# Patient Record
Sex: FEMALE | Race: WHITE | NOT HISPANIC OR LATINO | Employment: OTHER
[De-identification: names, ages, dates, MRNs, and addresses within clinical notes are randomized per-mention and may not be internally consistent; named-entity substitution may affect disease eponyms.]

---

## 2017-01-13 ENCOUNTER — RECORDS - HEALTHEAST (OUTPATIENT)
Dept: ADMINISTRATIVE | Facility: OTHER | Age: 72
End: 2017-01-13

## 2017-01-20 ENCOUNTER — RECORDS - HEALTHEAST (OUTPATIENT)
Dept: ADMINISTRATIVE | Facility: OTHER | Age: 72
End: 2017-01-20

## 2017-02-03 ENCOUNTER — HOSPITAL ENCOUNTER (OUTPATIENT)
Dept: CT IMAGING | Facility: CLINIC | Age: 72
Discharge: HOME | End: 2017-02-03
Attending: PHYSICIAN ASSISTANT

## 2017-02-03 DIAGNOSIS — M53.3 COCCYX PAIN: ICD-10-CM

## 2017-02-06 ENCOUNTER — RECORDS - HEALTHEAST (OUTPATIENT)
Dept: ADMINISTRATIVE | Facility: OTHER | Age: 72
End: 2017-02-06

## 2017-02-07 ENCOUNTER — RECORDS - HEALTHEAST (OUTPATIENT)
Dept: ADMINISTRATIVE | Facility: OTHER | Age: 72
End: 2017-02-07

## 2017-02-16 ENCOUNTER — OFFICE VISIT - HEALTHEAST (OUTPATIENT)
Dept: INTERNAL MEDICINE | Facility: CLINIC | Age: 72
End: 2017-02-16

## 2017-02-16 DIAGNOSIS — Z01.818 PREOP EXAMINATION: ICD-10-CM

## 2017-02-16 ASSESSMENT — MIFFLIN-ST. JEOR: SCORE: 1200.29

## 2017-02-23 ENCOUNTER — RECORDS - HEALTHEAST (OUTPATIENT)
Dept: ADMINISTRATIVE | Facility: OTHER | Age: 72
End: 2017-02-23

## 2017-03-10 ENCOUNTER — RECORDS - HEALTHEAST (OUTPATIENT)
Dept: ADMINISTRATIVE | Facility: OTHER | Age: 72
End: 2017-03-10

## 2017-03-14 ENCOUNTER — RECORDS - HEALTHEAST (OUTPATIENT)
Dept: ADMINISTRATIVE | Facility: OTHER | Age: 72
End: 2017-03-14

## 2017-03-17 ENCOUNTER — AMBULATORY - HEALTHEAST (OUTPATIENT)
Dept: INTERNAL MEDICINE | Facility: CLINIC | Age: 72
End: 2017-03-17

## 2017-03-17 ENCOUNTER — COMMUNICATION - HEALTHEAST (OUTPATIENT)
Dept: INTERNAL MEDICINE | Facility: CLINIC | Age: 72
End: 2017-03-17

## 2017-03-17 ENCOUNTER — ALLIED HEALTH/NURSE VISIT (OUTPATIENT)
Dept: NURSING | Facility: CLINIC | Age: 72
End: 2017-03-17
Payer: COMMERCIAL

## 2017-03-17 DIAGNOSIS — I10 BENIGN ESSENTIAL HYPERTENSION: Primary | ICD-10-CM

## 2017-03-17 PROCEDURE — 99207 ZZC NO CHARGE NURSE ONLY: CPT

## 2017-03-17 NOTE — MR AVS SNAPSHOT
"              After Visit Summary   3/17/2017    Jenny Iglesias    MRN: [id number]           Patient Information     Date Of Birth          1945        Visit Information        Provider Department      3/17/2017 1:00 PM HP RN/TRIAGE NURSE ONLY [facility]        Today's Diagnoses     Benign essential hypertension    -  1       Follow-ups after your visit        Who to contact     If you have questions or need follow up information about today's clinic visit or your schedule please contact [facility] directly at [phone].  Normal or non-critical lab and imaging results will be communicated to you by [employer], letter or phone within 4 business days after the clinic has received the results. If you do not hear from us within 7 days, please contact the clinic through [employer] or phone. If you have a critical or abnormal lab result, we will notify you by phone as soon as possible.  Submit refill requests through [employer] or call your pharmacy and they will forward the refill request to us. Please allow 3 business days for your refill to be completed.          Additional Information About Your Visit        MyChart Information     [employer] lets you send messages to your doctor, view your test results, renew your prescriptions, schedule appointments and more. To sign up, go to [email or web address] . Click on \"Log in\" on the left side of the screen, which will take you to the Welcome page. Then click on \"Sign up Now\" on the right side of the page.     You will be asked to enter the access code listed below, as well as some personal information. Please follow the directions to create your username and password.     Your access code is: [email or web address]  Expires: 6/15/2017  1:03 PM     Your access code will  in 90 days. If you need help or a new code, please call your [facility] or [phone].        Care EveryWhere ID     This is your Care EveryWhere ID. This could be " used by other organizations to access your [facility] medical records  [id number]         Blood Pressure from Last 3 Encounters:   11/11/13 118/68    Weight from Last 3 Encounters:   No data found for Wt              Today, you had the following     No orders found for display       Primary Care Provider Office Phone # Fax #    Ramesh Canales [phone]       [facility] [address] [address]        Thank you!     Thank you for choosing [facility]  for your care. Our goal is always to provide you with excellent care. Hearing back from our patients is one way we can continue to improve our services. Please take a few minutes to complete the written survey that you may receive in the mail after your visit with us. Thank you!             Your Updated Medication List - Protect others around you: Learn how to safely use, store and throw away your medicines at www.disposemymeds.org.          This list is accurate as of: 3/17/17  1:03 PM.  Always use your most recent med list.                   Brand Name Dispense Instructions for use    ALEVE 220 MG tablet   Generic drug:  naproxen sodium      Take 1 tablet by mouth 2 times daily (with meals)       AMBIEN PO      Take 5 mg by mouth At Bedtime       CALCIUM MAGNESIUM PO      Take 1 tablet by mouth daily       CENTRUM PO      Take 1 tablet by mouth daily       estradiol 0.1 MG/24HR WK patch    CLIMARA     Place 1 patch onto the skin once a week       HYZAAR 100-25 MG per tablet   Generic drug:  losartan-hydrochlorothiazide      Take 1 tablet by mouth daily       potassium chloride 10 MEQ tablet   Generic drug:  potassium chloride      Take 1 tablet by mouth daily       SINEMET  MG per tablet   Generic drug:  carbidopa-levodopa      Take 1 tablet by mouth At Bedtime

## 2017-03-17 NOTE — PROGRESS NOTES
S-(situation): [place] due to high BP reading of 204/92. Patient accompanied by . Patient denies weakness on either side of the body, no tongue deviation, no facial drooping or slurred speech. Patient denies visula changes, HA,  chest pain , dizziness or SOB. Patient states flushed and has ringing in ears. Patient has had increased hot flashes.   Vitals: BP recheck in clinic( 1st) 168/75, (2nd)173/80,  HR 52   T:97.7 SPO2 99% RA    B-(background): Patient states has had ringing in ears and has been flushed for the last week.    A-(assessment): Patient having some stress due to insurance not covering Estradiol patches for hot flashes and hot flashes have increased.     R-(recommendations): Patient needs to f/u with Dr. Canales, PCP at [facility] for BP/HTN check and possibly have meds adjusted.  Writer huddled with Dr. Joshi and in agreement with patient f/u w/PCP next week. Writer called Dr. Canaels office and updated MD to occurrence. Patient instructed to try destressing with activities patient finds calming such as essential oils, reading, deep breathing, movies etc.  Patient in agreement with plan and verbalizes understanding.   Thanks!   Yael Lewis RN

## 2017-04-03 ENCOUNTER — COMMUNICATION - HEALTHEAST (OUTPATIENT)
Dept: INTERNAL MEDICINE | Facility: CLINIC | Age: 72
End: 2017-04-03

## 2017-04-03 ENCOUNTER — OFFICE VISIT - HEALTHEAST (OUTPATIENT)
Dept: INTERNAL MEDICINE | Facility: CLINIC | Age: 72
End: 2017-04-03

## 2017-04-03 DIAGNOSIS — R10.9 ABDOMINAL PAIN: ICD-10-CM

## 2017-04-03 DIAGNOSIS — Z00.00 PREVENTATIVE HEALTH CARE: ICD-10-CM

## 2017-04-03 DIAGNOSIS — Z00.00 ROUTINE GENERAL MEDICAL EXAMINATION AT A HEALTH CARE FACILITY: ICD-10-CM

## 2017-04-03 DIAGNOSIS — Z78.0 POSTMENOPAUSAL: ICD-10-CM

## 2017-04-03 DIAGNOSIS — I10 ESSENTIAL HYPERTENSION: ICD-10-CM

## 2017-04-03 ASSESSMENT — MIFFLIN-ST. JEOR: SCORE: 1200.84

## 2017-04-10 ENCOUNTER — RECORDS - HEALTHEAST (OUTPATIENT)
Dept: ADMINISTRATIVE | Facility: OTHER | Age: 72
End: 2017-04-10

## 2017-04-18 ENCOUNTER — RECORDS - HEALTHEAST (OUTPATIENT)
Dept: ADMINISTRATIVE | Facility: OTHER | Age: 72
End: 2017-04-18

## 2017-04-19 ENCOUNTER — RECORDS - HEALTHEAST (OUTPATIENT)
Dept: ADMINISTRATIVE | Facility: OTHER | Age: 72
End: 2017-04-19

## 2017-04-24 ENCOUNTER — COMMUNICATION - HEALTHEAST (OUTPATIENT)
Dept: INTERNAL MEDICINE | Facility: CLINIC | Age: 72
End: 2017-04-24

## 2017-04-25 ENCOUNTER — RECORDS - HEALTHEAST (OUTPATIENT)
Dept: ADMINISTRATIVE | Facility: OTHER | Age: 72
End: 2017-04-25

## 2017-04-27 ENCOUNTER — COMMUNICATION - HEALTHEAST (OUTPATIENT)
Dept: INTERNAL MEDICINE | Facility: CLINIC | Age: 72
End: 2017-04-27

## 2017-05-02 ENCOUNTER — RECORDS - HEALTHEAST (OUTPATIENT)
Dept: ADMINISTRATIVE | Facility: OTHER | Age: 72
End: 2017-05-02

## 2017-05-05 ENCOUNTER — RECORDS - HEALTHEAST (OUTPATIENT)
Dept: ADMINISTRATIVE | Facility: OTHER | Age: 72
End: 2017-05-05

## 2017-05-15 ENCOUNTER — COMMUNICATION - HEALTHEAST (OUTPATIENT)
Dept: INTERNAL MEDICINE | Facility: CLINIC | Age: 72
End: 2017-05-15

## 2017-05-23 ENCOUNTER — RECORDS - HEALTHEAST (OUTPATIENT)
Dept: ADMINISTRATIVE | Facility: OTHER | Age: 72
End: 2017-05-23

## 2017-10-11 ENCOUNTER — AMBULATORY - HEALTHEAST (OUTPATIENT)
Dept: INTERNAL MEDICINE | Facility: CLINIC | Age: 72
End: 2017-10-11

## 2017-10-11 ENCOUNTER — OFFICE VISIT - HEALTHEAST (OUTPATIENT)
Dept: INTERNAL MEDICINE | Facility: CLINIC | Age: 72
End: 2017-10-11

## 2017-10-11 DIAGNOSIS — I10 ESSENTIAL HYPERTENSION: ICD-10-CM

## 2017-10-11 DIAGNOSIS — Z23 INFLUENZA VACCINE ADMINISTERED: ICD-10-CM

## 2017-10-11 DIAGNOSIS — R42 DIZZINESS: ICD-10-CM

## 2017-10-11 DIAGNOSIS — Z00.00 ROUTINE GENERAL MEDICAL EXAMINATION AT A HEALTH CARE FACILITY: ICD-10-CM

## 2017-10-11 DIAGNOSIS — F51.04 CHRONIC INSOMNIA: ICD-10-CM

## 2017-10-11 DIAGNOSIS — N95.1 PERIMENOPAUSAL VASOMOTOR SYMPTOMS: ICD-10-CM

## 2017-10-11 DIAGNOSIS — Z00.00 MEDICARE ANNUAL WELLNESS VISIT, SUBSEQUENT: ICD-10-CM

## 2017-10-11 LAB
CHOLEST SERPL-MCNC: 200 MG/DL
FASTING STATUS PATIENT QL REPORTED: YES
HDLC SERPL-MCNC: 93 MG/DL
LDLC SERPL CALC-MCNC: 92 MG/DL
TRIGL SERPL-MCNC: 76 MG/DL

## 2017-10-11 ASSESSMENT — MIFFLIN-ST. JEOR: SCORE: 1204.26

## 2017-10-12 ENCOUNTER — COMMUNICATION - HEALTHEAST (OUTPATIENT)
Dept: INTERNAL MEDICINE | Facility: CLINIC | Age: 72
End: 2017-10-12

## 2017-10-13 ENCOUNTER — COMMUNICATION - HEALTHEAST (OUTPATIENT)
Dept: INTERNAL MEDICINE | Facility: CLINIC | Age: 72
End: 2017-10-13

## 2017-10-17 ENCOUNTER — RECORDS - HEALTHEAST (OUTPATIENT)
Dept: ADMINISTRATIVE | Facility: OTHER | Age: 72
End: 2017-10-17

## 2017-10-20 ENCOUNTER — COMMUNICATION - HEALTHEAST (OUTPATIENT)
Dept: INTERNAL MEDICINE | Facility: CLINIC | Age: 72
End: 2017-10-20

## 2017-10-23 ENCOUNTER — COMMUNICATION - HEALTHEAST (OUTPATIENT)
Dept: INTERNAL MEDICINE | Facility: CLINIC | Age: 72
End: 2017-10-23

## 2017-10-23 DIAGNOSIS — I10 ESSENTIAL HYPERTENSION: ICD-10-CM

## 2017-12-11 ENCOUNTER — COMMUNICATION - HEALTHEAST (OUTPATIENT)
Dept: INTERNAL MEDICINE | Facility: CLINIC | Age: 72
End: 2017-12-11

## 2017-12-11 DIAGNOSIS — I10 ESSENTIAL HYPERTENSION: ICD-10-CM

## 2017-12-11 DIAGNOSIS — R42 DIZZINESS: ICD-10-CM

## 2017-12-11 DIAGNOSIS — G25.81 RESTLESS LEG SYNDROME: ICD-10-CM

## 2018-01-03 ENCOUNTER — RECORDS - HEALTHEAST (OUTPATIENT)
Dept: ADMINISTRATIVE | Facility: OTHER | Age: 73
End: 2018-01-03

## 2018-01-08 ENCOUNTER — RECORDS - HEALTHEAST (OUTPATIENT)
Dept: ADMINISTRATIVE | Facility: OTHER | Age: 73
End: 2018-01-08

## 2018-01-22 ENCOUNTER — RECORDS - HEALTHEAST (OUTPATIENT)
Dept: ADMINISTRATIVE | Facility: OTHER | Age: 73
End: 2018-01-22

## 2018-01-30 ENCOUNTER — RECORDS - HEALTHEAST (OUTPATIENT)
Dept: ADMINISTRATIVE | Facility: OTHER | Age: 73
End: 2018-01-30

## 2018-02-12 ENCOUNTER — RECORDS - HEALTHEAST (OUTPATIENT)
Dept: ADMINISTRATIVE | Facility: OTHER | Age: 73
End: 2018-02-12

## 2018-02-17 ENCOUNTER — COMMUNICATION - HEALTHEAST (OUTPATIENT)
Dept: INTERNAL MEDICINE | Facility: CLINIC | Age: 73
End: 2018-02-17

## 2018-02-19 ENCOUNTER — COMMUNICATION - HEALTHEAST (OUTPATIENT)
Dept: INTERNAL MEDICINE | Facility: CLINIC | Age: 73
End: 2018-02-19

## 2018-02-19 DIAGNOSIS — R42 DIZZINESS: ICD-10-CM

## 2018-03-12 ENCOUNTER — RECORDS - HEALTHEAST (OUTPATIENT)
Dept: ADMINISTRATIVE | Facility: OTHER | Age: 73
End: 2018-03-12

## 2018-03-22 ENCOUNTER — RECORDS - HEALTHEAST (OUTPATIENT)
Dept: ADMINISTRATIVE | Facility: OTHER | Age: 73
End: 2018-03-22

## 2018-04-19 ENCOUNTER — RECORDS - HEALTHEAST (OUTPATIENT)
Dept: ADMINISTRATIVE | Facility: OTHER | Age: 73
End: 2018-04-19

## 2018-05-01 ENCOUNTER — RECORDS - HEALTHEAST (OUTPATIENT)
Dept: ADMINISTRATIVE | Facility: OTHER | Age: 73
End: 2018-05-01

## 2018-05-04 ENCOUNTER — RECORDS - HEALTHEAST (OUTPATIENT)
Dept: ADMINISTRATIVE | Facility: OTHER | Age: 73
End: 2018-05-04

## 2018-05-14 ENCOUNTER — COMMUNICATION - HEALTHEAST (OUTPATIENT)
Dept: INTERNAL MEDICINE | Facility: CLINIC | Age: 73
End: 2018-05-14

## 2018-05-14 DIAGNOSIS — R42 DIZZINESS: ICD-10-CM

## 2018-05-14 DIAGNOSIS — F51.04 CHRONIC INSOMNIA: ICD-10-CM

## 2018-05-17 ENCOUNTER — RECORDS - HEALTHEAST (OUTPATIENT)
Dept: ADMINISTRATIVE | Facility: OTHER | Age: 73
End: 2018-05-17

## 2018-07-16 ENCOUNTER — COMMUNICATION - HEALTHEAST (OUTPATIENT)
Dept: INTERNAL MEDICINE | Facility: CLINIC | Age: 73
End: 2018-07-16

## 2018-08-15 ENCOUNTER — COMMUNICATION - HEALTHEAST (OUTPATIENT)
Dept: INTERNAL MEDICINE | Facility: CLINIC | Age: 73
End: 2018-08-15

## 2018-08-15 DIAGNOSIS — F51.04 CHRONIC INSOMNIA: ICD-10-CM

## 2018-08-15 DIAGNOSIS — R42 DIZZINESS: ICD-10-CM

## 2018-09-25 ENCOUNTER — RECORDS - HEALTHEAST (OUTPATIENT)
Dept: ADMINISTRATIVE | Facility: OTHER | Age: 73
End: 2018-09-25

## 2018-10-12 ENCOUNTER — COMMUNICATION - HEALTHEAST (OUTPATIENT)
Dept: INTERNAL MEDICINE | Facility: CLINIC | Age: 73
End: 2018-10-12

## 2018-10-12 ENCOUNTER — RECORDS - HEALTHEAST (OUTPATIENT)
Dept: ADMINISTRATIVE | Facility: OTHER | Age: 73
End: 2018-10-12

## 2018-10-12 ENCOUNTER — OFFICE VISIT - HEALTHEAST (OUTPATIENT)
Dept: INTERNAL MEDICINE | Facility: CLINIC | Age: 73
End: 2018-10-12

## 2018-10-12 DIAGNOSIS — M53.3 COCCYDYNIA: ICD-10-CM

## 2018-10-12 DIAGNOSIS — I10 ESSENTIAL HYPERTENSION: ICD-10-CM

## 2018-10-12 DIAGNOSIS — N95.1 PERIMENOPAUSAL VASOMOTOR SYMPTOMS: ICD-10-CM

## 2018-10-12 DIAGNOSIS — Z00.00 MEDICARE ANNUAL WELLNESS VISIT, SUBSEQUENT: ICD-10-CM

## 2018-10-12 DIAGNOSIS — R42 DIZZINESS: ICD-10-CM

## 2018-10-12 DIAGNOSIS — Z23 NEED FOR PROPHYLACTIC VACCINATION AND INOCULATION AGAINST INFLUENZA: ICD-10-CM

## 2018-10-12 DIAGNOSIS — F51.04 CHRONIC INSOMNIA: ICD-10-CM

## 2018-10-12 DIAGNOSIS — H81.10 BPV (BENIGN POSITIONAL VERTIGO): ICD-10-CM

## 2018-10-12 DIAGNOSIS — G25.81 RESTLESS LEG SYNDROME: ICD-10-CM

## 2018-10-12 LAB
ALBUMIN SERPL-MCNC: 3.7 G/DL (ref 3.5–5)
ALBUMIN UR-MCNC: NEGATIVE MG/DL
ALP SERPL-CCNC: 118 U/L (ref 45–120)
ALT SERPL W P-5'-P-CCNC: 16 U/L (ref 0–45)
ANION GAP SERPL CALCULATED.3IONS-SCNC: 10 MMOL/L (ref 5–18)
APPEARANCE UR: CLEAR
AST SERPL W P-5'-P-CCNC: 20 U/L (ref 0–40)
ATRIAL RATE - MUSE: 51 BPM
BASOPHILS # BLD AUTO: 0.1 THOU/UL (ref 0–0.2)
BASOPHILS NFR BLD AUTO: 1 % (ref 0–2)
BILIRUB DIRECT SERPL-MCNC: 0.2 MG/DL
BILIRUB SERPL-MCNC: 0.5 MG/DL (ref 0–1)
BILIRUB UR QL STRIP: NEGATIVE
BUN SERPL-MCNC: 20 MG/DL (ref 8–28)
CALCIUM SERPL-MCNC: 10.6 MG/DL (ref 8.5–10.5)
CHLORIDE BLD-SCNC: 104 MMOL/L (ref 98–107)
CHOLEST SERPL-MCNC: 205 MG/DL
CO2 SERPL-SCNC: 28 MMOL/L (ref 22–31)
COLOR UR AUTO: YELLOW
CREAT SERPL-MCNC: 1 MG/DL (ref 0.6–1.1)
DIASTOLIC BLOOD PRESSURE - MUSE: NORMAL MMHG
EOSINOPHIL # BLD AUTO: 0.3 THOU/UL (ref 0–0.4)
EOSINOPHIL NFR BLD AUTO: 3 % (ref 0–6)
ERYTHROCYTE [DISTWIDTH] IN BLOOD BY AUTOMATED COUNT: 13.1 % (ref 11–14.5)
ERYTHROCYTE [SEDIMENTATION RATE] IN BLOOD BY WESTERGREN METHOD: 5 MM/HR (ref 0–20)
FASTING STATUS PATIENT QL REPORTED: ABNORMAL
GFR SERPL CREATININE-BSD FRML MDRD: 54 ML/MIN/1.73M2
GLUCOSE BLD-MCNC: 91 MG/DL (ref 70–125)
GLUCOSE UR STRIP-MCNC: NEGATIVE MG/DL
HCT VFR BLD AUTO: 46.9 % (ref 35–47)
HDLC SERPL-MCNC: 91 MG/DL
HGB BLD-MCNC: 15.6 G/DL (ref 12–16)
HGB UR QL STRIP: NEGATIVE
INTERPRETATION ECG - MUSE: NORMAL
KETONES UR STRIP-MCNC: NEGATIVE MG/DL
LDLC SERPL CALC-MCNC: 99 MG/DL
LEUKOCYTE ESTERASE UR QL STRIP: NEGATIVE
LYMPHOCYTES # BLD AUTO: 2.9 THOU/UL (ref 0.8–4.4)
LYMPHOCYTES NFR BLD AUTO: 36 % (ref 20–40)
MCH RBC QN AUTO: 31.5 PG (ref 27–34)
MCHC RBC AUTO-ENTMCNC: 33.3 G/DL (ref 32–36)
MCV RBC AUTO: 95 FL (ref 80–100)
MONOCYTES # BLD AUTO: 0.5 THOU/UL (ref 0–0.9)
MONOCYTES NFR BLD AUTO: 7 % (ref 2–10)
NEUTROPHILS # BLD AUTO: 4.3 THOU/UL (ref 2–7.7)
NEUTROPHILS NFR BLD AUTO: 53 % (ref 50–70)
NITRATE UR QL: NEGATIVE
P AXIS - MUSE: 73 DEGREES
PH UR STRIP: 7 [PH] (ref 5–8)
PLATELET # BLD AUTO: 223 THOU/UL (ref 140–440)
PMV BLD AUTO: 8.1 FL (ref 7–10)
POTASSIUM BLD-SCNC: 5.4 MMOL/L (ref 3.5–5)
PR INTERVAL - MUSE: 168 MS
PROT SERPL-MCNC: 6.6 G/DL (ref 6–8)
QRS DURATION - MUSE: 84 MS
QT - MUSE: 438 MS
QTC - MUSE: 403 MS
R AXIS - MUSE: 20 DEGREES
RBC # BLD AUTO: 4.96 MILL/UL (ref 3.8–5.4)
SODIUM SERPL-SCNC: 142 MMOL/L (ref 136–145)
SP GR UR STRIP: 1.02 (ref 1–1.03)
SYSTOLIC BLOOD PRESSURE - MUSE: NORMAL MMHG
T AXIS - MUSE: 25 DEGREES
TRIGL SERPL-MCNC: 75 MG/DL
TSH SERPL DL<=0.005 MIU/L-ACNC: 1.67 UIU/ML (ref 0.3–5)
UROBILINOGEN UR STRIP-ACNC: NORMAL
VENTRICULAR RATE- MUSE: 51 BPM
WBC: 8.1 THOU/UL (ref 4–11)

## 2018-10-12 ASSESSMENT — MIFFLIN-ST. JEOR: SCORE: 1204.26

## 2018-10-15 LAB — 25(OH)D3 SERPL-MCNC: 39.1 NG/ML (ref 30–80)

## 2018-10-30 ENCOUNTER — RECORDS - HEALTHEAST (OUTPATIENT)
Dept: ADMINISTRATIVE | Facility: OTHER | Age: 73
End: 2018-10-30

## 2018-11-05 ENCOUNTER — COMMUNICATION - HEALTHEAST (OUTPATIENT)
Dept: INTERNAL MEDICINE | Facility: CLINIC | Age: 73
End: 2018-11-05

## 2018-11-05 DIAGNOSIS — M53.3 COCCYDYNIA: ICD-10-CM

## 2018-11-09 ENCOUNTER — RECORDS - HEALTHEAST (OUTPATIENT)
Dept: MAMMOGRAPHY | Facility: CLINIC | Age: 73
End: 2018-11-09

## 2018-11-09 DIAGNOSIS — Z12.31 ENCOUNTER FOR SCREENING MAMMOGRAM FOR MALIGNANT NEOPLASM OF BREAST: ICD-10-CM

## 2018-11-14 ENCOUNTER — RECORDS - HEALTHEAST (OUTPATIENT)
Dept: RADIOLOGY | Facility: CLINIC | Age: 73
End: 2018-11-14

## 2018-11-14 ENCOUNTER — RECORDS - HEALTHEAST (OUTPATIENT)
Dept: ADMINISTRATIVE | Facility: OTHER | Age: 73
End: 2018-11-14

## 2018-11-27 ENCOUNTER — RECORDS - HEALTHEAST (OUTPATIENT)
Dept: ADMINISTRATIVE | Facility: OTHER | Age: 73
End: 2018-11-27

## 2018-12-31 ENCOUNTER — RECORDS - HEALTHEAST (OUTPATIENT)
Dept: ADMINISTRATIVE | Facility: OTHER | Age: 73
End: 2018-12-31

## 2019-01-15 ENCOUNTER — RECORDS - HEALTHEAST (OUTPATIENT)
Dept: ADMINISTRATIVE | Facility: OTHER | Age: 74
End: 2019-01-15

## 2019-02-26 ENCOUNTER — RECORDS - HEALTHEAST (OUTPATIENT)
Dept: ADMINISTRATIVE | Facility: OTHER | Age: 74
End: 2019-02-26

## 2019-03-15 ENCOUNTER — OFFICE VISIT - HEALTHEAST (OUTPATIENT)
Dept: INTERNAL MEDICINE | Facility: CLINIC | Age: 74
End: 2019-03-15

## 2019-03-15 ENCOUNTER — AMBULATORY - HEALTHEAST (OUTPATIENT)
Dept: INTERNAL MEDICINE | Facility: CLINIC | Age: 74
End: 2019-03-15

## 2019-03-15 DIAGNOSIS — N95.1 PERIMENOPAUSAL VASOMOTOR SYMPTOMS: ICD-10-CM

## 2019-03-15 DIAGNOSIS — F51.04 CHRONIC INSOMNIA: ICD-10-CM

## 2019-03-15 DIAGNOSIS — M53.3 COCCYDYNIA: ICD-10-CM

## 2019-03-15 DIAGNOSIS — I10 ESSENTIAL HYPERTENSION: ICD-10-CM

## 2019-03-15 LAB
ALBUMIN SERPL-MCNC: 4 G/DL (ref 3.5–5)
ANION GAP SERPL CALCULATED.3IONS-SCNC: 13 MMOL/L (ref 5–18)
BUN SERPL-MCNC: 21 MG/DL (ref 8–28)
CALCIUM SERPL-MCNC: 10.9 MG/DL (ref 8.5–10.5)
CHLORIDE BLD-SCNC: 103 MMOL/L (ref 98–107)
CO2 SERPL-SCNC: 25 MMOL/L (ref 22–31)
CREAT SERPL-MCNC: 1.19 MG/DL (ref 0.6–1.1)
GFR SERPL CREATININE-BSD FRML MDRD: 44 ML/MIN/1.73M2
GLUCOSE BLD-MCNC: 142 MG/DL (ref 70–125)
PHOSPHATE SERPL-MCNC: 2.4 MG/DL (ref 2.5–4.5)
POTASSIUM BLD-SCNC: 4.4 MMOL/L (ref 3.5–5)
SODIUM SERPL-SCNC: 141 MMOL/L (ref 136–145)

## 2019-03-15 ASSESSMENT — MIFFLIN-ST. JEOR: SCORE: 1186.48

## 2019-03-17 ENCOUNTER — COMMUNICATION - HEALTHEAST (OUTPATIENT)
Dept: INTERNAL MEDICINE | Facility: CLINIC | Age: 74
End: 2019-03-17

## 2019-03-17 DIAGNOSIS — N95.1 PERIMENOPAUSAL VASOMOTOR SYMPTOMS: ICD-10-CM

## 2019-03-20 ENCOUNTER — RECORDS - HEALTHEAST (OUTPATIENT)
Dept: ADMINISTRATIVE | Facility: OTHER | Age: 74
End: 2019-03-20

## 2019-04-01 ENCOUNTER — COMMUNICATION - HEALTHEAST (OUTPATIENT)
Dept: INTERNAL MEDICINE | Facility: CLINIC | Age: 74
End: 2019-04-01

## 2019-04-01 DIAGNOSIS — R42 DIZZINESS: ICD-10-CM

## 2019-04-01 DIAGNOSIS — F51.04 CHRONIC INSOMNIA: ICD-10-CM

## 2019-04-24 ENCOUNTER — RECORDS - HEALTHEAST (OUTPATIENT)
Dept: ADMINISTRATIVE | Facility: OTHER | Age: 74
End: 2019-04-24

## 2019-05-06 ENCOUNTER — COMMUNICATION - HEALTHEAST (OUTPATIENT)
Dept: INTERNAL MEDICINE | Facility: CLINIC | Age: 74
End: 2019-05-06

## 2019-05-06 DIAGNOSIS — I65.29 CAROTID STENOSIS, ASYMPTOMATIC: ICD-10-CM

## 2019-06-06 ENCOUNTER — COMMUNICATION - HEALTHEAST (OUTPATIENT)
Dept: INTERNAL MEDICINE | Facility: CLINIC | Age: 74
End: 2019-06-06

## 2019-06-06 DIAGNOSIS — R42 DIZZINESS: ICD-10-CM

## 2019-06-10 ENCOUNTER — COMMUNICATION - HEALTHEAST (OUTPATIENT)
Dept: SCHEDULING | Facility: CLINIC | Age: 74
End: 2019-06-10

## 2019-06-10 DIAGNOSIS — R42 DIZZINESS: ICD-10-CM

## 2019-06-11 ENCOUNTER — COMMUNICATION - HEALTHEAST (OUTPATIENT)
Dept: INTERNAL MEDICINE | Facility: CLINIC | Age: 74
End: 2019-06-11

## 2019-06-11 DIAGNOSIS — R42 DIZZINESS: ICD-10-CM

## 2019-07-05 ENCOUNTER — COMMUNICATION - HEALTHEAST (OUTPATIENT)
Dept: INTERNAL MEDICINE | Facility: CLINIC | Age: 74
End: 2019-07-05

## 2019-07-05 DIAGNOSIS — F51.04 CHRONIC INSOMNIA: ICD-10-CM

## 2019-09-08 ENCOUNTER — RECORDS - HEALTHEAST (OUTPATIENT)
Dept: ADMINISTRATIVE | Facility: OTHER | Age: 74
End: 2019-09-08

## 2019-09-08 LAB — HBA1C MFR BLD: 6 % (ref 4–5.6)

## 2019-09-09 ENCOUNTER — RECORDS - HEALTHEAST (OUTPATIENT)
Dept: ADMINISTRATIVE | Facility: OTHER | Age: 74
End: 2019-09-09

## 2019-09-10 ENCOUNTER — RECORDS - HEALTHEAST (OUTPATIENT)
Dept: ADMINISTRATIVE | Facility: OTHER | Age: 74
End: 2019-09-10

## 2019-09-30 ENCOUNTER — RECORDS - HEALTHEAST (OUTPATIENT)
Dept: HEALTH INFORMATION MANAGEMENT | Facility: CLINIC | Age: 74
End: 2019-09-30

## 2019-10-16 ENCOUNTER — COMMUNICATION - HEALTHEAST (OUTPATIENT)
Dept: INTERNAL MEDICINE | Facility: CLINIC | Age: 74
End: 2019-10-16

## 2019-10-16 ENCOUNTER — RECORDS - HEALTHEAST (OUTPATIENT)
Dept: GENERAL RADIOLOGY | Facility: CLINIC | Age: 74
End: 2019-10-16

## 2019-10-16 ENCOUNTER — OFFICE VISIT - HEALTHEAST (OUTPATIENT)
Dept: INTERNAL MEDICINE | Facility: CLINIC | Age: 74
End: 2019-10-16

## 2019-10-16 DIAGNOSIS — Z23 FLU VACCINE NEED: ICD-10-CM

## 2019-10-16 DIAGNOSIS — G89.29 CHRONIC BILATERAL LOW BACK PAIN WITHOUT SCIATICA: ICD-10-CM

## 2019-10-16 DIAGNOSIS — N95.1 PERIMENOPAUSAL VASOMOTOR SYMPTOMS: ICD-10-CM

## 2019-10-16 DIAGNOSIS — M89.9 BONE DISORDER: ICD-10-CM

## 2019-10-16 DIAGNOSIS — I10 ESSENTIAL HYPERTENSION: ICD-10-CM

## 2019-10-16 DIAGNOSIS — R23.2 VASOMOTOR FLUSHING: ICD-10-CM

## 2019-10-16 DIAGNOSIS — R91.8 PULMONARY NODULES: ICD-10-CM

## 2019-10-16 DIAGNOSIS — G45.9 TIA (TRANSIENT ISCHEMIC ATTACK): ICD-10-CM

## 2019-10-16 DIAGNOSIS — F51.04 CHRONIC INSOMNIA: ICD-10-CM

## 2019-10-16 DIAGNOSIS — I65.23 ASYMPTOMATIC BILATERAL CAROTID ARTERY STENOSIS: ICD-10-CM

## 2019-10-16 DIAGNOSIS — Z00.00 MEDICARE ANNUAL WELLNESS VISIT, SUBSEQUENT: ICD-10-CM

## 2019-10-16 DIAGNOSIS — E78.5 HYPERLIPIDEMIA, UNSPECIFIED HYPERLIPIDEMIA TYPE: ICD-10-CM

## 2019-10-16 DIAGNOSIS — N18.30 CKD (CHRONIC KIDNEY DISEASE) STAGE 3, GFR 30-59 ML/MIN (H): ICD-10-CM

## 2019-10-16 DIAGNOSIS — M54.50 CHRONIC BILATERAL LOW BACK PAIN WITHOUT SCIATICA: ICD-10-CM

## 2019-10-16 DIAGNOSIS — R91.8 OTHER NONSPECIFIC ABNORMAL FINDING OF LUNG FIELD: ICD-10-CM

## 2019-10-16 LAB
ALBUMIN UR-MCNC: NEGATIVE MG/DL
ANION GAP SERPL CALCULATED.3IONS-SCNC: 8 MMOL/L (ref 5–18)
APPEARANCE UR: CLEAR
BACTERIA #/AREA URNS HPF: ABNORMAL HPF
BILIRUB UR QL STRIP: NEGATIVE
BUN SERPL-MCNC: 15 MG/DL (ref 8–28)
CALCIUM SERPL-MCNC: 10.8 MG/DL (ref 8.5–10.5)
CHLORIDE BLD-SCNC: 103 MMOL/L (ref 98–107)
CO2 SERPL-SCNC: 29 MMOL/L (ref 22–31)
COLOR UR AUTO: ABNORMAL
CREAT SERPL-MCNC: 1.14 MG/DL (ref 0.6–1.1)
GFR SERPL CREATININE-BSD FRML MDRD: 46 ML/MIN/1.73M2
GLUCOSE BLD-MCNC: 95 MG/DL (ref 70–125)
GLUCOSE UR STRIP-MCNC: NEGATIVE MG/DL
HGB UR QL STRIP: NEGATIVE
KETONES UR STRIP-MCNC: NEGATIVE MG/DL
LEUKOCYTE ESTERASE UR QL STRIP: ABNORMAL
MUCOUS THREADS #/AREA URNS LPF: ABNORMAL LPF
NITRATE UR QL: NEGATIVE
PH UR STRIP: 5 [PH] (ref 4.5–8)
POTASSIUM BLD-SCNC: 4.5 MMOL/L (ref 3.5–5)
RBC #/AREA URNS AUTO: ABNORMAL HPF
SODIUM SERPL-SCNC: 140 MMOL/L (ref 136–145)
SP GR UR STRIP: 1.01 (ref 1–1.03)
SQUAMOUS #/AREA URNS AUTO: ABNORMAL LPF
TSH SERPL DL<=0.005 MIU/L-ACNC: 1.15 UIU/ML (ref 0.3–5)
UROBILINOGEN UR STRIP-ACNC: ABNORMAL
WBC #/AREA URNS AUTO: ABNORMAL HPF

## 2019-10-16 ASSESSMENT — MIFFLIN-ST. JEOR: SCORE: 1164

## 2019-10-17 LAB
25(OH)D3 SERPL-MCNC: 48.6 NG/ML (ref 30–80)
25(OH)D3 SERPL-MCNC: 48.6 NG/ML (ref 30–80)
BACTERIA SPEC CULT: NORMAL

## 2019-10-18 ENCOUNTER — COMMUNICATION - HEALTHEAST (OUTPATIENT)
Dept: INTERNAL MEDICINE | Facility: CLINIC | Age: 74
End: 2019-10-18

## 2019-10-22 ENCOUNTER — COMMUNICATION - HEALTHEAST (OUTPATIENT)
Dept: INTERNAL MEDICINE | Facility: CLINIC | Age: 74
End: 2019-10-22

## 2019-10-23 ENCOUNTER — AMBULATORY - HEALTHEAST (OUTPATIENT)
Dept: INTERNAL MEDICINE | Facility: CLINIC | Age: 74
End: 2019-10-23

## 2019-10-23 DIAGNOSIS — N95.1 PERIMENOPAUSAL VASOMOTOR SYMPTOMS: ICD-10-CM

## 2019-10-31 ENCOUNTER — RECORDS - HEALTHEAST (OUTPATIENT)
Dept: ADMINISTRATIVE | Facility: OTHER | Age: 74
End: 2019-10-31

## 2019-11-08 ENCOUNTER — RECORDS - HEALTHEAST (OUTPATIENT)
Dept: ADMINISTRATIVE | Facility: OTHER | Age: 74
End: 2019-11-08

## 2019-11-13 ENCOUNTER — COMMUNICATION - HEALTHEAST (OUTPATIENT)
Dept: INTERNAL MEDICINE | Facility: CLINIC | Age: 74
End: 2019-11-13

## 2019-11-14 ENCOUNTER — AMBULATORY - HEALTHEAST (OUTPATIENT)
Dept: INTERNAL MEDICINE | Facility: CLINIC | Age: 74
End: 2019-11-14

## 2019-11-14 DIAGNOSIS — N18.30 CKD (CHRONIC KIDNEY DISEASE) STAGE 3, GFR 30-59 ML/MIN (H): ICD-10-CM

## 2019-11-14 DIAGNOSIS — G89.29 CHRONIC BILATERAL LOW BACK PAIN WITHOUT SCIATICA: ICD-10-CM

## 2019-11-14 DIAGNOSIS — M54.50 CHRONIC BILATERAL LOW BACK PAIN WITHOUT SCIATICA: ICD-10-CM

## 2019-11-26 ENCOUNTER — RECORDS - HEALTHEAST (OUTPATIENT)
Dept: ADMINISTRATIVE | Facility: OTHER | Age: 74
End: 2019-11-26

## 2019-11-27 ENCOUNTER — COMMUNICATION - HEALTHEAST (OUTPATIENT)
Dept: INTERNAL MEDICINE | Facility: CLINIC | Age: 74
End: 2019-11-27

## 2019-12-10 ENCOUNTER — OFFICE VISIT - HEALTHEAST (OUTPATIENT)
Dept: INTERNAL MEDICINE | Facility: CLINIC | Age: 74
End: 2019-12-10

## 2019-12-10 DIAGNOSIS — M19.90 ARTHRITIS: ICD-10-CM

## 2019-12-10 DIAGNOSIS — Z01.818 PREOP EXAM FOR INTERNAL MEDICINE: ICD-10-CM

## 2019-12-10 DIAGNOSIS — M54.59 INTRACTABLE LOW BACK PAIN: ICD-10-CM

## 2019-12-10 DIAGNOSIS — I10 ESSENTIAL HYPERTENSION: ICD-10-CM

## 2019-12-10 LAB
ANION GAP SERPL CALCULATED.3IONS-SCNC: 7 MMOL/L (ref 5–18)
BUN SERPL-MCNC: 12 MG/DL (ref 8–28)
CALCIUM SERPL-MCNC: 9.9 MG/DL (ref 8.5–10.5)
CHLORIDE BLD-SCNC: 101 MMOL/L (ref 98–107)
CO2 SERPL-SCNC: 31 MMOL/L (ref 22–31)
CREAT SERPL-MCNC: 1.03 MG/DL (ref 0.6–1.1)
GFR SERPL CREATININE-BSD FRML MDRD: 52 ML/MIN/1.73M2
GLUCOSE BLD-MCNC: 91 MG/DL (ref 70–125)
POTASSIUM BLD-SCNC: 4 MMOL/L (ref 3.5–5)
SODIUM SERPL-SCNC: 139 MMOL/L (ref 136–145)

## 2019-12-10 ASSESSMENT — MIFFLIN-ST. JEOR: SCORE: 1168.54

## 2019-12-11 ENCOUNTER — COMMUNICATION - HEALTHEAST (OUTPATIENT)
Dept: INTERNAL MEDICINE | Facility: CLINIC | Age: 74
End: 2019-12-11

## 2020-01-09 ENCOUNTER — OFFICE VISIT - HEALTHEAST (OUTPATIENT)
Dept: INTERNAL MEDICINE | Facility: CLINIC | Age: 75
End: 2020-01-09

## 2020-01-09 DIAGNOSIS — G89.29 CHRONIC BILATERAL LOW BACK PAIN WITHOUT SCIATICA: ICD-10-CM

## 2020-01-09 DIAGNOSIS — N18.2 CKD (CHRONIC KIDNEY DISEASE) STAGE 2, GFR 60-89 ML/MIN: ICD-10-CM

## 2020-01-09 DIAGNOSIS — M54.50 CHRONIC BILATERAL LOW BACK PAIN WITHOUT SCIATICA: ICD-10-CM

## 2020-01-09 DIAGNOSIS — F51.04 CHRONIC INSOMNIA: ICD-10-CM

## 2020-01-09 DIAGNOSIS — I10 ESSENTIAL HYPERTENSION: ICD-10-CM

## 2020-01-09 ASSESSMENT — MIFFLIN-ST. JEOR: SCORE: 1173.44

## 2020-01-10 ENCOUNTER — COMMUNICATION - HEALTHEAST (OUTPATIENT)
Dept: INTERNAL MEDICINE | Facility: CLINIC | Age: 75
End: 2020-01-10

## 2020-01-10 DIAGNOSIS — F51.04 CHRONIC INSOMNIA: ICD-10-CM

## 2020-01-10 DIAGNOSIS — R42 DIZZINESS: ICD-10-CM

## 2020-01-13 ENCOUNTER — COMMUNICATION - HEALTHEAST (OUTPATIENT)
Dept: INTERNAL MEDICINE | Facility: CLINIC | Age: 75
End: 2020-01-13

## 2020-01-13 DIAGNOSIS — G25.81 RESTLESS LEG SYNDROME: ICD-10-CM

## 2020-01-16 ENCOUNTER — COMMUNICATION - HEALTHEAST (OUTPATIENT)
Dept: INTERNAL MEDICINE | Facility: CLINIC | Age: 75
End: 2020-01-16

## 2020-01-20 ENCOUNTER — COMMUNICATION - HEALTHEAST (OUTPATIENT)
Dept: INTERNAL MEDICINE | Facility: CLINIC | Age: 75
End: 2020-01-20

## 2020-02-11 ENCOUNTER — OFFICE VISIT - HEALTHEAST (OUTPATIENT)
Dept: INTERNAL MEDICINE | Facility: CLINIC | Age: 75
End: 2020-02-11

## 2020-02-11 DIAGNOSIS — Z01.818 PREOP EXAM FOR INTERNAL MEDICINE: ICD-10-CM

## 2020-02-11 DIAGNOSIS — G89.29 CHRONIC INTRACTABLE PAIN: ICD-10-CM

## 2020-02-11 DIAGNOSIS — I10 ESSENTIAL HYPERTENSION: ICD-10-CM

## 2020-02-11 ASSESSMENT — MIFFLIN-ST. JEOR: SCORE: 1186.68

## 2020-02-21 ENCOUNTER — COMMUNICATION - HEALTHEAST (OUTPATIENT)
Dept: INTERNAL MEDICINE | Facility: CLINIC | Age: 75
End: 2020-02-21

## 2020-02-21 ENCOUNTER — OFFICE VISIT - HEALTHEAST (OUTPATIENT)
Dept: INTERNAL MEDICINE | Facility: CLINIC | Age: 75
End: 2020-02-21

## 2020-02-21 ENCOUNTER — RECORDS - HEALTHEAST (OUTPATIENT)
Dept: ADMINISTRATIVE | Facility: OTHER | Age: 75
End: 2020-02-21

## 2020-02-21 DIAGNOSIS — R42 VERTIGO: ICD-10-CM

## 2020-02-21 ASSESSMENT — MIFFLIN-ST. JEOR: SCORE: 1164

## 2020-03-02 ENCOUNTER — RECORDS - HEALTHEAST (OUTPATIENT)
Dept: ADMINISTRATIVE | Facility: OTHER | Age: 75
End: 2020-03-02

## 2020-03-04 ENCOUNTER — AMBULATORY - HEALTHEAST (OUTPATIENT)
Dept: CARE COORDINATION | Facility: CLINIC | Age: 75
End: 2020-03-04

## 2020-03-04 DIAGNOSIS — I65.29 CAROTID STENOSIS, ASYMPTOMATIC: ICD-10-CM

## 2020-03-04 DIAGNOSIS — I10 ESSENTIAL HYPERTENSION: ICD-10-CM

## 2020-03-05 ENCOUNTER — COMMUNICATION - HEALTHEAST (OUTPATIENT)
Dept: NURSING | Facility: CLINIC | Age: 75
End: 2020-03-05

## 2020-04-11 ENCOUNTER — COMMUNICATION - HEALTHEAST (OUTPATIENT)
Dept: INTERNAL MEDICINE | Facility: CLINIC | Age: 75
End: 2020-04-11

## 2020-04-11 DIAGNOSIS — N18.2 CKD (CHRONIC KIDNEY DISEASE) STAGE 2, GFR 60-89 ML/MIN: ICD-10-CM

## 2020-04-11 DIAGNOSIS — M54.50 CHRONIC BILATERAL LOW BACK PAIN WITHOUT SCIATICA: ICD-10-CM

## 2020-04-11 DIAGNOSIS — I10 ESSENTIAL HYPERTENSION: ICD-10-CM

## 2020-04-11 DIAGNOSIS — G89.29 CHRONIC BILATERAL LOW BACK PAIN WITHOUT SCIATICA: ICD-10-CM

## 2020-04-11 DIAGNOSIS — R42 DIZZINESS: ICD-10-CM

## 2020-05-26 ENCOUNTER — RECORDS - HEALTHEAST (OUTPATIENT)
Dept: ADMINISTRATIVE | Facility: OTHER | Age: 75
End: 2020-05-26

## 2020-07-07 ENCOUNTER — COMMUNICATION - HEALTHEAST (OUTPATIENT)
Dept: INTERNAL MEDICINE | Facility: CLINIC | Age: 75
End: 2020-07-07

## 2020-07-07 DIAGNOSIS — N18.2 CKD (CHRONIC KIDNEY DISEASE) STAGE 2, GFR 60-89 ML/MIN: ICD-10-CM

## 2020-07-07 DIAGNOSIS — I10 ESSENTIAL HYPERTENSION: ICD-10-CM

## 2020-07-07 DIAGNOSIS — M54.50 CHRONIC BILATERAL LOW BACK PAIN WITHOUT SCIATICA: ICD-10-CM

## 2020-07-07 DIAGNOSIS — I65.29 CAROTID STENOSIS, ASYMPTOMATIC: ICD-10-CM

## 2020-07-07 DIAGNOSIS — R42 DIZZINESS: ICD-10-CM

## 2020-07-07 DIAGNOSIS — F51.04 CHRONIC INSOMNIA: ICD-10-CM

## 2020-07-07 DIAGNOSIS — G89.29 CHRONIC BILATERAL LOW BACK PAIN WITHOUT SCIATICA: ICD-10-CM

## 2020-07-07 DIAGNOSIS — G25.81 RESTLESS LEG SYNDROME: ICD-10-CM

## 2020-07-20 ENCOUNTER — COMMUNICATION - HEALTHEAST (OUTPATIENT)
Dept: INTERNAL MEDICINE | Facility: CLINIC | Age: 75
End: 2020-07-20

## 2020-07-20 ENCOUNTER — RECORDS - HEALTHEAST (OUTPATIENT)
Dept: INTERNAL MEDICINE | Facility: CLINIC | Age: 75
End: 2020-07-20

## 2020-07-20 DIAGNOSIS — I10 ESSENTIAL HYPERTENSION: ICD-10-CM

## 2020-07-28 ENCOUNTER — RECORDS - HEALTHEAST (OUTPATIENT)
Dept: ADMINISTRATIVE | Facility: OTHER | Age: 75
End: 2020-07-28

## 2020-09-22 ENCOUNTER — RECORDS - HEALTHEAST (OUTPATIENT)
Dept: ADMINISTRATIVE | Facility: OTHER | Age: 75
End: 2020-09-22

## 2020-09-28 ENCOUNTER — AMBULATORY - HEALTHEAST (OUTPATIENT)
Dept: INTERNAL MEDICINE | Facility: CLINIC | Age: 75
End: 2020-09-28

## 2020-09-28 ENCOUNTER — COMMUNICATION - HEALTHEAST (OUTPATIENT)
Dept: LAB | Facility: CLINIC | Age: 75
End: 2020-09-28

## 2020-09-28 DIAGNOSIS — E78.2 MIXED HYPERLIPIDEMIA: ICD-10-CM

## 2020-09-28 DIAGNOSIS — E55.9 VITAMIN D INSUFFICIENCY: ICD-10-CM

## 2020-10-07 ENCOUNTER — OFFICE VISIT - HEALTHEAST (OUTPATIENT)
Dept: INTERNAL MEDICINE | Facility: CLINIC | Age: 75
End: 2020-10-07

## 2020-10-07 DIAGNOSIS — I65.23 ASYMPTOMATIC BILATERAL CAROTID ARTERY STENOSIS: ICD-10-CM

## 2020-10-07 DIAGNOSIS — R42 VERTIGO: ICD-10-CM

## 2020-10-07 DIAGNOSIS — F51.04 CHRONIC INSOMNIA: ICD-10-CM

## 2020-10-07 DIAGNOSIS — I10 ESSENTIAL HYPERTENSION: ICD-10-CM

## 2020-10-07 DIAGNOSIS — R42 DIZZINESS: ICD-10-CM

## 2020-10-07 DIAGNOSIS — G89.29 OTHER CHRONIC PAIN: ICD-10-CM

## 2020-10-08 ENCOUNTER — AMBULATORY - HEALTHEAST (OUTPATIENT)
Dept: LAB | Facility: CLINIC | Age: 75
End: 2020-10-08

## 2020-10-08 DIAGNOSIS — E78.2 MIXED HYPERLIPIDEMIA: ICD-10-CM

## 2020-10-08 DIAGNOSIS — E55.9 VITAMIN D INSUFFICIENCY: ICD-10-CM

## 2020-10-08 LAB
ALBUMIN SERPL-MCNC: 3.3 G/DL (ref 3.5–5)
ALP SERPL-CCNC: 193 U/L (ref 45–120)
ALT SERPL W P-5'-P-CCNC: 23 U/L (ref 0–45)
ANION GAP SERPL CALCULATED.3IONS-SCNC: 9 MMOL/L (ref 5–18)
AST SERPL W P-5'-P-CCNC: 23 U/L (ref 0–40)
BASOPHILS # BLD AUTO: 0.1 THOU/UL (ref 0–0.2)
BASOPHILS NFR BLD AUTO: 1 % (ref 0–2)
BILIRUB SERPL-MCNC: 0.3 MG/DL (ref 0–1)
BUN SERPL-MCNC: 14 MG/DL (ref 8–28)
CALCIUM SERPL-MCNC: 10.2 MG/DL (ref 8.5–10.5)
CHLORIDE BLD-SCNC: 102 MMOL/L (ref 98–107)
CHOLEST SERPL-MCNC: 169 MG/DL
CO2 SERPL-SCNC: 30 MMOL/L (ref 22–31)
CREAT SERPL-MCNC: 0.99 MG/DL (ref 0.6–1.1)
EOSINOPHIL # BLD AUTO: 0.2 THOU/UL (ref 0–0.4)
EOSINOPHIL NFR BLD AUTO: 3 % (ref 0–6)
ERYTHROCYTE [DISTWIDTH] IN BLOOD BY AUTOMATED COUNT: 12.3 % (ref 11–14.5)
FASTING STATUS PATIENT QL REPORTED: NO
GFR SERPL CREATININE-BSD FRML MDRD: 54 ML/MIN/1.73M2
GLUCOSE BLD-MCNC: 91 MG/DL (ref 70–125)
HCT VFR BLD AUTO: 43.5 % (ref 35–47)
HDLC SERPL-MCNC: 81 MG/DL
HGB BLD-MCNC: 14.5 G/DL (ref 12–16)
LDLC SERPL CALC-MCNC: 72 MG/DL
LYMPHOCYTES # BLD AUTO: 3.3 THOU/UL (ref 0.8–4.4)
LYMPHOCYTES NFR BLD AUTO: 34 % (ref 20–40)
MCH RBC QN AUTO: 30.6 PG (ref 27–34)
MCHC RBC AUTO-ENTMCNC: 33.4 G/DL (ref 32–36)
MCV RBC AUTO: 92 FL (ref 80–100)
MONOCYTES # BLD AUTO: 0.7 THOU/UL (ref 0–0.9)
MONOCYTES NFR BLD AUTO: 7 % (ref 2–10)
NEUTROPHILS # BLD AUTO: 5.4 THOU/UL (ref 2–7.7)
NEUTROPHILS NFR BLD AUTO: 56 % (ref 50–70)
PLATELET # BLD AUTO: 408 THOU/UL (ref 140–440)
PMV BLD AUTO: 7.4 FL (ref 7–10)
POTASSIUM BLD-SCNC: 4.7 MMOL/L (ref 3.5–5)
PROT SERPL-MCNC: 6.9 G/DL (ref 6–8)
RBC # BLD AUTO: 4.75 MILL/UL (ref 3.8–5.4)
SODIUM SERPL-SCNC: 141 MMOL/L (ref 136–145)
TRIGL SERPL-MCNC: 78 MG/DL
WBC: 9.7 THOU/UL (ref 4–11)

## 2020-10-09 LAB
25(OH)D3 SERPL-MCNC: 45.6 NG/ML (ref 30–80)
25(OH)D3 SERPL-MCNC: 45.6 NG/ML (ref 30–80)

## 2020-10-14 ENCOUNTER — RECORDS - HEALTHEAST (OUTPATIENT)
Dept: ADMINISTRATIVE | Facility: OTHER | Age: 75
End: 2020-10-14

## 2020-10-15 ENCOUNTER — COMMUNICATION - HEALTHEAST (OUTPATIENT)
Dept: SCHEDULING | Facility: CLINIC | Age: 75
End: 2020-10-15

## 2020-10-15 ENCOUNTER — COMMUNICATION - HEALTHEAST (OUTPATIENT)
Dept: INTERNAL MEDICINE | Facility: CLINIC | Age: 75
End: 2020-10-15

## 2020-10-15 DIAGNOSIS — R42 VERTIGO: ICD-10-CM

## 2020-10-19 ENCOUNTER — RECORDS - HEALTHEAST (OUTPATIENT)
Dept: ADMINISTRATIVE | Facility: OTHER | Age: 75
End: 2020-10-19

## 2020-10-30 ENCOUNTER — RECORDS - HEALTHEAST (OUTPATIENT)
Dept: ADMINISTRATIVE | Facility: OTHER | Age: 75
End: 2020-10-30

## 2020-12-02 ENCOUNTER — RECORDS - HEALTHEAST (OUTPATIENT)
Dept: ADMINISTRATIVE | Facility: OTHER | Age: 75
End: 2020-12-02

## 2021-01-05 ENCOUNTER — COMMUNICATION - HEALTHEAST (OUTPATIENT)
Dept: INTERNAL MEDICINE | Facility: CLINIC | Age: 76
End: 2021-01-05

## 2021-01-05 DIAGNOSIS — I10 ESSENTIAL HYPERTENSION: ICD-10-CM

## 2021-01-05 DIAGNOSIS — G89.29 OTHER CHRONIC PAIN: ICD-10-CM

## 2021-01-05 DIAGNOSIS — N95.1 PERIMENOPAUSAL VASOMOTOR SYMPTOMS: ICD-10-CM

## 2021-01-11 ENCOUNTER — COMMUNICATION - HEALTHEAST (OUTPATIENT)
Dept: INTERNAL MEDICINE | Facility: CLINIC | Age: 76
End: 2021-01-11

## 2021-01-11 DIAGNOSIS — I10 ESSENTIAL HYPERTENSION: ICD-10-CM

## 2021-02-05 ENCOUNTER — AMBULATORY - HEALTHEAST (OUTPATIENT)
Dept: NURSING | Facility: CLINIC | Age: 76
End: 2021-02-05

## 2021-02-08 ENCOUNTER — COMMUNICATION - HEALTHEAST (OUTPATIENT)
Dept: INTERNAL MEDICINE | Facility: CLINIC | Age: 76
End: 2021-02-08

## 2021-02-15 ENCOUNTER — OFFICE VISIT - HEALTHEAST (OUTPATIENT)
Dept: INTERNAL MEDICINE | Facility: CLINIC | Age: 76
End: 2021-02-15

## 2021-02-15 DIAGNOSIS — M81.0 AGE-RELATED OSTEOPOROSIS WITHOUT CURRENT PATHOLOGICAL FRACTURE: ICD-10-CM

## 2021-02-15 DIAGNOSIS — I10 ESSENTIAL HYPERTENSION: ICD-10-CM

## 2021-02-15 DIAGNOSIS — N18.31 STAGE 3A CHRONIC KIDNEY DISEASE (H): ICD-10-CM

## 2021-02-15 DIAGNOSIS — M16.4 POST-TRAUMATIC OSTEOARTHRITIS OF BOTH HIPS: ICD-10-CM

## 2021-02-15 DIAGNOSIS — G45.9 TIA (TRANSIENT ISCHEMIC ATTACK): ICD-10-CM

## 2021-02-24 ENCOUNTER — RECORDS - HEALTHEAST (OUTPATIENT)
Dept: BONE DENSITY | Facility: CLINIC | Age: 76
End: 2021-02-24

## 2021-02-24 ENCOUNTER — RECORDS - HEALTHEAST (OUTPATIENT)
Dept: ADMINISTRATIVE | Facility: OTHER | Age: 76
End: 2021-02-24

## 2021-02-24 DIAGNOSIS — M81.0 AGE-RELATED OSTEOPOROSIS WITHOUT CURRENT PATHOLOGICAL FRACTURE: ICD-10-CM

## 2021-02-26 ENCOUNTER — AMBULATORY - HEALTHEAST (OUTPATIENT)
Dept: NURSING | Facility: CLINIC | Age: 76
End: 2021-02-26

## 2021-02-27 ENCOUNTER — COMMUNICATION - HEALTHEAST (OUTPATIENT)
Dept: INTERNAL MEDICINE | Facility: CLINIC | Age: 76
End: 2021-02-27

## 2021-03-01 ENCOUNTER — AMBULATORY - HEALTHEAST (OUTPATIENT)
Dept: LAB | Facility: CLINIC | Age: 76
End: 2021-03-01

## 2021-03-01 ENCOUNTER — RECORDS - HEALTHEAST (OUTPATIENT)
Dept: ADMINISTRATIVE | Facility: OTHER | Age: 76
End: 2021-03-01

## 2021-03-01 ENCOUNTER — COMMUNICATION - HEALTHEAST (OUTPATIENT)
Dept: SCHEDULING | Facility: CLINIC | Age: 76
End: 2021-03-01

## 2021-03-01 DIAGNOSIS — H47.012 ANTERIOR ISCHEMIC OPTIC NEUROPATHY OF LEFT EYE: ICD-10-CM

## 2021-03-01 LAB
C REACTIVE PROTEIN LHE: 0.9 MG/DL (ref 0–0.8)
ERYTHROCYTE [SEDIMENTATION RATE] IN BLOOD BY WESTERGREN METHOD: 29 MM/HR (ref 0–20)

## 2021-03-02 ENCOUNTER — TRANSFERRED RECORDS (OUTPATIENT)
Dept: HEALTH INFORMATION MANAGEMENT | Facility: CLINIC | Age: 76
End: 2021-03-02

## 2021-03-02 ENCOUNTER — AMBULATORY - HEALTHEAST (OUTPATIENT)
Dept: INTERNAL MEDICINE | Facility: CLINIC | Age: 76
End: 2021-03-02

## 2021-03-02 ENCOUNTER — AMBULATORY - HEALTHEAST (OUTPATIENT)
Dept: SURGERY | Facility: CLINIC | Age: 76
End: 2021-03-02

## 2021-03-02 ENCOUNTER — SURGERY - HEALTHEAST (OUTPATIENT)
Dept: SURGERY | Facility: CLINIC | Age: 76
End: 2021-03-02

## 2021-03-02 ENCOUNTER — AMBULATORY - HEALTHEAST (OUTPATIENT)
Dept: LAB | Facility: CLINIC | Age: 76
End: 2021-03-02

## 2021-03-02 ENCOUNTER — COMMUNICATION - HEALTHEAST (OUTPATIENT)
Dept: SURGERY | Facility: CLINIC | Age: 76
End: 2021-03-02

## 2021-03-02 DIAGNOSIS — H47.019 ISCHEMIC OPTIC NEUROPATHY, UNSPECIFIED LATERALITY: ICD-10-CM

## 2021-03-02 DIAGNOSIS — Z20.822 ENCOUNTER FOR LABORATORY TESTING FOR COVID-19 VIRUS: ICD-10-CM

## 2021-03-02 DIAGNOSIS — H47.019 ISCHEMIC OPTIC NEUROPATHY: ICD-10-CM

## 2021-03-03 ENCOUNTER — OFFICE VISIT - HEALTHEAST (OUTPATIENT)
Dept: INTERNAL MEDICINE | Facility: CLINIC | Age: 76
End: 2021-03-03

## 2021-03-03 ENCOUNTER — ANESTHESIA - HEALTHEAST (OUTPATIENT)
Dept: SURGERY | Facility: AMBULATORY SURGERY CENTER | Age: 76
End: 2021-03-03

## 2021-03-03 DIAGNOSIS — G45.9 TIA (TRANSIENT ISCHEMIC ATTACK): ICD-10-CM

## 2021-03-03 DIAGNOSIS — I10 ESSENTIAL HYPERTENSION: ICD-10-CM

## 2021-03-03 DIAGNOSIS — H47.019 ISCHEMIC OPTIC NEUROPATHY, UNSPECIFIED LATERALITY: ICD-10-CM

## 2021-03-03 LAB
SARS-COV-2 PCR COMMENT: NORMAL
SARS-COV-2 RNA SPEC QL NAA+PROBE: NEGATIVE
SARS-COV-2 VIRUS SPECIMEN SOURCE: NORMAL

## 2021-03-03 ASSESSMENT — MIFFLIN-ST. JEOR
SCORE: 1105.04
SCORE: 1146.99

## 2021-03-04 ENCOUNTER — SURGERY - HEALTHEAST (OUTPATIENT)
Dept: SURGERY | Facility: AMBULATORY SURGERY CENTER | Age: 76
End: 2021-03-04

## 2021-03-04 ENCOUNTER — COMMUNICATION - HEALTHEAST (OUTPATIENT)
Dept: SCHEDULING | Facility: CLINIC | Age: 76
End: 2021-03-04

## 2021-03-04 ASSESSMENT — MIFFLIN-ST. JEOR: SCORE: 1146.99

## 2021-03-06 ENCOUNTER — COMMUNICATION - HEALTHEAST (OUTPATIENT)
Dept: SCHEDULING | Facility: CLINIC | Age: 76
End: 2021-03-06

## 2021-03-09 ENCOUNTER — COMMUNICATION - HEALTHEAST (OUTPATIENT)
Dept: INTERNAL MEDICINE | Facility: CLINIC | Age: 76
End: 2021-03-09

## 2021-03-10 ENCOUNTER — COMMUNICATION - HEALTHEAST (OUTPATIENT)
Dept: INTERNAL MEDICINE | Facility: CLINIC | Age: 76
End: 2021-03-10

## 2021-03-10 ENCOUNTER — AMBULATORY - HEALTHEAST (OUTPATIENT)
Dept: INTERNAL MEDICINE | Facility: CLINIC | Age: 76
End: 2021-03-10

## 2021-03-10 DIAGNOSIS — M31.6 GCA (GIANT CELL ARTERITIS) (H): ICD-10-CM

## 2021-03-11 ENCOUNTER — COMMUNICATION - HEALTHEAST (OUTPATIENT)
Dept: ADMINISTRATIVE | Facility: CLINIC | Age: 76
End: 2021-03-11

## 2021-03-11 ENCOUNTER — COMMUNICATION - HEALTHEAST (OUTPATIENT)
Dept: INTERNAL MEDICINE | Facility: CLINIC | Age: 76
End: 2021-03-11

## 2021-03-13 ENCOUNTER — RECORDS - HEALTHEAST (OUTPATIENT)
Dept: ADMINISTRATIVE | Facility: OTHER | Age: 76
End: 2021-03-13

## 2021-03-15 ENCOUNTER — AMBULATORY - HEALTHEAST (OUTPATIENT)
Dept: INTERNAL MEDICINE | Facility: CLINIC | Age: 76
End: 2021-03-15

## 2021-03-15 ENCOUNTER — OFFICE VISIT - HEALTHEAST (OUTPATIENT)
Dept: INTERNAL MEDICINE | Facility: CLINIC | Age: 76
End: 2021-03-15

## 2021-03-15 DIAGNOSIS — M31.6 TEMPORAL ARTERITIS (H): ICD-10-CM

## 2021-03-15 LAB
ANION GAP SERPL CALCULATED.3IONS-SCNC: 10 MMOL/L (ref 5–18)
BUN SERPL-MCNC: 19 MG/DL (ref 8–28)
C REACTIVE PROTEIN LHE: <0.1 MG/DL (ref 0–0.8)
CALCIUM SERPL-MCNC: 9.8 MG/DL (ref 8.5–10.5)
CHLORIDE BLD-SCNC: 99 MMOL/L (ref 98–107)
CO2 SERPL-SCNC: 31 MMOL/L (ref 22–31)
CREAT SERPL-MCNC: 1.09 MG/DL (ref 0.6–1.1)
ERYTHROCYTE [SEDIMENTATION RATE] IN BLOOD BY WESTERGREN METHOD: 2 MM/HR (ref 0–20)
GFR SERPL CREATININE-BSD FRML MDRD: 49 ML/MIN/1.73M2
GLUCOSE BLD-MCNC: 107 MG/DL (ref 70–125)
POTASSIUM BLD-SCNC: 3.5 MMOL/L (ref 3.5–5)
SODIUM SERPL-SCNC: 140 MMOL/L (ref 136–145)

## 2021-03-18 ENCOUNTER — RECORDS - HEALTHEAST (OUTPATIENT)
Dept: ADMINISTRATIVE | Facility: OTHER | Age: 76
End: 2021-03-18

## 2021-03-25 ENCOUNTER — OFFICE VISIT - HEALTHEAST (OUTPATIENT)
Dept: RHEUMATOLOGY | Facility: CLINIC | Age: 76
End: 2021-03-25

## 2021-03-25 DIAGNOSIS — M31.6 GCA (GIANT CELL ARTERITIS) (H): ICD-10-CM

## 2021-03-25 DIAGNOSIS — Z79.899 HIGH RISK MEDICATION USE: ICD-10-CM

## 2021-03-25 DIAGNOSIS — Z78.0 OSTEOPENIA AFTER MENOPAUSE: ICD-10-CM

## 2021-03-25 DIAGNOSIS — I65.01 VERTEBRAL ARTERY OCCLUSION, RIGHT: ICD-10-CM

## 2021-03-25 DIAGNOSIS — M85.80 OSTEOPENIA AFTER MENOPAUSE: ICD-10-CM

## 2021-04-05 ENCOUNTER — COMMUNICATION - HEALTHEAST (OUTPATIENT)
Dept: INTERNAL MEDICINE | Facility: CLINIC | Age: 76
End: 2021-04-05

## 2021-04-05 DIAGNOSIS — F51.04 CHRONIC INSOMNIA: ICD-10-CM

## 2021-04-05 DIAGNOSIS — I10 ESSENTIAL HYPERTENSION: ICD-10-CM

## 2021-04-06 ENCOUNTER — RECORDS - HEALTHEAST (OUTPATIENT)
Dept: ADMINISTRATIVE | Facility: OTHER | Age: 76
End: 2021-04-06

## 2021-04-06 ENCOUNTER — OFFICE VISIT - HEALTHEAST (OUTPATIENT)
Dept: INTERNAL MEDICINE | Facility: CLINIC | Age: 76
End: 2021-04-06

## 2021-04-06 DIAGNOSIS — R73.03 PREDIABETES: ICD-10-CM

## 2021-04-06 DIAGNOSIS — E78.2 MIXED HYPERLIPIDEMIA: ICD-10-CM

## 2021-04-06 DIAGNOSIS — E55.9 VITAMIN D INSUFFICIENCY: ICD-10-CM

## 2021-04-06 LAB
ANION GAP SERPL CALCULATED.3IONS-SCNC: 18 MMOL/L (ref 5–18)
AST SERPL W P-5'-P-CCNC: 21 U/L (ref 0–40)
BUN SERPL-MCNC: 22 MG/DL (ref 8–28)
CALCIUM SERPL-MCNC: 9.9 MG/DL (ref 8.5–10.5)
CHLORIDE BLD-SCNC: 103 MMOL/L (ref 98–107)
CO2 SERPL-SCNC: 18 MMOL/L (ref 22–31)
CREAT SERPL-MCNC: 1.31 MG/DL (ref 0.6–1.1)
ERYTHROCYTE [DISTWIDTH] IN BLOOD BY AUTOMATED COUNT: 14.1 % (ref 11–14.5)
GFR SERPL CREATININE-BSD FRML MDRD: 39 ML/MIN/1.73M2
GLUCOSE BLD-MCNC: 279 MG/DL (ref 70–125)
HBA1C MFR BLD: 6.3 %
HCT VFR BLD AUTO: 46.4 % (ref 35–47)
HGB BLD-MCNC: 15.1 G/DL (ref 12–16)
MCH RBC QN AUTO: 30.9 PG (ref 27–34)
MCHC RBC AUTO-ENTMCNC: 32.5 G/DL (ref 32–36)
MCV RBC AUTO: 95 FL (ref 80–100)
PLATELET # BLD AUTO: 230 THOU/UL (ref 140–440)
PMV BLD AUTO: 9.2 FL (ref 7–10)
POTASSIUM BLD-SCNC: 3.5 MMOL/L (ref 3.5–5)
RBC # BLD AUTO: 4.88 MILL/UL (ref 3.8–5.4)
SODIUM SERPL-SCNC: 139 MMOL/L (ref 136–145)
TSH SERPL DL<=0.005 MIU/L-ACNC: 0.74 UIU/ML (ref 0.3–5)
WBC: 12.4 THOU/UL (ref 4–11)

## 2021-04-06 ASSESSMENT — MIFFLIN-ST. JEOR: SCORE: 1137.92

## 2021-04-07 ENCOUNTER — COMMUNICATION - HEALTHEAST (OUTPATIENT)
Dept: INTERNAL MEDICINE | Facility: CLINIC | Age: 76
End: 2021-04-07

## 2021-04-07 ENCOUNTER — AMBULATORY - HEALTHEAST (OUTPATIENT)
Dept: INTERNAL MEDICINE | Facility: CLINIC | Age: 76
End: 2021-04-07

## 2021-04-07 DIAGNOSIS — I10 ESSENTIAL HYPERTENSION: ICD-10-CM

## 2021-04-07 LAB
25(OH)D3 SERPL-MCNC: 33.9 NG/ML (ref 30–80)
25(OH)D3 SERPL-MCNC: 33.9 NG/ML (ref 30–80)

## 2021-04-09 ENCOUNTER — RECORDS - HEALTHEAST (OUTPATIENT)
Dept: ADMINISTRATIVE | Facility: OTHER | Age: 76
End: 2021-04-09

## 2021-04-20 ENCOUNTER — COMMUNICATION - HEALTHEAST (OUTPATIENT)
Dept: INTERNAL MEDICINE | Facility: CLINIC | Age: 76
End: 2021-04-20

## 2021-05-03 ENCOUNTER — RECORDS - HEALTHEAST (OUTPATIENT)
Dept: ADMINISTRATIVE | Facility: OTHER | Age: 76
End: 2021-05-03

## 2021-05-04 ENCOUNTER — RECORDS - HEALTHEAST (OUTPATIENT)
Dept: ADMINISTRATIVE | Facility: OTHER | Age: 76
End: 2021-05-04

## 2021-05-05 ENCOUNTER — RECORDS - HEALTHEAST (OUTPATIENT)
Dept: INTERNAL MEDICINE | Facility: CLINIC | Age: 76
End: 2021-05-05

## 2021-05-05 ENCOUNTER — COMMUNICATION - HEALTHEAST (OUTPATIENT)
Dept: INTERNAL MEDICINE | Facility: CLINIC | Age: 76
End: 2021-05-05

## 2021-05-05 DIAGNOSIS — I10 ESSENTIAL HYPERTENSION: ICD-10-CM

## 2021-05-06 ENCOUNTER — OFFICE VISIT - HEALTHEAST (OUTPATIENT)
Dept: RHEUMATOLOGY | Facility: CLINIC | Age: 76
End: 2021-05-06

## 2021-05-06 DIAGNOSIS — R27.8 LIMB INCOORDINATION: ICD-10-CM

## 2021-05-06 DIAGNOSIS — H47.019 ISCHEMIC OPTIC NEUROPATHY, UNSPECIFIED LATERALITY: ICD-10-CM

## 2021-05-06 DIAGNOSIS — M31.6 GCA (GIANT CELL ARTERITIS) (H): ICD-10-CM

## 2021-05-06 DIAGNOSIS — Z79.899 HIGH RISK MEDICATION USE: ICD-10-CM

## 2021-05-20 ENCOUNTER — RECORDS - HEALTHEAST (OUTPATIENT)
Dept: ADMINISTRATIVE | Facility: CLINIC | Age: 76
End: 2021-05-20

## 2021-05-25 ENCOUNTER — COMMUNICATION - HEALTHEAST (OUTPATIENT)
Dept: RHEUMATOLOGY | Facility: CLINIC | Age: 76
End: 2021-05-25

## 2021-05-25 DIAGNOSIS — M31.6 GCA (GIANT CELL ARTERITIS) (H): ICD-10-CM

## 2021-05-26 ENCOUNTER — COMMUNICATION - HEALTHEAST (OUTPATIENT)
Dept: RHEUMATOLOGY | Facility: CLINIC | Age: 76
End: 2021-05-26

## 2021-05-26 DIAGNOSIS — M31.6 GCA (GIANT CELL ARTERITIS) (H): ICD-10-CM

## 2021-05-27 NOTE — TELEPHONE ENCOUNTER
Prescription Monitoring Program activity reviewed with no discrepancies noted.      Last fill per : 12/26/18 and 12/24/18   Quantity/days supply: #90 90 day supply, #60 30 day supply    Controlled Substance Agreement on file: No  Date:     Last office visit with provider:  3/15/2019 Ramesh Canales MD    Please advise.

## 2021-05-27 NOTE — TELEPHONE ENCOUNTER
Controlled Substance Refill Request  Medication:   Requested Prescriptions     Pending Prescriptions Disp Refills     zolpidem (AMBIEN) 10 mg tablet [Pharmacy Med Name: Zolpidem Tartrate Oral Tablet 10 MG] 90 tablet 0     Sig: TAKE ONE TABLET BY MOUTH AT BEDTIME AS NEEDED FOR SLEEP     diazePAM (VALIUM) 2 MG tablet [Pharmacy Med Name: diazePAM Oral Tablet 2 MG] 60 tablet 0     Sig: TAKE ONE TABLET BY MOUTH TWICE DAILY AS NEEDED FOR ANXIETY     Date Last Fill: 10/12/18  Pharmacy: [facility] [id number]   Submit electronically to pharmacy  Controlled Substance Agreement on File:   Encounter-Level CSA Scan Date:    There are no encounter-level csa scan date.       Last office visit: Last office visit pertaining to requested medication was 3/15/19.

## 2021-05-28 ENCOUNTER — RECORDS - HEALTHEAST (OUTPATIENT)
Dept: ADMINISTRATIVE | Facility: CLINIC | Age: 76
End: 2021-05-28

## 2021-05-28 NOTE — TELEPHONE ENCOUNTER
Refill Approved    Rx renewed per Medication Renewal Policy. Medication was last renewed on 10/12/2018.  Last OV 3/15/2019.    Rachelle Vigil, Care Connection Triage/Med Refill 5/7/2019     Requested Prescriptions   Pending Prescriptions Disp Refills     pravastatin (PRAVACHOL) 20 MG tablet [Pharmacy Med Name: Pravastatin Sodium Oral Tablet 20 MG] 90 tablet PRN     Sig: TAKE ONE TABLET BY MOUTH ONE TIME DAILY IN THE EVENING       Statins Refill Protocol (Hmg CoA Reductase Inhibitors) Passed - 5/6/2019  4:46 PM        Passed - PCP or prescribing provider visit in past 12 months      Last office visit with prescriber/PCP: 3/15/2019 Ramesh Canales MD OR same dept: 3/15/2019 Ramesh Canales MD OR same specialty: 3/15/2019 Ramesh Canales MD  Last physical: 10/12/2018 Last MTM visit: Visit date not found   Next visit within 3 mo: Visit date not found  Next physical within 3 mo: Visit date not found  Prescriber OR PCP: Ramesh Canales MD  Last diagnosis associated with med order: There are no diagnoses linked to this encounter.  If protocol passes may refill for 12 months if within 3 months of last provider visit (or a total of 15 months).

## 2021-05-29 NOTE — TELEPHONE ENCOUNTER
Prescription Monitoring Program activity reviewed with no discrepancies noted.      Last fill per : 4/4/19  Quantity/days supply: 60 tablets for 30 days    Controlled Substance Agreement on file: No  Date: N/A    Last office visit with provider:  3/15/2019 Ramesh Canales MD    Please advise.

## 2021-05-29 NOTE — TELEPHONE ENCOUNTER
RN Triage:     [facility] [place] ([facility]) calling about the diazepam.     Prescription was sent to the [facility] by mistake. . Patient is waiting at the pharmacy in [place]. Pharmacy stated the patient was upset because they drove 2 hours to get to the [facility].     Please advise on refill of script asap.   Spoke via [facility]el to [facility] to let clinic know patient is waiting.     Delores Valles RN, BSN Care Connection Triage Nurse

## 2021-05-29 NOTE — TELEPHONE ENCOUNTER
Controlled Substance Refill Request  Medication:   Requested Prescriptions     Pending Prescriptions Disp Refills     diazePAM (VALIUM) 2 MG tablet 60 tablet 0     Sig: Take 1 tablet (2 mg total) by mouth 2 (two) times a day as needed for anxiety.     Date Last Fill: 6/6/19 (did not )   Pharmacy: [facility]    Submit electronically to pharmacy  Controlled Substance Agreement on File:   Encounter-Level CSA Scan Date:    There are no encounter-level csa scan date.       Last office visit: Last office visit pertaining to requested medication was 3/15/19.    Delores Valles RN, BSN Care Connection Triage Nurse

## 2021-05-29 NOTE — TELEPHONE ENCOUNTER
Patient Returning Call  Reason for call:  Patients  called back.  Information relayed to patient:  n/a  Patient has additional questions:  Yes  If YES, what are your questions/concerns:  States needs this medication to be sent to [facility] in [place], [place] as he did not  the on in [place] as it was not there when he was there.  Please send to new pharmacy as they will be staying in [place] until September.  Okay to leave a detailed message?: Yes   Please call when medication is sent to [facility] in [place], [place].

## 2021-05-29 NOTE — TELEPHONE ENCOUNTER
Fax received from out of state pharmacy that states patient is changing to this pharmacy.  It is in [facility].  No CSA to check what pharmacy she was committed to.  Please review    Controlled Substance Refill Request  Medication Name:   Requested Prescriptions     Pending Prescriptions Disp Refills     diazePAM (VALIUM) 2 MG tablet 60 tablet 0     Sig: Take 1 tablet (2 mg total) by mouth 2 (two) times a day as needed for anxiety.     Date Last Fill:4/4/19  Pharmacy: [facility]      Submit electronically to pharmacy  Controlled Substance Agreement Date Scanned:   Encounter-Level CSA Scan Date:    There are no encounter-level csa scan date.       Last office visit with prescriber/PCP: 3/15/2019 Ramesh Canales MD OR same dept: 3/15/2019 Ramesh Canales MD OR same specialty: 3/15/2019 Ramesh Canales MD  Last physical: 10/12/2018 Last MTM visit: Visit date not found

## 2021-05-29 NOTE — TELEPHONE ENCOUNTER
Dr. Taylor,  Refill has been set up for you to review with the updated pharmacy.  Cheryl DOVE CMA/DEIDRE....................1:42 PM

## 2021-05-30 VITALS — WEIGHT: 161 LBS | BODY MASS INDEX: 27.49 KG/M2 | HEIGHT: 64 IN

## 2021-05-30 VITALS — HEIGHT: 64 IN | WEIGHT: 161.12 LBS | BODY MASS INDEX: 27.51 KG/M2

## 2021-05-30 NOTE — TELEPHONE ENCOUNTER
Controlled Substance Refill Request  Medication:   Requested Prescriptions     Pending Prescriptions Disp Refills     zolpidem (AMBIEN) 10 mg tablet [Pharmacy Med Name: Zolpidem Tartrate Oral Tablet 10 MG] 90 tablet 0     Sig: Take 1 tablet (10 mg total) by mouth at bedtime as needed for sleep.     Date Last Fill: 4/4/2019  Pharmacy: [facility]  Submit electronically to pharmacy  Controlled Substance Agreement on File:   Encounter-Level CSA Scan Date:    There are no encounter-level csa scan date.       Last office visit:3/15/2019. Last office visit pertaining to requested medication was 3/15/2019 with PCP Dr STEVEN Canales.

## 2021-05-31 VITALS — WEIGHT: 161 LBS | BODY MASS INDEX: 27.49 KG/M2 | HEIGHT: 64 IN

## 2021-06-02 VITALS — HEIGHT: 64 IN | WEIGHT: 157.08 LBS | BODY MASS INDEX: 26.82 KG/M2

## 2021-06-02 VITALS — BODY MASS INDEX: 27.49 KG/M2 | WEIGHT: 161 LBS | HEIGHT: 64 IN

## 2021-06-02 NOTE — PROGRESS NOTES
Patient has been on 0.0375 of transdermal estrogen.  We can maintain that dose.  Inadvertently her dose was increased to 0.05.

## 2021-06-02 NOTE — TELEPHONE ENCOUNTER
Patient Returning Call  Reason for call:  Patient called back.  Information relayed to patient:  n/a  Patient has additional questions:  Yes  If YES, what are your questions/concerns:  Calling on the status of this message as she needs the Zolpidem today.  Please address.  Okay to leave a detailed message?: Yes

## 2021-06-02 NOTE — TELEPHONE ENCOUNTER
Prior Authorization Request  Who s requesting:  Pharmacy  Pharmacy Name and Location: [facility]  Medication Name:   estradiol (CLIMARA) 0.05 mg/24 hr 13 patch 0 3/18/2019  No   Sig - Route: Place 1 patch on the skin once a week. - Transdermal       Insurance Plan: [employer]  Insurance Member ID Number:  [id number]  Informed patient that prior authorizations can take up to 10 business days for response:   No  Okay to leave a detailed message: Yes    Key [id number]

## 2021-06-02 NOTE — TELEPHONE ENCOUNTER
Prescription Monitoring Program activity reviewed with no discrepancies noted.      Last fill per : 7/8/19  Quantity/days supply: 90 tablets for 90 days    Controlled Substance Agreement on file: Yes  Date: 10/16/19    Last office visit with provider:  10/16/2019    Please advise.

## 2021-06-02 NOTE — PROGRESS NOTES
Assessment and Plan:        1. Medicare annual wellness visit, subsequent  Completed  - Basic Metabolic Panel  - Thyroid Cascade  - Urinalysis-UC if Indicated  - Vitamin D, Total (25-Hydroxy)  - Culture, Urine    2. Essential hypertension  Controlled on decreased dose of both medications since hospitalization.  She does follow this at home  - losartan-hydrochlorothiazide (HYZAAR) 100-25 mg per tablet; Take 0.5 tablets by mouth daily.    3. CKD (chronic kidney disease) stage 3, GFR 30-59 ml/min (H)  Stable  - traMADol (ULTRAM) 50 mg tablet; Take 1 tablet (50 mg total) by mouth every 6 (six) hours as needed for pain. For chronic pain  Dispense: 60 tablet; Refill: 0    4. Asymptomatic bilateral carotid artery stenosis  With very brief speech episode of TIA.  Maintain aspirin, continue pravastatin statin, antihypertensive  Estrogen okay.  Would not increase dosage  5. Perimenopausal vasomotor symptoms  On transdermal estrogen.      6. Chronic bilateral low back pain without sciatica  Refill tramadol.  Discontinue duloxetine-ineffective  - traMADol (ULTRAM) 50 mg tablet; Take 1 tablet (50 mg total) by mouth every 6 (six) hours as needed for pain. For chronic pain  Dispense: 60 tablet; Refill: 0    7. Hyperlipidemia, unspecified hyperlipidemia type  On Rx    8. TIA (transient ischemic attack)  No residual.  Reviewed.  Reviewed labs    9. Bone disorder      - Vitamin D, Total (25-Hydroxy)    10. Vasomotor flushing  On estrogen transdermal low-dose    11. Pulmonary nodules  Chest x-ray performed negative  - XR Chest 2 Views; Future    12. Flu vaccine need     - Influenza High Dose,Seasonal,PF 65+ Yrs           We will recheck blood pressure recheck lipid on RTC 3 months  Medication refill          The patient's current medical problems were reviewed.      In addition to the wellness examination additional time was spent addressing the following listed problems.   Above listed issues    In doing so, this provider spent  greater than 25 min. face-to-face time with the patient and/or his family.  More than half this time was spent in counseling and or coordination of care which was consistent with the nature of this patient's problems which are listed and described in the assessment and plan.          Ramesh Canales MD  Internal medicine  [facility]  [phone]  [email or web address]      The following health maintenance schedule was reviewed with the patient and provided in printed form in the after visit summary:   Health Maintenance   Topic Date Due     TD 18+ HE  07/25/1963     ZOSTER VACCINES (2 of 3) 12/09/2008     DXA SCAN  10/10/2018     INFLUENZA VACCINE RULE BASED (1) 08/01/2019     MEDICARE ANNUAL WELLNESS VISIT  10/12/2019     FALL RISK ASSESSMENT  10/16/2020     ADVANCE CARE PLANNING  10/12/2023     PNEUMOCOCCAL IMMUNIZATION 65+ LOW/MEDIUM RISK  Completed        Subjective:   Chief Complaint: Jenny Iglesias is an 74 y.o. female here for an Annual Wellness visit.   HPI: Patient had a TIA  She was [place] her cabin area.  She ultimately went to [place].  She had exhaustive work-up which was negative  Symptoms included garbled speech.  Last for very short period of time.  Has not had recurrence.  She was started on aspirin 325 mg.  Her blood pressure was okay in fact it was a bit low.  Both her metoprolol losartan hydrochlorothiazide were decreased.  Due to lower blood pressure    Blood pressures at home have been okay on this    She is not on a statin    She has chronic back pain.  She does take tramadol.  Because of some mild azotemia her nonsteroidal was held.  She has been instructed to drink a lot of fluids, avoid nonsteroidals.  Use Tylenol.  Is using that and has used some tramadol.    Duloxetine try-ineffective.    Review of Systems:     Feels well  Appetite is good  Bowels are regular  Urination unremarkable without incontinence.    She has significant vasomotor instability with  a lot of hot flashes despite being on transdermal estrogen.       She uses a lot of pain chronically for chronic insomnia with good results p      jony see above.  The rest of the review of systems are negative for all systems.    Patient Care Team:  Ramesh Canales MD as PCP - General (Internal Medicine)  Rodney Esquivel DO as Physician (Pain Medicine)  Ramesh Canales MD as Assigned PCP     Patient Active Problem List   Diagnosis     Vertigo     Essential hypertension     Restless leg syndrome     Perimenopausal vasomotor symptoms     Carotid stenosis, asymptomatic     Chronic insomnia     Past Medical History:   Diagnosis Date     Carotid artery stenosis 11/2013    50% left internal carotid artery      Chronic low back pain      Hypertension      Osteoarthritis of lumbar spine      Osteopenia     mild, resolved     Postmenopausal 11/2013    on hormone     Restless leg syndrome      Surgical menopause     age 47     Thyroid nodule 11/2013    35mm, rightthyroid lobe biopsy-benign nodule     TIA (transient ischemic attack) 11/2013     Vertigo       Past Surgical History:   Procedure Laterality Date     APPENDECTOMY       CATARACT EXTRACTION       CHOLECYSTECTOMY       fifth toe amputation Right 1998     HYSTERECTOMY       TUBAL LIGATION        Family History   Problem Relation Age of Onset     Breast cancer Mother 57     Lung cancer Father 62     Hypertension Sister       Social History     Socioeconomic History     Marital status:      Spouse name: Not on file     Number of children: Not on file     Years of education: Not on file     Highest education level: Not on file   Occupational History     Not on file   Social Needs     Financial resource strain: Not on file     Food insecurity:     Worry: Not on file     Inability: Not on file     Transportation needs:     Medical: Not on file     Non-medical: Not on file   Tobacco Use     Smoking status: Former Smoker     Last attempt to quit: 10/22/2000      Years since quittin.9     Smokeless tobacco: Never Used   Substance and Sexual Activity     Alcohol use: Yes     Drug use: Not on file     Sexual activity: Not on file   Lifestyle     Physical activity:     Days per week: Not on file     Minutes per session: Not on file     Stress: Not on file   Relationships     Social connections:     Talks on phone: Not on file     Gets together: Not on file     Attends Restorationist service: Not on file     Active member of club or organization: Not on file     Attends meetings of clubs or organizations: Not on file     Relationship status: Not on file     Intimate partner violence:     Fear of current or ex partner: Not on file     Emotionally abused: Not on file     Physically abused: Not on file     Forced sexual activity: Not on file   Other Topics Concern     Not on file   Social History Narrative    - [place]    Retired 2001    [employer]    [employer] de[employer]    [place]    SonNael    DaughterVilma    DaughterRe      Current Outpatient Medications   Medication Sig Dispense Refill     aspirin 325 MG EC tablet Take 325 mg by mouth daily.       calcium carbonate (OS-NIKITA) 600 mg (1,500 mg) tablet Take 600 mg by mouth 2 (two) times a day with meals.       diazePAM (VALIUM) 2 MG tablet Take 1 tablet (2 mg total) by mouth 2 (two) times a day as needed for anxiety. 60 tablet 0     DULoxetine (CYMBALTA) 30 MG capsule Take 30 mg by mouth daily.       estradiol (CLIMARA) 0.05 mg/24 hr Place 1 patch on the skin once a week. 13 patch 0     losartan-hydrochlorothiazide (HYZAAR) 100-25 mg per tablet Take 1 tablet by mouth daily. (Patient taking differently: Take 0.5 tablets by mouth daily.       ) 90 tablet 3     multivitamin with minerals (THERA-M) 9 mg iron-400 mcg Tab tablet Take 1 tablet by mouth daily.       potassium chloride (KLOR-CON) 10 MEQ CR tablet Take 1 tablet (10 mEq total) by mouth daily. 90 tablet 3     pramipexole (MIRAPEX) 0.25 MG tablet Take 1 tablet (0.25  "mg total) by mouth 2 (two) times a day. At 7 PM & 11  tablet 3     pravastatin (PRAVACHOL) 20 MG tablet Take half tablet (10 mg) by mouth every evening. 45 tablet 3     zolpidem (AMBIEN) 10 mg tablet Take 1 tablet (10 mg total) by mouth at bedtime as needed for sleep. 90 tablet 0     hydrocortisone 2.5 % ointment Apply topically as needed.        metoprolol succinate (TOPROL XL) 50 MG 24 hr tablet Take 1 tablet (50 mg total) by mouth daily. For htn (Patient taking differently: Take 25 mg by mouth daily. For htn      ) 90 tablet 3     No current facility-administered medications for this visit.       Objective:   Vital Signs:   Visit Vitals  /74 (Patient Site: Left Arm, Patient Position: Sitting, Cuff Size: Adult Regular)   Pulse (!) 53   Ht 5' 4\" (1.626 m)   Wt 153 lb (69.4 kg)   LMP  (LMP Unknown)   SpO2 98%   Breastfeeding? No   BMI 26.26 kg/m         VisionScreening:  No exam data present     PHYSICAL EXAM  Appears well  Sharp  No distress  Excellent speech  Skin: Normal. No rash or lesion  Lymph Nodes: None palpable-including neck, axilla, inguinal, epitrochlear.  Head:  Normocephalic.    Eyes: Midline.  Equal size., full ROM.  External exams normal.  No icterus  Ears:  Normal pinnae, canals, and TM's.    Nose:  Patent, without deformity.    Throat:  Moist mucous membranes without lesions, erythema, or exudate.    Neck: No palpable masses, lymphadenopathy or tenderness.No thyromegaly or goiter.  No thyroid nodule.  Carotid Arteries:  No Bruit.  Carotid upstroke normal  Chest Wall: No deformity or pain elicited on compression.  Axilla no adenopathy  Breast examination normal no mass  Respiratory:  Normal respiratory effort.  Lungs are clear with good breath sounds.  No dullness.  No wheezing.  Heart: Regular rhythm.  Normal sounding S1, S2 without S3, S4, murmurs, rubs, or gallops.    Abdomen:  The abdomen was flat, soft and nontender without guarding rebound or masses.  There are normal bowel sounds. "  There is no hepatosplenomegaly.  There is no palpable enlargement of the aorta.  Pelvic rectal-will do on return to clinic   Extremities:  Full ROM without limitation, deformity or edema.    4+ pulses  Neurologic-intact- No focal deficit.  Speech clear.  Coordination normal.  Strength symmetric  Orthopedic-no arthropathy.      Lab 9/9/2019    Chol 169  ldl 73    Bun 19  Creat 1.26    gfr 41      MR BRAIN WO CONTRAST    INDICATION: Strokelike symptoms this morning at 1030 in the lasted for 1 hour, dysarthria, speech problem, symptoms have been staggering throughout the morning and the days so far.    COMPARISON: CT dated 09/08/2019.    FINDINGS: There is no evidence of acute or subacute infarction. No intracranial mass or hemorrhage. The cortical sulci, ventricles, basilar cisterns are symmetric and appropriate for age. There are scattered FLAIR hyperintensities in the white matter, which are nonspecific, but most likely represent chronic microvascular disease. Brainstem and cerebellum are unremarkable. The pituitary gland and suprasellar region are normal. The major intracranial flow voids are intact. Normal orbits. Mild mucosal thickening of the ethmoid air cells. Moderate opacification of the left sphenoid sinus. Mastoid air cells are clear. Bone marrow signal is heterogeneous, which is nonspecific.    IMPRESSION:  1.  No acute intracranial findings. No acute or subacute infarction.  2.  Age-related and chronic change.  3.  Left sphenoid sinus disease.    Dictated By: Dr. J Carlos Gaines 9/8/2019 3:03 PM  Edited By: MAHAMED 9/8/2019 3:08 PM      IMPRESSION:   1.  No acute intracranial findings on noncontrast head CT. No evidence of hemorrhage or acute large vascular distribution infarction.  2.  Cervical and major intracranial arteries are patent. Moderate calcified plaque of the proximal ICAs with less than 50% luminal stenosis.  3.  Heterogeneous right thyroid nodule measuring 3.2 cm. Followup outpatient  ultrasound-guided FNA is recommended if not previously completed.-unchanged snince 11/2013  4.  Left sphenoid sinus disease.    A Non Emergent finding was documented on this exam, review complete report for details.    Dictated By: Dr. J Carlos Gaines 9/8/2019 2:25 PM  Edited By: LAURENT 9/8/2019 2:40 PM    Assessment Results 10/16/2019   Activities of Daily Living No help needed   Instrumental Activities of Daily Living No help needed   Get Up and Go Score Less than 12 seconds   Mini Cog Total Score 5   Some recent data might be hidden     A Mini-Cog score of 0-2 suggests the possibility of dementia, score of 3-5 suggests no dementia    Identified Health Risks:     The patient was counseled and encouraged to consider modifying their diet and eating habits. She was provided with information on recommended healthy diet options.  Patient's advanced directive was discussed

## 2021-06-02 NOTE — TELEPHONE ENCOUNTER
Central PA team  [phone]  Pool: HE PA MED ([id number])          PA has been initiated.       PA form completed and faxed insurance via Cover My Meds     Key:  [id number]     Medication:  estradiol (CLIMARA) 0.05 mg/24 hr    Insurance:  ucare         Response will be received via fax and may take up to 5-10 business days depending on plan

## 2021-06-03 VITALS
HEART RATE: 53 BPM | DIASTOLIC BLOOD PRESSURE: 86 MMHG | BODY MASS INDEX: 26.12 KG/M2 | OXYGEN SATURATION: 98 % | SYSTOLIC BLOOD PRESSURE: 132 MMHG | HEIGHT: 64 IN | WEIGHT: 153 LBS

## 2021-06-03 NOTE — TELEPHONE ENCOUNTER
Dr. Canales,      Are you able to see patient or would you like to refer to another provider for pre-op?    Lucy SCHROEDER LPN .......... 10:58 AM  11/27/19

## 2021-06-03 NOTE — TELEPHONE ENCOUNTER
New Appointment Needed  What is the reason for the visit:    Pre-Op Appt Request  When is the surgery? :  12/23/19  Where is the surgery?:   [facility]   Who is the surgeon? :  Dr. Ada Granger   What type of surgery is being done?: Lead Implant  Provider Preference: Any available  How soon do you need to be seen?: Before 12/23/19  Waitlist offered?: No  Okay to leave a detailed message:  Yes

## 2021-06-04 VITALS
SYSTOLIC BLOOD PRESSURE: 130 MMHG | HEART RATE: 65 BPM | OXYGEN SATURATION: 98 % | WEIGHT: 155.08 LBS | DIASTOLIC BLOOD PRESSURE: 80 MMHG | HEIGHT: 64 IN | BODY MASS INDEX: 26.47 KG/M2

## 2021-06-04 VITALS
SYSTOLIC BLOOD PRESSURE: 138 MMHG | HEART RATE: 62 BPM | HEIGHT: 64 IN | OXYGEN SATURATION: 95 % | BODY MASS INDEX: 26.98 KG/M2 | DIASTOLIC BLOOD PRESSURE: 70 MMHG | WEIGHT: 158 LBS

## 2021-06-04 VITALS
DIASTOLIC BLOOD PRESSURE: 76 MMHG | OXYGEN SATURATION: 98 % | HEART RATE: 68 BPM | SYSTOLIC BLOOD PRESSURE: 148 MMHG | WEIGHT: 153 LBS | HEIGHT: 64 IN | BODY MASS INDEX: 26.12 KG/M2

## 2021-06-04 VITALS
WEIGHT: 154 LBS | OXYGEN SATURATION: 94 % | HEIGHT: 64 IN | HEART RATE: 54 BPM | SYSTOLIC BLOOD PRESSURE: 134 MMHG | BODY MASS INDEX: 26.29 KG/M2 | DIASTOLIC BLOOD PRESSURE: 78 MMHG

## 2021-06-04 NOTE — PATIENT INSTRUCTIONS - HE
If calcium is still high . Stop calcium pills and recheck in one month    if calcium and creatinine still abnormal when you see dr thornton , consider stopping the thiazide portion of Hyzaar.   Stop aspirin 7 days before surgery .

## 2021-06-04 NOTE — PROGRESS NOTES
Preoperative Exam    Scheduled Procedure: spinal implant   Surgery Date:   Chief Complaint   Patient presents with     Pre-op Exam     spinal implant, 12/23/19, [facility], Dr. Ada Mireles       Surgery Location:     Assessment/Plan:     1. Essential hypertension  bp controlled  - Basic Metabolic Panel    2. Arthritis  Has DJD of the the phalangeal joints . She is allergic to oral diclofenac because of diarrhe , also has CKD stage 2 . Recommend consultation with hand ortho for possible release of trigger finger and steroid injection. Could try topical diclofenac , less likely to have GI side effects. Cautiously apply once a day if tolerated , increase frequency - diclofenac sodium (VOLTAREN) 1 % Gel; 2g up to four times a day. Try once a day first to see if tolerated . This does not have the same diarrhea side effects as the oral tablet  Dispense: 1 Tube; Refill: 2    3. Intractable low back pain  Has tried and failed conservative therapy .     4. Preop exam for internal medicine          Surgical Procedure Risk: Low (reported cardiac risk generally < 1%)  Have you had prior anesthesia?: Yes  Have you or any family members had a previous anesthesia reaction:  No  Do you or any family members have a history of a clotting or bleeding disorder?: No  Cardiac Risk Assessment: no increased risk for major cardiac complications    APPROVAL GIVEN to proceed with proposed procedure, without further diagnostic evaluation        Functional Status: Independent  Patient plans to recover at home with family.     Subjective:      Jenny Iglesias is a 74 y.o. female who presents for a preoperative consultation.    Chief Complaint   Patient presents with     Pre-op Exam     spinal implant, 12/23/19, [facility], Dr. Ada Mireles         All other systems reviewed and are negative, other than those listed in the HPI.    Pertinent History  Do you have difficulty breathing or chest pain after walking up a  flight of stairs: No  History of obstructive sleep apnea: No  Steroid use in the last 6 months: No  Frequent Aspirin/NSAID use: Yes: will stop before surgery   Prior Blood Transfusion: No  Prior Blood Transfusion Reaction: No  If for some reason prior to, during or after the procedure, if it is medically indicated, would you be willing to have a blood transfusion?:  There is no transfusion refusal.    Current Outpatient Medications   Medication Sig Dispense Refill     aspirin 325 MG EC tablet Take 325 mg by mouth daily.       calcium carbonate (OS-NIKITA) 600 mg (1,500 mg) tablet Take 600 mg by mouth 2 (two) times a day with meals.       diazePAM (VALIUM) 2 MG tablet Take 1 tablet (2 mg total) by mouth 2 (two) times a day as needed for anxiety. 60 tablet 0     estradiol (CLIMARA) 0.0375 mg/24 hr Place 1 patch on the skin once a week. 13 patch 3     hydrocortisone 2.5 % ointment Apply topically as needed.        losartan-hydrochlorothiazide (HYZAAR) 100-25 mg per tablet Take 0.5 tablets by mouth daily.       metoprolol succinate (TOPROL-XL) 50 MG 24 hr tablet TAKE ONE TABLET BY MOUTH ONE TIME DAILY FOR HYPERTENSION (Patient taking differently: 25 mg daily. ) 90 tablet 1     multivitamin with minerals (THERA-M) 9 mg iron-400 mcg Tab tablet Take 1 tablet by mouth daily.       potassium chloride (KLOR-CON) 10 MEQ CR tablet Take 1 tablet (10 mEq total) by mouth daily. 90 tablet 3     pramipexole (MIRAPEX) 0.25 MG tablet Take 1 tablet (0.25 mg total) by mouth 2 (two) times a day. At 7 PM & 11  tablet 3     pravastatin (PRAVACHOL) 20 MG tablet Take half tablet (10 mg) by mouth every evening. 45 tablet 3     traMADol (ULTRAM) 50 mg tablet Take 1-2 tablets ( mg total) by mouth 2 (two) times a day as needed for pain. For chronic pain 60 tablet 0     zolpidem (AMBIEN) 10 mg tablet Take 1 tablet (10 mg total) by mouth at bedtime as needed for sleep. 90 tablet 0     diclofenac sodium (VOLTAREN) 1 % Gel 2g up to four  times a day. Try once a day first to see if tolerated . This does not have the same diarrhea side effects as the oral tablet 1 Tube 2     zolpidem (AMBIEN) 10 mg tablet TAKE ONE TABLET BY MOUTH AT BEDTIME AS NEEDED FOR SLEEP 90 tablet 0     No current facility-administered medications for this visit.         Allergies   Allergen Reactions     Codeine Other (See Comments)     Other chest pain     Diclofenac      Diarrhea       Sinemet [Carbidopa-Levodopa]      dystonia       Patient Active Problem List   Diagnosis     Vertigo     Essential hypertension     Restless leg syndrome     Perimenopausal vasomotor symptoms     Carotid stenosis, asymptomatic     Chronic insomnia       Past Medical History:   Diagnosis Date     Carotid artery stenosis 2013    50% left internal carotid artery      Chronic low back pain      Hypertension      Osteoarthritis of lumbar spine      Osteopenia     mild, resolved     Postmenopausal 2013    on hormone     Restless leg syndrome      Surgical menopause     age 47     Thyroid nodule 2013    35mm, rightthyroid lobe biopsy-benign nodule     TIA (transient ischemic attack) 2013     Vertigo        Past Surgical History:   Procedure Laterality Date     APPENDECTOMY       CATARACT EXTRACTION       CHOLECYSTECTOMY       fifth toe amputation Right 1998     HYSTERECTOMY       TUBAL LIGATION         Social History     Socioeconomic History     Marital status:      Spouse name: Not on file     Number of children: Not on file     Years of education: Not on file     Highest education level: Not on file   Occupational History     Not on file   Social Needs     Financial resource strain: Not on file     Food insecurity:     Worry: Not on file     Inability: Not on file     Transportation needs:     Medical: Not on file     Non-medical: Not on file   Tobacco Use     Smoking status: Former Smoker     Last attempt to quit: 10/22/2000     Years since quittin.1     Smokeless tobacco:  "Never Used   Substance and Sexual Activity     Alcohol use: Yes     Drug use: Not on file     Sexual activity: Not on file   Lifestyle     Physical activity:     Days per week: Not on file     Minutes per session: Not on file     Stress: Not on file   Relationships     Social connections:     Talks on phone: Not on file     Gets together: Not on file     Attends Catholic service: Not on file     Active member of club or organization: Not on file     Attends meetings of clubs or organizations: Not on file     Relationship status: Not on file     Intimate partner violence:     Fear of current or ex partner: Not on file     Emotionally abused: Not on file     Physically abused: Not on file     Forced sexual activity: Not on file   Other Topics Concern     Not on file   Social History Narrative    - [place]    Retired 1/2001    [employer]    [place]    Son-[place]    Daughter-Nadege    Daughter-Leatha             Objective:     Vitals:    12/10/19 1005   BP: 134/78   Pulse: (!) 54   SpO2: 94%   Weight: 154 lb (69.9 kg)   Height: 5' 4\" (1.626 m)         Physical Exam:  Physical Examination: General appearance - alert, well appearing, and in no distress  Mental status - alert, oriented to person, place, and time  Mouth - mucous membranes moist, pharynx normal without lesions  Neck - supple, no significant adenopathy  Lymphatics - no palpable lymphadenopathy, no hepatosplenomegaly  Chest - clear to auscultation, no wheezes, rales or rhonchi, symmetric air entry  Heart - normal rate, regular rhythm, normal S1, S2, no murmurs, rubs, clicks or gallops  Abdomen - soft, nontender, nondistended, no masses or organomegaly  Musculoskeletal - djd changes fingers of hands  joint tenderness, deformity or swelling  Extremities - peripheral pulses normal, no pedal edema, no clubbing or cyanosis  Skin - normal coloration and turgor, no rashes, no suspicious skin lesions noted        Patient Instructions   If calcium " is still high . Stop calcium pills and recheck in one month    if calcium and creatinine still abnormal when you see dr thornton , consider stopping the thiazide portion of Hyzaar.   Stop aspirin 7 days before surgery .       Reviewed past echos and ekf . Has good Ejection fraction . Did have a recent TIA earlier this yeat   Labs:  Had slightly high creatinine and calcium , will repeat labs . May have to stop her calcium and if hypercalcemia persists conisder stopping thiazide     Immunization History   Administered Date(s) Administered     Influenza T2t4-96, 01/01/2010     Influenza high dose,seasonal,PF, 65+ yrs 10/27/2015, 10/06/2016, 10/11/2017, 10/12/2018, 10/16/2019     Pneumo Conj 13-V (2010&after) 10/27/2015     Pneumo Polysac 23-V 09/15/1995, 10/06/2010     ZOSTER, LIVE 10/14/2008           Electronically signed by Karen Taylor MD 12/10/19 11:18 PM

## 2021-06-04 NOTE — TELEPHONE ENCOUNTER
[phone] (home) [phone] (work)     [facility] called and informed pt of results and recommendation to stay hydrated.  Pt thanked for call and stated understanding to stay hydrated. Amanda Goddard CMA ([facility]) 2:25 PM

## 2021-06-04 NOTE — TELEPHONE ENCOUNTER
----- Message from Karen Taylor MD sent at 12/11/2019  1:15 PM CST -----  Please tell pt that the kidney test and calcium are now normal and all the electrolytes are excellent . So no changes need to be made and she can keep well hydrated

## 2021-06-05 ENCOUNTER — RECORDS - HEALTHEAST (OUTPATIENT)
Dept: SCHEDULING | Facility: CLINIC | Age: 76
End: 2021-06-05

## 2021-06-05 VITALS
DIASTOLIC BLOOD PRESSURE: 70 MMHG | SYSTOLIC BLOOD PRESSURE: 132 MMHG | BODY MASS INDEX: 25.58 KG/M2 | OXYGEN SATURATION: 97 % | HEART RATE: 58 BPM | WEIGHT: 149 LBS

## 2021-06-05 VITALS — HEIGHT: 64 IN | BODY MASS INDEX: 25.48 KG/M2 | WEIGHT: 149.25 LBS

## 2021-06-05 VITALS
HEART RATE: 96 BPM | DIASTOLIC BLOOD PRESSURE: 64 MMHG | HEIGHT: 64 IN | WEIGHT: 147.25 LBS | OXYGEN SATURATION: 98 % | BODY MASS INDEX: 25.14 KG/M2 | SYSTOLIC BLOOD PRESSURE: 122 MMHG

## 2021-06-05 VITALS
BODY MASS INDEX: 24.89 KG/M2 | DIASTOLIC BLOOD PRESSURE: 82 MMHG | OXYGEN SATURATION: 99 % | WEIGHT: 145 LBS | SYSTOLIC BLOOD PRESSURE: 144 MMHG | HEART RATE: 87 BPM

## 2021-06-05 VITALS
BODY MASS INDEX: 25.48 KG/M2 | WEIGHT: 149.25 LBS | HEIGHT: 64 IN | HEART RATE: 80 BPM | DIASTOLIC BLOOD PRESSURE: 64 MMHG | OXYGEN SATURATION: 95 % | SYSTOLIC BLOOD PRESSURE: 124 MMHG

## 2021-06-05 DIAGNOSIS — E07.9 DISORDER OF THYROID: ICD-10-CM

## 2021-06-05 NOTE — TELEPHONE ENCOUNTER
Who is calling:  Geovanny  Reason for Call:  Dr. Canales should have received a phone call from [facility] earlier this week.  The call was to discuss with Dr. Canales whether or not patient could be off her daily dose of aspirin 325 mg as part of her pre-surgery preparations.  They also want to know if the aspirin was ordered by the doctor for the patient to take or if this was self-started.  ([place] states Dr. Canales had instructed her to take it.)  Patient can not schedule her back surgery procedure until the surgery center has an answer.  Could Dr. Canales please call the surgery center soon.  The number is [phone].  Date of last appointment with primary care: 1/9/20  Okay to leave a detailed message: Yes

## 2021-06-05 NOTE — TELEPHONE ENCOUNTER
Prescription Monitoring Program activity reviewed with no discrepancies noted.      Last fill per  & Quantity/days supply  10/16/2019 Zolpidem Tartrate 10 Mg Tablet   90 tablets for 90 days  06/06/2019 Diazepam 2 Mg Tablet   60 tablets for 30 days    Controlled Substance Agreement on file: Yes  Date: 10/16/19    Last office visit with provider:  1/9/2020 Ramesh Canales MD    Please advise.

## 2021-06-05 NOTE — TELEPHONE ENCOUNTER
Have you had any communication with the surgery center? If not what are your recommendations on below message.  Sanjuanita Barr CSS

## 2021-06-05 NOTE — TELEPHONE ENCOUNTER
Spoke with the patient and helped her to schedule a pre-op appointment with Dr. Taylor on 2/11/2020 at 9:40 am.  Patient had no further questions at this time.  Cheryl DOVE CMA/DEIDRE....................9:19 AM

## 2021-06-05 NOTE — TELEPHONE ENCOUNTER
No obvious place to schedule patient. Do you want to fit her in, or have her see another provider?     Aaliyah Nation, CMA

## 2021-06-05 NOTE — TELEPHONE ENCOUNTER
Controlled Substance Refill Request  Medication Name:   Requested Prescriptions     Pending Prescriptions Disp Refills     diazePAM (VALIUM) 2 MG tablet [Pharmacy Med Name: diazePAM Oral Tablet 2 MG] 60 tablet 0     Sig: TAKE ONE TABLET BY MOUTH TWICE DAILY AS NEEDED FOR ANXIETY     zolpidem (AMBIEN) 10 mg tablet [Pharmacy Med Name: Zolpidem Tartrate Oral Tablet 10 MG] 90 tablet 0     Sig: TAKE ONE TABLET BY MOUTH AT BEDTIME AS NEEDED FOR SLEEP     Date Last Fill: 6/11/19 valium; 1/9/20 ambien  Requested [facility]acy: [facility]  Submit electronically to pharmacy  Controlled Substance Agreement on file:   Encounter-Level CSA Scan Date:    There are no encounter-level csa scan date.        Last office visit:  1/9/20

## 2021-06-05 NOTE — TELEPHONE ENCOUNTER
Refill Approved    Rx renewed per Medication Renewal Policy. Medication was last renewed on 10/12/18.    Jessica Anderson, [facility] Triage/Med Refill 1/15/2020     Requested Prescriptions   Pending Prescriptions Disp Refills     pramipexole (MIRAPEX) 0.25 MG tablet 180 tablet 3     Sig: Take 1 tablet (0.25 mg total) by mouth 2 (two) times a day. At 7 PM & 11 PM       Parkinson's Meds I Refill Protocol Passed - 1/13/2020  3:50 PM        Passed - PCP or prescribing provider visit in past 6 months      Last office visit with prescriber/PCP: 1/9/2020 OR same dept: 1/9/2020 Ramesh Canales MD OR same specialty: 1/9/2020 Ramesh Canales MD Last physical: 10/16/2019 Last MTM visit: Visit date not found     Next appt within 3 mo: Visit date not found  Next physical within 3 mo: Visit date not found  Prescriber OR PCP: Ramesh Canales MD  Last diagnosis associated with med order: 1. Restless leg syndrome  - pramipexole (MIRAPEX) 0.25 MG tablet; Take 1 tablet (0.25 mg total) by mouth 2 (two) times a day. At 7 PM & 11 PM  Dispense: 180 tablet; Refill: 3    If protocol passes may refill for 6 months if within 3 months of last provider visit (or a total of 9 months).

## 2021-06-05 NOTE — PROGRESS NOTES
OFFICE VISIT NOTE  Jenny Iglesias   74 y.o. female            Assessment/Plan for  Jenny Iglesias is a 74 y.o. female.  No Patient Care Coordination Note on file.       1. Chronic insomnia  Doing well with chronic Ambien.  Refill  - zolpidem (AMBIEN) 10 mg tablet; Take 1 tablet (10 mg total) by mouth at bedtime as needed for sleep.  Dispense: 90 tablet; Refill: 1    2. CKD (chronic kidney disease) stage 3, GFR 30-59 ml/min (H)  Stable  - traMADol (ULTRAM) 50 mg tablet; Take 1-2 tablets ( mg total) by mouth 2 (two) times a day as needed for pain. For chronic pain  Dispense: 60 tablet; Refill: 0    3. Chronic bilateral low back pain without sciatica  Good response to temporary spinal neurostimulator.  Hopefully will get insurance approval for implant.  - traMADol (ULTRAM) 50 mg tablet; Take 1-2 tablets ( mg total) by mouth 2 (two) times a day as needed for pain. For chronic pain  Dispense: 60 tablet; Refill: 0    4. Essential hypertension  Controlled continue current Rx        Plan:  Medication refill  Clinic visit return in 6 months  No lab today  There are no Patient Instructions on file for this visit.    Diagnoses and all orders for this visit:    Essential hypertension  -     metoprolol succinate (TOPROL-XL) 50 MG 24 hr tablet; Take 0.5 tablets (25 mg total) by mouth daily.  Dispense: 90 tablet; Refill: 3    Chronic insomnia  -     zolpidem (AMBIEN) 10 mg tablet; Take 1 tablet (10 mg total) by mouth at bedtime as needed for sleep.  Dispense: 90 tablet; Refill: 1    CKD (chronic kidney disease) stage 2, GFR 60-89 ml/min  -     traMADol (ULTRAM) 50 mg tablet; Take 1-2 tablets ( mg total) by mouth 2 (two) times a day as needed for pain. For chronic pain  Dispense: 60 tablet; Refill: 0    Chronic bilateral low back pain without sciatica  -     traMADol (ULTRAM) 50 mg tablet; Take 1-2 tablets ( mg total) by mouth 2 (two) times a day as needed for pain. For chronic pain  Dispense: 60  tablet; Refill: 0        Medications after visit  Current Outpatient Medications   Medication Sig Dispense Refill     aspirin 325 MG EC tablet Take 325 mg by mouth daily.       calcium carbonate (OS-NIKITA) 600 mg (1,500 mg) tablet Take 600 mg by mouth 2 (two) times a day with meals.       diazePAM (VALIUM) 2 MG tablet Take 1 tablet (2 mg total) by mouth 2 (two) times a day as needed for anxiety. 60 tablet 0     diclofenac sodium (VOLTAREN) 1 % Gel 2g up to four times a day. Try once a day first to see if tolerated . This does not have the same diarrhea side effects as the oral tablet 1 Tube 2     estradiol (CLIMARA) 0.0375 mg/24 hr Place 1 patch on the skin once a week. 13 patch 3     hydrocortisone 2.5 % ointment Apply topically as needed.        losartan-hydrochlorothiazide (HYZAAR) 100-25 mg per tablet Take 0.5 tablets by mouth daily.       metoprolol succinate (TOPROL-XL) 50 MG 24 hr tablet Take 0.5 tablets (25 mg total) by mouth daily. 90 tablet 3     multivitamin with minerals (THERA-M) 9 mg iron-400 mcg Tab tablet Take 1 tablet by mouth daily.       potassium chloride (KLOR-CON) 10 MEQ CR tablet Take 1 tablet (10 mEq total) by mouth daily. 90 tablet 3     pramipexole (MIRAPEX) 0.25 MG tablet Take 1 tablet (0.25 mg total) by mouth 2 (two) times a day. At 7 PM & 11  tablet 3     pravastatin (PRAVACHOL) 20 MG tablet Take half tablet (10 mg) by mouth every evening. 45 tablet 3     traMADol (ULTRAM) 50 mg tablet Take 1-2 tablets ( mg total) by mouth 2 (two) times a day as needed for pain. For chronic pain 60 tablet 0     zolpidem (AMBIEN) 10 mg tablet Take 1 tablet (10 mg total) by mouth at bedtime as needed for sleep. 90 tablet 1     No current facility-administered medications for this visit.                       Ramesh Canales MD  Internal medicine  [facility]  [phone]  [email or web address]    Much or all of the text in this note was generated through the  use of Dragon Dictate voice-to-text software. Errors in spelling or words which seem out of context are unintentional.   Sound alike errors, in particular, may have escaped editing.                 Subjective:   Chief Complaint:  Hypertension; Follow-up; and Medication Refill    Patient had an excellent response to a temporary trial percutaneous neurostimulator for lower back pain.  Pain relief was outstanding.  She is awaiting insurance approval for plantation.    Review of Systems:     Extensive 10-point review of systems was performed. Please see the HPI for problem specific pertinent review of systems.     Patient does note     Chronic i Ambien helpful for her sleep.  Effective.  No attenuation    Saw dermatologist for hair thinning.  Rx Rogaine.    Otherwise, the following systems are noncontributory including constitutional, eyes, ears, nose and throat, cardiovascular, respiratory, gastrointestinal, genitourinary, musculoskeletal,neurological, skin and/or breast, endocrine, hematologic/lymph, allergic/immunologic and psychiatric.              Medications:  Current Outpatient Medications on File Prior to Visit   Medication Sig     aspirin 325 MG EC tablet Take 325 mg by mouth daily.     calcium carbonate (OS-NIKITA) 600 mg (1,500 mg) tablet Take 600 mg by mouth 2 (two) times a day with meals.     diazePAM (VALIUM) 2 MG tablet Take 1 tablet (2 mg total) by mouth 2 (two) times a day as needed for anxiety.     diclofenac sodium (VOLTAREN) 1 % Gel 2g up to four times a day. Try once a day first to see if tolerated . This does not have the same diarrhea side effects as the oral tablet     estradiol (CLIMARA) 0.0375 mg/24 hr Place 1 patch on the skin once a week.     hydrocortisone 2.5 % ointment Apply topically as needed.      losartan-hydrochlorothiazide (HYZAAR) 100-25 mg per tablet Take 0.5 tablets by mouth daily.     multivitamin with minerals (THERA-M) 9 mg iron-400 mcg Tab tablet Take 1 tablet by mouth daily.      "potassium chloride (KLOR-CON) 10 MEQ CR tablet Take 1 tablet (10 mEq total) by mouth daily.     pramipexole (MIRAPEX) 0.25 MG tablet Take 1 tablet (0.25 mg total) by mouth 2 (two) times a day. At 7 PM & 11 PM     pravastatin (PRAVACHOL) 20 MG tablet Take half tablet (10 mg) by mouth every evening.     [DISCONTINUED] metoprolol succinate (TOPROL-XL) 50 MG 24 hr tablet TAKE ONE TABLET BY MOUTH ONE TIME DAILY FOR HYPERTENSION (Patient taking differently: 25 mg daily. )     [DISCONTINUED] traMADol (ULTRAM) 50 mg tablet Take 1-2 tablets ( mg total) by mouth 2 (two) times a day as needed for pain. For chronic pain     [DISCONTINUED] zolpidem (AMBIEN) 10 mg tablet Take 1 tablet (10 mg total) by mouth at bedtime as needed for sleep.     No current facility-administered medications on file prior to visit.             Allergies:  Allergies   Allergen Reactions     Codeine Other (See Comments)     Other chest pain     Diclofenac      Diarrhea       Sinemet [Carbidopa-Levodopa]      dystonia       PSFHx: Tobacco Status:  She  reports that she quit smoking about 19 years ago. She has never used smokeless tobacco.   Alcohol Status:    Social History     Substance and Sexual Activity   Alcohol Use Yes       reports that she quit smoking about 19 years ago. She has never used smokeless tobacco. She reports current alcohol use. No history on file for drug.    Objective:    /80 (Patient Site: Right Arm, Patient Position: Sitting, Cuff Size: Adult Regular)   Pulse 65   Ht 5' 4\" (1.626 m)   Wt 155 lb 1.3 oz (70.3 kg)   LMP  (LMP Unknown)   SpO2 98%   Breastfeeding No   BMI 26.62 kg/m    Weight:   Wt Readings from Last 3 Encounters:   01/09/20 155 lb 1.3 oz (70.3 kg)   12/10/19 154 lb (69.9 kg)   10/16/19 153 lb (69.4 kg)     BP Readings from Last 10 Encounters:   01/09/20 130/80   12/10/19 134/78   10/16/19 132/86   03/15/19 130/70   10/12/18 132/60   10/11/17 127/70   05/22/17 115/56   04/03/17 140/86   02/16/17 " 122/60   10/06/16 136/82         General-appears well, no acute distress.  Pulses regular  Lungs are clear  No murmur  Gait-unremarkable      Review of clinical lab tests  Lab Results   Component Value Date    WBC 8.1 10/12/2018    HGB 15.6 10/12/2018    HCT 46.9 10/12/2018     10/12/2018    CHOL 205 (H) 10/12/2018    TRIG 75 10/12/2018    HDL 91 10/12/2018    ALT 16 10/12/2018    AST 20 10/12/2018     12/10/2019    K 4.0 12/10/2019     12/10/2019    CREATININE 1.03 12/10/2019    BUN 12 12/10/2019    CO2 31 12/10/2019    TSH 1.15 10/16/2019    INR 0.94 11/18/2013    HGBA1C 6.0 (H) 09/08/2019       Glucose   Date/Time Value Ref Range Status   12/10/2019 10:45 AM 91 70 - 125 mg/dL Final   10/16/2019 10:06 AM 95 70 - 125 mg/dL Final   03/15/2019 11:16  (H) 70 - 125 mg/dL Final   10/12/2018 11:42 AM 91 70 - 125 mg/dL Final   10/11/2017 08:39 AM 98 70 - 125 mg/dL Final   05/22/2017 10:26  70 - 125 mg/dL Final   04/03/2017 10:45 AM 87 70 - 125 mg/dL Final   10/06/2016 11:16 AM 99 74 - 125 mg/dL Final   10/27/2015 10:34 AM 93 74 - 125 mg/dL Final   11/18/2013 10:43 AM 89 70 - 125 mg/dL Final     Comment:          Fasting Glucose reference range is 70-99 mg/dL per       American Diabetes Association (ADA) guidelines.     No results found for this or any previous visit (from the past 24 hour(s)).    RADIOLOGY: No results found.    Review of recent consultation-

## 2021-06-05 NOTE — TELEPHONE ENCOUNTER
Patient Returning Call  Reason for call:  Return call   Information relayed to patient:  Caller was informed of message below.   Patient has additional questions:  Yes  If YES, what are your questions/concerns:  Caller stated that she had faxed over a form for Ramesh Canales MD to fill out and sign and fax back therefore they can have it on their files.   Fax - [phone]  Okay to leave a detailed message?: Yes  [phone] opt #1

## 2021-06-05 NOTE — TELEPHONE ENCOUNTER
New Appointment Needed  What is the reason for the visit:    Pre-Op Appt Request  When is the surgery? :  2/17/2020  Where is the surgery?:   [facility]  Who is the surgeon? :  Dr. LUCAS  What type of surgery is being done?: DRG  Provider Preference: PCP only  How soon do you need to be seen?: Week of 2/10/2020  Okay to leave a detailed message:  Yes

## 2021-06-05 NOTE — TELEPHONE ENCOUNTER
Who is calling:  Patient  Reason for Call:   Patient calling back to schedule her pre-op appointment. Patient states she was hoping to schedule with Dr. Taylor as she is interested in making dr. taylor her new PCP.  Also patient was specifically looking for an appointment the week of 02/10-02/14 as she is currently in [place]  Date of last appointment with primary care:   Okay to leave a detailed message: Yes

## 2021-06-06 NOTE — TELEPHONE ENCOUNTER
Dr. Taylor,  Prescription has been set up for you to review with the updated pharmacy as requested by the patient.  Cheryl DOVE CMA/DEIDRE....................4:28 PM

## 2021-06-06 NOTE — PATIENT INSTRUCTIONS - HE
Take the lorazepam twice  aday for the next three days   Then once a day for three days   Then once a day as needed   If you feel better then you dont need to see the balance clinic

## 2021-06-06 NOTE — PROGRESS NOTES
"  Office Visit - Follow Up   Jenny Iglesias   74 y.o. female    Date of Visit: 2/21/2020         Assessment and Plan   1. Vertigo  I feel this is a combination of positional vertigo along with use of naroctic postoperatively . She has no focal neurological signs . Extensive work up in 2019 for similar symptoms including CTA was negative . There are no features to suspect a TIA / cerebellar lesion . Reassured . Meclizine does not work. Recommend lorazepam twice daily till she feels better . She is very anxious as well . Referral to balance clinic   - Ambulatory referral to Adult PT- Internal            If not better in one week      History of Present Illness   This 74 y.o. old   Chief Complaint   Patient presents with     Dizziness     Had procedure done on Monday, has been dizzy since    had electrodes put in near her spine for pain control . Was taking hydrocodone 4-5 times a day for post operative pain . She is not used to high dose narcotics .  Feels lightheaded on changing position but no specific position. No falls but is using a cane . No headache , fever, urinary problems , no weakness , speech problems , numbness , tingling , hearing loss or visula problems . No nausea .     Review of Systems: A comprehensive review of systems was negative except as noted.     Medications, Allergies and Problem List   Reviewed, reconciled and updated  Patient Active Problem List   Diagnosis     Vertigo     Essential hypertension     Restless leg syndrome     Perimenopausal vasomotor symptoms     Carotid stenosis, asymptomatic     Chronic insomnia        Physical Exam   General Appearance:       /76 (Patient Site: Left Arm, Patient Position: Sitting, Cuff Size: Adult Large)   Pulse 68   Ht 5' 4\" (1.626 m)   Wt 153 lb (69.4 kg)   LMP  (LMP Unknown)   SpO2 98%   BMI 26.26 kg/m      General appearance - alert, well appearing, and in no distress  Mental status - alert, oriented to person, place, and time  Neck - " supple, no significant adenopathy  Lymphatics - no palpable lymphadenopathy, no hepatosplenomegaly  Chest - clear to auscultation, no wheezes, rales or rhonchi, symmetric air entry  Heart - normal rate, regular rhythm, normal S1, S2, no murmurs, rubs, clicks or gallops  Abdomen - soft, nontender, nondistended, no masses or organomegaly  rombergs negative . Normal LE power . Symmetrical reflexes . No cerebellar signs , no increase in tone , no past pointing , cranial nerves normal no nystagmus or tremor .full ROM of neck with no reproducible nystagmus   Extremities - peripheral pulses normal, no pedal edema, no clubbing or cyanosis  Skin - normal coloration and turgor, no rashes, no suspicious skin lesions noted                                                                                       Additional Information   Current Outpatient Medications   Medication Sig Dispense Refill     aspirin 325 MG EC tablet Take 325 mg by mouth daily.       calcium carbonate (OS-NIKITA) 600 mg (1,500 mg) tablet Take 600 mg by mouth 2 (two) times a day with meals.       diazePAM (VALIUM) 2 MG tablet TAKE ONE TABLET BY MOUTH TWICE DAILY AS NEEDED FOR ANXIETY 60 tablet 0     diclofenac sodium (VOLTAREN) 1 % Gel 2g up to four times a day. Try once a day first to see if tolerated . This does not have the same diarrhea side effects as the oral tablet 1 Tube 2     estradiol (CLIMARA) 0.0375 mg/24 hr Place 1 patch on the skin once a week. 13 patch 3     hydrocortisone 2.5 % ointment Apply topically as needed.        losartan-hydrochlorothiazide (HYZAAR) 100-25 mg per tablet Take 0.5 tablets by mouth daily.       metoprolol succinate (TOPROL-XL) 50 MG 24 hr tablet Take 0.5 tablets (25 mg total) by mouth daily. 90 tablet 3     multivitamin with minerals (THERA-M) 9 mg iron-400 mcg Tab tablet Take 1 tablet by mouth daily.       potassium chloride (KLOR-CON) 10 MEQ CR tablet Take 1 tablet (10 mEq total) by mouth daily. 90 tablet 3      pramipexole (MIRAPEX) 0.25 MG tablet Take 1 tablet (0.25 mg total) by mouth 2 (two) times a day. At 7 PM & 11  tablet 1     pravastatin (PRAVACHOL) 20 MG tablet Take half tablet (10 mg) by mouth every evening. 45 tablet 3     zolpidem (AMBIEN) 10 mg tablet TAKE ONE TABLET BY MOUTH AT BEDTIME AS NEEDED FOR SLEEP  90 tablet 0     LORazepam (ATIVAN) 0.5 MG tablet Twice daily as needed 30 tablet 0     No current facility-administered medications for this visit.      Allergies   Allergen Reactions     Codeine Other (See Comments)     Other chest pain     Diclofenac      Diarrhea       Sinemet [Carbidopa-Levodopa]      dystonia     Social History     Social History Narrative    - [place]    Retired 1/2001    [employer]    [place]    Son-[place]    Daughter-Nadege    Daughter-Leatha      reports that she quit smoking about 19 years ago. She has never used smokeless tobacco. She reports current alcohol use. No history on file for drug.   Past Medical History:   Diagnosis Date     Carotid artery stenosis 11/2013    50% left internal carotid artery      Chronic low back pain      Hypertension      Osteoarthritis of lumbar spine      Osteopenia     mild, resolved     Postmenopausal 11/2013    on hormone     Restless leg syndrome      Surgical menopause     age 47     Thyroid nodule 11/2013    35mm, rightthyroid lobe biopsy-benign nodule     TIA (transient ischemic attack) 11/2013     Vertigo               Karen Taylor MD

## 2021-06-06 NOTE — PROGRESS NOTES
Preoperative Exam    Scheduled Procedure: Dorsal Rootganglion Stimulator  Surgery Date:  2-  Surgery Location: [facility] - fax [phone]    Surgeon:  Dr Mathew pain doctor specialized in surgery .    Assessment/Plan:     1. Essential hypertension  Well controlled with adjustment in meds     2. Preop exam for internal medicine      3. Chronic intractable pain  The first attempt helped 75% of the pain . They are now hoping to reduce the pain with the addition of another electrode .       Surgical Procedure Risk: Low (reported cardiac risk generally < 1%)  Have you had prior anesthesia?: Yes  Have you or any family members had a previous anesthesia reaction:  No  Do you or any family members have a history of a clotting or bleeding disorder?: No  Cardiac Risk Assessment: no increased risk for major cardiac complications    APPROVAL GIVEN to proceed with proposed procedure, without further diagnostic evaluation    Please Note:    Functional Status: Independent  Patient plans to recover at home with family.     Subjective:      Jenny Iglesias is a 74 y.o. female who presents for a preoperative consultation.  Had some improvement with the prior implant , and is coming back to get another ann put in     All other systems reviewed and are negative, other than those listed in the HPI.    Pertinent History  Do you have difficulty breathing or chest pain after walking up a flight of stairs: No  History of obstructive sleep apnea: No  Steroid use in the last 6 months: No  Frequent Aspirin/NSAID use: Yes: ASA 325mg daily - On hold  Prior Blood Transfusion: No  Prior Blood Transfusion Reaction: No  If for some reason prior to, during or after the procedure, if it is medically indicated, would you be willing to have a blood transfusion?:  There is no transfusion refusal.    Current Outpatient Medications   Medication Sig Dispense Refill     aspirin 325 MG EC tablet Take 325 mg by mouth daily.        calcium carbonate (OS-NIKITA) 600 mg (1,500 mg) tablet Take 600 mg by mouth 2 (two) times a day with meals.       diazePAM (VALIUM) 2 MG tablet TAKE ONE TABLET BY MOUTH TWICE DAILY AS NEEDED FOR ANXIETY 60 tablet 0     diclofenac sodium (VOLTAREN) 1 % Gel 2g up to four times a day. Try once a day first to see if tolerated . This does not have the same diarrhea side effects as the oral tablet 1 Tube 2     estradiol (CLIMARA) 0.0375 mg/24 hr Place 1 patch on the skin once a week. 13 patch 3     hydrocortisone 2.5 % ointment Apply topically as needed.        losartan-hydrochlorothiazide (HYZAAR) 100-25 mg per tablet Take 0.5 tablets by mouth daily.       metoprolol succinate (TOPROL-XL) 50 MG 24 hr tablet Take 0.5 tablets (25 mg total) by mouth daily. 90 tablet 3     multivitamin with minerals (THERA-M) 9 mg iron-400 mcg Tab tablet Take 1 tablet by mouth daily.       potassium chloride (KLOR-CON) 10 MEQ CR tablet Take 1 tablet (10 mEq total) by mouth daily. 90 tablet 3     pramipexole (MIRAPEX) 0.25 MG tablet Take 1 tablet (0.25 mg total) by mouth 2 (two) times a day. At 7 PM & 11  tablet 1     pravastatin (PRAVACHOL) 20 MG tablet Take half tablet (10 mg) by mouth every evening. 45 tablet 3     traMADol (ULTRAM) 50 mg tablet Take 1-2 tablets ( mg total) by mouth 2 (two) times a day as needed for pain. For chronic pain 60 tablet 0     zolpidem (AMBIEN) 10 mg tablet TAKE ONE TABLET BY MOUTH AT BEDTIME AS NEEDED FOR SLEEP  90 tablet 0     No current facility-administered medications for this visit.         Allergies   Allergen Reactions     Codeine Other (See Comments)     Other chest pain     Diclofenac      Diarrhea       Sinemet [Carbidopa-Levodopa]      dystonia       Patient Active Problem List   Diagnosis     Vertigo     Essential hypertension     Restless leg syndrome     Perimenopausal vasomotor symptoms     Carotid stenosis, asymptomatic     Chronic insomnia       Past Medical History:   Diagnosis Date      Carotid artery stenosis 2013    50% left internal carotid artery      Chronic low back pain      Hypertension      Osteoarthritis of lumbar spine      Osteopenia     mild, resolved     Postmenopausal 2013    on hormone     Restless leg syndrome      Surgical menopause     age 47     Thyroid nodule 2013    35mm, rightthyroid lobe biopsy-benign nodule     TIA (transient ischemic attack) 2013     Vertigo        Past Surgical History:   Procedure Laterality Date     APPENDECTOMY       CATARACT EXTRACTION       CHOLECYSTECTOMY       fifth toe amputation Right 1998     HYSTERECTOMY       TUBAL LIGATION         Social History     Socioeconomic History     Marital status:      Spouse name: Not on file     Number of children: Not on file     Years of education: Not on file     Highest education level: Not on file   Occupational History     Not on file   Social Needs     Financial resource strain: Not on file     Food insecurity:     Worry: Not on file     Inability: Not on file     Transportation needs:     Medical: Not on file     Non-medical: Not on file   Tobacco Use     Smoking status: Former Smoker     Last attempt to quit: 10/22/2000     Years since quittin.3     Smokeless tobacco: Never Used   Substance and Sexual Activity     Alcohol use: Yes     Drug use: Not on file     Sexual activity: Not on file   Lifestyle     Physical activity:     Days per week: Not on file     Minutes per session: Not on file     Stress: Not on file   Relationships     Social connections:     Talks on phone: Not on file     Gets together: Not on file     Attends [employer] service: Not on file     Active member of club or organization: Not on file     Attends meetings of clubs or organizations: Not on file     Relationship status: Not on file     Intimate partner violence:     Fear of current or ex partner: Not on file     Emotionally abused: Not on file     Physically abused: Not on file     Forced sexual  "activity: Not on file   Other Topics Concern     Not on file   Social History Narrative    - [place]    Retired 1/2001    [employer]    [employer]    Son-Zaire    Daughter-Nadege    Daughter-Leatha             Objective:     Vitals:    02/11/20 0948   BP: 138/70   Pulse: 62   SpO2: 95%   Weight: 158 lb (71.7 kg)   Height: 5' 4\" (1.626 m)         Physical Exam:  Physical Examination: General appearance - alert, well appearing, and in no distress  Mental status - alert, oriented to person, place, and time  Mouth - mucous membranes moist, pharynx normal without lesions  Neck - supple, no significant adenopathy  Lymphatics - no palpable lymphadenopathy, no hepatosplenomegaly  Chest - clear to auscultation, no wheezes, rales or rhonchi, symmetric air entry  Heart - normal rate, regular rhythm, normal S1, S2, no murmurs, rubs, clicks or gallops  Abdomen - soft, nontender, nondistended, no masses or organomegaly  Musculoskeletal - no joint tenderness, deformity or swelling  Extremities - peripheral pulses normal, no pedal edema, no clubbing or cyanosis  Skin - normal coloration and turgor, no rashes, no suspicious skin lesions noted      There are no Patient Instructions on file for this visit.    EKG recent EKG reviewed     Labs:  Recent labs reviewed and normal     Immunization History   Administered Date(s) Administered     Influenza [id number], 01/01/2010     Influenza high dose,seasonal,PF, 65+ yrs 10/27/2015, 10/06/2016, 10/11/2017, 10/12/2018, 10/16/2019     Pneumo Conj 13-V (2010&after) 10/27/2015     Pneumo Polysac 23-V 09/15/1995, 10/06/2010     ZOSTER, LIVE 10/14/2008           Electronically signed by Karen Taylor MD 02/11/20 9:50 AM  "

## 2021-06-06 NOTE — TELEPHONE ENCOUNTER
Medication Question or Clarification  Who is calling: Patient  What medication are you calling about (include dose and sig)?:   LORazepam (ATIVAN) 0.5 MG tablet 30 tablet 0 2/21/2020     Sig: Twice daily as needed    Sent to pharmacy as: LORazepam 0.5 mg tablet (ATIVAN)    E-Prescribing Status: Receipt confirmed by pharmacy (2/21/2020  2:35 PM CST)      Who prescribed the medication?: Karen Taylor MD   What is your question/concern?: Patient stated she told Karen Taylor MD to send the lorazepam to [facility] but it was sent to [facility] twice.     Please [facility] the [facility].    Patient stated she would like a call back to confirm the Rx was [facility].  [facility] Pharmacy[id number]  Okay to leave a detailed message?: Yes [phone]

## 2021-06-07 NOTE — TELEPHONE ENCOUNTER
Controlled Substance Refill Request  Medication Name:   Requested Prescriptions     Pending Prescriptions Disp Refills     traMADoL (ULTRAM) 50 mg tablet [Pharmacy Med Name: traMADol HCl Oral Tablet 50 MG] 60 tablet 0     Sig: TAKE ONE OR TWO TABLETS BY MOUTH TWICE DAILY AS NEEDED FOR CHRONIC PAIN     metoprolol succinate (TOPROL-XL) 50 MG 24 hr tablet [Pharmacy Med Name: Metoprolol Succinate ER Oral Tablet Extended Release 24 Hour 50 MG] 90 tablet 0     Sig: TAKE ONE TABLET BY MOUTH ONE TIME DAILY FOR HYPERTENSION     Date Last Fill: not on med list  Requested Pharmacy: [facility]  Submit electronically to pharmacy  Controlled Substance Agreement on file:   Encounter-Level CSA Scan Date:    There are no encounter-level csa scan date.        Last office visit:  2/21/20         Provider Refill Request  Medication:  Metoprolol  RN can NOT refill this medication per RN refill protocol because sig does not match med list

## 2021-06-07 NOTE — TELEPHONE ENCOUNTER
Controlled Substance Refill Request  Medication Name:   Requested Prescriptions     Pending Prescriptions Disp Refills     diazePAM (VALIUM) 2 MG tablet [Pharmacy Med Name: diazePAM Oral Tablet 2 MG] 60 tablet 0     Sig: TAKE ONE TABLET BY MOUTH TWICE DAILY AS NEEDED FOR ANXIETY     Date Last Fill: 1/13/20  Requested Pharmacy: [facility]  Submit electronically to pharmacy  Controlled Substance Agreement on file:   Encounter-Level CSA Scan Date:    There are no encounter-level csa scan date.        Last office visit:  2/21/20

## 2021-06-08 NOTE — PROGRESS NOTES
[facility] Follow Up Note    Jenny Iglesias   71 y.o. female    Date of Visit: 2/16/2017    Chief Complaint   Patient presents with     Pre-op Exam     2/23, [place], Left hand,Dr Terrance Cook     Subjective  This is a 71-year-old lady who is a patient of Dr. Ramesh Canales.  She comes in today for preoperative evaluation prior to surgery on her left thumb to be done on February 23 by Dr. Terrance Cook at [facility].  She has had issues with her thumbs for many years.  In October of last year she had a [facility]MC arthroplasty done of the right thumb.  The surgery went very well and she will now be having the left thumb done as well.  She has no new complaints or issues since the last surgery.  Her medical problems appear to be under stable control.  Her back is her other biggest issue and she is having some injections done earlier next week on the back.  No other specific complaints at this time.  Her medications are as listed.    Past surgical history: Procedures have included hysterectomy, appendectomy, cholecystectomy, cataract surgery, toe amputation and the previously mentioned right thumb surgery.    Past medical history: Problems have included hypertension, osteopenia, osteoarthritis, chronic back pain, restless leg syndrome, chronic insomnia, previous TIA, thyroid nodule, and hyperlipidemia.  She is allergic to codeine and Sinemet.    Social history: The patient is .  She is active physically.  She does not smoke.    Family history: This is noncontributory to the current issue.    ROS A comprehensive review of systems was performed and was otherwise negative    Medications, allergies, and problem list were reviewed and updated    Exam  General Appearance:   On examination her blood pressure is 122/60.  Weight is 191 pounds and height is 64 inches.  BMI is 27.64.    Eyes: Pupils are equal and conjunctiva are normal.    Ears nose and throat: Normal.    Neck: Supple with no  masses and no neck vein distention.  No thyroid enlargement.    Lungs: Clear.    Cardiovascular: Heart is in a sinus rhythm with a rate of 58 and no ectopy.  No gallops or murmurs.  Carotid pulses are full with no bruits.  No peripheral edema.    GI: Abdomen is soft and nontender with no distention.  No masses or organomegaly.    Musculoskeletal: Head and neck are normal to inspection with good range of motion.  Good strength and range of motion in all 4 extremities.    Neurologic: Cranial nerves are intact.  Gait is normal.    Psychiatric: The patient is alert and oriented ×3.      Assessment/Plan  1. Preop examination  Hemoglobin    Potassium     I do not see any contraindication to the proposed procedure.  There has been no significant medical change in her condition since the last procedure in October.  She has no history of anesthesia or bleeding problems.  As she had an EKG in October there is no need to repeat it at this time.  We will forward a copy to the surgery Center.  We will check a potassium and hemoglobin and forward this information as well.  I have no other specific recommendations to make.    Total time of this preoperative visit was over 40 minutes with greater than 50% of the time spent in care coordination and patient counseling.  Body Mass Index was not assessed due to The patient was in for preop evaluation..    Zaire Bull MD      Current Outpatient Prescriptions on File Prior to Visit   Medication Sig     aspirin 81 mg chewable tablet Chew 81 mg daily.     calcium carbonate (OS-NIKITA) 600 mg (1,500 mg) tablet Take 600 mg by mouth 2 (two) times a day with meals.     estradiol (CLIMARA) 0.0375 mg/24 hr Place 1 patch on the skin once a week. Change on Wed     losartan-hydrochlorothiazide (HYZAAR) 100-25 mg per tablet Take 1 tablet by mouth daily.     multivitamin with minerals (THERA-M) 9 mg iron-400 mcg Tab tablet Take 1 tablet by mouth daily.     potassium chloride (KLOR-CON) 10 MEQ CR  tablet Take 2 tablets (20 mEq total) by mouth daily.     pramipexole (MIRAPEX) 0.25 MG tablet Take 1 tablet (0.25 mg total) by mouth 2 (two) times a day. At 7 PM & 11 PM     pravastatin (PRAVACHOL) 10 MG tablet Take 1 tablet (10 mg total) by mouth daily.     zolpidem (AMBIEN) 10 mg tablet Take 1 tablet (10 mg total) by mouth bedtime as needed for sleep.     No current facility-administered medications on file prior to visit.      Allergies   Allergen Reactions     Codeine Other (See Comments)     Other chest pain     Sinemet [Carbidopa-Levodopa]      dystonia     Social History   Substance Use Topics     Smoking status: Former Smoker     Quit date: 10/22/2000     Smokeless tobacco: None     Alcohol use Yes

## 2021-06-09 ENCOUNTER — OFFICE VISIT - HEALTHEAST (OUTPATIENT)
Dept: INTERNAL MEDICINE | Facility: CLINIC | Age: 76
End: 2021-06-09

## 2021-06-09 ENCOUNTER — COMMUNICATION - HEALTHEAST (OUTPATIENT)
Dept: RHEUMATOLOGY | Facility: CLINIC | Age: 76
End: 2021-06-09

## 2021-06-09 DIAGNOSIS — M54.50 CHRONIC BILATERAL LOW BACK PAIN WITHOUT SCIATICA: ICD-10-CM

## 2021-06-09 DIAGNOSIS — M31.6 GCA (GIANT CELL ARTERITIS) (H): ICD-10-CM

## 2021-06-09 DIAGNOSIS — G40.909 SEIZURE DISORDER (H): ICD-10-CM

## 2021-06-09 DIAGNOSIS — M85.80 OSTEOPENIA AFTER MENOPAUSE: ICD-10-CM

## 2021-06-09 DIAGNOSIS — R42 VERTIGO: ICD-10-CM

## 2021-06-09 DIAGNOSIS — Z79.899 HIGH RISK MEDICATION USE: ICD-10-CM

## 2021-06-09 DIAGNOSIS — G89.29 CHRONIC BILATERAL LOW BACK PAIN WITHOUT SCIATICA: ICD-10-CM

## 2021-06-09 DIAGNOSIS — Z78.0 OSTEOPENIA AFTER MENOPAUSE: ICD-10-CM

## 2021-06-09 DIAGNOSIS — I10 ESSENTIAL HYPERTENSION: ICD-10-CM

## 2021-06-09 NOTE — TELEPHONE ENCOUNTER
Controlled Substance Refill Request  Medication Name:   Requested Prescriptions     Pending Prescriptions Disp Refills     diazePAM (VALIUM) 2 MG tablet [Pharmacy Med Name: diazePAM Oral Tablet 2 MG] 60 tablet 0     Sig: TAKE ONE TABLET BY MOUTH TWICE DAILY AS NEEDED FOR ANXIETY     metoprolol succinate (TOPROL-XL) 50 MG 24 hr tablet [Pharmacy Med Name: Metoprolol Succinate ER Oral Tablet Extended Release 24 Hour 50 MG] 90 tablet 0     Sig: TAKE ONE TABLET BY MOUTH ONE TIME DAILY FOR HYPERTENSION     traMADoL (ULTRAM) 50 mg tablet [Pharmacy Med Name: traMADol HCl Oral Tablet 50 MG] 60 tablet 0     Sig: TAKE ONE OR TWO TABLETS BY MOUTH TWICE DAILY AS NEEDED FOR CHRONIC PAIN     zolpidem (AMBIEN) 10 mg tablet [Pharmacy Med Name: Zolpidem Tartrate Oral Tablet 10 MG] 90 tablet 0     Sig: TAKE ONE TABLET BY MOUTH AT BEDTIME AS NEEDED FOR SLEEP     Date Last Fill: 04/13/2020.  Is patient out of medication?:  Yes  Patient notified refills processed within 3 business days:  Yes  Requested Pharmacy: [id number] [place], [place]  Submit electronically to pharmacy  Controlled Substance Agreement on file:   Encounter-Level CSA Scan Date:    There are no encounter-level csa scan date.        Last office visit:  02/21/2020 with PCP.

## 2021-06-09 NOTE — TELEPHONE ENCOUNTER
Refill Approved    Rx renewed per Medication Renewal Policy. Medication was last renewed on 3/15/19.    Ov: 2/21/2020    Nicol Astorga, [facility] Triage/Med Refill 7/10/2020     Requested Prescriptions   Pending Prescriptions Disp Refills     pravastatin (PRAVACHOL) 20 MG tablet [Pharmacy Med Name: Pravastatin Sodium Oral Tablet 20 MG] 45 tablet 0     Sig: TAKE HALF TABLET BY MOUTH DAILY IN THE EVENING       Statins Refill Protocol (Hmg CoA Reductase Inhibitors) Passed - 7/7/2020 10:06 AM        Passed - PCP or prescribing provider visit in past 12 months      Last office visit with prescriber/PCP: 1/9/2020 Ramesh Canales MD OR same dept: 2/21/2020 Karen Taylor MD OR same specialty: 2/21/2020 Karen Taylor MD  Last physical: 10/16/2019 Last MTM visit: Visit date not found   Next visit within 3 mo: Visit date not found  Next physical within 3 mo: Visit date not found  Prescriber OR PCP: Ramesh Canales MD  Last diagnosis associated with med order: 1. Carotid stenosis, asymptomatic  - pravastatin (PRAVACHOL) 20 MG tablet [Pharmacy Med Name: Pravastatin Sodium Oral Tablet 20 MG]; TAKE HALF TABLET BY MOUTH DAILY IN THE EVENING  Dispense: 45 tablet; Refill: 0    2. Restless leg syndrome  - pramipexole (MIRAPEX) 0.25 MG tablet [Pharmacy Med Name: Pramipexole Dihydrochloride Oral Tablet 0.25 MG]; TAKE ONE TABLET BY MOUTH TWICE DAILY AT 7PM AND 11PM  Dispense: 180 tablet; Refill: 0    3. Essential hypertension  - potassium chloride (KLOR-CON) 10 MEQ CR tablet [Pharmacy Med Name: Potassium Chloride ER Oral Tablet Extended Release 10 MEQ]; TAKE ONE TABLET BY MOUTH ONE TIME DAILY   Dispense: 90 tablet; Refill: 0    If protocol passes may refill for 12 months if within 3 months of last provider visit (or a total of 15 months).                pramipexole (MIRAPEX) 0.25 MG tablet [Pharmacy Med Name: Pramipexole Dihydrochloride Oral Tablet 0.25 MG] 180 tablet 0     Sig: TAKE ONE TABLET BY MOUTH TWICE  DAILY AT 7PM AND 11PM       Parkinson's Meds I Refill Protocol Passed - 7/7/2020 10:06 AM        Passed - PCP or prescribing provider visit in past 6 months      Last office visit with prescriber/PCP: 1/9/2020 OR same dept: 2/21/2020 Karen Taylor MD OR same specialty: 2/21/2020 Karen Taylor MD Last physical: Visit date not found Last MTM visit: Visit date not found     Next appt within 3 mo: Visit date not found  Next physical within 3 mo: Visit date not found  Prescriber OR PCP: Ramesh Canales MD  Last diagnosis associated with med order: 1. Carotid stenosis, asymptomatic  - pravastatin (PRAVACHOL) 20 MG tablet [Pharmacy Med Name: Pravastatin Sodium Oral Tablet 20 MG]; TAKE HALF TABLET BY MOUTH DAILY IN THE EVENING  Dispense: 45 tablet; Refill: 0    2. Restless leg syndrome  - pramipexole (MIRAPEX) 0.25 MG tablet [Pharmacy Med Name: Pramipexole Dihydrochloride Oral Tablet 0.25 MG]; TAKE ONE TABLET BY MOUTH TWICE DAILY AT 7PM AND 11PM  Dispense: 180 tablet; Refill: 0    3. Essential hypertension  - potassium chloride (KLOR-CON) 10 MEQ CR tablet [Pharmacy Med Name: Potassium Chloride ER Oral Tablet Extended Release 10 MEQ]; TAKE ONE TABLET BY MOUTH ONE TIME DAILY   Dispense: 90 tablet; Refill: 0    If protocol passes may refill for 6 months if within 3 months of last provider visit (or a total of 9 months).             Pramipexole/Ropinirole Refill Protocol Passed - 7/7/2020 10:06 AM        Passed - PCP or prescribing provider visit in past 6 months      Last office visit with prescriber/PCP: 1/9/2020 OR same dept: 2/21/2020 Karen Taylor MD OR same specialty: 2/21/2020 Karen Taylor MD Last physical: Visit date not found Last MTM visit: Visit date not found         Next appt within 3 mo: Visit date not found  Next physical within 3 mo: Visit date not found  Prescriber OR PCP: Ramesh Canales MD  Last diagnosis associated with med order: 1. Carotid stenosis, asymptomatic  - pravastatin  (PRAVACHOL) 20 MG tablet [Pharmacy Med Name: Pravastatin Sodium Oral Tablet 20 MG]; TAKE HALF TABLET BY MOUTH DAILY IN THE EVENING  Dispense: 45 tablet; Refill: 0    2. Restless leg syndrome  - pramipexole (MIRAPEX) 0.25 MG tablet [Pharmacy Med Name: Pramipexole Dihydrochloride Oral Tablet 0.25 MG]; TAKE ONE TABLET BY MOUTH TWICE DAILY AT 7PM AND 11PM  Dispense: 180 tablet; Refill: 0    3. Essential hypertension  - potassium chloride (KLOR-CON) 10 MEQ CR tablet [Pharmacy Med Name: Potassium Chloride ER Oral Tablet Extended Release 10 MEQ]; TAKE ONE TABLET BY MOUTH ONE TIME DAILY   Dispense: 90 tablet; Refill: 0     If protocol passes may refill for 6 months if within 3 months of last provider visit (or a total of 9 months).                 potassium chloride (KLOR-CON) 10 MEQ CR tablet [Pharmacy Med Name: Potassium Chloride ER Oral Tablet Extended Release 10 MEQ] 90 tablet 0     Sig: TAKE ONE TABLET BY MOUTH ONE TIME DAILY       Potassium Supplements Refill Protocol Passed - 7/7/2020 10:06 AM        Passed - PCP or prescribing provider visit in past 12 months       Last office visit with prescriber/PCP: 1/9/2020 Ramesh Canales MD OR same dept: 2/21/2020 Karen Taylor MD OR same specialty: 2/21/2020 Karen Taylor MD  Last physical: 10/16/2019 Last MTM visit: Visit date not found   Next visit within 3 mo: Visit date not found  Next physical within 3 mo: Visit date not found  Prescriber OR PCP: Ramesh Canales MD  Last diagnosis associated with med order: 1. Carotid stenosis, asymptomatic  - pravastatin (PRAVACHOL) 20 MG tablet [Pharmacy Med Name: Pravastatin Sodium Oral Tablet 20 MG]; TAKE HALF TABLET BY MOUTH DAILY IN THE EVENING  Dispense: 45 tablet; Refill: 0    2. Restless leg syndrome  - pramipexole (MIRAPEX) 0.25 MG tablet [Pharmacy Med Name: Pramipexole Dihydrochloride Oral Tablet 0.25 MG]; TAKE ONE TABLET BY MOUTH TWICE DAILY AT 7PM AND 11PM  Dispense: 180 tablet; Refill: 0    3. Essential  hypertension  - potassium chloride (KLOR-CON) 10 MEQ CR tablet [Pharmacy Med Name: Potassium Chloride ER Oral Tablet Extended Release 10 MEQ]; TAKE ONE TABLET BY MOUTH ONE TIME DAILY   Dispense: 90 tablet; Refill: 0    If protocol passes may refill for 12 months if within 3 months of last provider visit (or a total of 15 months).             Passed - Potassium level in last 12 months     Lab Results   Component Value Date    Potassium 4.2 03/03/2020

## 2021-06-09 NOTE — TELEPHONE ENCOUNTER
Dr. Taylor,  Please review patient's medication list.  Refill requested below is for lisinopril-HCTZ, but after reviewing patient's med list, I don't see that medication listed, only losartan-HCTZ.  Please advise and I can set up for you to review.  Cheryl DOVE CMA/DEIDRE....................2:00 PM

## 2021-06-09 NOTE — TELEPHONE ENCOUNTER
Medication Request  Medication name: lisinopril-HCTZ 100-25 mg tab - take 1 tab by mouth daily  Requested Pharmacy: [facility]  Reason for request: [facility] from [facility], stated they faxed in their request on 7/16 and 7/17, to [phone]. The current Rx is marked as no print and with a different sig.  When did you use medication last?:  Per pharmacy, last refill was 6/10/20  Patient offered appointment:  n/a  Okay to leave a detailed message: no

## 2021-06-09 NOTE — PROGRESS NOTES
OFFICE VISIT NOTE  Jenny Iglesias   71 y.o. female      Subjective:   Chief Complaint:  Dizziness; Nausea (Upset stomach); Hot Flashes (Restart estrogen therapy); Shaky Legs (Feels unsteady on feet); and Hypertension (Routine BP follow up)    Sara has done well with her wrist fusion.    She has however had elevated blood pressure.  Feels shaky.  And felt hot flashes off estrogen.  She did restart this.  There was a cost issue.  She has had no TIA symptoms.  She has been compliant with her blood pressure medication.  I did speak with her and we started metoprolol.  She does watch her salt.  She takes no over-the-counter medication.  She is aware of sodium intake.  She has an ounce of vodka a night.  She does not drink more than 1 ounce.    Review of Systems:     Extensive 10-point review of systems was performed. Please see the HPI for problem specific pertinent review of systems.     Patient does note some vague GI upset.  Bowels are regular.  No melena nor hematochezia    Otherwise, the following systems are noncontributory including constitutional, eyes, ears, nose and throat, cardiovascular, respiratory, gastrointestinal, genitourinary, musculoskeletal,neurological, skin and/or breast, endocrine, hematologic/lymph, allergic/immunologic and psychiatric.              Medications:  Current Outpatient Prescriptions   Medication Sig Dispense Refill     aspirin 81 mg chewable tablet Chew 81 mg daily.       calcium carbonate (OS-NIKITA) 600 mg (1,500 mg) tablet Take 600 mg by mouth 2 (two) times a day with meals.       estradiol (CLIMARA) 0.0375 mg/24 hr Place 1 patch on the skin once a week. Change on Wed 13 patch 3     hydrocortisone 2.5 % ointment        losartan-hydrochlorothiazide (HYZAAR) 100-25 mg per tablet Take 1 tablet by mouth daily. 90 tablet 3     metoprolol succinate (TOPROL XL) 50 MG 24 hr tablet Take 1 tablet (50 mg total) by mouth daily. For htn 30 tablet 11     multivitamin with minerals (THERA-M)  9 mg iron-400 mcg Tab tablet Take 1 tablet by mouth daily.       potassium chloride (KLOR-CON) 10 MEQ CR tablet Take 2 tablets (20 mEq total) by mouth daily. 180 tablet 3     pramipexole (MIRAPEX) 0.25 MG tablet Take 1 tablet (0.25 mg total) by mouth 2 (two) times a day. At 7 PM & 11  tablet 3     pravastatin (PRAVACHOL) 10 MG tablet Take 1 tablet (10 mg total) by mouth daily. 90 tablet 3     zolpidem (AMBIEN) 10 mg tablet Take 1 tablet (10 mg total) by mouth bedtime as needed for sleep. 90 tablet 1     No current facility-administered medications for this visit.      Current Outpatient Prescriptions on File Prior to Visit   Medication Sig     aspirin 81 mg chewable tablet Chew 81 mg daily.     calcium carbonate (OS-NIKITA) 600 mg (1,500 mg) tablet Take 600 mg by mouth 2 (two) times a day with meals.     hydrocortisone 2.5 % ointment      losartan-hydrochlorothiazide (HYZAAR) 100-25 mg per tablet Take 1 tablet by mouth daily.     metoprolol succinate (TOPROL XL) 50 MG 24 hr tablet Take 1 tablet (50 mg total) by mouth daily. For htn     multivitamin with minerals (THERA-M) 9 mg iron-400 mcg Tab tablet Take 1 tablet by mouth daily.     potassium chloride (KLOR-CON) 10 MEQ CR tablet Take 2 tablets (20 mEq total) by mouth daily.     pramipexole (MIRAPEX) 0.25 MG tablet Take 1 tablet (0.25 mg total) by mouth 2 (two) times a day. At 7 PM & 11 PM     pravastatin (PRAVACHOL) 10 MG tablet Take 1 tablet (10 mg total) by mouth daily.     zolpidem (AMBIEN) 10 mg tablet Take 1 tablet (10 mg total) by mouth bedtime as needed for sleep.     [DISCONTINUED] estradiol (CLIMARA) 0.0375 mg/24 hr Place 1 patch on the skin once a week. Change on Wed     No current facility-administered medications on file prior to visit.        Allergies:  Allergies   Allergen Reactions     Codeine Other (See Comments)     Other chest pain     Sinemet [Carbidopa-Levodopa]      dystonia       PSFHx: Tobacco Status:  She  reports that she quit smoking  "about 16 years ago. She does not have any smokeless tobacco history on file.   Alcohol Status:    History   Alcohol Use     Yes       reports that she quit smoking about 16 years ago. She does not have any smokeless tobacco history on file. She reports that she drinks alcohol. Her drug history is not on file.    Objective:    /86  Pulse 62  Ht 5' 4\" (1.626 m)  Wt 161 lb 1.9 oz (73.1 kg)  LMP  (LMP Unknown)  SpO2 98%  Breastfeeding? No  BMI 27.66 kg/m2  Weight:   Wt Readings from Last 3 Encounters:   04/03/17 161 lb 1.9 oz (73.1 kg)   02/16/17 161 lb (73 kg)   10/06/16 153 lb 0.6 oz (69.4 kg)     BP Readings from Last 3 Encounters:   04/03/17 140/86   02/16/17 122/60   10/06/16 136/82         General-appears well, no acute distress.  Multiple blood pressures taken    Vitals:    04/03/17 1006 04/03/17 1058 04/03/17 1059   BP: 138/72 160/90 140/86   Patient Site: Right Arm     Patient Position: Sitting     Cuff Size: Adult Regular     Pulse: 62     SpO2: 98%     Weight: 161 lb 1.9 oz (73.1 kg)     Height: 5' 4\" (1.626 m)       Chest is clear  Cardiac regular no murmur  No edema  Soft abdomen      Review of clinical lab tests  Lab Results   Component Value Date    WBC 6.9 10/06/2016    HGB 14.9 02/16/2017    HCT 45.2 10/06/2016     10/06/2016    CHOL 167 10/06/2016    TRIG 39 10/06/2016     10/06/2016    ALT 19 10/06/2016    AST 20 10/06/2016     10/06/2016    K 3.7 02/16/2017     10/06/2016    CREATININE 1.15 (H) 10/06/2016    BUN 21 10/06/2016    CO2 30 10/06/2016    TSH 1.16 10/06/2016    INR 0.94 11/18/2013                Assessment/Plan for  Jenny Iglesias is a 71 y.o. female.  No Patient Care Coordination Note on file.       1. Essential hypertension  Improved on metoprolol.  Check renal profile  - Basic Metabolic Panel    2. Postmenopausal  Restart estrogen    3. Routine general medical examination at a health care facility  Restart estrogen  - estradiol (CLIMARA) " 0.0375 mg/24 hr; Place 1 patch on the skin once a week. Change on Wed  Dispense: 13 patch; Refill: 3    4. Abdominal pain  Vague with unremarkable exam.  Check laboratory.  Scheduled follow-up.  - HM1(CBC and Differential)  - Hepatic Profile  - Thyroid Cascade  - Urinalysis-UC if Indicated  - HM1 (CBC with Diff)    Same medication-including metoprolol      Plan:  Notify me if her abdominal discomfort has not resolved within a couple weeks  Laboratory  Continue metoprolol  Clinic visit 3 months      Diagnoses and all orders for this visit:    Essential hypertension  -     Basic Metabolic Panel    Postmenopausal    Routine general medical examination at a health care facility  -     estradiol (CLIMARA) 0.0375 mg/24 hr; Place 1 patch on the skin once a week. Change on Wed  Dispense: 13 patch; Refill: 3    Abdominal pain  -     HM1(CBC and Differential)  -     Hepatic Profile  -     Thyroid Cascade  -     Urinalysis-UC if Indicated  -     HM1 (CBC with Diff)    Other orders  -     Cancel: estradiol (CLIMARA) 0.0375 mg/24 hr; Place 1 patch on the skin once a week. Change on Wed  Dispense: 13 patch; Refill: 3                Ramesh Canales MD  Internal medicine  [facility]  [phone]  [email or web address]      This is an electronically verified report by Ramesh Canales M.D.  (Note created with Dragon voice recognition and unintended spelling errors and word substitutions may occur)

## 2021-06-11 NOTE — TELEPHONE ENCOUNTER
Patient has VV AWV on 10/07/2020 and lab only on 10/08/2020. Please place future orders during patient's visit. Thank you.    Aaliyah Nation, CMA

## 2021-06-12 NOTE — TELEPHONE ENCOUNTER
Dr. Taylor,   Please review message below and let me know if you would like the patient to schedule a visit with you, or if you would like to send a referral.  Thank you.  Cheryl DOVE, LEOPOLDO/CMT....................9:30 AM

## 2021-06-12 NOTE — PROGRESS NOTES
"Jenny Iglesias is a 75 y.o. female who is being evaluated via a billable video visit.      The patient has been notified of following:     \"This video visit will be conducted via a call between you and your physician/provider. We have found that certain health care needs can be provided without the need for an in-person physical exam.  This service lets us provide the care you need with a video conversation.  If a prescription is necessary we can send it directly to your pharmacy.  If lab work is needed we can place an order for that and you can then stop by our lab to have the test done at a later time.    Video visits are billed at different rates depending on your insurance coverage. Please reach out to your insurance provider with any questions.    If during the course of the call the physician/provider feels a video visit is not appropriate, you will not be charged for this service.\"    Patient has given verbal consent to a Video visit? Yes  How would you like to obtain your AVS? AVS Preference: [employer].  If dropped by the video visit, the video invitation should be sent to: Text to cell phone: [phone]  Will anyone else be joining your video visit? No      Mammogram   Video Start Time: 9:04 AM  Chief Complaint   Patient presents with     Follow-up     Well check     dorsal root ganglion stimulator inserted but has failed and she continues to have significant pain and Has develops severe bad legs and  cannot lie on her back or bend knees . Lying on her shoulder . Wakes up three of four times at night . Feels angry about the pain . [place]        Video-Visit Details    Type of service:  Video Visit    Video End Time (time video stopped): 935 am   Originating Location (pt. Location): Home    Distant Location (provider location):  [facility]     Platform used for Video Visit: Quinten VASQUEZ MD   Assessment and Plan: "     Patient has been advised of split billing requirements and indicates understanding: Yes  1. Essential hypertension  Says her bp is controlled     2. Vertigo  Complete resolution after going to the vertigo clinic   - LORazepam (ATIVAN) 0.5 MG tablet; Twice daily as needed  Dispense: 30 tablet; Refill: 5    3. Chronic insomnia  She has been using this for many years and will continue it   - zolpidem (AMBIEN) 10 mg tablet; Take 1 tablet (10 mg total) by mouth at bedtime as needed for sleep.  Dispense: 90 tablet; Refill: 1    4. Asymptomatic bilateral carotid artery stenosis      5. Other chronic pain  Had oxycodone for her tooth extraction   and that helped her pain . She doesn't want to take narcotics but is in so much pain that she wants to try . She has not taken chronic narcotics in the past . She will try it and let me kow . Goal is two tablets at night .   - oxyCODONE-acetaminophen (PERCOCET/ENDOCET) 5-325 mg per tablet; Take 1 tablet by mouth every 6 (six) hours as needed for pain.  Dispense: 60 tablet; Refill: 0    6. Dizziness    - diazePAM (VALIUM) 2 MG tablet; Take 1 tablet (2 mg total) by mouth 2 (two) times a day as needed for anxiety.  Dispense: 60 tablet; Refill: 5        The patient's current medical problems were reviewed.    I have had an Advance Directives discussion with the patient.  The following health maintenance schedule was reviewed with the patient and provided in printed form in the after visit summary:   Health Maintenance   Topic Date Due     TD 18+ HE  07/25/1963     ZOSTER VACCINES (2 of 3) 12/09/2008     DXA SCAN  10/10/2018     INFLUENZA VACCINE RULE BASED (1) 08/01/2020     MEDICARE ANNUAL WELLNESS VISIT  10/07/2021     FALL RISK ASSESSMENT  10/07/2021     LIPID  10/12/2023     ADVANCE CARE PLANNING  10/16/2024     Pneumococcal Vaccine: 65+ Years  Completed     Pneumococcal Vaccine: Pediatrics (0 to 5 Years) and At-Risk Patients (6 to 64 Years)  Aged Out     HEPATITIS B VACCINES   Aged Out        Subjective:   Chief Complaint: Jenny Iglesias is an 75 y.o. female here for an Annual Wellness visit. Via video     Review of Systems:    Please see above.  The rest of the review of systems are negative for all systems.    Patient Care Team:  Karen Taylor MD as PCP - General (Internal Medicine)  Rodney Esquivel DO as Physician (Pain Medicine)  Karen Taylor MD as Assigned PCP     Patient Active Problem List   Diagnosis     Vertigo     Essential hypertension     Restless leg syndrome     Perimenopausal vasomotor symptoms     Carotid stenosis, asymptomatic     Chronic insomnia     Other chronic pain     Past Medical History:   Diagnosis Date     Carotid artery stenosis 11/2013    50% left internal carotid artery      Chronic low back pain      Hypertension      Osteoarthritis of lumbar spine      Osteopenia     mild, resolved     Postmenopausal 11/2013    on hormone     Restless leg syndrome      Surgical menopause     age 47     Thyroid nodule 11/2013    35mm, rightthyroid lobe biopsy-benign nodule     TIA (transient ischemic attack) 11/2013     Vertigo       Past Surgical History:   Procedure Laterality Date     APPENDECTOMY       CATARACT EXTRACTION       CHOLECYSTECTOMY       fifth toe amputation Right 1998     HYSTERECTOMY       TUBAL LIGATION        Family History   Problem Relation Age of Onset     Breast cancer Mother 57     Lung cancer Father 62     Hypertension Sister       Social History     Socioeconomic History     Marital status:      Spouse name: Not on file     Number of children: Not on file     Years of education: Not on file     Highest education level: Not on file   Occupational History     Not on file   Social Needs     Financial resource strain: Not on file     Food insecurity     Worry: Not on file     Inability: Not on file     Transportation needs     Medical: Not on file     Non-medical: Not on file   Tobacco Use     Smoking status: Former Smoker      Quit date: 10/22/2000     Years since quittin.9     Smokeless tobacco: Never Used   Substance and Sexual Activity     Alcohol use: Yes     Drug use: Not on file     Sexual activity: Not on file   Lifestyle     Physical activity     Days per week: Not on file     Minutes per session: Not on file     Stress: Not on file   Relationships     Social connections     Talks on phone: Not on file     Gets together: Not on file     Attends Restorationist service: Not on file     Active member of club or organization: Not on file     Attends meetings of clubs or organizations: Not on file     Relationship status: Not on file     Intimate partner violence     Fear of current or ex partner: Not on file     Emotionally abused: Not on file     Physically abused: Not on file     Forced sexual activity: Not on file   Other Topics Concern     Not on file   Social History Narrative    - [place]    Retired 2001    [employer]    [facility]    SonNael    DaughterVilma    DaughterRe      Current Outpatient Medications   Medication Sig Dispense Refill     aspirin 325 MG EC tablet Take 325 mg by mouth daily.       calcium carbonate (OS-NIKITA) 600 mg (1,500 mg) tablet Take 600 mg by mouth 2 (two) times a day with meals.       diazePAM (VALIUM) 2 MG tablet Take 1 tablet (2 mg total) by mouth 2 (two) times a day as needed for anxiety. 60 tablet 5     diclofenac sodium (VOLTAREN) 1 % Gel 2g up to four times a day. Try once a day first to see if tolerated . This does not have the same diarrhea side effects as the oral tablet 1 Tube 2     estradiol (CLIMARA) 0.0375 mg/24 hr Place 1 patch on the skin once a week. 13 patch 3     hydrocortisone 2.5 % ointment Apply topically as needed.        LORazepam (ATIVAN) 0.5 MG tablet Twice daily as needed 30 tablet 5     losartan-hydrochlorothiazide (HYZAAR) 100-25 mg per tablet Take 1 tablet by mouth daily. 90 tablet 3     metoprolol succinate (TOPROL-XL) 50 MG 24 hr tablet TAKE  ONE TABLET BY MOUTH ONE TIME DAILY FOR HYPERTENSION 90 tablet 0     multivitamin with minerals (THERA-M) 9 mg iron-400 mcg Tab tablet Take 1 tablet by mouth daily.       potassium chloride (KLOR-CON) 10 MEQ CR tablet TAKE ONE TABLET BY MOUTH ONE TIME DAILY  90 tablet 2     pramipexole (MIRAPEX) 0.25 MG tablet TAKE ONE TABLET BY MOUTH TWICE DAILY AT 7PM AND 11PM 180 tablet 0     pravastatin (PRAVACHOL) 20 MG tablet TAKE HALF TABLET BY MOUTH DAILY IN THE EVENING 45 tablet 2     zolpidem (AMBIEN) 10 mg tablet Take 1 tablet (10 mg total) by mouth at bedtime as needed for sleep. 90 tablet 1     oxyCODONE-acetaminophen (PERCOCET/ENDOCET) 5-325 mg per tablet Take 1 tablet by mouth every 6 (six) hours as needed for pain. 60 tablet 0     No current facility-administered medications for this visit.       Objective:   Vital Signs:   Visit Vitals  LMP  (LMP Unknown)            VisionScreening:  No exam data present         Assessment Results 10/7/2020   Activities of Daily Living No help needed   Instrumental Activities of Daily Living No help needed   Get Up and Go Score -   Mini Cog Total Score -   Some recent data might be hidden     A Mini-Cog score of 0-2 suggests the possibility of dementia, score of 3-5 suggests no dementia        Identified Health Risks:     The patient was counseled and encouraged to consider modifying their diet and eating habits. She was provided with information on recommended healthy diet options.  Information regarding advance directives (living john), including where she can download the appropriate form, was provided to the patient via the AVS.

## 2021-06-12 NOTE — TELEPHONE ENCOUNTER
Patient calling asking for [facility].  Has vertigo.      Needs referral.    Carolee Tilley RN  Triage Nurse Advisor

## 2021-06-13 NOTE — PROGRESS NOTES
Assessment and Plan:        1. Influenza vaccine administered     - Influenza High Dose, Seasonal 65+ yrs    2. Routine general medical examination at a health care facility  performed    3. Medicare annual wellness visit, subsequent  completed  - HM1(CBC and Differential)  - Erythrocyte Sedimentation Rate  - Urinalysis-UC if Indicated  - Basic Metabolic Panel  - Hepatic Profile  - Lipid Cascade  - Thyroid Cascade  - Vitamin D, Total (25-Hydroxy)  - HM1 (CBC with Diff)    4. Perimenopausal vasomotor symptoms  Continue estrogen.  Has significant vasomotor instability benefits outweigh risk.  Status post hysterectomy.  Normal breast exam  - estradiol (CLIMARA) 0.0375 mg/24 hr; Place 1 patch on the skin once a week. Change on Wed  Dispense: 13 patch; Refill: 3    5. Chronic insomnia  Refill Ambien  - zolpidem (AMBIEN) 10 mg tablet; TAKE 1 TABLET (10 MG TOTAL) BY MOUTH BEDTIME AS NEEDED FOR SLEEP.  Dispense: 90 tablet; Refill: 3    6. Essential hypertension  Controlled.  I am wondering if she has intermittent hypotension with the dizziness.  She will be checking her blood pressure more regularly and certainly with any episode.  - metoprolol succinate (TOPROL XL) 50 MG 24 hr tablet; Take 1 tablet (50 mg total) by mouth daily. For htn  Dispense: 30 tablet; Refill: 11    7. Dizziness  With ER visit reviewed in May.  MRI negative blood pressure excellent not low or high.  Still has about once a week may last 3 minutes.  Not orthostatic.  Not vertiginous  - diazePAM (VALIUM) 2 MG tablet; Take 1 tablet (2 mg total) by mouth 2 (two) times a day as needed for anxiety.  Dispense: 60 tablet; Refill: 0      In addition to the wellness examination we addressed greater than 3 chronic medical issues  This provider spent greater than 25 min. face-to-face time with the patient and/or his family.  More than half this time was spent in counseling and or coordination of care with other providers or agencies which were consistent with  the nature of this patient's problems which are listed and described in the assessment and plan.    The patient's current medical problems were reviewed.    I have had an Advance Directives discussion with the patient.  The following health maintenance schedule was reviewed with the patient and provided in printed form in the after visit summary:   Health Maintenance   Topic Date Due     TD 18+ HE  07/25/1963     INFLUENZA VACCINE RULE BASED (1) 08/01/2017     MAMMOGRAM  11/02/2017     FALL RISK ASSESSMENT  04/03/2018     DXA SCAN  10/10/2018     ADVANCE DIRECTIVES DISCUSSED WITH PATIENT  10/27/2020     COLONOSCOPY  04/18/2027     PNEUMOCOCCAL POLYSACCHARIDE VACCINE AGE 65 AND OVER  Completed     PNEUMOCOCCAL CONJUGATE VACCINE FOR ADULTS (PCV13 OR PREVNAR)  Completed     ZOSTER VACCINE  Completed        Subjective:   Chief Complaint: Jenny Iglesias is an 72 y.o. female here for an Annual Wellness visit.   HPI: Here for wellness check and follow-up issues    Still with some dizziness.  May last 3 minutes.  No syncope.  Does sit down.  Often flushed sometimes pale.  Emergency room visit in May-Dr. Ming Powell with MRI ECG laboratory normal vitals.  Does take as needed diazepam which helped.  She has not had syncope or near syncope.  When she checks her blood pressure at home is excellent typically in the 1 teens-120s    Hypertension-There are no cardiovascular, respiratory, neurologic complaints.No claudication.There is no orthostasis.Patient is compliant with medications.  Medications reviewed.   No side effects from medication.  Vasomotor instability.  Try to get off estrogen in the past.  Has had normal mammograms as had a hysterectomy.  Patient is aware of benefits/risk    Hyperlipoproteinemia-patient is tolerating medication.  There are no myalgia, arthralgia, weakness.  No Bowel issues.  Liver profile has been normal.  Patient has met cholesterol goals.  Has mild carotid stenosis no TIA symptoms.   Specifically no speech.    Review of Systems: Uses Ambien for sleep  Occasional diazepam very effective for anxiety  Successful wrist surgery-Dr. home  Back pain.  Sees orthopedist.  Does get injections.  These are helpful.      Please see above.  The rest of the review of systems are negative for all systems.    Patient Care Team:  Ramesh Canales MD as PCP - General (Internal Medicine)     Patient Active Problem List   Diagnosis     Vertigo     Essential hypertension     Restless leg syndrome     Perimenopausal vasomotor symptoms     Carotid stenosis, asymptomatic     Chronic insomnia     Past Medical History:   Diagnosis Date     Carotid artery stenosis 11/2013    50% left internal carotid artery      Chronic low back pain      Hypertension      Osteoarthritis of lumbar spine      Osteopenia     mild, resolved     Postmenopausal 11/2013    on hormone     Restless leg syndrome      Surgical menopause     age 47     Thyroid nodule 11/2013    35mm, rightthyroid lobe biopsy-benign nodule     TIA (transient ischemic attack) 11/2013     Vertigo       Past Surgical History:   Procedure Laterality Date     APPENDECTOMY       CATARACT EXTRACTION       CHOLECYSTECTOMY       fifth toe amputation Right 1998     HYSTERECTOMY       TUBAL LIGATION        Family History   Problem Relation Age of Onset     Breast cancer Mother 57     Lung cancer Father 62     Hypertension Sister       Social History     Social History     Marital status:      Spouse name: N/A     Number of children: N/A     Years of education: N/A     Occupational History     Not on file.     Social History Main Topics     Smoking status: Former Smoker     Quit date: 10/22/2000     Smokeless tobacco: Never Used     Alcohol use Yes     Drug use: Not on file     Sexual activity: Not on file     Other Topics Concern     Not on file     Social History Narrative    - [place]    Retired 1/2001    [employer]    [employer] dept    [place]    Son-Zaire     "Daughter-Nadege Torres      Current Outpatient Prescriptions   Medication Sig Dispense Refill     aspirin 81 mg chewable tablet Chew 81 mg daily.       calcium carbonate (OS-NIKITA) 600 mg (1,500 mg) tablet Take 600 mg by mouth 2 (two) times a day with meals.       diazePAM (VALIUM) 2 MG tablet Take 1 tablet (2 mg total) by mouth 2 (two) times a day as needed for anxiety. 60 tablet 0     estradiol (CLIMARA) 0.0375 mg/24 hr Place 1 patch on the skin once a week. Change on Wed 13 patch 3     hydrocortisone 2.5 % ointment Apply topically as needed.        losartan-hydrochlorothiazide (HYZAAR) 100-25 mg per tablet Take 1 tablet by mouth daily. 90 tablet 3     metoprolol succinate (TOPROL XL) 50 MG 24 hr tablet Take 1 tablet (50 mg total) by mouth daily. For htn 30 tablet 11     multivitamin with minerals (THERA-M) 9 mg iron-400 mcg Tab tablet Take 1 tablet by mouth daily.       potassium chloride (KLOR-CON) 10 MEQ CR tablet Take 2 tablets (20 mEq total) by mouth daily. 180 tablet 3     pramipexole (MIRAPEX) 0.25 MG tablet Take 1 tablet (0.25 mg total) by mouth 2 (two) times a day. At 7 PM & 11  tablet 3     pravastatin (PRAVACHOL) 10 MG tablet Take 1 tablet (10 mg total) by mouth daily. 90 tablet 3     zolpidem (AMBIEN) 10 mg tablet TAKE 1 TABLET (10 MG TOTAL) BY MOUTH BEDTIME AS NEEDED FOR SLEEP. 90 tablet 3     No current facility-administered medications for this visit.       Objective:   Vital Signs:   Visit Vitals     /70     Pulse 69     Resp 16     Ht 5' 4.25\" (1.632 m)     Wt 161 lb (73 kg)     LMP  (LMP Unknown)     SpO2 96%     Breastfeeding No     BMI 27.42 kg/m2        VisionScreening:  No exam data present     PHYSICAL EXAM  Skin: Normal. No rash or lesion  Lymph Nodes: None palpable-including neck, axilla, inguinal, epitrochlear.  Head:  Normocephalic.    Eyes: Midline.  Equal size., full ROM.  External exams normal.  No icterus  Ears:  Normal pinnae, canals, and TM's.    Nose:  Patent, " without deformity.    Throat:  Moist mucous membranes without lesions, erythema, or exudate.  No palpable thyroid mass  Neck: No palpable masses, lymphadenopathy or tenderness.No thyromegaly or goiter.  No thyroid nodule.  Carotid Arteries:  No Bruit.  Carotid upstroke normal  Chest Wall: No deformity or pain elicited on compression.  Axilla negative no lymphadenopathy  Insert normal breast  Respiratory:  Normal respiratory effort.  Lungs are clear with good breath sounds.  No dullness.  No wheezing.  Heart: Regular rhythm.  Normal sounding S1, S2 without S3, S4, murmurs, rubs, or gallops.    Abdomen:  The abdomen was flat, soft and nontender without guarding rebound or masses.  There are normal bowel sounds.  There is no hepatosplenomegaly.  There is no palpable enlargement of the aorta.  Pelvic external normal.  No palpable uterus.  No palpable pelvic mass  Rectum-tight tone no mass  Extremities:  Full ROM without limitation, deformity or edema.    4+ pulses  Neurologic-intact- No focal deficit.  Speech clear.  Coordination normal.  Strength symmetric  Orthopedic-gait normal-no limping        Assessment Results 10/11/2017   Activities of Daily Living No help needed   Instrumental Activities of Daily Living No help needed   Get Up and Go Score Less than 12 seconds   Mini Cog Total Score 5   Some recent data might be hidden     A Mini-Cog score of 0-2 suggests the possibility of dementia, score of 3-5 suggests no dementia    Identified Health Risks:     The patient was counseled and encouraged to consider modifying their diet and eating habits. She was provided with information on recommended healthy diet options.  Patient's advanced directive was discussed

## 2021-06-14 NOTE — TELEPHONE ENCOUNTER
Refill Approved    Rx renewed per Medication Renewal Policy. Medication was last renewed on 7/20/2020  OV 10/7/2020  Laura Sommer, [facility] Connection Triage/Med Refill 1/11/2021     Requested Prescriptions   Pending Prescriptions Disp Refills     losartan-hydrochlorothiazide (HYZAAR) 100-25 mg per tablet [Pharmacy Med Name: Losartan Potassium-HCTZ Oral Tablet 100-25 MG] 90 tablet 0     Sig: TAKE ONE TABLET BY MOUTH ONE TIME DAILY       Diuretics/Combination Diuretics Refill Protocol  Passed - 1/11/2021  9:54 AM        Passed - Visit with PCP or prescribing provider visit in past 12 months     Last office visit with prescriber/PCP: 1/9/2020 Ramesh Canales MD OR same dept: 2/21/2020 Karen Taylor MD OR same specialty: 2/21/2020 Karen Taylor MD  Last physical: 10/16/2019 Last MTM visit: Visit date not found   Next visit within 3 mo: Visit date not found  Next physical within 3 mo: Visit date not found  Prescriber OR PCP: Ramesh Canales MD  Last diagnosis associated with med order: 1. Essential hypertension  - losartan-hydrochlorothiazide (HYZAAR) 100-25 mg per tablet [Pharmacy Med Name: Losartan Potassium-HCTZ Oral Tablet 100-25 MG]; TAKE ONE TABLET BY MOUTH ONE TIME DAILY   Dispense: 90 tablet; Refill: 0    If protocol passes may refill for 12 months if within 3 months of last provider visit (or a total of 15 months).             Passed - Serum Potassium in past 12 months      Lab Results   Component Value Date    Potassium 4.7 10/08/2020             Passed - Serum Sodium in past 12 months      Lab Results   Component Value Date    Sodium 141 10/08/2020             Passed - Blood pressure on file in past 12 months     BP Readings from Last 1 Encounters:   03/03/20 135/70             Passed - Serum Creatinine in past 12 months      Creatinine   Date Value Ref Range Status   10/08/2020 0.99 0.60 - 1.10 mg/dL Final

## 2021-06-15 NOTE — ANESTHESIA POSTPROCEDURE EVALUATION
Patient: Jenny Iglesias  Procedure(s):  BIOPSY, ARTERY, TEMPORAL (Bilateral)  Anesthesia type: MAC    Patient location: Phase II Recovery  Last vitals:   Vitals Value Taken Time   /62 03/04/21 1615   Temp 36.7  C (98.1  F) 03/04/21 1610   Pulse 88 03/04/21 1617   Resp 16 03/04/21 1610   SpO2 97 % 03/04/21 1617   Vitals shown include unvalidated device data.  Post vital signs: stable  Level of consciousness: awake and responds to simple questions  Post-anesthesia pain: pain controlled  Post-anesthesia nausea and vomiting: no  Pulmonary: unassisted, return to baseline  Cardiovascular: stable and blood pressure at baseline  Hydration: adequate  Anesthetic events: no    QCDR Measures:  ASA# 11 - Nati-op Cardiac Arrest: ASA11B - Patient did NOT experience unanticipated cardiac arrest  ASA# 12 - Nati-op Mortality Rate: ASA12B - Patient did NOT die  ASA# 13 - PACU Re-Intubation Rate: ASA13B - Patient did NOT require a new airway mgmt  ASA# 10 - Composite Anes Safety: ASA10A - No serious adverse event    Additional Notes:

## 2021-06-15 NOTE — PROGRESS NOTES
Office Visit - Follow Up   Jenny Iglesias   75 y.o. female    Date of Visit: 3/3/2021         Assessment and Plan   1. TIA (transient ischemic attack)    Seizures were thought to be the diagnosis but seziures would not cause ischemic neuropathy .  2. Essential hypertension  Controlled     3. Ischemic optic neuropathy, unspecified laterality  It is concerning that despite plavix, aspirin statin and well controlled hypertension , she continues to have ischemic events . Even though sed rate and CRP are not that high , vasculitis could be considered , rarer conditions like neurosarcoidosis  Usually can be seen in MRI but can be missed . She will be getting a temporal artery biopsy done . plavix will be held but cannot be held for the 5 day normally required . Will wait for result to decide taper off prednisone . I did speak to neurologist at [place] who saw her and she will get in touch with stroke neurolgoist             No follow-ups on file.     History of Present Illness   This 75 y.o. old   Chief Complaint   Patient presents with     Follow-up     seizures   dontrell has a h/o very transient TIA like events . Prior MRI/MRAs had shown some verterbral artery stenosis and she was put on plavix in addition to aspirin . In the last month was admitted twice for TIAs  First time image showed :  Redemonstrated occluded V4 segments of the vertebral arteries and proximal basilar artery, which are likely chronic in nature as there is a lack of ischemic change on the recent brain MRI dated 02/02/2021.    2.  Mild attenuation of the distal M2 segment of the anterior branch of the left middle cerebral artery, favored to be chronic in nature given the lack of ischemic change on recent brain MRI dated 02/02/2021. NO stroke identified . she was discharged and returned Second time  CT angio showed New round focus of hypoattenuation in the left basal ganglia measuring 5 x 6 x 7 mm, with appearance most suggestive of developing  "subacute infarction.     Was seen by neurologist whose assessment was :    1. Likely simple partial seizures characterized by brief visual change on the left side followed by expressive aphasia lasting about a minute. These episodes have been very stereotyped and have occurred rarely over the past two years, but twice this month.  2. Question of small basal ganglia infarct on Ct angio.     She was put on kepraa and discharged . At the time she had been complaining of visual changes and was told to see an opthalmogist . She did do saw and was found to have ischemic optic neuropathy of the left side . She was started on prednisone by opthalmologisit ( 60mg ) , sed rate and CRP were slightly elevated . She has no new symptoms . says she has lost partial vision of her left eye .    Review of Systems: A comprehensive review of systems was negative except as noted.     Medications, Allergies and Problem List   Reviewed, reconciled and updated  Patient Active Problem List   Diagnosis     Vertigo     Essential hypertension     Restless leg syndrome     Perimenopausal vasomotor symptoms     Carotid stenosis, asymptomatic     Chronic insomnia     Other chronic pain     Chronic kidney disease, stage 3     Ischemic optic neuropathy     TIA (transient ischemic attack)        Physical Exam   General Appearance:       /64 (Patient Site: Left Arm, Patient Position: Sitting, Cuff Size: Adult Regular)   Pulse 80   Ht 5' 4\" (1.626 m)   Wt 149 lb 4 oz (67.7 kg)   LMP  (LMP Unknown)   SpO2 95%   BMI 25.62 kg/m      General appearance - alert, well appearing, and in no distress  Mental status - alert, oriented to person, place, and time  Neck - supple, no significant adenopathy  Lymphatics - no palpable lymphadenopathy, no hepatosplenomegaly  Chest - clear to auscultation, no wheezes, rales or rhonchi, symmetric air entry  Heart - normal rate, regular rhythm, normal S1, S2, no murmurs, rubs, clicks or gallops  Abdomen - soft, " nontender, nondistended, no masses or organomegaly  Musculoskeletal - no joint tenderness, deformity or swelling  Extremities - peripheral pulses normal, no pedal edema, no clubbing or cyanosis  Skin - normal coloration and turgor, no rashes, no suspicious skin lesions noted  symmetrical Upper and lower extremity power   Cranial nerves normal   rombergs negative ' gait normal   No cerebellar signs                                                                                      Additional Information   Current Outpatient Medications   Medication Sig Dispense Refill     atorvastatin (LIPITOR) 40 MG tablet Take 1 tablet (40 mg total) by mouth daily. 90 tablet 3     calcium carbonate (OS-NIKITA) 600 mg (1,500 mg) tablet Take 600 mg by mouth 2 (two) times a day with meals.       clopidogreL (PLAVIX) 75 mg tablet Take 75 mg by mouth.       diazePAM (VALIUM) 2 MG tablet Take 1 tablet (2 mg total) by mouth 2 (two) times a day as needed for anxiety. 60 tablet 5     hydrocortisone 2.5 % ointment Apply topically as needed.        levETIRAcetam (KEPPRA) 250 MG tablet Take 250 mg by mouth 2 (two) times a day.       LORazepam (ATIVAN) 0.5 MG tablet Twice daily as needed 30 tablet 5     losartan-hydrochlorothiazide (HYZAAR) 100-25 mg per tablet TAKE ONE TABLET BY MOUTH ONE TIME DAILY  90 tablet 2     metoprolol succinate (TOPROL-XL) 50 MG 24 hr tablet TAKE ONE TABLET BY MOUTH ONE TIME DAILY FOR HYPERTENSION 90 tablet 0     multivitamin (VITAMINS FOR HAIR) per tablet Take 1 tablet by mouth.       multivitamin with minerals (THERA-M) 9 mg iron-400 mcg Tab tablet Take 1 tablet by mouth daily.       oxyCODONE-acetaminophen (PERCOCET/ENDOCET) 5-325 mg per tablet TAKE ONE TABLET BY MOUTH EVERY SIX HOURS AS NEEDED FOR PAIN  60 tablet 0     potassium chloride (KLOR-CON) 10 MEQ CR tablet TAKE ONE TABLET BY MOUTH ONE TIME DAILY  90 tablet 2     pramipexole (MIRAPEX) 0.25 MG tablet TAKE ONE TABLET BY MOUTH TWICE DAILY AT 7PM AND 11PM 180  tablet 0     predniSONE (DELTASONE) 20 MG tablet Take 20 mg by mouth daily.       zolpidem (AMBIEN) 10 mg tablet Take 1 tablet (10 mg total) by mouth at bedtime as needed for sleep. 90 tablet 1     clopidogreL (PLAVIX) 75 mg tablet Take 1 tablet (75 mg total) by mouth daily. 90 tablet 11     diclofenac sodium (VOLTAREN) 1 % Gel 2g up to four times a day. Try once a day first to see if tolerated . This does not have the same diarrhea side effects as the oral tablet 1 Tube 2     HYDROcodone-acetaminophen 5-325 mg per tablet Take 1-2 tablets by mouth every 4 (four) hours as needed for pain. 10 tablet 0     No current facility-administered medications for this visit.      Allergies   Allergen Reactions     Codeine Other (See Comments)     Other chest pain     Diclofenac      Diarrhea       Sinemet [Carbidopa-Levodopa]      dystonia     Social History     Social History Narrative    - [place]    Retired 1/2001    [employer]    Son-[place]    Daughter-Nadege    Daughter-Leatha      reports that she quit smoking about 20 years ago. She has never used smokeless tobacco. She reports current alcohol use. She reports previous drug use.   Past Medical History:   Diagnosis Date     Anxiety      Carotid artery stenosis 11/2013    50% left internal carotid artery      Chronic low back pain      Hypertension      Osteoarthritis of lumbar spine      Osteopenia     mild, resolved     Postmenopausal 11/2013    on hormone     Restless leg syndrome      Seizures (H)     Feb 18, 2021, Small lost vision and could not speak for 4-5 seconds     Surgical menopause     age 47     Thyroid nodule 11/2013    35mm, rightthyroid lobe biopsy-benign nodule     TIA (transient ischemic attack) 11/2013     TIA (transient ischemic attack)      Vertigo            Time:      Karen Taylor MD

## 2021-06-15 NOTE — TELEPHONE ENCOUNTER
Who is calling:  [facility]  Reason for Call:  FYI, patient is coming in for lab work today, [facility] is faxing orders to [phone] and patient also hand carrying orders.  Date of last appointment with primary care:   Okay to leave a detailed message: No

## 2021-06-15 NOTE — TELEPHONE ENCOUNTER
Reason for Call:  Other call back      Detailed comments: STROKE FOLLOWUP  WOULD LIKE TO SPEAK WITH DR KIM     Phone Number Patient can be reached at: Other phone number:  [phone]    Best Time: ANYTIME    Can we leave a detailed message on this number?: Yes    Call taken on 3/10/2021 at 8:51 AM by Miriam Meyer

## 2021-06-15 NOTE — TELEPHONE ENCOUNTER
"Who is calling:  Pt  Reason for Call:  Pt has a referral for giant cell arteritis, per the referral notes states \"URGENT\" - would like Pt to be seen with 3 weeks or so; looks like Dr. Taylor wanted the Pt to go to the [facility] but Pt did not want to go to [facility].    Pt is scheduled for a VV with Dr. Han for 4/26/21 but per Dr. Taylor's request, \"Urgent\", please advise if Dr. Han is able to do a VV or F2F appointment sooner than 4/26/21.        Okay to leave a detailed message: Yes    "

## 2021-06-15 NOTE — PATIENT INSTRUCTIONS - HE
In one month check blood pressures should be at target bp is 130//90  If not let me know     Immediately increase metoprolol to FULL tablet    .    Bring your machine with you in 2 months

## 2021-06-15 NOTE — TELEPHONE ENCOUNTER
Update for the neuroscience clinic:    Patient was evaluated and diagnosed with seizures, not TIA's or stroke.  Patient will need to see neurology for follow up.  Patient is to follow eye doctor's recommendation on follow up for her temporal arteritis.

## 2021-06-15 NOTE — PROGRESS NOTES
Office Visit - Follow Up   Jenny Iglesias   75 y.o. female    Date of Visit: 2/15/2021         Assessment and Plan   1. Essential hypertension  Initial readings high . Home readings high 140's occaisional 160's . I was not aware that she is halving her metoprolol and losartan . She said she has done for a while as her bps were low . Recommend she increase metoprolol to 50mg . . Return in 2 months and will increase losartan if needed  . Immediately bp can be higher during the time of a TIA but chronically needs to be around 130//90 ,NOT higher .      2. Stage 3a chronic kidney disease  Stable     3. Post-traumatic osteoarthritis of both hips  Still going throgh intractable pain . Failed stimulator . Has had hip injections . The best effect she has is with a medrol pack , suggesting an inflammtory componenet . She doesn't have other features of synovitis or Infalmmatory arthritis . She would like to see how she does before testing . She is worried about the statin change giving her more pain     4. TIA (transient ischemic attack)  TIA thorugh clinical history , a few minutes of word finding problems . NO loss of memory , insight or weakness . Extensive work up shows chronic vertberal artery segment occulusion and some MCA narrowness . NO stroke . Echo was normal .  She is on a cardiac monitor for 30 days.  I did explain the pathophysiology of TIAs and how the just repeating all the work-up that she has had in the past.  Theoretically of the presence of estrogen systemically can cause thromboembolism even though she has not actually had a thromboembolic stroke she is at high risk.  She said she already had stopped the Climara but will now remove it from her med list.  But I did explain that this is because she was already on a statin and aspirin and those medications had to be changed even though they were not necessarily not working.  Of note her pravastatin at 10 mg a day was still achieving a target LDL  of 72.  She is doing the overlap of aspirin and Plavix and then will be on Plavix alone which not really sure why but will review the neurology notes.  Given the fact that her cerebrovascular disease is not surgically treatable as per the neurologist note.  I do not think a follow-up with neurology is necessary at this point we will continue medication and monitor her BP closely.  - atorvastatin (LIPITOR) 40 MG tablet; Take 1 tablet (40 mg total) by mouth daily.  Dispense: 90 tablet; Refill: 3  - clopidogreL (PLAVIX) 75 mg tablet; Take 1 tablet (75 mg total) by mouth daily.  Dispense: 90 tablet; Refill: 11    5. Age-related osteoporosis without current pathological fracture  She is due for her bone density scan she has had a mammogram  - DXA Bone Density Scan; Future          Post Discharge Medication Reconciliation Status: discharge medications reconciled, continue medications without change    Return in about 2 months (around 4/15/2021).     History of Present Illness   This 75 y.o. old   Chief Complaint   Patient presents with     Follow-up     TIA on 2/1   Again she had word finding difficulty lasting for a minute.  She had no loss of motor functions and sensory function no significant dysarthria.  She was cognizant throughout the entire event.  She had no head injury.  CTA's was suggestive of some new thromboembolic phenomena in the left middle cerebral and the vertebral artery.  So she was put on heparin.  She was not put on TPA but subsequent MRI and MRA  Redemonstrated occluded V4 segments of the vertebral arteries and proximal basilar artery, which are likely chronic in nature as there is a lack of ischemic change on the recent brain MRI dated 02/02/2021.    2.  Mild attenuation of the distal M2 segment of the anterior branch of the left middle cerebral artery, favored to be chronic in nature given the lack of ischemic change on recent brain MRI dated 02/02/2021 's  These all suggest chronic  phenomena.  Review of Systems: A comprehensive review of systems was negative except as noted.     Medications, Allergies and Problem List   Reviewed, reconciled and updated  Patient Active Problem List   Diagnosis     Vertigo     Essential hypertension     Restless leg syndrome     Perimenopausal vasomotor symptoms     Carotid stenosis, asymptomatic     Chronic insomnia     Other chronic pain     Chronic kidney disease, stage 3        Physical Exam   General Appearance:       /82   Pulse 87   Wt 145 lb (65.8 kg)   LMP  (LMP Unknown)   SpO2 99%   BMI 24.89 kg/m      General appearance - alert, well appearing, and in no distress  Mental status - alert, oriented to person, place, and time  Neck - supple, no significant adenopathy  Lymphatics - no palpable lymphadenopathy, no hepatosplenomegaly  Chest - clear to auscultation, no wheezes, rales or rhonchi, symmetric air entry  Heart - normal rate, regular rhythm, normal S1, S2, no murmurs, rubs, clicks or gallops  Extremities - peripheral pulses normal, no pedal edema, no clubbing or cyanosis mild venous insufficiency changes with no ulceration.  Or significant edema  Skin - normal coloration and turgor, no rashes, no suspicious skin lesions noted                                                                                       Additional Information   Current Outpatient Medications   Medication Sig Dispense Refill     atorvastatin (LIPITOR) 40 MG tablet Take 40 mg by mouth.       calcium carbonate (OS-NIKITA) 600 mg (1,500 mg) tablet Take 600 mg by mouth 2 (two) times a day with meals.       clopidogreL (PLAVIX) 75 mg tablet Take 75 mg by mouth.       diazePAM (VALIUM) 2 MG tablet Take 1 tablet (2 mg total) by mouth 2 (two) times a day as needed for anxiety. 60 tablet 5     diclofenac sodium (VOLTAREN) 1 % Gel 2g up to four times a day. Try once a day first to see if tolerated . This does not have the same diarrhea side effects as the oral tablet 1 Tube 2      hydrocortisone 2.5 % ointment Apply topically as needed.        LORazepam (ATIVAN) 0.5 MG tablet Twice daily as needed 30 tablet 5     losartan-hydrochlorothiazide (HYZAAR) 100-25 mg per tablet TAKE ONE TABLET BY MOUTH ONE TIME DAILY  90 tablet 2     metoprolol succinate (TOPROL-XL) 50 MG 24 hr tablet TAKE ONE TABLET BY MOUTH ONE TIME DAILY FOR HYPERTENSION 90 tablet 0     multivitamin (VITAMINS FOR HAIR) per tablet Take 1 tablet by mouth.       multivitamin with minerals (THERA-M) 9 mg iron-400 mcg Tab tablet Take 1 tablet by mouth daily.       oxyCODONE-acetaminophen (PERCOCET/ENDOCET) 5-325 mg per tablet TAKE ONE TABLET BY MOUTH EVERY SIX HOURS AS NEEDED FOR PAIN  60 tablet 0     potassium chloride (KLOR-CON) 10 MEQ CR tablet TAKE ONE TABLET BY MOUTH ONE TIME DAILY  90 tablet 2     pramipexole (MIRAPEX) 0.25 MG tablet TAKE ONE TABLET BY MOUTH TWICE DAILY AT 7PM AND 11PM 180 tablet 0     pravastatin (PRAVACHOL) 20 MG tablet TAKE HALF TABLET BY MOUTH DAILY IN THE EVENING 45 tablet 2     zolpidem (AMBIEN) 10 mg tablet Take 1 tablet (10 mg total) by mouth at bedtime as needed for sleep. 90 tablet 1     atorvastatin (LIPITOR) 40 MG tablet Take 1 tablet (40 mg total) by mouth daily. 90 tablet 3     clopidogreL (PLAVIX) 75 mg tablet Take 1 tablet (75 mg total) by mouth daily. 90 tablet 11     No current facility-administered medications for this visit.      Allergies   Allergen Reactions     Codeine Other (See Comments)     Other chest pain     Diclofenac      Diarrhea       Sinemet [Carbidopa-Levodopa]      dystonia     Social History     Social History Narrative    - [place]    Retired 1/2001    [employer]    Son-[place]    Daughter-Nadege    Daughter-Leatha      reports that she quit smoking about 20 years ago. She has never used smokeless tobacco. She reports current alcohol use. No history on file for drug.   Past Medical History:   Diagnosis Date     Carotid artery stenosis  11/2013    50% left internal carotid artery      Chronic low back pain      Hypertension      Osteoarthritis of lumbar spine      Osteopenia     mild, resolved     Postmenopausal 11/2013    on hormone     Restless leg syndrome      Surgical menopause     age 47     Thyroid nodule 11/2013    35mm, rightthyroid lobe biopsy-benign nodule     TIA (transient ischemic attack) 11/2013     Vertigo            Time:      Karen Taylor MD

## 2021-06-15 NOTE — ANESTHESIA CARE TRANSFER NOTE
Last vitals:   Vitals:    03/04/21 1551   BP: 111/56   Pulse: 89   Resp: 16   Temp: 36.7  C (98  F)   SpO2: 100%     Patient's level of consciousness is drowsy  Spontaneous respirations: yes  Maintains airway independently: yes  Dentition unchanged: yes  Oropharynx: oropharynx clear of all foreign objects    QCDR Measures:  ASA# 20 - Surgical Safety Checklist: WHO surgical safety checklist completed prior to induction    PQRS# 430 - Adult PONV Prevention: 4558F - Pt received => 2 anti-emetic agents (different classes) preop & intraop  ASA# 8 - Peds PONV Prevention: 4558F - Pt received => 2 anti-emetic agents (different classes) preop & intraop  PQRS# 424 - Nati-op Temp Management: 4559F - At least one body temp DOCUMENTED => 35.5C or 95.9F within required timeframe  PQRS# 426 - PACU Transfer Protocol: - Transfer of care checklist used  ASA# 14 - Acute Post-op Pain: ASA14B - Patient did NOT experience pain >= 7 out of 10

## 2021-06-15 NOTE — PROGRESS NOTES
Office Visit - Follow Up   Jenny Iglesias   75 y.o. female    Date of Visit: 3/15/2021         Assessment and Plan   1. Temporal arteritis (H)  Dramatic response to prednisone all  her pre-existing degenerative arthritis pain has also disappeared.  Her sed rate has normalized to 2.  I did reassure her that this is excellent news.  I gave her more information on the prognosis of temporal arteritis.  I had spoken to her epilepsy neurologist and she is seeing her later this week.  Stroke neurology felt that she did not need to be seen by them.  I am still concerned that the small early infarct that was noticed in the last MRI.  She is compliant with Plavix, statin and her blood pressure is in excellent control.  We will review her next neurology note.  She is due to see rheumatology.  Her ophthalmologist increase the prednisone to 70 mg she will continue that till she sees rheumatology and they can set taper schedule.   - Basic Metabolic Panel  - Erythrocyte Sedimentation Rate  - C-Reactive Protein(CRP)            Return in about 4 weeks (around 4/12/2021).     History of Present Illness   This 75 y.o. old   Chief Complaint   Patient presents with     Follow-up     BP check    Recently diagnosed with ischemic optic neuropathy confirmed temporal arteritis by bilateral biopsies.  Is on 70 mg prednisone.  She is tolerating it well she has no side effects no heartburn, no insomnia.  She has some mild water retention without pitting edema.  She is due to see rheumatology in 2 weeks.    Review of Systems: A comprehensive review of systems was negative except as noted.     Medications, Allergies and Problem List   Reviewed, reconciled and updated  Patient Active Problem List   Diagnosis     Vertigo     Essential hypertension     Restless leg syndrome     Perimenopausal vasomotor symptoms     Carotid stenosis, asymptomatic     Chronic insomnia     Other chronic pain     Chronic kidney disease, stage 3     Ischemic optic  neuropathy     TIA (transient ischemic attack)     GCA (giant cell arteritis) (H)        Physical Exam   General Appearance:       /70 (Patient Site: Left Arm, Patient Position: Sitting, Cuff Size: Adult Regular)   Pulse (!) 58   Wt 149 lb (67.6 kg)   LMP  (LMP Unknown)   SpO2 97%   BMI 25.58 kg/m      General appearance - alert, well appearing, and in no distress  Mental status - alert, oriented to person, place, and time  Neck - supple, no significant adenopathy  Lymphatics - no palpable lymphadenopathy, no hepatosplenomegaly  Chest - clear to auscultation, no wheezes, rales or rhonchi, symmetric air entry  Heart - normal rate, regular rhythm, normal S1, S2, no murmurs, rubs, clicks or gallops                                                                                         Additional Information   Current Outpatient Medications   Medication Sig Dispense Refill     atorvastatin (LIPITOR) 40 MG tablet Take 1 tablet (40 mg total) by mouth daily. 90 tablet 3     calcium carbonate (OS-NIKITA) 600 mg (1,500 mg) tablet Take 600 mg by mouth 2 (two) times a day with meals.       clopidogreL (PLAVIX) 75 mg tablet Take 1 tablet (75 mg total) by mouth daily. 90 tablet 11     diazePAM (VALIUM) 2 MG tablet Take 1 tablet (2 mg total) by mouth 2 (two) times a day as needed for anxiety. 60 tablet 5     diclofenac sodium (VOLTAREN) 1 % Gel 2g up to four times a day. Try once a day first to see if tolerated . This does not have the same diarrhea side effects as the oral tablet 1 Tube 2     HYDROcodone-acetaminophen 5-325 mg per tablet Take 1-2 tablets by mouth every 4 (four) hours as needed for pain. 10 tablet 0     hydrocortisone 2.5 % ointment Apply topically as needed.        levETIRAcetam (KEPPRA) 250 MG tablet Take 250 mg by mouth 2 (two) times a day.       LORazepam (ATIVAN) 0.5 MG tablet Twice daily as needed 30 tablet 5     losartan-hydrochlorothiazide (HYZAAR) 100-25 mg per tablet TAKE ONE TABLET BY MOUTH ONE  TIME DAILY  90 tablet 2     metoprolol succinate (TOPROL-XL) 50 MG 24 hr tablet TAKE ONE TABLET BY MOUTH ONE TIME DAILY FOR HYPERTENSION 90 tablet 0     multivitamin (VITAMINS FOR HAIR) per tablet Take 1 tablet by mouth.       multivitamin with minerals (THERA-M) 9 mg iron-400 mcg Tab tablet Take 1 tablet by mouth daily.       oxyCODONE-acetaminophen (PERCOCET/ENDOCET) 5-325 mg per tablet TAKE ONE TABLET BY MOUTH EVERY SIX HOURS AS NEEDED FOR PAIN  60 tablet 0     potassium chloride (KLOR-CON) 10 MEQ CR tablet TAKE ONE TABLET BY MOUTH ONE TIME DAILY  90 tablet 2     pramipexole (MIRAPEX) 0.25 MG tablet TAKE ONE TABLET BY MOUTH TWICE DAILY AT 7PM AND 11PM 180 tablet 0     predniSONE (DELTASONE) 20 MG tablet Take 70 mg by mouth daily .       zolpidem (AMBIEN) 10 mg tablet Take 1 tablet (10 mg total) by mouth at bedtime as needed for sleep. 90 tablet 1     No current facility-administered medications for this visit.      Allergies   Allergen Reactions     Codeine Other (See Comments)     Other chest pain     Diclofenac      Diarrhea       Sinemet [Carbidopa-Levodopa]      dystonia     Social History     Social History Narrative    - [place]    Retired 1/2001    [employer]    Son-[place]    Daughter-Nadege    Daughter-Leatha      reports that she quit smoking about 20 years ago. She has never used smokeless tobacco. She reports current alcohol use. She reports previous drug use.   Past Medical History:   Diagnosis Date     Anxiety      Carotid artery stenosis 11/2013    50% left internal carotid artery      Chronic low back pain      Hypertension      Osteoarthritis of lumbar spine      Osteopenia     mild, resolved     Postmenopausal 11/2013    on hormone     Restless leg syndrome      Seizures (H)     Feb 18, 2021, Small lost vision and could not speak for 4-5 seconds     Surgical menopause     age 47     Thyroid nodule 11/2013    35mm, rightthyroid lobe biopsy-benign nodule     TIA  (transient ischemic attack) 11/2013     TIA (transient ischemic attack)      Vertigo            Time: total time spent with the patient was 15 minutes of which >50% was spent in counseling and coordination of care     Karen Taylor MD

## 2021-06-15 NOTE — ANESTHESIA PREPROCEDURE EVALUATION
Anesthesia Evaluation      No history of anesthetic complications     Airway   Mallampati: I  Neck ROM: full   Pulmonary     breath sounds clear to auscultation  (-) asthma                         Cardiovascular   Exercise tolerance: > or = 4 METS  (+) hypertension, ,     (-) CAD, angina  Rhythm: regular  Rate: normal,      ROS comment: Carotid artery stenosis at 50%     Neuro/Psych    (+) seizures well controlled, CVA ,     Comments: Multiple TIA's no neuro deficits.     Endo/Other       GI/Hepatic/Renal    (+)   chronic renal disease,           Dental - normal exam                        Anesthesia Plan  Planned anesthetic: general LMA  Stress dose steroids as patient takes chronic Prednisone 20 mg daily.   ASA 3   Induction: intravenous   Anesthetic plan and risks discussed with: patient  Anesthesia plan special considerations: antiemetics,   Post-op plan: routine recovery

## 2021-06-16 PROBLEM — N18.30 CHRONIC KIDNEY DISEASE, STAGE 3 (H): Status: ACTIVE | Noted: 2021-02-15

## 2021-06-16 PROBLEM — G89.29 OTHER CHRONIC PAIN: Status: ACTIVE | Noted: 2020-10-07

## 2021-06-16 PROBLEM — M31.6 GCA (GIANT CELL ARTERITIS) (H): Status: ACTIVE | Noted: 2021-03-10

## 2021-06-16 PROBLEM — H47.019 ISCHEMIC OPTIC NEUROPATHY: Status: ACTIVE | Noted: 2021-03-02

## 2021-06-16 PROBLEM — G40.909 SEIZURE DISORDER (H): Status: ACTIVE | Noted: 2021-06-09

## 2021-06-16 NOTE — TELEPHONE ENCOUNTER
Refill Approved    Rx renewed per Medication Renewal Policy. Medication was last renewed on 7/10/20.    Terrance Jolly, [facility] Triage/Med Refill 4/5/2021     Requested Prescriptions   Pending Prescriptions Disp Refills     zolpidem (AMBIEN) 10 mg tablet [Pharmacy Med Name: Zolpidem Tartrate Oral Tablet 10 MG] 90 tablet 0     Sig: TAKE ONE TABLET BY MOUTH AT BEDTIME AS NEEDED FOR SLEEP       Controlled Substances Refill Protocol Failed - 4/5/2021 10:19 AM        Failed - Route all Controlled Substance Requests to Provider        Passed - Patient has controlled substance agreement in past 12 months     Encounter-Level CSA Scan Date:    There are no encounter-level csa scan date.               Passed - Visit with PCP or prescribing provider visit in past 12 months      Last office visit with prescriber/PCP: 3/15/2021 Karen Taylor MD OR same dept: Visit date not found OR same specialty: 3/15/2021 Karen Taylor MD Last physical: 2/11/2020 Last MTM visit: Visit date not found    Next visit within 3 mo: Visit date not found  Next physical within 3 mo: Visit date not found  Prescriber OR PCP: Karen Taylor MD  Last diagnosis associated with med order: 1. Chronic insomnia  - zolpidem (AMBIEN) 10 mg tablet [Pharmacy Med Name: Zolpidem Tartrate Oral Tablet 10 MG]; TAKE ONE TABLET BY MOUTH AT BEDTIME AS NEEDED FOR SLEEP  Dispense: 90 tablet; Refill: 0    2. Essential hypertension  - potassium chloride (KLOR-CON) 10 MEQ CR tablet [Pharmacy Med Name: Potassium Chloride ER Oral Tablet Extended Release 10 MEQ]; TAKE ONE TABLET BY MOUTH ONE TIME DAILY   Dispense: 90 tablet; Refill: 0                  potassium chloride (KLOR-CON) 10 MEQ CR tablet [Pharmacy Med Name: Potassium Chloride ER Oral Tablet Extended Release 10 MEQ] 90 tablet 0     Sig: TAKE ONE TABLET BY MOUTH ONE TIME DAILY       Potassium Supplements Refill Protocol Passed - 4/5/2021 10:19 AM        Passed - PCP or prescribing provider visit in  past 12 months       Last office visit with prescriber/PCP: 3/15/2021 Karen Taylor MD OR same dept: Visit date not found OR same specialty: 3/15/2021 Karen Taylor MD  Last physical: 2/11/2020 Last MTM visit: Visit date not found   Next visit within 3 mo: Visit date not found  Next physical within 3 mo: Visit date not found  Prescriber OR PCP: Karen Taylor MD  Last diagnosis associated with med order: 1. Chronic insomnia  - zolpidem (AMBIEN) 10 mg tablet [Pharmacy Med Name: Zolpidem Tartrate Oral Tablet 10 MG]; TAKE ONE TABLET BY MOUTH AT BEDTIME AS NEEDED FOR SLEEP  Dispense: 90 tablet; Refill: 0    2. Essential hypertension  - potassium chloride (KLOR-CON) 10 MEQ CR tablet [Pharmacy Med Name: Potassium Chloride ER Oral Tablet Extended Release 10 MEQ]; TAKE ONE TABLET BY MOUTH ONE TIME DAILY   Dispense: 90 tablet; Refill: 0    If protocol passes may refill for 12 months if within 3 months of last provider visit (or a total of 15 months).             Passed - Potassium level in last 12 months     Lab Results   Component Value Date    Potassium 3.5 03/15/2021

## 2021-06-16 NOTE — TELEPHONE ENCOUNTER
Controlled Substance Refill Request  Medication Name:   Requested Prescriptions     Pending Prescriptions Disp Refills     zolpidem (AMBIEN) 10 mg tablet [Pharmacy Med Name: Zolpidem Tartrate Oral Tablet 10 MG] 90 tablet 0     Sig: TAKE ONE TABLET BY MOUTH AT BEDTIME AS NEEDED FOR SLEEP     Signed Prescriptions Disp Refills     potassium chloride (KLOR-CON) 10 MEQ CR tablet 90 tablet 3     Sig: TAKE ONE TABLET BY MOUTH ONE TIME DAILY     Authorizing Provider: CHASTITY KIM     Ordering User: JACQUIE ACUNA     Date Last Fill: 10/7/20  Requested Pharmacy: [facility]  Submit electronically to pharmacy  Controlled Substance Agreement on file:   Encounter-Level CSA Scan Date:    There are no encounter-level csa scan date.        Last office visit:  3/15/21

## 2021-06-16 NOTE — TELEPHONE ENCOUNTER
Prescription Monitoring Program activity reviewed with no discrepancies noted.      Last fill per : 1/7/21  Quantity/days supply: 90 tabs x 90 days     Controlled Substance Agreement on file: Yes  Date: 10/16/19    Last office visit with provider:  3/15/2021 Karen Taylor MD    Please advise.

## 2021-06-16 NOTE — PROGRESS NOTES
Jenny Iglesias is a 75 y.o. female who is being evaluated via a billable video visit.      How would you like to obtain your AVS? MyChart.  If dropped from the video visit, the video invitation should be resent by: Text to cell phone: [phone]  Will anyone else be joining your video visit? No      Video Start Time: 4:43 PM  Video-Visit Details    Type of service:  Video Visit    Video End Time (time video stopped): 5:16 PM  Originating Location (pt. Location): Home    Distant Location (provider location):  [facility]     Platform used for Video Visit: [employer]    This document was created using a software with less than 100% fidelity, at times resulting in unintended, even erroneous syntax and grammar.  The reader is advised to keep this under consideration while reviewing, interpreting this note.    ASSESSMENT AND PLAN:  Jenny Iglesias 75 y.o. female is seen here on 03/25/21 for management of biopsy-proven giant cell arteritis, currently on 70 mg of prednisone for the past 3-1/2 weeks.  Today we had a detailed discussion.  I have outlined to her that the usual parameters by which one would monitor the temporal arteritis patients are either absent or minimally abnormal in her case.  She does have evidence of her right vertebral artery occlusion.  This can be a rare association of GCA.  I have talked to her about the side effects of steroids including ocular metabolic bone related.  In addition she has osteopenia.  I have asked her to start Fosamax.  Precautions were outlined.  For her high blood pressure she is going to speak with Dr. Taylor.  She is aware that the reason for taking prednisone at such a high dose and exposure to its potential complications, is to save her vision.  We will meet in 6 weeks on video and in person in 3 months, despite a minimal elevation of sed rate and CRP this may be the only tool available for some monitoring in her case.      Diagnoses and all  "orders for this visit:    GCA (giant cell arteritis) (H)  -     alendronate (FOSAMAX) 70 MG tablet; Take 1 tablet (70 mg total) by mouth every 7 days. Take in the morning on an empty stomach with a full glass of water 30 minutes before food  Dispense: 12 tablet; Refill: 0  -     predniSONE (DELTASONE) 10 mg tablet; 65 mg for 2 weeks, reduce by 5 mg every 2 weeks to 60, 55, 50, 45, 40, 35, 30, 25, 20 mg daily..  Dispense: 200 tablet; Refill: 1  -     C-Reactive Protein; Standing  -     Erythrocyte Sedimentation Rate; Standing    High risk medication use  -     alendronate (FOSAMAX) 70 MG tablet; Take 1 tablet (70 mg total) by mouth every 7 days. Take in the morning on an empty stomach with a full glass of water 30 minutes before food  Dispense: 12 tablet; Refill: 0    Vertebral artery occlusion, right    Osteopenia after menopause  -     alendronate (FOSAMAX) 70 MG tablet; Take 1 tablet (70 mg total) by mouth every 7 days. Take in the morning on an empty stomach with a full glass of water 30 minutes before food  Dispense: 12 tablet; Refill: 0      HISTORY OF PRESENTING ILLNESS:  Jenny Iglesias, 75 y.o., female is here for establishment of care.  She was diagnosed with giant cell arteritis.  She recalls that all of a sudden 1 day while she was reading she felt this unusual feeling in her head, it felt, funny, she felt as if there was a blast of sunlight, she had difficulty speaking, this was on 2/18/2021.  An excerpt from that visit note shows  \"... CTA head showed complete occlusion of the right vertebral artery just beyond its origin with distal reconstitution in the post-PICA segment, multifocal severe stenosis and near complete occlusion of the extradural left vertebral artery with complete occlusion of the intradural pre-PICA segment and reconstitution in the post-PICA segment. Patient was deemed not a candidate for mechanical thrombectomy due to resolution of symptoms and lack of large vessel occlusion " "amenable to treatment....\"    This is something that has happened in the past.  This was first noticed in 2017.  She took herself to [facility].  Question of whether she might have a TIAs as was diagnosed in the past may be the case.  However this was felt not to be.  She recalls no headaches, jaw claudication double vision.  She noted some discomfort in her shoulders that would typically cover overnight.  He had pain in her ankles and knees.  She is known to have osteoarthritis.  Typically she will be able to get up with first thing in the morning and do her day-to-day activities without difficulty.  On 3/1/2021 she was seen in Dr. 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         MICROSCOPIC AND DIAGNOSIS:   A) LEFT TEMPORAL ARTERY, BIOPSY:        - POSITIVE FOR TEMPORAL ARTERITIS WITH MARKED HISTIOCYTIC   INFILTRATE,          RARE GIANT CELLS, SIGNIFICANT INTIMAL THICKENING, AND NARROWING                   OF THE ARTERIAL LUMEN     B) RIGHT TEMPORAL ARTERY, BIOPSY:        - POSITIVE FOR TEMPORAL ARTERITIS WITH OCCASIONAL GIANT CELLS,   MARKED          INTIMAL THICKENING, AND NARROWING OF THE ARTERIAL LUMEN     GROSS:   A) The specimen is received in formalin with the patient's name, labeled   left temporal artery, and consists of one tan to brown cylindrical   tissue measuring 1.5 cm in length with a stitch magdalene at one end of the   tissue.  SSTE-1C.     B) The specimen is received in formalin with the patient's name, labeled   right temporal artery, and consists of one pale tan cylindrical tissue   measuring 1.8 cm in length and 0.1 cm in diameter.  TE-1C.  WWC:njr       Microscopic and diagnosis  rendered by Eri Hughes M.D./ Pathologist   Electronically signed 3/8/2021 10:43:40AM   Final report released by Eri Hughes M.D./ Pathologist Electronically   signed 3/8/2021 12:30:02PM "      Specimen Collected: 21 14:29        Given started on prednisone, 70 mg, 3/3/2021.  She reports complete resolution of her joint symptoms across her shoulders, knees and ankles.  As noted previously she did not have any headaches jaw claudication double vision.  She had minimal elevation of sedimentation rate of 29 and the mild elevation of CRP all of which returned normal symptoms.  She reports a history of seizure disorder for which she is following up at epilepsy clinic.  She has also noted high blood pressure.  She is not a smoker.  She describes no claudication of upper or lower extremities.  Rest of the pertinent review of systems negative.    ALLERGIES:Codeine, Diclofenac, and Sinemet [carbidopa-levodopa]    PAST MEDICAL/ACTIVE PROBLEMS/MEDICATION/ FAMILY HISTORY/SOCIAL DATA:  The patient has a family history of  Past Medical History:   Diagnosis Date     Anxiety      Carotid artery stenosis 2013    50% left internal carotid artery      Chronic low back pain      Hypertension      Osteoarthritis of lumbar spine      Osteopenia     mild, resolved     Postmenopausal 2013    on hormone     Restless leg syndrome      Seizures (H)     2021, Small lost vision and could not speak for 4-5 seconds     Surgical menopause     age 47     Thyroid nodule 2013    35mm, rightthyroid lobe biopsy-benign nodule     TIA (transient ischemic attack) 2013     TIA (transient ischemic attack)      Vertigo      Social History     Tobacco Use   Smoking Status Former Smoker     Quit date: 10/22/2000     Years since quittin.4   Smokeless Tobacco Never Used     Patient Active Problem List   Diagnosis     Vertigo     Essential hypertension     Restless leg syndrome     Perimenopausal vasomotor symptoms     Carotid stenosis, asymptomatic     Chronic insomnia     Other chronic pain     Chronic kidney disease, stage 3     Ischemic optic neuropathy     TIA (transient ischemic attack)     GCA (giant cell  arteritis) (H)     Current Outpatient Medications   Medication Sig Dispense Refill     atorvastatin (LIPITOR) 40 MG tablet Take 1 tablet (40 mg total) by mouth daily. 90 tablet 3     calcium carbonate (OS-NIKITA) 600 mg (1,500 mg) tablet Take 600 mg by mouth 2 (two) times a day with meals.       clopidogreL (PLAVIX) 75 mg tablet Take 1 tablet (75 mg total) by mouth daily. 90 tablet 11     diazePAM (VALIUM) 2 MG tablet Take 1 tablet (2 mg total) by mouth 2 (two) times a day as needed for anxiety. 60 tablet 5     hydrocortisone 2.5 % ointment Apply topically as needed.        levETIRAcetam (KEPPRA) 250 MG tablet Take 250 mg by mouth 2 (two) times a day.       LORazepam (ATIVAN) 0.5 MG tablet Twice daily as needed 30 tablet 5     losartan-hydrochlorothiazide (HYZAAR) 100-25 mg per tablet TAKE ONE TABLET BY MOUTH ONE TIME DAILY  (Patient taking differently: 0.5 tablets. ) 90 tablet 2     metoprolol succinate (TOPROL-XL) 50 MG 24 hr tablet TAKE ONE TABLET BY MOUTH ONE TIME DAILY FOR HYPERTENSION 90 tablet 0     multivitamin (VITAMINS FOR HAIR) per tablet Take 1 tablet by mouth.       potassium chloride (KLOR-CON) 10 MEQ CR tablet TAKE ONE TABLET BY MOUTH ONE TIME DAILY  90 tablet 2     pramipexole (MIRAPEX) 0.25 MG tablet TAKE ONE TABLET BY MOUTH TWICE DAILY AT 7PM AND 11PM 180 tablet 0     predniSONE (DELTASONE) 20 MG tablet Take 70 mg by mouth daily .       zolpidem (AMBIEN) 10 mg tablet Take 1 tablet (10 mg total) by mouth at bedtime as needed for sleep. 90 tablet 1     diclofenac sodium (VOLTAREN) 1 % Gel 2g up to four times a day. Try once a day first to see if tolerated . This does not have the same diarrhea side effects as the oral tablet 1 Tube 2     HYDROcodone-acetaminophen 5-325 mg per tablet Take 1-2 tablets by mouth every 4 (four) hours as needed for pain. 10 tablet 0     oxyCODONE-acetaminophen (PERCOCET/ENDOCET) 5-325 mg per tablet TAKE ONE TABLET BY MOUTH EVERY SIX HOURS AS NEEDED FOR PAIN  60 tablet 0      No current facility-administered medications for this visit.        COMPREHENSIVE EXAMINATION:  There were no vitals filed for this visit.  A well appearing alert oriented female. Well appearing alert oriented.   Examination of the eyes revealed no redness, obvious scleromalacia.  ENT examination shows no nasal deformity such as of saddle type, external ear without signs of inflamm/deformity ation.  Cardiopulmonary examination without obvious signs of dyspnea, no wheezing is audible, no cyanosis.  There is no finger clubbing.  Skin examination performed for heliotrope, malar area eruption periungual erythema, lupus pernio.  Neurological examination shows the speech is fluent, no facial asymmetry, muscle power in the upper extremities proximally appears to be normal.  In the psychiatric examination the memory, orientation, attention, affect were observable and normal.  Joint examination of the DIPs, PIPs, MCPs, IP joints of the thumbs, wrists, elbows, for swelling, range of motion, for shoulders range of motion evaluated.  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    LAB / IMAGING DATA:  ALT   Date Value Ref Range Status   10/08/2020 23 0 - 45 U/L Final   10/12/2018 16 0 - 45 U/L Final   10/11/2017 15 0 - 45 U/L Final     Albumin   Date Value Ref Range Status   10/08/2020 3.3 (L) 3.5 - 5.0 g/dL Final   03/15/2019 4.0 3.5 - 5.0 g/dL Final   10/12/2018 3.7 3.5 - 5.0 g/dL Final     Creatinine   Date Value Ref Range Status   03/15/2021 1.09 0.60 - 1.10 mg/dL Final   10/08/2020 0.99 0.60 - 1.10 mg/dL Final   03/03/2020 0.97 0.60 - 1.10 mg/dL Final       WBC   Date Value Ref Range Status   10/08/2020 9.7 4.0 - 11.0 thou/uL Final   03/03/2020 15.7 (H) 4.0 - 11.0 thou/uL Final     Hemoglobin   Date Value Ref Range Status   10/08/2020 14.5 12.0 - 16.0 g/dL Final   03/03/2020 14.6 12.0 - 16.0 g/dL Final   10/12/2018 15.6 12.0 -  16.0 g/dL Final     Platelets   Date Value Ref Range Status   10/08/2020 408 140 - 440 thou/uL Final   03/03/2020 296 140 - 440 thou/uL Final   10/12/2018 223 140 - 440 thou/uL Final       Lab Results   Component Value Date    SEDRATE 2 03/15/2021

## 2021-06-16 NOTE — TELEPHONE ENCOUNTER
Telephone Encounter by Cheryl Shoemaker CMA at 11/27/2019 12:42 PM     Author: Cheryl Shoemaker CMA Service: -- Author Type: Medical Assistant    Filed: 11/27/2019 12:43 PM Encounter Date: 11/27/2019 Status: Signed    : Cheryl Shoemaker CMA (Medical Assistant)       Spoke with the patient and relayed the following message from Dr. Canales:  Ramesh Canales MD Rezac, Kellie R, MAGALYSN 1 hour ago (11:08 AM)      Another provider    Routing comment      Patient verbalized understanding and has been scheduled to see Dr. Taylor on 12/10/19 at 10 am.  She had no further questions at this time.  Cheryl DOVE CMA/DEIDRE....................12:43 PM

## 2021-06-16 NOTE — TELEPHONE ENCOUNTER
Telephone Encounter by Cheryl Shoemaker CMA at 1/17/2020  8:08 AM     Author: Cheryl Shoemaker CMA Service: -- Author Type: Medical Assistant    Filed: 1/17/2020  8:10 AM Encounter Date: 1/16/2020 Status: Signed    : Cheryl Shoemaker CMA (Medical Assistant)       Spoke with the patient and relayed the following message from Dr. Canales:  Ramesh Canales MD Finell, James Care Team Pool; Neela Barr CMA 15 hours ago (4:36 PM)      Call     Sara had a tia in 2013 and has been on asa since.   May hold one week before surgery   Resume post op     Ramehs Canales MD   Internal medicine   [facility]   [phone]   [email or web address]      Routing comment      She verbalized understanding and had no further questions at this time.    Same message was left with on the voicemail for the procedure nurse at [facility].  Asked that they call with any further questions.    Cheryl DOVE CMA/DEIDRE....................8:10 AM

## 2021-06-16 NOTE — TELEPHONE ENCOUNTER
Telephone Encounter by Mariela Art at 10/24/2019 11:43 AM     Author: Mariela Art Service: -- Author Type: --    Filed: 10/24/2019 11:44 AM Encounter Date: 10/22/2019 Status: Signed    : Mariela Art APPROVED:    Approval start date:9/22/19  Approval end date:10/21/20    Pharmacy has been notified of approval and will contact patient when medication is ready for pickup.

## 2021-06-16 NOTE — PATIENT INSTRUCTIONS - HE
No more than 1200mg caclium in supplements   TOTAL calcium should not be more than 1500mg including diet     Prednisone steroid:    Taper  65 mg for 2 weeks, reduce by 5 mg every 2 weeks to 60, 55, 50, 45, 40, 35, 30, 25, 20 mg daily..       bp take once daily fpr 2 weeks     Call if bp over 160/90 for two readings    send [employer] meddage with readings in 2 weeks

## 2021-06-17 NOTE — TELEPHONE ENCOUNTER
Called Sara her blood pressure readings have been elevated I would like her average blood pressure to be 130 x 80.  I will add metoprolol 25 mg extended release to her existing 50 mg prescription so that she will be taking 75 mg a day this was explained to the patient and she has to monitor her heart rate target blood pressure is 130 x 80 target heart rate is 60-80.  If she has heart rates in the 50s she is to call us.

## 2021-06-17 NOTE — TELEPHONE ENCOUNTER
Refill request from Amadou Camacho ND    Medication: Prednisone  Last Refill: 3/25/21  Last OV: 5/6/21  Last lab: 4/6/21

## 2021-06-17 NOTE — TELEPHONE ENCOUNTER
Reason for Call:  Other call back      Detailed comments: Pt stating she take 1 full tablet of the losartan    No further questions    Phone Number Patient can be reached at: Home number on file [phone] (home)    Best Time: anytime    Can we leave a detailed message on this number?: Yes    Call taken on 5/5/2021 at 3:30 PM by Miriam Meyer

## 2021-06-17 NOTE — TELEPHONE ENCOUNTER
Who is calling:  Pt  Reason for Call:  Please send PT/OT Referral order to [facility] on  [address] [place] [address]    Ph: [phone]; Pt did not have a fax #.      Okay to leave a detailed message: Yes

## 2021-06-17 NOTE — PROGRESS NOTES
"Jenny Iglesias is a 75 y.o. female who is being evaluated via a billable video visit.      How would you like to obtain your AVS? MyChart.  If dropped from the video visit, the video invitation should be resent by: Send to e-mail at: [email or web address]  Will anyone else be joining your video visit? No      Video Start Time: 4:22 PM  Video-Visit Details    Type of service:  Video Visit    Video End Time (time video stopped): 4:29 PM  Originating Location (pt. Location): Home    Distant Location (provider location):  [facility]     Platform used for Video Visit: InsideAxisÃ¢â€žÂ¢    This document was created using a software with less than 100% fidelity, at times resulting in unintended, even erroneous syntax and grammar.  The reader is advised to keep this under consideration while reviewing, interpreting this note.           ASSESSMENT AND PLAN:    Diagnoses and all orders for this visit:    GCA (giant cell arteritis) (H)    Ischemic optic neuropathy, unspecified laterality    High risk medication use  -     Ambulatory referral to PT/OT    Limb incoordination  -     Ambulatory referral to PT/OT        Follow up in 2 months      HISTORY OF PRESENTING ILLNESS:  Jenny Iglesias 75 y.o. is evaluated here via video/audio link.  This is for follow-up.  As she has temporal arteritis, biopsy-proven.  She had atypical symptoms not the usual headache jaw claudication but had this \"funny feeling like a blast of sunlight\" and speech impairment in February this year.  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   On 3/1/2021 she was seen in Dr. Stockton's office, and  ischemic optic neuropathy was suspected, temporal artery biopsies were recommended, this was done on 3/3/2021 the report of that is attached.  She has noted weakness in her lower extremities going up and down the steps somewhat unsteady.  We discussed how prednisone can contribute to this.  Start her on physical therapy.  Calcium " "and vitamin D to continue.  Her sed rate and CRP have normalized.  No jaw claudication double vision no headache.  She has not had the original symptoms recur.       An excerpt from that visit note shows  \"... CTA head showed complete occlusion of the right vertebral artery just beyond its origin with distal reconstitution in the post-PICA segment, multifocal severe stenosis and near complete occlusion of the extradural left vertebral artery with complete occlusion of the intradural pre-PICA segment and reconstitution in the post-PICA segment. Patient was deemed not a candidate for mechanical thrombectomy due to resolution of symptoms and lack of large vessel occlusion amenable to treatment....\"    This is something that has happened in the past.  This was first noticed in 2017.  She took herself to [facility].  Question of whether she might have a TIAs as was diagnosed in the past may be the case.  However this was felt not to be.  She recalls no headaches, jaw claudication double vision.  She noted some discomfort in her shoulders that would typically cover overnight.  He had pain in her ankles and knees.  She is known to have osteoarthritis.  Typically she will be able to get up with first thing in the morning and do her day-to-day activities without difficulty.     ROS enquiry held for fever, ocular symptoms, rash, headache,  GI issues.  Today we also discussed the issues related to the current pandemic, the pros and cons of the current treatment plan, the CDC guidelines such as social distancing washing the hands covering the cough.  ALLERGIES:Codeine, Diclofenac, and Sinemet [carbidopa-levodopa]    PAST MEDICAL/ACTIVE PROBLEMS/MEDICATION/SOCIAL DATA  Past Medical History:   Diagnosis Date     Anxiety      Carotid artery stenosis 11/2013    50% left internal carotid artery      Chronic low back pain      Hypertension      Osteoarthritis of lumbar spine      Osteopenia     mild, resolved     Postmenopausal " 2013    on hormone     Restless leg syndrome      Seizures (H)     2021, Small lost vision and could not speak for 4-5 seconds     Surgical menopause     age 47     Thyroid nodule 2013    35mm, rightthyroid lobe biopsy-benign nodule     TIA (transient ischemic attack) 2013     TIA (transient ischemic attack)      Vertigo      Social History     Tobacco Use   Smoking Status Former Smoker     Quit date: 10/22/2000     Years since quittin.5   Smokeless Tobacco Never Used     Patient Active Problem List   Diagnosis     Vertigo     Essential hypertension     Restless leg syndrome     Perimenopausal vasomotor symptoms     Carotid stenosis, asymptomatic     Chronic insomnia     Other chronic pain     Chronic kidney disease, stage 3     Ischemic optic neuropathy     TIA (transient ischemic attack)     GCA (giant cell arteritis) (H)     Current Outpatient Medications   Medication Sig Dispense Refill     alendronate (FOSAMAX) 70 MG tablet Take 1 tablet (70 mg total) by mouth every 7 days. Take in the morning on an empty stomach with a full glass of water 30 minutes before food 12 tablet 0     atorvastatin (LIPITOR) 40 MG tablet Take 1 tablet (40 mg total) by mouth daily. 90 tablet 3     calcium carbonate (OS-NIKITA) 600 mg (1,500 mg) tablet Take 600 mg by mouth 2 (two) times a day with meals.       clopidogreL (PLAVIX) 75 mg tablet Take 1 tablet (75 mg total) by mouth daily. 90 tablet 11     diazePAM (VALIUM) 2 MG tablet Take 1 tablet (2 mg total) by mouth 2 (two) times a day as needed for anxiety. 60 tablet 5     HYDROcodone-acetaminophen 5-325 mg per tablet Take 1-2 tablets by mouth every 4 (four) hours as needed for pain. 10 tablet 0     LORazepam (ATIVAN) 0.5 MG tablet Twice daily as needed 30 tablet 5     losartan-hydrochlorothiazide (HYZAAR) 100-25 mg per tablet TAKE ONE TABLET BY MOUTH ONE TIME DAILY  (Patient taking differently: 0.5 tablets. ) 90 tablet 2     metoprolol succinate (TOPROL-XL) 50 MG  24 hr tablet TAKE ONE TABLET BY MOUTH ONE TIME DAILY FOR HYPERTENSION 90 tablet 0     multivitamin (VITAMINS FOR HAIR) per tablet Take 1 tablet by mouth.       oxyCODONE-acetaminophen (PERCOCET/ENDOCET) 5-325 mg per tablet TAKE ONE TABLET BY MOUTH EVERY SIX HOURS AS NEEDED FOR PAIN  60 tablet 0     potassium chloride (KLOR-CON) 10 MEQ CR tablet TAKE ONE TABLET BY MOUTH ONE TIME DAILY  90 tablet 3     pramipexole (MIRAPEX) 0.25 MG tablet TAKE ONE TABLET BY MOUTH TWICE DAILY AT 7PM AND 11PM 180 tablet 0     predniSONE (DELTASONE) 10 mg tablet 65 mg for 2 weeks, reduce by 5 mg every 2 weeks to 60, 55, 50, 45, 40, 35, 30, 25, 20 mg daily.. 200 tablet 1     predniSONE (DELTASONE) 20 MG tablet Take 70 mg by mouth daily .       zolpidem (AMBIEN) 10 mg tablet TAKE ONE TABLET BY MOUTH AT BEDTIME AS NEEDED FOR SLEEP 90 tablet 0     diclofenac sodium (VOLTAREN) 1 % Gel 2g up to four times a day. Try once a day first to see if tolerated . This does not have the same diarrhea side effects as the oral tablet 1 Tube 2     hydrocortisone 2.5 % ointment Apply topically as needed.        levETIRAcetam (KEPPRA) 250 MG tablet Take 250 mg by mouth 2 (two) times a day.       metoprolol succinate (TOPROL XL) 25 MG 25 mg once daily in addition to the 50 mg extended release metoprolol.  To be taken together.  Total dose 75 mg metoprolol a day 90 tablet 11     No current facility-administered medications for this visit.          EXAMINATION:    Using the audio and video link as best as possible the constitutional, neck, neurologic, psych, skin, both upper extremities areas/organ system were evaluated during this assessment.  Some of the important findings: Alert, oriented, speech fluent.    Able to fully flex the digits, into fists bilaterally, wrist and elbow range of motion appear normal, abduction of the shoulder is normal.      LAB / IMAGING DATA:  ALT   Date Value Ref Range Status   10/08/2020 23 0 - 45 U/L Final   10/12/2018 16 0 - 45  U/L Final   10/11/2017 15 0 - 45 U/L Final     Albumin   Date Value Ref Range Status   10/08/2020 3.3 (L) 3.5 - 5.0 g/dL Final   03/15/2019 4.0 3.5 - 5.0 g/dL Final   10/12/2018 3.7 3.5 - 5.0 g/dL Final     Creatinine   Date Value Ref Range Status   04/06/2021 1.31 (H) 0.60 - 1.10 mg/dL Final   03/15/2021 1.09 0.60 - 1.10 mg/dL Final   10/08/2020 0.99 0.60 - 1.10 mg/dL Final       WBC   Date Value Ref Range Status   04/06/2021 12.4 (H) 4.0 - 11.0 thou/uL Final   10/08/2020 9.7 4.0 - 11.0 thou/uL Final     Hemoglobin   Date Value Ref Range Status   04/06/2021 15.1 12.0 - 16.0 g/dL Final   10/08/2020 14.5 12.0 - 16.0 g/dL Final   03/03/2020 14.6 12.0 - 16.0 g/dL Final     Platelets   Date Value Ref Range Status   04/06/2021 230 140 - 440 thou/uL Final   10/08/2020 408 140 - 440 thou/uL Final   03/03/2020 296 140 - 440 thou/uL Final       Lab Results   Component Value Date    SEDRATE 2 03/15/2021       .

## 2021-06-17 NOTE — PATIENT INSTRUCTIONS - HE
Patient Instructions by Ramesh Canales MD at 10/16/2019  8:40 AM     Author: Ramesh Canales MD Service: -- Author Type: Physician    Filed: 10/16/2019  9:01 AM Encounter Date: 10/16/2019 Status: Signed    : Ramesh Canales MD (Physician)         Patient Education   Understanding USDA MyPlate  The USDA (US Department of Agriculture) has guidelines to help you make healthy food choices. These are called MyPlate. MyPlate shows the food groups that make up healthy meals using the image of a place setting. Before you eat, think about the healthiest choices for what to put onto your plate or into your cup or bowl. To learn more about building a healthy plate, visit www.choosemyplate.gov.       The Food Groups    Fruits: Any fruit or 100% fruit juice counts as part of the Fruit Group. Fruits may be fresh, canned, frozen, or dried, and may be whole, cut-up, or pureed. Make half your plate fruits and vegetables.    Vegetables: Any vegetable or 100% vegetable juice counts as a member of the Vegetable Group. Vegetables may be fresh, frozen, canned, or dried. They can be served raw or cooked and may be whole, cut-up, or mashed. Make half your plate fruits and vegetables.     Grains: All foods made from grains are part of the Grains Group. These include wheat, rice, oats, cornmeal, and barley such as bread, pasta, oatmeal, cereal, tortillas, and grits. Grains should be no more than a quarter of your plate. At least half of your grains should be whole grains.    Protein: This group includes meat, poultry, seafood, beans and peas, eggs, processed soy products (like tofu), nuts (including nut butters), and seeds. Make protein choices no more than a quarter of your plate. Meat and poultry choices should be lean or low fat.    Dairy: All fluid milk products and foods made from milk that contain calcium, like yogurt and cheese are part of the Dairy Group. (Foods that have little calcium, such as cream, butter, and cream  cheese, are not part of the group.) Most dairy choices should be low-fat or fat-free.    Oils: These are fats that are liquid at room temperature. They include canola, corn, olive, soybean, and sunflower oil. Foods that are mainly oil include mayonnaise, certain salad dressings, and soft margarines. You should have only 5 to 7 teaspoons of oils a day. You probably already get this much from the food you eat.  Use [employer] to Help Build Your Meals  The Make My platecker can help you plan and track your meals and activity. You can look up individual foods to see or compare their nutritional value. You can get guidelines for what and how much you should eat. You can compare your food choices. And you can assess personal physical activities and see ways you can improve. Go to www.iVengo.gov/Reocarcker/.    1713-4914 [employer]. [address] [place] [address]. All rights reserved. This information is not intended as a substitute for professional medical care. Always follow your healthcare professional's instructions.             Advance Directive  Patients advance directive was discussed and I am comfortable with the patients wishes.  Patient Education   Personalized Prevention Plan  You are due for the preventive services outlined below.  Your care team is available to assist you in scheduling these services.  If you have already completed any of these items, please share that information with your care team to update in your medical record.  Health Maintenance   Topic Date Due   ? TD 18+ HE  07/25/1963   ? ZOSTER VACCINES (2 of 3) 12/09/2008   ? DXA SCAN  10/10/2018   ? INFLUENZA VACCINE RULE BASED (1) 08/01/2019   ? MEDICARE ANNUAL WELLNESS VISIT  10/12/2019   ? FALL RISK ASSESSMENT  10/16/2020   ? ADVANCE CARE PLANNING  10/12/2023   ? PNEUMOCOCCAL IMMUNIZATION 65+ LOW/MEDIUM RISK  Completed

## 2021-06-18 NOTE — PATIENT INSTRUCTIONS - HE
Patient Instructions by Karen Taylor MD at 10/7/2020  9:00 AM     Author: Karen Taylor MD Service: -- Author Type: Physician    Filed: 10/7/2020  8:24 PM Encounter Date: 10/7/2020 Status: Signed    : Karen Taylor MD (Physician)         Patient Education   Understanding USDA MyPlate  The USDA (US Department of Agriculture) has guidelines to help you make healthy food choices. These are called MyPlate. MyPlate shows the food groups that make up healthy meals using the image of a place setting. Before you eat, think about the healthiest choices for what to put onto your plate or into your cup or bowl. To learn more about building a healthy plate, visit www.Moogiplate.gov.       The Food Groups    Fruits: Any fruit or 100% fruit juice counts as part of the Fruit Group. Fruits may be fresh, canned, frozen, or dried, and may be whole, cut-up, or pureed. Make half your plate fruits and vegetables.    Vegetables: Any vegetable or 100% vegetable juice counts as a member of the Vegetable Group. Vegetables may be fresh, frozen, canned, or dried. They can be served raw or cooked and may be whole, cut-up, or mashed. Make half your plate fruits and vegetables.     Grains: All foods made from grains are part of the Grains Group. These include wheat, rice, oats, cornmeal, and barley such as bread, pasta, oatmeal, cereal, tortillas, and grits. Grains should be no more than a quarter of your plate. At least half of your grains should be whole grains.    Protein: This group includes meat, poultry, seafood, beans and peas, eggs, processed soy products (like tofu), nuts (including nut butters), and seeds. Make protein choices no more than a quarter of your plate. Meat and poultry choices should be lean or low fat.    Dairy: All fluid milk products and foods made from milk that contain calcium, like yogurt and cheese are part of the Dairy Group. (Foods that have little calcium, such as cream, butter, and  cream cheese, are not part of the group.) Most dairy choices should be low-fat or fat-free.    Oils: These are fats that are liquid at room temperature. They include canola, corn, olive, soybean, and sunflower oil. Foods that are mainly oil include mayonnaise, certain salad dressings, and soft margarines. You should have only 5 to 7 teaspoons of oils a day. You probably already get this much from the food you eat.  Use [employer] to Help Build Your Meals  The Tapatalkcker can help you plan and track your meals and activity. You can look up individual foods to see or compare their nutritional value. You can get guidelines for what and how much you should eat. You can compare your food choices. And you can assess personal physical activities and see ways you can improve. Go to www.[employer].gov/Coupon Walletcker/.    5343-6853 [employer]. [address] [place] [address]. All rights reserved. This information is not intended as a substitute for professional medical care. Always follow your healthcare professional's instructions.           Patient Education   Understanding Advance Care Planning  Advance care planning is the process of deciding ones own future medical care. It helps ensure that if you cant speak for yourself, your wishes can still be carried out. The plan is a series of legal documents that note a persons wishes. The documents vary by state. Advance care planning may be done when a person has a serious illness that is expected to get worse. It may be done before major surgery. And it can help you and your family be prepared in case of a major illness or injury. Advance care planning helps with making decisions at these times.       A health care proxy is a person who acts as the voice of a patient when the patient cant speak for himself or herself. The name of this role varies by state. It may be called a Durable Medical Power of  or Durable Power of  for  Healthcare. It may be called an agent, surrogate, or advocate. Or it may be called a representative or decision maker. It is an official duty that is identified by a legal document. The document also varies by state.    Why Is Advance Care Planning Important?  If a person communicates their healthcare wishes:    They will be given medical care that matches their values and goals.    Their family members will not be forced to make decisions in a crisis with no guidance.  Creating a Plan  Making an advance care plan is often done in 3 steps:    Thinking about ones wishes. To create an advance care plan, you should think about what kind of medical treatment you would want if you lose the ability to communicate. Are there any situations in which you would refuse or stop treatment? Are there therapies you would want or not want? And whom do you want to make decisions for you? There are many places to learn more about how to plan for your care. Ask your doctor or  for resources.    Picking a health care proxy. This means choosing a trusted person to speak for you only when you cant speak for yourself. When you cannot make medical decisions, your proxy makes sure the instructions in your advance care plan are followed. A proxy does not make decisions based on his or her own opinions. They must put aside those opinions and values if needed, and carry out your wishes.    Filling out the legal documents. There are several kinds of legal documents for advance care planning. Each one tells health care providers your wishes. The documents may vary by state. They must be signed and may need to be witnessed or notarized. You can cancel or change them whenever you wish. Depending on your state, the documents may include a Healthcare Proxy form, Living Will, Durable Medical Power of , Advance Directive, or others.  The Familys Role  The best help a family can give is to support their loved ones wishes. Open and  honest communication is vital. Family should express any concerns they have about the patients choices while the patient can still make decisions.    7429-1924 [employer]. [address] [place] [address], [place] [address]. All rights reserved. This information is not intended as a substitute for professional medical care. Always follow your healthcare professional's instructions.         Also, [facility] offers a free, downloadable health care directive that allows you to share your treatment choices and personal preferences if you cannot communicate your wishes. It also allows you to appoint another person (called a health care agent) to make health care decisions if you are unable to do so. You can download an advance directive by going here: [email or web address]     Patient Education   Personalized Prevention Plan  You are due for the preventive services outlined below.  Your care team is available to assist you in scheduling these services.  If you have already completed any of these items, please share that information with your care team to update in your medical record.  Health Maintenance   Topic Date Due   ? TD 18+ HE  07/25/1963   ? ZOSTER VACCINES (2 of 3) 12/09/2008   ? DXA SCAN  10/10/2018   ? INFLUENZA VACCINE RULE BASED (1) 08/01/2020   ? MEDICARE ANNUAL WELLNESS VISIT  10/07/2021   ? FALL RISK ASSESSMENT  10/07/2021   ? LIPID  10/12/2023   ? ADVANCE CARE PLANNING  10/16/2024   ? Pneumococcal Vaccine: 65+ Years  Completed   ? Pneumococcal Vaccine: Pediatrics (0 to 5 Years) and At-Risk Patients (6 to 64 Years)  Aged Out   ? HEPATITIS B VACCINES  Aged Out

## 2021-06-19 NOTE — LETTER
Letter by Ramesh Canales MD at      Author: Ramesh Canales MD Service: -- Author Type: --    Filed:  Encounter Date: 10/16/2019 Status: Signed         [facility] INTERNAL MEDICINE  10/16/19    Patient: Jenny Iglesias  YOB: 1945  Medical Record Number: [id number]                                                                  Opioid / Opioid Plus Controlled Substance Agreement    I understand that my care provider has prescribed an opioid (narcotic) controlled substance to help manage my condition(s). I am taking this medicine to help me function or work. I know this is strong medicine, and that it can cause serious side effects. Opioid medicine can be sedating, addicting and may cause a dependency on the drug. They can affect my ability to drive or think, and cause depression. They need to be taken exactly as prescribed. Combining opioids with certain medicines or chemicals (such as cocaine, sedatives and tranquilizers, sleeping pills, meth) can be dangerous or even fatal. Also, if I stop opioids suddenly, I may have severe withdrawal symptoms. Last, I understand that opioids do not work for all types of pain nor for all patients. If not helpful, I may be asked to stop them.    I am also being prescribed a benzodiazepine (tranquilizer) controlled substance.   I understand this type of medication is sedating, and can increase the risk of death when taken together with opioids.  I have talked to my care team about the option of having a prescription for Narcan to use to reverse the opioid medicine, in case I get too sleepy. I will be very careful to take my medicines only as directed.      The risks, benefits, and side effects of these medicine(s) were explained to me. I agree that:    1. I will take part in other treatments as advised by my care team. This may be psychiatry or counseling, physical therapy, behavioral therapy, group treatment or a referral to a pain clinic. I will  reduce or stop my medicine when my care team tells me to do so.  2. I will take my medicines as prescribed. I will not change the dose or schedule unless my care team tells me to. There will be no refills if I run out early.  I may be contactedwithout warning and asked to complete a urine drug test or pill count at any time.   3. I will keep all my appointments, and understand this is part of the monitoring of opioids. My care team may require an office visit for EVERY opioid/controlled substance refill. If I miss appointments or dont follow instructions, my care team may stop my medicine.  4. I will not ask other providers to prescribe controlled substances, and I will not accept controlled substances from other people. If I need another prescribed controlled substance for a new reason, I will tell my care team within 1 business day.  5. I will use one pharmacy to fill all of my controlled substance prescriptions, and it is up to me to make sure that I do not run out of my medicines on weekends or holidays. If my care team is willing to refill my opioid prescription without a visit, I must request refills only during office hours, refills may take up to 3 days to process, and it may take up to 5 to 7 days for my medicine to be mailed and ready at my pharmacy. Prescriptions will not be mailed anywhere except my pharmacy.        [id number]  Rev 12/18         Registration to scan to EHR                             Page 1 of 2               Controlled Substance Agreement Opioid        [facility] INTERNAL MEDICINE  10/16/19  Patient: Jenny Iglesias  YOB: 1945  Medical Record Number: [id number]                                                                  6. I am responsible for my prescriptions. If the medicine/prescription is lost or stolen, it will not be replaced. I also agree not to share controlled substance medicines with anyone.  7. I agree to not use ANY illegal or recreational  drugs. This includes marijuana, cocaine, bath salts or other drugs. I agree not to use alcohol unless my care team says I may.          I agree to give urine samples whenever asked. If I dont give a urine sample, the care team may stop my medicine.    8. If I enroll in the [place] Medical Marijuana program, I will tell my care team. I will also sign an agreement to share my medical records with my care team.   9. I will bring in my list of medicines (or my medicine bottles) each time I come to the clinic.   10. I will tell my care team right away if I become pregnant or have a new medical problem treated outside of my regular clinic.  11. I understand that this medicine can affect my thinking and judgment. It may be unsafe for me to drive, use machinery and do dangerous tasks. I will not do any of these things until I know how the medicine affects me. If my dose changes, I will wait to see how it affects me. I will contact my care team if I have concerns about medicine side effects.    I understand that if I do not follow any of the conditions above, my prescriptions or treatment may be stopped.      I agree that my provider, clinic care team, and pharmacy may work with any city, state or federal law enforcement agency that investigates the misuse, sale, or other diversion of my controlled medicine. I will allow my provider to discuss my care with or share a copy of this agreement with any other treating provider, pharmacy or emergency room where I receive care. I agree to give up (waive) any right of privacy or confidentiality with respect to these consents.     I have read this agreement and have asked questions about anything I did not understand.      ________________________________________________________________________  Patient signature - Date/Time -  Jenny RUIZ Kueppers                                      ________________________________________________________________________  Witness signature                                                             ________________________________________________________________________  Provider signature - Ramesh Canales MD      [id number]  Rev 12/18         Registration to scan to EHR                         Page 2 of 2                   Controlled Substance Agreement Opioid           Page 1 of 2  Opioid Pain Medicines (also known as Narcotics)  What You Need to Know    What are opioids?   Opioids are pain medicines that must be prescribed by a doctor.  They are also known as narcotics.    Examples are:     morphine (MS Contin, Vianey)    oxycodone (Oxycontin)    oxycodone and acetaminophen (Percocet)    hydrocodone and acetaminophen (Vicodin, Norco)     fentanyl patch (Duragesic)     hydromorphone (Dilaudid)     methadone     What do opioids do well?   Opioids are best for short-term pain after a surgery or injury. They also work well for cancer pain. Unlike other pain medicines, they do not cause liver or kidney failure or ulcers. They may help some people with long-lasting (chronic) pain.     What do opioids NOT do well?   Opioids never get rid of pain entirely, and they do not work well for most patients with chronic pain. Opioids do not reduce swelling, one of the causes of pain. They also dont work well for nerve pain.                           For informational purposes only.  Not to replace the advice of your care provider.  Copyright [address] [facility]. All right reserved. [place] 777873-Bpr 02/18.      Page 2 of 2    Risks and side effects   Talk to your doctor before you start or decide to keep taking one of these medicines. Side effects include:    Lowering your breathing rate enough to cause death    Overdose, including death, especially if taking higher than prescribed doses    Long-term opioid use    Worse depression symptoms; less pleasure in things you usually enjoy    Feeling tired or sluggish    Slower thoughts or cloudy thinking    Being more  sensitive to pain over time; pain is harder to control    Trouble sleeping or restless sleep    Changes in hormone levels (for example, less testosterone)    Changes in sex drive or ability to have sex    Constipation    Unsafe driving    Itching and sweating    Feeling dizzy    Nausea, vomiting and dry mouth    What else should I know about opioids?  When someone takes opioids for too long or too often, they become dependent. This means that if you stop or reduce the medicine too quickly, you will have withdrawal symptoms.    Dependence is not the same as addiction. Addiction is when people keep using a substance that harms their body, their mind or their relations with others. If you have a history of drug or alcohol abuse, taking opioids can cause a relapse.    Over time, opioids dont work as well. Most people will need higher and higher doses. The higher the dose, the more serious the side effects. We dont know the long-term effects of opioids.      Prescribed opioids aren't the best way to manage chronic pain    Other ways to manage pain include:      Ibuprofen or acetaminophen.  You should always try this first.      Treat health problems that may be causing pain.      acupuncture or massage, deep breathing, meditation, visual imagery, aromatherapy.      Use heat or ice at the pain site      Physical therapy and exercise      Stop smoking      See a counselor or therapist                                                  People who have used opioids for a long time may have a lower quality of life, worse depression, higher levels of pain and more visits to doctors.    Never share your opioids with others. Be sure to store opioids in a secure place, locked if possible.Young children can easily swallow them and overdose.     You can overdose on opioids.  Signs of overdose include decrease or loss of consciousness, slowed breathing, trouble waking and blue lips.  If someone is worried about overdose, they should  call 911.    If you are at risk for overdose, you may get naloxone (Narcan, a medicine that reverses the effects of opioids.  If you overdose, a friend or family member can give you Narcan while waiting for the ambulance.  They need to know the signs of overdose and how to give Narcan.    While you're taking opioids:    Don't use alcohol or street drugs. Taking them together can cause death.    Don't take any of these medicines unless your doctor says its okay.  Taking these with opioids can cause death.    Benzodiazepines (such as lorazepam         or diazepam)    Muscle relaxers (such as cyclobenzaprine)    sleeping pills    other opioids    Safe disposal of opioids  Find your area drug take-back program, your pharmacy mail-back program, buy a special disposal bag (such as Deterra) from your pharmacy or flush them down the toilet.  Use the guidelines at:  www.fda.gov/drugs/resourcesforyou

## 2021-06-21 NOTE — PROGRESS NOTES
Assessment and Plan:        1. Need for prophylactic vaccination and inoculation against influenza  Given  - Influenza High Dose, Seasonal 65+ yrs    2. Dizziness  Benign positional vertigo chronic.  PRN Valium.  Epley maneuvers.-Self  - diazePAM (VALIUM) 2 MG tablet; Take 1 tablet (2 mg total) by mouth 2 (two) times a day as needed for anxiety.  Dispense: 60 tablet; Refill: 1    3. Perimenopausal vasomotor symptoms  Symptomatic-increase Rx.  Patient has an upcoming mammogram.  Her breast check is negative.  She has had a hysterectomy.  Pelvic exam today-unremarkable  - estradiol (CLIMARA) 0.05 mg/24 hr; Place 1 patch on the skin once a week.  Dispense: 12 patch; Refill: 3    4. Essential hypertension  Controlled  - losartan-hydrochlorothiazide (HYZAAR) 100-25 mg per tablet; Take 1 tablet by mouth daily.  Dispense: 90 tablet; Refill: 3  - metoprolol succinate (TOPROL XL) 50 MG 24 hr tablet; Take 1 tablet (50 mg total) by mouth daily. For htn  Dispense: 90 tablet; Refill: 3  - potassium chloride (KLOR-CON) 10 MEQ CR tablet; Take 2 tablets (20 mEq total) by mouth daily.  Dispense: 180 tablet; Refill: 3  - Electrocardiogram Perform - Clinic    5. Restless leg syndrome  Continue Mirapex  - pramipexole (MIRAPEX) 0.25 MG tablet; Take 1 tablet (0.25 mg total) by mouth 2 (two) times a day. At 7 PM & 11 PM  Dispense: 180 tablet; Refill: 3    6. Chronic insomnia  The estrogen increase will help.  Refill Zoloft the p.m.  - zolpidem (AMBIEN) 10 mg tablet; Take 1 tablet (10 mg total) by mouth at bedtime as needed for sleep.  Dispense: 90 tablet; Refill: 0    7. Coccydynia  Really quite uncomfortable.  Trial of diclofenac.  Warned of GI issues  - diclofenac (VOLTAREN) 75 MG EC tablet; Take 1 tablet (75 mg total) by mouth 2 (two) times a day.  Dispense: 60 tablet; Refill: 0    8. BPV (benign positional vertigo)  As above    9. Medicare annual wellness visit, subsequent  Completed          - [facility](CBC and Differential)  -  Erythrocyte Sedimentation Rate  - Urinalysis-UC if Indicated  - Basic Metabolic Panel  - Hepatic Profile  - Lipid Cascade  - Thyroid Cascade  - Vitamin D, Total (25-Hydroxy)  - HM1 (CBC with Diff)     The patient's current medical problems were reviewed.    I have had an Advance Directives discussion with the patient.  The following health maintenance schedule was reviewed with the patient and provided in printed form in the after visit summary:   Health Maintenance   Topic Date Due     TD 18+ HE  07/25/1963     FALL RISK ASSESSMENT  04/03/2018     INFLUENZA VACCINE RULE BASED (1) 08/01/2018     DXA SCAN  10/10/2018     ADVANCE DIRECTIVES DISCUSSED WITH PATIENT  10/11/2022     COLONOSCOPY  04/18/2027     PNEUMOCOCCAL POLYSACCHARIDE VACCINE AGE 65 AND OVER  Completed     PNEUMOCOCCAL CONJUGATE VACCINE FOR ADULTS (PCV13 OR PREVNAR)  Completed     ZOSTER VACCINE  Completed              In addition to the wellness examination additional time was spent addressing the following listed problems.   As noted above    In doing so, this provider spent greater than 25 min. face-to-face time with the patient and/or his family.  More than half this time was spent in counseling and or coordination of care with other providers or agencies which were consistent with the nature of this patient's problems which are listed and described in the assessment and plan.          Ramesh Canales MD  Internal medicine  [facility]  [phone]  [email or web address]              Subjective:   Chief Complaint: Jenny Iglesias is an 73 y.o. female here for an Annual Wellness visit.   HPI: Here for wellness    Multiple other issues    Coccyx pain.  Reviewed.  Also bilateral trochanteric discomfort  She has had injections  Surprisingly not on nonsteroidal.  We discussed diclofenac.  She has no dyspeptic symptoms    Hypertension-There are no cardiovascular, respiratory, neurologic complaints.No  claudication.There is no orthostasis.Patient is compliant with medications.  Medications reviewed.   No side effects from medication.    Postmenopausal.  Positive vasomotor instability hot flashes.  Generally uncomfortable.  Increase.  There is no family history of breast cancer.  She does a self breast exam should have regular mammograms.  She had a hysterectomy.  She does have her ovaries.  She is aware of the increased risk of breast cancer with hormones    Restless leg syndrome-well controlled      Ambien does help her chronic insomnia    She has had no TIA symptoms.  She has a history of a TIA and moderate carotid stenosis.  She is on lipid therapy as well as baby aspirin.    Review of Systems:    Appetite is good    Does have some loose stools after larger fatty meal.  This is relatively new.  She is up-to-date with colonoscopy  Urination is okay please see above.  The rest of the review of systems are negative for all systems.    Patient Care Team:  Ramesh Canales MD as PCP - General (Internal Medicine)  Rodney Esquivel DO as Physician (Pain Medicine)     Patient Active Problem List   Diagnosis     Vertigo     Essential hypertension     Restless leg syndrome     Perimenopausal vasomotor symptoms     Carotid stenosis, asymptomatic     Chronic insomnia     Past Medical History:   Diagnosis Date     Carotid artery stenosis 11/2013    50% left internal carotid artery      Chronic low back pain      Hypertension      Osteoarthritis of lumbar spine      Osteopenia     mild, resolved     Postmenopausal 11/2013    on hormone     Restless leg syndrome      Surgical menopause     age 47     Thyroid nodule 11/2013    35mm, rightthyroid lobe biopsy-benign nodule     TIA (transient ischemic attack) 11/2013     Vertigo       Past Surgical History:   Procedure Laterality Date     APPENDECTOMY       CATARACT EXTRACTION       CHOLECYSTECTOMY       fifth toe amputation Right 1998     HYSTERECTOMY       TUBAL LIGATION         Family History   Problem Relation Age of Onset     Breast cancer Mother 57     Lung cancer Father 62     Hypertension Sister       Social History     Social History     Marital status:      Spouse name: N/A     Number of children: N/A     Years of education: N/A     Occupational History     Not on file.     Social History Main Topics     Smoking status: Former Smoker     Quit date: 10/22/2000     Smokeless tobacco: Never Used     Alcohol use Yes     Drug use: Not on file     Sexual activity: Not on file     Other Topics Concern     Not on file     Social History Narrative    - [place]    Retired 1/2001    [employer]    SonNael    DaughterVilma    DaughterRe      Current Outpatient Prescriptions   Medication Sig Dispense Refill     aspirin 81 mg chewable tablet Chew 81 mg daily.       calcium carbonate (OS-NIKITA) 600 mg (1,500 mg) tablet Take 600 mg by mouth 2 (two) times a day with meals.       diazePAM (VALIUM) 2 MG tablet Take 1 tablet (2 mg total) by mouth 2 (two) times a day as needed for anxiety. 60 tablet 1     hydrocortisone 2.5 % ointment Apply topically as needed.        losartan-hydrochlorothiazide (HYZAAR) 100-25 mg per tablet Take 1 tablet by mouth daily. 90 tablet 3     metoprolol succinate (TOPROL XL) 50 MG 24 hr tablet Take 1 tablet (50 mg total) by mouth daily. For htn 90 tablet 3     multivitamin with minerals (THERA-M) 9 mg iron-400 mcg Tab tablet Take 1 tablet by mouth daily.       potassium chloride (KLOR-CON) 10 MEQ CR tablet Take 2 tablets (20 mEq total) by mouth daily. 180 tablet 3     pramipexole (MIRAPEX) 0.25 MG tablet Take 1 tablet (0.25 mg total) by mouth 2 (two) times a day. At 7 PM & 11  tablet 3     pravastatin (PRAVACHOL) 20 MG tablet Take 0.5 tablets (10 mg total) by mouth every evening. 90 tablet 3     zolpidem (AMBIEN) 10 mg tablet Take 1 tablet (10 mg total) by mouth at bedtime as needed for sleep. 90 tablet 0     diclofenac  "(VOLTAREN) 75 MG EC tablet Take 1 tablet (75 mg total) by mouth 2 (two) times a day. 60 tablet 0     estradiol (CLIMARA) 0.05 mg/24 hr Place 1 patch on the skin once a week. 12 patch 3     No current facility-administered medications for this visit.       Objective:   Vital Signs:   Visit Vitals     /60 (Patient Site: Right Arm, Patient Position: Sitting, Cuff Size: Adult Regular)     Pulse (!) 53     Resp 14     Ht 5' 4.25\" (1.632 m)     Wt 161 lb (73 kg)     LMP  (LMP Unknown)     SpO2 97%     Breastfeeding No     BMI 27.42 kg/m2        VisionScreening:  No exam data present     PHYSICAL EXAM  Very pleasant  Appears healthy  Skin: Normal. No rash or lesion  Lymph Nodes: None palpable-including neck, axilla, inguinal, epitrochlear.  Head:  Normocephalic.  Very thick hair  Eyes: Midline.  Equal size., full ROM.  External exams normal.  No icterus  Ears:  Normal pinnae, canals, and TM's.    Nose:  Patent, without deformity.    Throat:  Moist mucous membranes without lesions, erythema, or exudate.    Neck: No palpable masses, lymphadenopathy or tenderness.No thyromegaly or goiter.  No thyroid nodule.  Carotid Arteries:  No Bruit.  Carotid upstroke normal  Chest Wall: No deformity or pain elicited on compression.  Axilla-no adenopathy  Breast mild bilateral symmetric fibrocystic type changes.  No palpable mass  Respiratory:  Normal respiratory effort.  Lungs are clear with good breath sounds.  No dullness.  No wheezing.  Heart: Regular rhythm.  Normal sounding S1, S2 without S3, S4, murmurs, rubs, or gallops.    Abdomen:  The abdomen was flat, soft and nontender without guarding rebound or masses.  There are normal bowel sounds.  There is no hepatosplenomegaly.  There is no palpable enlargement of the aorta.  Pelvic-slightly atrophic.  No palpable pelvic mass  Rectum normal tight tone  No stool  No mass  Extremities:  Full ROM without limitation, deformity or edema.    4+ pulses  Neurologic-intact- No focal " deficit.  Speech clear.  Coordination normal.  Strength symmetric  Orthopedic-no arthropathy.      Assessment Results 10/12/2018   Activities of Daily Living No help needed   Instrumental Activities of Daily Living No help needed   Get Up and Go Score Less than 12 seconds   Mini Cog Total Score 5   Some recent data might be hidden     A Mini-Cog score of 0-2 suggests the possibility of dementia, score of 3-5 suggests no dementia    Identified Health Risks:     The patient was counseled and encouraged to consider modifying their diet and eating habits. She was provided with information on recommended healthy diet options.  Patient's advanced directive was discussed and I am comfortable withv  the patient's wishes.

## 2021-06-21 NOTE — PROGRESS NOTES
Please call  Potassium is little high  Decrease your potassium from 2 tablets a day down to one tablet a day.  Your other lab looks fine

## 2021-06-24 ENCOUNTER — RECORDS - HEALTHEAST (OUTPATIENT)
Dept: ADMINISTRATIVE | Facility: OTHER | Age: 76
End: 2021-06-24

## 2021-06-25 NOTE — TELEPHONE ENCOUNTER
Pt states she is taking prednisone 45mg daily currently reducing by 5mg every 2 weeks. Pt would like refill to go to [facility] pharmacy in [facility].     Refill sent per RN refill protocol

## 2021-06-25 NOTE — TELEPHONE ENCOUNTER
Refill request from [facility]# [id number]    Medication: Alendronate 70mg  Last Refill: 3/25/21  Last OV: 5/6/21  No Labs  Future OV: 7/7/21

## 2021-06-25 NOTE — PROGRESS NOTES
OFFICE VISIT NOTE  Jenny Iglesias   73 y.o. female            Assessment/Plan for  Jenny Iglesias is a 73 y.o. female.  No Patient Care Coordination Note on file.       1. Essential hypertension  Good control    2. Perimenopausal vasomotor symptoms  Controlled with estrogen    3. Chronic insomnia  Chronic Ambien    4. Coccydynia  Recent steroid injection         Plan:  Continue current Rx  Aleve as needed back.  Warned regarding diarrhea on Celebrex  Medication refill  Check potassium- last check was 5.4.  She did decrease her potassium 1 tablet daily  Clinic wellness 6 months  There are no Patient Instructions on file for this visit.    Diagnoses and all orders for this visit:    Essential hypertension  -     Renal Function Profile  -     potassium chloride (KLOR-CON) 10 MEQ CR tablet  Dispense: 90 tablet; Refill: 3    Perimenopausal vasomotor symptoms    Chronic insomnia    Coccydynia        Medications after visit  Current Outpatient Medications   Medication Sig Dispense Refill     aspirin 81 mg chewable tablet Chew 81 mg daily.       calcium carbonate (OS-NIKITA) 600 mg (1,500 mg) tablet Take 600 mg by mouth 2 (two) times a day with meals.       celecoxib (CELEBREX) 50 MG capsule Take 50 mg by mouth 2 (two) times a day.              diazePAM (VALIUM) 2 MG tablet Take 1 tablet (2 mg total) by mouth 2 (two) times a day as needed for anxiety. 60 tablet 1     estradiol (CLIMARA) 0.05 mg/24 hr Place 1 patch on the skin once a week. 12 patch 3     hydrocortisone 2.5 % ointment Apply topically as needed.        losartan-hydrochlorothiazide (HYZAAR) 100-25 mg per tablet Take 1 tablet by mouth daily. 90 tablet 3     metoprolol succinate (TOPROL XL) 50 MG 24 hr tablet Take 1 tablet (50 mg total) by mouth daily. For htn 90 tablet 3     multivitamin with minerals (THERA-M) 9 mg iron-400 mcg Tab tablet Take 1 tablet by mouth daily.       potassium chloride (KLOR-CON) 10 MEQ CR tablet Take 1 tablet (10 mEq total) by  mouth daily. 90 tablet 3     pramipexole (MIRAPEX) 0.25 MG tablet Take 1 tablet (0.25 mg total) by mouth 2 (two) times a day. At 7 PM & 11  tablet 3     pravastatin (PRAVACHOL) 20 MG tablet Take 0.5 tablets (10 mg total) by mouth every evening. 90 tablet 3     zolpidem (AMBIEN) 10 mg tablet Take 1 tablet (10 mg total) by mouth at bedtime as needed for sleep. 90 tablet 0     No current facility-administered medications for this visit.                       Ramesh Canales MD  Internal medicine  [facility]  [phone]  [email or web address]    Much or all of the text in this note was generated through the use of Dragon Dictate voice-to-text software. Errors in spelling or words which seem out of context are unintentional.   Sound alike errors, in particular, may have escaped editing.                 Subjective:   Chief Complaint:  Hypertension (Concerned regarding low heart rate and elevated BP) and Follow-up (Routine 6m visit)    Patient has had recent steroid injections in her SI joints.  She also has some lumbar facet arthritis.  She got diarrhea on diclofenac.  Currently on Celebrex.  She notes Aleve helps.    Vasomotor instability resolved with Premarin.  I believe she is on no 0.05 she will confirm this.  She thinks she might be on 0 0.0375    Chronic insomnia.  She is doing well with Ambien    Anxiety-intermittent.  Does use occasional low-dose 2 mg diazepam with excellent effect    Hypertension-There are no cardiovascular, respiratory, neurologic complaints.No claudication.There is no orthostasis.Patient is compliant with medications.  Medications reviewed.   No side effects from medication.    Review of Systems:     Extensive 10-point review of systems was performed. Please see the HPI for problem specific pertinent review of systems.     Patient does note her appetite is good.  Currently no diarrhea off diclofenac    Otherwise, the following systems are  noncontributory including constitutional, eyes, ears, nose and throat, cardiovascular, respiratory, gastrointestinal, genitourinary, musculoskeletal,neurological, skin and/or breast, endocrine, hematologic/lymph, allergic/immunologic and psychiatric.              Medications:  Current Outpatient Medications on File Prior to Visit   Medication Sig     aspirin 81 mg chewable tablet Chew 81 mg daily.     calcium carbonate (OS-NIKITA) 600 mg (1,500 mg) tablet Take 600 mg by mouth 2 (two) times a day with meals.     celecoxib (CELEBREX) 50 MG capsule Take 50 mg by mouth 2 (two) times a day.            diazePAM (VALIUM) 2 MG tablet Take 1 tablet (2 mg total) by mouth 2 (two) times a day as needed for anxiety.     estradiol (CLIMARA) 0.05 mg/24 hr Place 1 patch on the skin once a week.     hydrocortisone 2.5 % ointment Apply topically as needed.      losartan-hydrochlorothiazide (HYZAAR) 100-25 mg per tablet Take 1 tablet by mouth daily.     metoprolol succinate (TOPROL XL) 50 MG 24 hr tablet Take 1 tablet (50 mg total) by mouth daily. For htn     multivitamin with minerals (THERA-M) 9 mg iron-400 mcg Tab tablet Take 1 tablet by mouth daily.     pramipexole (MIRAPEX) 0.25 MG tablet Take 1 tablet (0.25 mg total) by mouth 2 (two) times a day. At 7 PM & 11 PM     pravastatin (PRAVACHOL) 20 MG tablet Take 0.5 tablets (10 mg total) by mouth every evening.     zolpidem (AMBIEN) 10 mg tablet Take 1 tablet (10 mg total) by mouth at bedtime as needed for sleep.     [DISCONTINUED] potassium chloride (KLOR-CON) 10 MEQ CR tablet Take 2 tablets (20 mEq total) by mouth daily. (Patient taking differently: Take 10 mEq by mouth daily.       )     [DISCONTINUED] diclofenac (VOLTAREN) 75 MG EC tablet Take 1 tablet (75 mg total) by mouth 2 (two) times a day.     No current facility-administered medications on file prior to visit.             Allergies:  Allergies   Allergen Reactions     Codeine Other (See Comments)     Other chest pain      "Diclofenac      Diarrhea       Sinemet [Carbidopa-Levodopa]      dystonia       PSFHx: Tobacco Status:  She  reports that she quit smoking about 18 years ago. she has never used smokeless tobacco.   Alcohol Status:    Social History     Substance and Sexual Activity   Alcohol Use Yes       reports that she quit smoking about 18 years ago. she has never used smokeless tobacco. She reports that she drinks alcohol. Her drug history is not on file.    Objective:    /70   Pulse 82   Ht 5' 4.25\" (1.632 m)   Wt 157 lb 1.3 oz (71.3 kg)   LMP  (LMP Unknown)   SpO2 98%   Breastfeeding? No   BMI 26.75 kg/m    Weight:   Wt Readings from Last 3 Encounters:   03/15/19 157 lb 1.3 oz (71.3 kg)   10/12/18 161 lb (73 kg)   10/11/17 161 lb (73 kg)     BP Readings from Last 10 Encounters:   03/15/19 130/70   10/12/18 132/60   10/11/17 127/70   05/22/17 115/56   04/03/17 140/86   02/16/17 122/60   10/06/16 136/82   02/25/16 128/70   01/26/16 128/76   10/27/15 122/70         General-appears well, no acute distress.  Slightly flushed-steroid injection    Pulse regular  Lungs clear  Throat normal  Neck no adenopathy or thyromegaly  Clear lungs  No murmur  No edema            Review of clinical lab tests  Lab Results   Component Value Date    WBC 8.1 10/12/2018    HGB 15.6 10/12/2018    HCT 46.9 10/12/2018     10/12/2018    CHOL 205 (H) 10/12/2018    TRIG 75 10/12/2018    HDL 91 10/12/2018    ALT 16 10/12/2018    AST 20 10/12/2018     10/12/2018    K 5.4 (H) 10/12/2018     10/12/2018    CREATININE 1.00 10/12/2018    BUN 20 10/12/2018    CO2 28 10/12/2018    TSH 1.67 10/12/2018    INR 0.94 11/18/2013       Glucose   Date/Time Value Ref Range Status   10/12/2018 11:42 AM 91 70 - 125 mg/dL Final   10/11/2017 08:39 AM 98 70 - 125 mg/dL Final   05/22/2017 10:26  70 - 125 mg/dL Final   04/03/2017 10:45 AM 87 70 - 125 mg/dL Final   10/06/2016 11:16 AM 99 74 - 125 mg/dL Final   10/27/2015 10:34 AM 93 74 - 125 " mg/dL Final   11/18/2013 10:43 AM 89 70 - 125 mg/dL Final     Comment:          Fasting Glucose reference range is 70-99 mg/dL per       American Diabetes Association (ADA) guidelines.   11/14/2013 01:07 PM 87 70 - 125 mg/dL Final     Comment:          Fasting Glucose reference range is 70-99 mg/dL per       American Diabetes Association (ADA) guidelines.     No results found for this or any previous visit (from the past 24 hour(s)).    RADIOLOGY: No results found.    Review of recent consultation-     [facility]   si injection

## 2021-06-25 NOTE — PROGRESS NOTES
Sara your sugar is a bit high.  Kidney is okay  Calcium is okay    Were you fasting at the time of the blood draw?      I think the high sugars probably due to the steroid injection    We can certainly check this again in a month or so

## 2021-06-25 NOTE — TELEPHONE ENCOUNTER
Left message for pt to call back to see what dose of prednisone she is currently taking so we can send a refill to her pharmacy

## 2021-06-25 NOTE — TELEPHONE ENCOUNTER
Refill Approved    Rx renewed per Medication Renewal Policy. Medication was last renewed on 10/12/18.    Jessica Anderson, [facility] Triage/Med Refill 3/18/2019     Requested Prescriptions   Pending Prescriptions Disp Refills     estradiol (CLIMARA) 0.05 mg/24 hr 12 patch 3     Sig: Place 1 patch on the skin once a week.    Hormone Replacement Therapy Refill Protocol Passed - 3/17/2019  3:10 PM       Passed - PCP or prescribing provider visit in past 12 months      Last office visit with prescriber/PCP: 3/15/2019 Ramesh Canales MD OR same dept: 3/15/2019 Ramesh Canales MD OR same specialty: 3/15/2019 Ramesh Canales MD  Last physical: 10/12/2018 Last MTM visit: Visit date not found     Next visit within 3 mo: Visit date not found  Next physical within 3 mo: Visit date not found  Prescriber OR PCP: Ramesh Canales MD  Last diagnosis associated with med order: 1. Perimenopausal vasomotor symptoms  - estradiol (CLIMARA) 0.05 mg/24 hr; Place 1 patch on the skin once a week.  Dispense: 12 patch; Refill: 3     If protocol passes may refill for 3 months.

## 2021-06-25 NOTE — TELEPHONE ENCOUNTER
[facility] [place], [place] faxing stating patient is transferring RX there.    Rx for Prednisone sent to [facility] 5/23/21 needs to be faxed to [facility].      Called [facility] and cancelled RX

## 2021-06-26 NOTE — PATIENT INSTRUCTIONS - HE
Discontinue clopidogrel    Resume aspirin 81 mg daily    Hold atorvastatin for 2 to 3 weeks and monitor back pain    Medicine list clarified    Referral for therapy-vestibular rehab    Monitor blood pressure

## 2021-06-26 NOTE — PROGRESS NOTES
Virginia is a 75 y.o. female contacting the clinic today via video, who will use the platform: [employer] for the visit.  Phone # for [employer], or if Amwell drops:   Telephone Information:   Mobile [phone]   Link  so changed to doximity      ASSESSMENT:  1. Vertigo  Recurrent.  With many other coexisting conditions.  Okay to repeat trial of vestibular rehab  - Ambulatory referral to PT/OT    2. Essential hypertension  Clarify dose and refill  - losartan-hydrochlorothiazide (HYZAAR) 100-25 mg per tablet; Take 1 tablet by mouth daily.  Dispense: 90 tablet; Refill: 2    3. GCA (giant cell arteritis) (H)  Plavix initiated with onset of seizures and other neurologic changes.  Plavix no longer indicated per neurology note.  Discontinue Plavix and resume aspirin  - aspirin 81 MG EC tablet; Take 1 tablet (81 mg total) by mouth daily.; Refill: 0    4. Chronic bilateral low back pain without sciatica  Recommend trial off atorvastatin.  Prednisone helping some    5. Seizure disorder (H)  Clarify lamotrigine and not Keppra through neurology  - lamoTRIgine (LAMICTAL) 25 MG tablet; Take 1 tablet (25 mg total) by mouth 2 (two) times a day.; Refill: 0    Preventive Health Care: Otherwise up-to-date    PLAN:  Patient Instructions   Discontinue clopidogrel    Resume aspirin 81 mg daily    Hold atorvastatin for 2 to 3 weeks and monitor back pain    Medicine list clarified    Referral for therapy-vestibular rehab    Monitor blood pressure    Return in about 6 months (around 2021) for a phone/video follow up visit.      CHIEF COMPLAINT:  Chief Complaint   Patient presents with     Dizziness     1 Month      Medication Management     Would like to talk with pcp about medications and possible reactions        HISTORY OF PRESENT ILLNESS:  Virginia is a 75 y.o. female contacting the clinic today via video for referral for therapy.  She has a history of intermittent vertigo.  She sees a clinic in the [facility] but she is now  [facility] and wishes a local referral    She was diagnosed with giant cell arteritis which manifested as strokes and neurologic symptoms.  In the hospital she was placed on Plavix.  She is on prednisone 40 mg daily for the temporal arteritis and was on Keppra for seizures.  Keppra was changed to lamotrigine.  She is tolerating this well but feels intermittently dizzy.    Plavix was added as an anticoagulant when there was concern of vascular compromise and strokes.  Last neurology note suggests that her story is not vascular or occlusive.  She was on aspirin prior, would like to stop Plavix, and complains of a great deal of bruising    REVIEW OF SYSTEMS:   No peripheral edema.  Poorly monitored blood pressure    PFSH:  Social History     Social History Narrative    - [place]    Retired 2001    [employer] de[employer]    [place]    Son-Zaire    Daughter-Nadege    Daughter-Leatha       TOBACCO USE:  Social History     Tobacco Use   Smoking Status Former Smoker     Quit date: 10/22/2000     Years since quittin.6   Smokeless Tobacco Never Used       VITALS:  There were no vitals filed for this visit.  Wt Readings from Last 3 Encounters:   21 147 lb 4 oz (66.8 kg)   03/15/21 149 lb (67.6 kg)   21 149 lb 4 oz (67.7 kg)       PHYSICAL EXAM:  (observations via Video)  Alert and oriented.  Bruises on arms    MEDICATIONS:   Current Outpatient Medications   Medication Sig Dispense Refill     atorvastatin (LIPITOR) 40 MG tablet Take 1 tablet (40 mg total) by mouth daily. 90 tablet 3     calcium carbonate (OS-NIKITA) 600 mg (1,500 mg) tablet Take 600 mg by mouth 2 (two) times a day with meals.       diazePAM (VALIUM) 2 MG tablet Take 1 tablet (2 mg total) by mouth 2 (two) times a day as needed for anxiety. 60 tablet 5     lamoTRIgine (LAMICTAL) 25 MG tablet Take 1 tablet (25 mg total) by mouth 2 (two) times a day.  0     LORazepam (ATIVAN) 0.5 MG tablet Twice daily as needed 30 tablet 5      losartan-hydrochlorothiazide (HYZAAR) 100-25 mg per tablet Take 1 tablet by mouth daily. 90 tablet 2     metoprolol succinate (TOPROL XL) 25 MG 25 mg once daily in addition to the 50 mg extended release metoprolol.  To be taken together.  Total dose 75 mg metoprolol a day 90 tablet 11     metoprolol succinate (TOPROL-XL) 50 MG 24 hr tablet TAKE ONE TABLET BY MOUTH ONE TIME DAILY FOR HYPERTENSION 90 tablet 0     multivitamin (VITAMINS FOR HAIR) per tablet Take 1 tablet by mouth.       potassium chloride (KLOR-CON) 10 MEQ CR tablet TAKE ONE TABLET BY MOUTH ONE TIME DAILY  90 tablet 3     pramipexole (MIRAPEX) 0.25 MG tablet TAKE ONE TABLET BY MOUTH TWICE DAILY AT 7PM AND 11PM 180 tablet 0     predniSONE (DELTASONE) 10 mg tablet Take 45 mg po daily reducing by 5mg every two weeks. (Patient taking differently: Take 40 mg po daily reducing by 5mg every two weeks.) 210 tablet 1     zolpidem (AMBIEN) 10 mg tablet TAKE ONE TABLET BY MOUTH AT BEDTIME AS NEEDED FOR SLEEP 90 tablet 0     alendronate (FOSAMAX) 70 MG tablet Take 1 tablet (70 mg total) by mouth every 7 days. Take in the morning on an empty stomach with a full glass of water 30 minutes before food 12 tablet 0     aspirin 81 MG EC tablet Take 1 tablet (81 mg total) by mouth daily.  0     No current facility-administered medications for this visit.        Outside Notes summarized: Neurology note.  Hospital discharge summary  Mychart messages x ,  Interim orders x   Labs, x-rays, cardiology, GI tests reviewed: MRI and sed rate  New orders:   Orders Placed This Encounter   Procedures     Ambulatory referral to PT/OT     Referral Priority:   Routine     Referral Type:   Physical Therapy or Occupational Therapy     Referral Reason:   Evaluation and Treatment     Requested Specialty:   Physical Therapy     Number of Visits Requested:   1       Independent review of:    Supplemental history by:      Video Start Time: 10:57 AM  Video End time:  11:29 AM  Face to face plus  orders: 32 minutes  Documentation time: 3 minutes    The visit lasted a total of 35 minutes     Patient has given verbal consent to a Video visit?  Yes  How would you like to obtain your AVS?  MyChart  Will anyone else be joining your video visit? No       Video-Visit Details  Type of service:  Video Visit  Originating Location (pt. Location): Home  Distant Location (provider location):   [facility]     Nicko Farley MD

## 2021-07-03 NOTE — ADDENDUM NOTE
Addendum Note by Chastity Kim MD at 10/15/2020  1:13 PM     Author: Chastity Kim MD Service: -- Author Type: Physician    Filed: 10/15/2020  1:13 PM Encounter Date: 10/15/2020 Status: Signed    : Chastity Kim MD (Physician)    Addended by: CHASTITY KIM on: 10/15/2020 01:13 PM        Modules accepted: Orders

## 2021-07-07 ENCOUNTER — MEDICAL CORRESPONDENCE (OUTPATIENT)
Dept: HEALTH INFORMATION MANAGEMENT | Facility: CLINIC | Age: 76
End: 2021-07-07

## 2021-07-19 ENCOUNTER — TELEPHONE (OUTPATIENT)
Dept: INTERNAL MEDICINE | Facility: CLINIC | Age: 76
End: 2021-07-19

## 2021-07-19 DIAGNOSIS — F41.9 ANXIETY: ICD-10-CM

## 2021-07-19 DIAGNOSIS — I10 ESSENTIAL HYPERTENSION: ICD-10-CM

## 2021-07-19 DIAGNOSIS — G25.81 RESTLESS LEG SYNDROME: ICD-10-CM

## 2021-07-19 DIAGNOSIS — F51.04 CHRONIC INSOMNIA: Primary | ICD-10-CM

## 2021-07-19 NOTE — TELEPHONE ENCOUNTER
Please call patient regarding her meds -  Would like to go over with MD or team member her dosage and directions of the medications listed below    Pt has been working on this for over week      Reason for Call:  Other call back    Detailed comments: Pt calling on status of refills for her medication:  Zolpidem  Diazepam  Metroprolol 50mg  Pramipexole      Pt getting low on meds    Ph[facility] [place]      Phone Number Patient can be reached at: Home number on file [phone] (home)    Best Time: anytime    Can we leave a detailed message on this number? YES         Call taken on 7/19/2021 at 4:27 PM by Miriam Meyer

## 2021-07-21 ENCOUNTER — NURSE TRIAGE (OUTPATIENT)
Dept: NURSING | Facility: CLINIC | Age: 76
End: 2021-07-21

## 2021-07-21 DIAGNOSIS — I10 BENIGN ESSENTIAL HYPERTENSION: ICD-10-CM

## 2021-07-21 DIAGNOSIS — F51.04 CHRONIC INSOMNIA: Primary | ICD-10-CM

## 2021-07-21 DIAGNOSIS — G25.81 RESTLESS LEG SYNDROME: ICD-10-CM

## 2021-07-21 DIAGNOSIS — F41.9 ANXIETY: ICD-10-CM

## 2021-07-21 RX ORDER — METOPROLOL SUCCINATE 25 MG/1
25 TABLET, EXTENDED RELEASE ORAL DAILY
COMMUNITY
Start: 2021-05-05 | End: 2021-07-21

## 2021-07-21 RX ORDER — DIAZEPAM 2 MG
2 TABLET ORAL 2 TIMES DAILY PRN
Qty: 60 TABLET | Refills: 0 | Status: CANCELLED
Start: 2021-07-21

## 2021-07-21 RX ORDER — PRAMIPEXOLE DIHYDROCHLORIDE 0.25 MG/1
1 TABLET ORAL 2 TIMES DAILY
COMMUNITY
Start: 2020-07-10 | End: 2021-07-21

## 2021-07-21 RX ORDER — PRAMIPEXOLE DIHYDROCHLORIDE 0.25 MG/1
0.25 TABLET ORAL 2 TIMES DAILY
Qty: 180 TABLET | Refills: 3 | Status: CANCELLED
Start: 2021-07-21

## 2021-07-21 RX ORDER — ZOLPIDEM TARTRATE 10 MG/1
10 TABLET ORAL AT BEDTIME
Qty: 30 TABLET | Refills: 0 | Status: SHIPPED | OUTPATIENT
Start: 2021-07-21 | End: 2021-08-18

## 2021-07-21 RX ORDER — METOPROLOL SUCCINATE 50 MG/1
50 TABLET, EXTENDED RELEASE ORAL DAILY
Qty: 90 TABLET | Refills: 3 | Status: SHIPPED | OUTPATIENT
Start: 2021-07-21 | End: 2021-09-09

## 2021-07-21 RX ORDER — METOPROLOL SUCCINATE 50 MG/1
50 TABLET, EXTENDED RELEASE ORAL DAILY
COMMUNITY
Start: 2021-01-05 | End: 2021-07-21

## 2021-07-21 RX ORDER — DIAZEPAM 2 MG
2 TABLET ORAL 2 TIMES DAILY PRN
Qty: 60 TABLET | Refills: 0 | Status: SHIPPED | OUTPATIENT
Start: 2021-07-21 | End: 2021-10-08

## 2021-07-21 RX ORDER — ZOLPIDEM TARTRATE 10 MG/1
10 TABLET ORAL AT BEDTIME
Qty: 90 TABLET | Refills: 0 | Status: CANCELLED
Start: 2021-07-21

## 2021-07-21 RX ORDER — DIAZEPAM 2 MG
2 TABLET ORAL 2 TIMES DAILY PRN
COMMUNITY
Start: 2020-10-07 | End: 2021-07-21

## 2021-07-21 RX ORDER — PRAMIPEXOLE DIHYDROCHLORIDE 0.25 MG/1
0.25 TABLET ORAL 2 TIMES DAILY
Qty: 180 TABLET | Refills: 3 | Status: SHIPPED | OUTPATIENT
Start: 2021-07-21 | End: 2021-10-08

## 2021-07-21 RX ORDER — METOPROLOL SUCCINATE 50 MG/1
50 TABLET, EXTENDED RELEASE ORAL DAILY
Qty: 90 TABLET | Refills: 3 | Status: CANCELLED
Start: 2021-07-21

## 2021-07-21 RX ORDER — METOPROLOL SUCCINATE 25 MG/1
25 TABLET, EXTENDED RELEASE ORAL DAILY
Qty: 90 TABLET | Refills: 3 | Status: SHIPPED | OUTPATIENT
Start: 2021-07-21 | End: 2021-10-08

## 2021-07-21 NOTE — TELEPHONE ENCOUNTER
Writer tried the phone number in this encounter but received a message that this number is not in service.  Patient was left a message on her mobile number today.

## 2021-07-21 NOTE — TELEPHONE ENCOUNTER
Left message for Sara  Message to relay:  The refills request have different directions and quantity than what is her her [facility] Epic chart.  Patient needs to confirm these medications are set up the way she wants them to be sent to her pharmacy.  Also confirm she wants all four to go to the pharmacy in [place], [place].

## 2021-07-21 NOTE — TELEPHONE ENCOUNTER
Ginger is calling back [phone], cell [phone].  Writer unable to addend nurse triage encounter.    Zolpidem  Is 10mg 1 tab at  Bedtime, qty 90    Diazepam Is 2mg 1 tab as needed.    Metroprolol 50mg  1 tab in the morning, ALSO  Metroprolol 25MG 1 TAB in the morning    Pramipexole 0.25 mg 1 tab in morning, 1 tab at suppertime.    And yes, she would all refills sent to [facility].  They live [place] there [place] the [place].    Patient is out of medication, please refill asap. Has been trying since last week to get these filled.

## 2021-07-21 NOTE — TELEPHONE ENCOUNTER
Patient calling;  She has called several times , and pharmacy has left many messages . No response.  Please leave a message for patient.    Diazepam 2mg      Pramipexole  BID    Metoprolol  50 mg.    Patient waiting for refill of these meds, unable to get any response from MD , Dr. Taylor.    Please advise; Patient has 1 sleeping pill left  as well.     Pharmacy is [facility] ;     [address] [place] [address].  [id number]    [phone]    Neela Field, RN RN  [facility] Triage/refill nurse

## 2021-08-18 ENCOUNTER — TELEPHONE (OUTPATIENT)
Dept: RHEUMATOLOGY | Facility: CLINIC | Age: 76
End: 2021-08-18

## 2021-08-18 DIAGNOSIS — F51.04 CHRONIC INSOMNIA: ICD-10-CM

## 2021-08-18 RX ORDER — ZOLPIDEM TARTRATE 10 MG/1
10 TABLET ORAL AT BEDTIME
Qty: 30 TABLET | Refills: 5 | Status: SHIPPED | OUTPATIENT
Start: 2021-08-18 | End: 2021-10-08

## 2021-08-18 NOTE — TELEPHONE ENCOUNTER
Please mail lab order to patient's summer address:    [address] [address] [place] [address]    She would like to hand carry the order to a clinic close to [place].

## 2021-08-18 NOTE — TELEPHONE ENCOUNTER
Patient is calling back to provide a fax number instead of having to mail the orders. Please fax to [phone]; [facility].

## 2021-08-18 NOTE — TELEPHONE ENCOUNTER
Medication: Zolpidem 10 mg  Last Date Filled 7/22/21   pulled: YES           Only PCP Prescribing? : YES  Taken as prescribed from physician notes? YES    CSA in last year: NO  Random Utox in last year: n/a  Opioids + benzodiazepines? NO       Is patient on the Executive Review Team? No    All responses suggest: Refilling prescription

## 2021-08-18 NOTE — TELEPHONE ENCOUNTER
Reason for Call:  Medication or medication refill:    Do you use a [facility]?  Name of the pharmacy and phone number for the current request[facility] [place]    Name of the medication requested:   zolpidem (AMBIEN) 10 MG tablet 30 tablet 0 7/21/2021  --   Sig - Route: Take 1 tablet (10 mg) by mouth At Bedtime - Oral         Other request: pt is leaving Thursday and needs this filled if possible.2nd request came on Monday from pharm per pt    mychart is not helpful for her.    Can we leave a detailed message on this number? YES    Phone number patient can be reached at: Work number on file:  [phone] (work)    Best Time: any    Call taken on 8/18/2021 at 4:40 PM by Pam J. Behr  ;

## 2021-09-09 ENCOUNTER — TELEPHONE (OUTPATIENT)
Dept: OPHTHALMOLOGY | Facility: CLINIC | Age: 76
End: 2021-09-09

## 2021-09-09 ENCOUNTER — TRANSFERRED RECORDS (OUTPATIENT)
Dept: HEALTH INFORMATION MANAGEMENT | Facility: CLINIC | Age: 76
End: 2021-09-09

## 2021-09-09 ENCOUNTER — OFFICE VISIT (OUTPATIENT)
Dept: RHEUMATOLOGY | Facility: CLINIC | Age: 76
End: 2021-09-09
Payer: COMMERCIAL

## 2021-09-09 VITALS
SYSTOLIC BLOOD PRESSURE: 130 MMHG | BODY MASS INDEX: 25.75 KG/M2 | DIASTOLIC BLOOD PRESSURE: 70 MMHG | HEART RATE: 84 BPM | WEIGHT: 150 LBS

## 2021-09-09 DIAGNOSIS — Z78.0 OSTEOPENIA AFTER MENOPAUSE: ICD-10-CM

## 2021-09-09 DIAGNOSIS — M85.80 OSTEOPENIA AFTER MENOPAUSE: ICD-10-CM

## 2021-09-09 DIAGNOSIS — Z79.899 HIGH RISK MEDICATION USE: ICD-10-CM

## 2021-09-09 DIAGNOSIS — M31.6 GCA (GIANT CELL ARTERITIS) (H): Primary | ICD-10-CM

## 2021-09-09 PROCEDURE — 99214 OFFICE O/P EST MOD 30 MIN: CPT | Performed by: INTERNAL MEDICINE

## 2021-09-09 RX ORDER — PREDNISONE 10 MG/1
TABLET ORAL
COMMUNITY
Start: 2021-08-08 | End: 2021-09-09

## 2021-09-09 RX ORDER — LAMOTRIGINE 25 MG/1
50 TABLET ORAL 2 TIMES DAILY
COMMUNITY
Start: 2021-06-09 | End: 2023-06-05

## 2021-09-09 RX ORDER — OXYCODONE AND ACETAMINOPHEN 5; 325 MG/1; MG/1
TABLET ORAL
COMMUNITY
Start: 2021-01-05 | End: 2021-10-08

## 2021-09-09 RX ORDER — ALENDRONATE SODIUM 70 MG/1
TABLET ORAL
COMMUNITY
Start: 2021-08-10 | End: 2021-10-08

## 2021-09-09 RX ORDER — PREDNISONE 20 MG/1
60 TABLET ORAL DAILY
Qty: 63 TABLET | Refills: 0 | Status: SHIPPED | OUTPATIENT
Start: 2021-09-09 | End: 2021-09-29

## 2021-09-09 NOTE — TELEPHONE ENCOUNTER
Received call from Christin at [facility].  Patient has biopsy proven giant cell arteritis.  Taking prednisone 10 mg daily.      Patient previously only had vision changes in her left eye.  Now over the last couple months the patient has noticed vision changes in right eye.  Visual acuity 20/30 right eye (same as last visit in July).  On exam noticed possible new cottonwool spot in the right eye.     Dr. Madera recommended patient increase prednisone up to 60 mg daily and would like her to see Dr. Joshi tomorrow as patient leaving out of town after tomorrow and will not be back until after 7/19.      Dr. Joshi not in clinic tomorrow.  Will review if Dr. Hutchinson willing to add on.    Should call 's cell at [phone] if able to see tomorrow.  Otherwise we can call [facility] to see what provider would like to do.        Rachelle Andrade on 9/9/2021 at 11:41 AM

## 2021-09-09 NOTE — TELEPHONE ENCOUNTER
Spoke to [facility] to let her know we could not accommodate appointment tomorrow.  Spoke to patient's  and scheduled first available once patient back in town the last week in September.     Rachelle Andrade on 9/9/2021 at 2:14 PM

## 2021-09-09 NOTE — PROGRESS NOTES
"      Rheumatology follow-up visit note     Virginia is a 76 year old female presents today for follow-up.    Virginia was seen today for recheck.    Diagnoses and all orders for this visit:    GCA (giant cell arteritis) (H)  -     predniSONE (DELTASONE) 20 MG tablet; Take 3 tablets (60 mg) by mouth daily for 21 days    High risk medication use    Osteopenia after menopause        This patient has temporal arteritis, biopsy diagnosed, atypical initial presentation sent without the usual jaw claudication headache, has had reduced vision in her left about a month ago she noted blurry vision in the right eye this morning she was seen in ophthalmology to have seen cotton-wool spots and recommended that she increases her prednisone to 60 mg and is going to be seen at the [facility] retinal clinic, evidently seen cotton-wool spots and concerned that she might have a relapse of her arthritis.  Her sed rate and CRP however remains normal.  For now we will increase the prednisone per ophthalmology recommendation for the next 21 days when she will be seen at [facility] neuro-ophthalmology clinic on September 29.    Follow up in 1 month.    HPI    Jenny Iglesias is a 76 year old female is here for follow-up of   She has temporal arteritis, biopsy-proven.  She had atypical symptoms not the usual headache jaw claudication but had this \"funny feeling like a blast of sunlight\" and speech impairment in February this year.  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   On 3/1/2021 she was seen in Dr. Stockton's office, and  ischemic optic neuropathy was suspected, temporal artery biopsies were recommended, this was done on 3/3/2021 the report of that is attached.  She continues to taper prednisone.  She has not had deleterious effects that can be detected.  A month ago she noted blurriness of vision in the right eye.  This was her good eye.  This morning she was seen in ophthalmology " cotton-wool spots were noted.  She brings paperwork with her.  Noted to increase her prednisone to 60 mg daily.  Her most recent labs show normal sed rate and CRP done at her local clinic..  We discussed importance of taking calcium and vitamin D.  Monitoring in her case would be more involved than usual because of her presenting symptoms are so atypical.  [place]  DETAILED EXAMINATION  09/09/21  :    Vitals:    09/09/21 1452   BP: 130/70   Pulse: 84   Weight: 68 kg (150 lb)     Alert oriented. Head including the face is examined for malar rash, heliotropes, scarring, lupus pernio. Eyes examined for redness such as in episcleritis/scleritis, periorbital lesions.   Neck/ Face examined for parotid gland swelling, range of motion of neck.  Left upper and lower and right upper and lower extremities examined for tenderness, swelling, warmth of the appendicular joints, range of motion, edema, rash.  Some of the important findings included: she does not have evidence of synovitis in the palpable joints of the upper extremities.  No significant deformities of the digits.  +++  [place] nodes.  Range of motion of the shoulders  show full abduction.  No JLT effusion or warmth of the knees.  she does not have dactylitis of the digits.     Patient Active Problem List    Diagnosis Date Noted     Seizure disorder (H) 06/09/2021     Priority: Medium     GCA (giant cell arteritis) (H) 03/10/2021     Priority: Medium     TIA (transient ischemic attack)      Priority: Medium     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     Priority: Medium     Added automatically from request for surgery [id number]         Chronic kidney disease, stage 3 02/15/2021     Priority: Medium     Other chronic pain 10/07/2020     Priority: Medium     Chronic insomnia 02/25/2016     Priority: Medium     Vertigo 10/27/2015     Priority: Medium     Essential hypertension 10/27/2015     Priority: Medium      Restless leg syndrome 10/27/2015     Priority: Medium     Perimenopausal vasomotor symptoms 10/27/2015     Priority: Medium     Carotid stenosis, asymptomatic 10/27/2015     Priority: Medium     Past Surgical History:   Procedure Laterality Date     APPENDECTOMY       CATARACT EXTRACTION       CHOLECYSTECTOMY       HYSTERECTOMY       OTHER SURGICAL HISTORY Right 1998    fifth toe amputation     OR TEMPORAL ARTERY LIGATN OR BX Bilateral 3/4/2021    Procedure: BIOPSY, ARTERY, TEMPORAL;  Surgeon: Ramesh Box MD;  Location: [facility];  Service: General     TUBAL LIGATION        Past Medical History:   Diagnosis Date     Anxiety      Carotid artery stenosis 11/2013    50% left internal carotid artery      Chronic low back pain      Hypertension      Osteoarthritis of lumbar spine      Osteopenia     mild, resolved     Postmenopausal 11/2013    on hormone     Restless leg syndrome      Seizures (H)     Feb 18, 2021, Small lost vision and could not speak for 4-5 seconds     Surgical menopause     age 47     Thyroid nodule 11/2013    35mm, rightthyroid lobe biopsy-benign nodule     TIA (transient ischemic attack)      TIA (transient ischemic attack) 11/2013     Vertigo      Allergies   Allergen Reactions     Codeine Sulfate Shortness Of Breath and Anaphylaxis     Current Outpatient Medications   Medication Sig Dispense Refill     aspirin (ASA) 81 MG EC tablet Take 81 mg by mouth       Calcium-Magnesium-Vitamin D (CALCIUM MAGNESIUM PO) Take 1 tablet by mouth daily       diazepam (VALIUM) 2 MG tablet Take 1 tablet (2 mg) by mouth 2 times daily as needed 60 tablet 0     lamoTRIgine (LAMICTAL) 25 MG tablet Take 25 mg by mouth       losartan-hydrochlorothiazide (HYZAAR) 100-25 MG per tablet Take 1 tablet by mouth daily       metoprolol succinate ER (TOPROL-XL) 25 MG 24 hr tablet Take 1 tablet (25 mg) by mouth daily To be taken with 50 mg tab totaling 75 mg daily 90 tablet 3     Multiple Vitamins-Minerals (CENTRUM  PO) Take 1 tablet by mouth daily       oxyCODONE-acetaminophen (PERCOCET) 5-325 MG tablet        potassium chloride (KLOR-CON 10) 10 MEQ tablet Take 1 tablet by mouth daily       pramipexole (MIRAPEX) 0.25 MG tablet Take 1 tablet (0.25 mg) by mouth 2 times daily 180 tablet 3     predniSONE (DELTASONE) 10 MG tablet TAKE 4.5 TABS (45 MG) BY MOUTH ONCE A DAY FOR 2 WEEKS. THEN REDUCE THE DOSE BY 5MG EVERY 2 WEEKS       zolpidem (AMBIEN) 10 MG tablet Take 1 tablet (10 mg) by mouth At Bedtime 30 tablet 5     alendronate (FOSAMAX) 70 MG tablet TAKE 1 TAB A WEEK IN THE MORNING ON EMPTY STOMACH WITH A FULL GLASS OF WATER 30MIN BEFORE FOOD       family history includes Breast Cancer (age of onset: 57.00) in her mother; Hypertension in her sister; Lung Cancer (age of onset: 62.00) in her father.     Social Connections:      Frequency of Communication with Friends and Family:      Frequency of Social Gatherings with Friends and Family:      Attends Gnosticism Services:      Active Member of Clubs or Organizations:      Attends Club or Organization Meetings:      Marital Status:           WBC   Date Value Ref Range Status   04/06/2021 12.4 (H) 4.0 - 11.0 thou/uL Final     RBC Count   Date Value Ref Range Status   04/06/2021 4.88 3.80 - 5.40 mill/uL Final     Hemoglobin   Date Value Ref Range Status   04/06/2021 15.1 12.0 - 16.0 g/dL Final     Hematocrit   Date Value Ref Range Status   04/06/2021 46.4 35.0 - 47.0 % Final     MCV   Date Value Ref Range Status   04/06/2021 95 80 - 100 fL Final     MCH   Date Value Ref Range Status   04/06/2021 30.9 27.0 - 34.0 pg Final     Platelet Count   Date Value Ref Range Status   04/06/2021 230 140 - 440 thou/uL Final     % Lymphocytes   Date Value Ref Range Status   10/08/2020 34 20 - 40 % Final     AST   Date Value Ref Range Status   04/06/2021 21 0 - 40 U/L Final     ALT   Date Value Ref Range Status   10/08/2020 23 0 - 45 U/L Final     Albumin   Date Value Ref Range Status   10/08/2020 3.3  (L) 3.5 - 5.0 g/dL Final     Alkaline Phosphatase   Date Value Ref Range Status   10/08/2020 193 (H) 45 - 120 U/L Final     Creatinine   Date Value Ref Range Status   04/06/2021 1.31 (H) 0.60 - 1.10 mg/dL Final     GFR Estimate   Date Value Ref Range Status   04/06/2021 39 (L) >60 mL/min/1.73m2 Final     GFR Estimate If Black   Date Value Ref Range Status   04/06/2021 48 (L) >60 mL/min/1.73m2 Final     Erythrocyte Sedimentation Rate   Date Value Ref Range Status   03/15/2021 2 0 - 20 mm/hr Final     CRP   Date Value Ref Range Status   03/15/2021 <0.1 0.0 - 0.8 mg/dL Final

## 2021-09-10 NOTE — TELEPHONE ENCOUNTER
FUTURE VISIT INFORMATION      FUTURE VISIT INFORMATION:    Date: 9/29/21    Time: 8:45    Location: [facility]  REFERRAL INFORMATION:    Referring providers clinic:  [facility]     Reason for visit/diagnosis  right eye vision changes, GCA positive patient    RECORDS REQUESTED FROM:       Clinic name Comments Records Status Imaging Status   [facility] Request for recs sent 9/10- received and sent to [facility]

## 2021-09-27 NOTE — PROGRESS NOTES
Assessment & Plan     Jenny Iglesias is a 76 year old female with the following diagnoses:   1. GCA (giant cell arteritis) (H)    2. Visual field defect    3. Optic atrophy         Patient was sent for consultation by Dr. Williams Madera for new visual field defect right eye with history of giant cell arteritis.     HPI:  She went to the hospital on February 1st because she thought she was having a Transient ischemic attack because she was unable to speak on 4 occasions for about 5 min or so.   MRI showed no acute stroke.  CTA showed complete occlusion of the right vertebral artery and near complete  occlusion of the extradural left vertebral artery.  She returned to the ED on 2/18 with another episode of aphasia and eye blurriness.  Repeat CTA showed new focal left basal ganglia acute/subacute infarction.  Video EEG shows temporal abnormality and was told having simple partial seizures.  The day after she woke up from her last seizure, she experienced vision loss in the LEFT eye. She saw Dr. Figueroa on the same day. He sent the patient to get a temporal artery biopsy and put her on prednisone.  The biopsies came back positive. The vision loss has not changed since then.  Seeing Rheumatology.  She is using prednisone 60 mg day for the past 3 weeks.  In February was on prednisone 60 mg.  She was down to 5 mg until 3 weeks ago.  She lost vision 3 weeks ago.   Vision has been stable since that time.   Denies headaches, jaw pain, extremity pain.    Denies weight loss or loss of appetite.  Denies diplopia.       Independent historians:  Patient and spouse.      Review of outside testing:  Temporal artery biopsy 3/4   A) LEFT TEMPORAL ARTERY, BIOPSY:        - POSITIVE FOR TEMPORAL ARTERITIS WITH MARKED HISTIOCYTIC INFILTRATE, RARE GIANT CELLS, SIGNIFICANT INTIMAL THICKENING, AND NARROWING OF THE ARTERIAL LUMEN      B) RIGHT TEMPORAL ARTERY, BIOPSY:        - POSITIVE FOR TEMPORAL ARTERITIS WITH OCCASIONAL GIANT CELLS,  MARKED INTIMAL THICKENING, AND NARROWING OF THE ARTERIAL LUMEN      ESR 3/1 29, CRP on 3/1 0.9 and on 9/7 ESR was 7 and CRP 0.8    HEAD CT 2/1:   1.  No evidence of acute intracranial hemorrhage, acute large territorial infarction, or intracranial mass lesion.   2.  Stable mild chronic small vessel ischemic change.   3.  Stable mild diffuse brain parenchymal volume loss.   4.  Findings compatible with chronic left sphenoid sinusitis.     HEAD CTA 2/1:   1.  Complete occlusion of the proximal intradural vertebral arteries bilaterally with reconstitution in both post-PICA segments. Hyperdensity involving the intradural vertebral arteries bilaterally could represent intramural hematoma in the setting of an acute dissection or atherosclerotic plaque. The findings are new since the head and neck CTA in 2013.   2.  Attenuation of the mid to distal right posterior inferior cerebellar artery could represent at least partial occlusion. The left posterior inferior cerebellar artery and basilar artery are widely patent. There is large left and small right posterior communicating arteries, which likely contribute retrograde flow to the basilar artery and post-PICA segment of the bilateral vertebral arteries.   3.  Mild attenuation of a small distal M2 anterior division branch of the left middle cerebral artery, which could represent partially occlusive thrombus or less likely intracranial atherosclerosis. The finding is new since 2013.     NECK CTA 2/1:   1.  Complete occlusion of the right vertebral artery just beyond its origin with distal reconstitution in the post-PICA segment, as detailed above.   2.  Multifocal severe stenosis and near complete occlusion of the extradural left vertebral artery with complete occlusion of the intradural pre-PICA segment and reconstitution in the post-PICA segment.   3.  The above constellation of findings could be secondary to intradural and/or extradural vertebral artery dissections  bilaterally, and less likely atherosclerotic occlusion given the lack of significant atherosclerosis seen on prior head and neck CTA.   4.  Stable moderate stenosis in the proximal cervical right internal carotid artery (approximately 50%).   5.  Stable mild stenosis in the proximal cervical left internal carotid artery.   6.  Stable enlarged right thyroid gland with large hypodense nodule measuring up to 27 mm. Correlation with prior thyroid ultrasound and biopsy is recommended.     CTA 2/18   1.  New round focus of hypoattenuation in the left basal ganglia measuring 5 x 6 x 7 mm, with appearance most suggestive of developing subacute infarction. Recommend MRI brain for further characterization.   2.  No acute intracranial hemorrhage or mass effect.   3.  Additional presumed sequelae of mild chronic small vessel ischemic disease.     My interpretation performed today of outside testing:  Not available      Review of outside clinical notes:  [facility] showed no optic disc edema right eye and resolving optic disc edema left eye.  New cotton wool spot right eye    Steroid responsive glaucoma     Past medical history:  Osteoporosis, seizures, high blood pressure, steroid-induced glaucoma, giant cell arteritis, insomnia    Medications:   alendronate (FOSAMAX) 70 MG tablet  aspirin (ASA) 81 MG EC tablet  Calcium-Magnesium-Vitamin D (CALCIUM MAGNESIUM PO)  diazepam (VALIUM) 2 MG tablet  lamoTRIgine (LAMICTAL) 25 MG tablet  losartan-hydrochlorothiazide (HYZAAR) 100-25 MG per tablet  metoprolol succinate ER (TOPROL-XL) 25 MG 24 hr tablet  Multiple Vitamins-Minerals (CENTRUM PO)  oxyCODONE-acetaminophen (PERCOCET) 5-325 MG tablet  potassium chloride (KLOR-CON 10) 10 MEQ tablet  pramipexole (MIRAPEX) 0.25 MG tablet  predniSONE (DELTASONE) 60 mg  zolpidem (AMBIEN) 10 MG tablet  Timolol     Family history / social history:  Noncontributory    Exam:  Visual acuity 20/25 right eye and 20/30 left eye.  She has a left afferent  pupillary defect.  Color vision 4/11 right eye and 5/11 left eye.  MRD1 of 0.5 mm both eyes.  Her right optic nerve appears normal.  Her left optic nerve appears pale.    Tests ordered and interpreted today:  Visual field with arcuate right eye and altitudinal defect left eye - does not respect the vertical meridian   OCT normal right eye and superior and inferior thinning left eye.     Discussion of management / interpretation with another provider:   None    Assessment/Plan:   It is my impression that patient has new decreased vision RIGHT eye in setting of biopsy proven Giant cell arteritis.  Interestingly, her acute phase reactants were normal when they were checked.  She is currently on 60 mg of prednisone daily for the past 3 weeks.  She has not noticed any progressive changes in the right eye since they began.  She does not have current optic nerve damage evident on exam or OCT today in the right eye.  Her visual field could be consistent with a right homonymous hemianopia.  She has occlusion of both vertebral arteries seen on previous imaging.  It is possible that she experienced a stroke.  It is also possible that she experienced posterior ischemic optic neuropathy in the right eye.  It is not clear to me whether this is related to giant cell arteritis or not.    I will obtain an MRI to look for a left occipital lobe stroke.  I will Continue with prednisone 60 mg daily at this time since it is not clear whether she experienced posterior ischemic optic neuropathy or not.  I will have her return in 6 to 8 weeks to reassess her ganglion cell layer and nerve fiber layer.  I would expect her to develop optic nerve atrophy during this time, if she had ischemic optic neuropathy.  She is asking about being on steroids and whether there would be a steroid sparing agent.  I let her know that Actemra is FDA approved for the treatment of giant cell arteritis.  I asked her to discuss this with her rheumatologist.          Attending Physician Attestation:  Complete documentation of historical and exam elements from today's encounter can be found in the full encounter summary report (not reduplicated in this progress note).  I personally obtained the chief complaint(s) and history of present illness.  I confirmed and edited as necessary the review of systems, past medical/surgical history, family history, social history, and examination findings as documented by others; and I examined the patient myself.  I personally reviewed the relevant tests, images, and reports as documented above.  I formulated and edited as necessary the assessment and plan and discussed the findings and management plan with the patient and family. - Tung Joshi MD

## 2021-09-29 ENCOUNTER — PRE VISIT (OUTPATIENT)
Dept: OPHTHALMOLOGY | Facility: CLINIC | Age: 76
End: 2021-09-29

## 2021-09-29 ENCOUNTER — OFFICE VISIT (OUTPATIENT)
Dept: OPHTHALMOLOGY | Facility: CLINIC | Age: 76
End: 2021-09-29
Payer: COMMERCIAL

## 2021-09-29 DIAGNOSIS — H53.40 VISUAL FIELD DEFECT: ICD-10-CM

## 2021-09-29 DIAGNOSIS — H47.20 OPTIC ATROPHY: ICD-10-CM

## 2021-09-29 DIAGNOSIS — H53.40 VISUAL FIELD DEFECT: Primary | ICD-10-CM

## 2021-09-29 DIAGNOSIS — M31.6 GCA (GIANT CELL ARTERITIS) (H): Primary | ICD-10-CM

## 2021-09-29 PROCEDURE — 92133 CPTRZD OPH DX IMG PST SGM ON: CPT | Performed by: OPHTHALMOLOGY

## 2021-09-29 PROCEDURE — 92083 EXTENDED VISUAL FIELD XM: CPT | Performed by: OPHTHALMOLOGY

## 2021-09-29 PROCEDURE — 99204 OFFICE O/P NEW MOD 45 MIN: CPT | Performed by: OPHTHALMOLOGY

## 2021-09-29 RX ORDER — PREDNISONE 20 MG/1
60 TABLET ORAL DAILY
Qty: 90 TABLET | Refills: 0 | Status: SHIPPED | OUTPATIENT
Start: 2021-09-29 | End: 2021-10-19

## 2021-09-29 ASSESSMENT — MARGIN REFLEX DISTANCE
OD_MRD1: 0.5
OS_MRD1: 0.5

## 2021-09-29 ASSESSMENT — SLIT LAMP EXAM - LIDS
COMMENTS: NORMAL
COMMENTS: NORMAL

## 2021-09-29 ASSESSMENT — TONOMETRY
OD_IOP_MMHG: 17
IOP_METHOD: ICARE
OS_IOP_MMHG: 18

## 2021-09-29 ASSESSMENT — EXTERNAL EXAM - RIGHT EYE: OD_EXAM: NORMAL

## 2021-09-29 ASSESSMENT — VISUAL ACUITY
OD_SC: 20/25
METHOD: SNELLEN - LINEAR
OS_SC: 20/30

## 2021-09-29 ASSESSMENT — CONF VISUAL FIELD
OD_NORMAL: 1
OS_NORMAL: 1

## 2021-09-29 ASSESSMENT — EXTERNAL EXAM - LEFT EYE: OS_EXAM: NORMAL

## 2021-09-29 NOTE — Clinical Note
9/29/2021       RE: Jenny Iglesias  [address]  [place] [address]     Dear Colleague,    Thank you for referring your patient, Jenny Iglesias, to the [facility] [place] at [facility]. Please see a copy of my visit note below.         Assessment & Plan     Jenny Iglesias is a 76 year old female with the following diagnoses:   1. GCA (giant cell arteritis) (H)    2. Visual field defect    3. Optic atrophy         Patient was sent for consultation by Dr. Williams Madera for new visual field defect right eye with history of giant cell arteritis.     HPI:  She went to the hospital on February 1st because she thought she was having a Transient ischemic attack because she was unable to speak on 4 occasions for about 5 min or so.   MRI showed no acute stroke.  CTA showed complete occlusion of the right vertebral artery and near complete  occlusion of the extradural left vertebral artery.  She returned to the ED on 2/18 with another episode of aphasia and eye blurriness.  Repeat CTA showed new focal left basal ganglia acute/subacute infarction.  Video EEG shows temporal abnormality and was told having simple partial seizures.  The day after she woke up from her last seizure, she experienced vision loss in the LEFT eye. She saw Dr. Figueroa on the same day. He sent the patient to get a temporal artery biopsy and put her on prednisone.  The biopsies came back positive. The vision loss has not changed since then.  Seeing Rheumatology.  She is using prednisone 60 mg day for the past 3 weeks.  In February was on prednisone 60 mg.  She was down to 5 mg until 3 weeks ago.  She lost vision 3 weeks ago.   Vision has been stable since that time.   Denies headaches, jaw pain, extremity pain.    Denies weight loss or loss of appetite.  Denies diplopia.       Independent historians:  Patient and spouse.      Review of outside  testing:  Temporal artery biopsy 3/4   A) LEFT TEMPORAL ARTERY, BIOPSY:        - POSITIVE FOR TEMPORAL ARTERITIS WITH MARKED HISTIOCYTIC INFILTRATE, RARE GIANT CELLS, SIGNIFICANT INTIMAL THICKENING, AND NARROWING OF THE ARTERIAL LUMEN      B) RIGHT TEMPORAL ARTERY, BIOPSY:        - POSITIVE FOR TEMPORAL ARTERITIS WITH OCCASIONAL GIANT CELLS, MARKED INTIMAL THICKENING, AND NARROWING OF THE ARTERIAL LUMEN      ESR 3/1 29, CRP on 3/1 0.9 and on 9/7 ESR was 7 and CRP 0.8    HEAD CT 2/1:   1.  No evidence of acute intracranial hemorrhage, acute large territorial infarction, or intracranial mass lesion.   2.  Stable mild chronic small vessel ischemic change.   3.  Stable mild diffuse brain parenchymal volume loss.   4.  Findings compatible with chronic left sphenoid sinusitis.     HEAD CTA 2/1:   1.  Complete occlusion of the proximal intradural vertebral arteries bilaterally with reconstitution in both post-PICA segments. Hyperdensity involving the intradural vertebral arteries bilaterally could represent intramural hematoma in the setting of an acute dissection or atherosclerotic plaque. The findings are new since the head and neck CTA in 2013.   2.  Attenuation of the mid to distal right posterior inferior cerebellar artery could represent at least partial occlusion. The left posterior inferior cerebellar artery and basilar artery are widely patent. There is large left and small right posterior communicating arteries, which likely contribute retrograde flow to the basilar artery and post-PICA segment of the bilateral vertebral arteries.   3.  Mild attenuation of a small distal M2 anterior division branch of the left middle cerebral artery, which could represent partially occlusive thrombus or less likely intracranial atherosclerosis. The finding is new since 2013.     NECK CTA 2/1:   1.  Complete occlusion of the right vertebral artery just beyond its origin with distal reconstitution in the post-PICA segment, as  detailed above.   2.  Multifocal severe stenosis and near complete occlusion of the extradural left vertebral artery with complete occlusion of the intradural pre-PICA segment and reconstitution in the post-PICA segment.   3.  The above constellation of findings could be secondary to intradural and/or extradural vertebral artery dissections bilaterally, and less likely atherosclerotic occlusion given the lack of significant atherosclerosis seen on prior head and neck CTA.   4.  Stable moderate stenosis in the proximal cervical right internal carotid artery (approximately 50%).   5.  Stable mild stenosis in the proximal cervical left internal carotid artery.   6.  Stable enlarged right thyroid gland with large hypodense nodule measuring up to 27 mm. Correlation with prior thyroid ultrasound and biopsy is recommended.     CTA 2/18   1.  New round focus of hypoattenuation in the left basal ganglia measuring 5 x 6 x 7 mm, with appearance most suggestive of developing subacute infarction. Recommend MRI brain for further characterization.   2.  No acute intracranial hemorrhage or mass effect.   3.  Additional presumed sequelae of mild chronic small vessel ischemic disease.     My interpretation performed today of outside testing:  Not available      Review of outside clinical notes:  [facility] showed no optic disc edema right eye and resolving optic disc edema left eye.  New cotton wool spot right eye    Steroid responsive glaucoma     Past medical history:  Osteoporosis, seizures, high blood pressure, steroid-induced glaucoma, giant cell arteritis, insomnia    Medications:   alendronate (FOSAMAX) 70 MG tablet  aspirin (ASA) 81 MG EC tablet  Calcium-Magnesium-Vitamin D (CALCIUM MAGNESIUM PO)  diazepam (VALIUM) 2 MG tablet  lamoTRIgine (LAMICTAL) 25 MG tablet  losartan-hydrochlorothiazide (HYZAAR) 100-25 MG per tablet  metoprolol succinate ER (TOPROL-XL) 25 MG 24 hr tablet  Multiple Vitamins-Minerals (CENTRUM  PO)  oxyCODONE-acetaminophen (PERCOCET) 5-325 MG tablet  potassium chloride (KLOR-CON 10) 10 MEQ tablet  pramipexole (MIRAPEX) 0.25 MG tablet  predniSONE (DELTASONE) 60 mg  zolpidem (AMBIEN) 10 MG tablet  Timolol     Family history / social history:  Noncontributory    Exam:  Visual acuity 20/25 right eye and 20/30 left eye.  She has a left afferent pupillary defect.  Color vision 4/11 right eye and 5/11 left eye.  MRD1 of 0.5 mm both eyes.  Her right optic nerve appears normal.  Her left optic nerve appears pale.    Tests ordered and interpreted today:  Visual field with arcuate right eye and altitudinal defect left eye - does not respect the vertical meridian   OCT normal right eye and superior and inferior thinning left eye.     Discussion of management / interpretation with another provider:   None    Assessment/Plan:   It is my impression that patient has new decreased vision RIGHT eye in setting of biopsy proven Giant cell arteritis.  Interestingly, her acute phase reactants were normal when they were checked.  She is currently on 60 mg of prednisone daily for the past 3 weeks.  She has not noticed any progressive changes in the right eye since they began.  She does not have current optic nerve damage evident on exam or OCT today in the right eye.  Her visual field could be consistent with a right homonymous hemianopia.  She has occlusion of both vertebral arteries seen on previous imaging.  It is possible that she experienced a stroke.  It is also possible that she experienced posterior ischemic optic neuropathy in the right eye.  It is not clear to me whether this is related to giant cell arteritis or not.    I will obtain an MRI to look for a left occipital lobe stroke.  I will Continue with prednisone 60 mg daily at this time since it is not clear whether she experienced posterior ischemic optic neuropathy or not.  I will have her return in 6 to 8 weeks to reassess her ganglion cell layer and nerve fiber  layer.  I would expect her to develop optic nerve atrophy during this time, if she had ischemic optic neuropathy.  She is asking about being on steroids and whether there would be a steroid sparing agent.  I let her know that Actemra is FDA approved for the treatment of giant cell arteritis.  I asked her to discuss this with her rheumatologist.         Attending Physician Attestation:  Complete documentation of historical and exam elements from today's encounter can be found in the full encounter summary report (not reduplicated in this progress note).  I personally obtained the chief complaint(s) and history of present illness.  I confirmed and edited as necessary the review of systems, past medical/surgical history, family history, social history, and examination findings as documented by others; and I examined the patient myself.  I personally reviewed the relevant tests, images, and reports as documented above.  I formulated and edited as necessary the assessment and plan and discussed the findings and management plan with the patient and family. - Tung Joshi MD              Again, thank you for allowing me to participate in the care of your patient.      Sincerely,    Tung Joshi MD

## 2021-09-29 NOTE — LETTER
2021         RE:  :  MRN: Jenny Iglesias  1945  [id number]     Dear Dr. Madera,    Thank you for asking me to see your very pleasant patient, Jenny Iglesias, in neuro-ophthalmic consultation.  I would like to thank you for sending your records and I have summarized them in the history of present illness.   My assessment and plan are below.  For further details, please see my attached clinic note.      Assessment & Plan     Jenny Iglesias is a 76 year old female with the following diagnoses:   1. GCA (giant cell arteritis) (H)    2. Visual field defect    3. Optic atrophy         Patient was sent for consultation by Dr. Williams Madera for new visual field defect right eye with history of giant cell arteritis.     HPI:  She went to the hospital on  because she thought she was having a Transient ischemic attack because she was unable to speak on 4 occasions for about 5 min or so.   MRI showed no acute stroke.  CTA showed complete occlusion of the right vertebral artery and near complete  occlusion of the extradural left vertebral artery.  She returned to the ED on  with another episode of aphasia and eye blurriness.  Repeat CTA showed new focal left basal ganglia acute/subacute infarction.  Video EEG shows temporal abnormality and was told having simple partial seizures.  The day after she woke up from her last seizure, she experienced vision loss in the LEFT eye. She saw Dr. Figueroa on the same day. He sent the patient to get a temporal artery biopsy and put her on prednisone.  The biopsies came back positive. The vision loss has not changed since then.  Seeing Rheumatology.  She is using prednisone 60 mg day for the past 3 weeks.  In February was on prednisone 60 mg.  She was down to 5 mg until 3 weeks ago.  She lost vision 3 weeks ago.   Vision has been stable since that time.   Denies headaches, jaw pain, extremity pain.    Denies weight loss or loss of appetite.  Denies  diplopia.       Independent historians:  Patient and spouse.      Review of outside testing:  Temporal artery biopsy 3/4   A) LEFT TEMPORAL ARTERY, BIOPSY:        - POSITIVE FOR TEMPORAL ARTERITIS WITH MARKED HISTIOCYTIC INFILTRATE, RARE GIANT CELLS, SIGNIFICANT INTIMAL THICKENING, AND NARROWING OF THE ARTERIAL LUMEN      B) RIGHT TEMPORAL ARTERY, BIOPSY:        - POSITIVE FOR TEMPORAL ARTERITIS WITH OCCASIONAL GIANT CELLS, MARKED INTIMAL THICKENING, AND NARROWING OF THE ARTERIAL LUMEN      ESR 3/1 29, CRP on 3/1 0.9 and on 9/7 ESR was 7 and CRP 0.8    HEAD CT 2/1:   1.  No evidence of acute intracranial hemorrhage, acute large territorial infarction, or intracranial mass lesion.   2.  Stable mild chronic small vessel ischemic change.   3.  Stable mild diffuse brain parenchymal volume loss.   4.  Findings compatible with chronic left sphenoid sinusitis.     HEAD CTA 2/1:   1.  Complete occlusion of the proximal intradural vertebral arteries bilaterally with reconstitution in both post-PICA segments. Hyperdensity involving the intradural vertebral arteries bilaterally could represent intramural hematoma in the setting of an acute dissection or atherosclerotic plaque. The findings are new since the head and neck CTA in 2013.   2.  Attenuation of the mid to distal right posterior inferior cerebellar artery could represent at least partial occlusion. The left posterior inferior cerebellar artery and basilar artery are widely patent. There is large left and small right posterior communicating arteries, which likely contribute retrograde flow to the basilar artery and post-PICA segment of the bilateral vertebral arteries.   3.  Mild attenuation of a small distal M2 anterior division branch of the left middle cerebral artery, which could represent partially occlusive thrombus or less likely intracranial atherosclerosis. The finding is new since 2013.     NECK CTA 2/1:   1.  Complete occlusion of the right vertebral artery  just beyond its origin with distal reconstitution in the post-PICA segment, as detailed above.   2.  Multifocal severe stenosis and near complete occlusion of the extradural left vertebral artery with complete occlusion of the intradural pre-PICA segment and reconstitution in the post-PICA segment.   3.  The above constellation of findings could be secondary to intradural and/or extradural vertebral artery dissections bilaterally, and less likely atherosclerotic occlusion given the lack of significant atherosclerosis seen on prior head and neck CTA.   4.  Stable moderate stenosis in the proximal cervical right internal carotid artery (approximately 50%).   5.  Stable mild stenosis in the proximal cervical left internal carotid artery.   6.  Stable enlarged right thyroid gland with large hypodense nodule measuring up to 27 mm. Correlation with prior thyroid ultrasound and biopsy is recommended.     CTA 2/18   1.  New round focus of hypoattenuation in the left basal ganglia measuring 5 x 6 x 7 mm, with appearance most suggestive of developing subacute infarction. Recommend MRI brain for further characterization.   2.  No acute intracranial hemorrhage or mass effect.   3.  Additional presumed sequelae of mild chronic small vessel ischemic disease.     My interpretation performed today of outside testing:  Not available      Review of outside clinical notes:  [facility] showed no optic disc edema right eye and resolving optic disc edema left eye.  New cotton wool spot right eye    Steroid responsive glaucoma     Past medical history:  Osteoporosis, seizures, high blood pressure, steroid-induced glaucoma, giant cell arteritis, insomnia    Medications:   alendronate (FOSAMAX) 70 MG tablet  aspirin (ASA) 81 MG EC tablet  Calcium-Magnesium-Vitamin D (CALCIUM MAGNESIUM PO)  diazepam (VALIUM) 2 MG tablet  lamoTRIgine (LAMICTAL) 25 MG tablet  losartan-hydrochlorothiazide (HYZAAR) 100-25 MG per tablet  metoprolol succinate ER  (TOPROL-XL) 25 MG 24 hr tablet  Multiple Vitamins-Minerals (CENTRUM PO)  oxyCODONE-acetaminophen (PERCOCET) 5-325 MG tablet  potassium chloride (KLOR-CON 10) 10 MEQ tablet  pramipexole (MIRAPEX) 0.25 MG tablet  predniSONE (DELTASONE) 60 mg  zolpidem (AMBIEN) 10 MG tablet  Timolol     Family history / social history:  Noncontributory    Exam:  Visual acuity 20/25 right eye and 20/30 left eye.  She has a left afferent pupillary defect.  Color vision 4/11 right eye and 5/11 left eye.  MRD1 of 0.5 mm both eyes.  Her right optic nerve appears normal.  Her left optic nerve appears pale.    Tests ordered and interpreted today:  Visual field with arcuate right eye and altitudinal defect left eye - does not respect the vertical meridian   OCT normal right eye and superior and inferior thinning left eye.     Discussion of management / interpretation with another provider:   None    Assessment/Plan:   It is my impression that patient has new decreased vision RIGHT eye in setting of biopsy proven Giant cell arteritis.  Interestingly, her acute phase reactants were normal when they were checked.  She is currently on 60 mg of prednisone daily for the past 3 weeks.  She has not noticed any progressive changes in the right eye since they began.  She does not have current optic nerve damage evident on exam or OCT today in the right eye.  Her visual field could be consistent with a right homonymous hemianopia.  She has occlusion of both vertebral arteries seen on previous imaging.  It is possible that she experienced a stroke.  It is also possible that she experienced posterior ischemic optic neuropathy in the right eye.  It is not clear to me whether this is related to giant cell arteritis or not.    I will obtain an MRI to look for a left occipital lobe stroke.  I will Continue with prednisone 60 mg daily at this time since it is not clear whether she experienced posterior ischemic optic neuropathy or not.  I will have her return  in 6 to 8 weeks to reassess her ganglion cell layer and nerve fiber layer.  I would expect her to develop optic nerve atrophy during this time, if she had ischemic optic neuropathy.  She is asking about being on steroids and whether there would be a steroid sparing agent.  I let her know that Actemra is FDA approved for the treatment of giant cell arteritis.  I asked her to discuss this with her rheumatologist.        Again, thank you for allowing me to participate in the care of your patient.      Sincerely,    Tung Joshi MD  Professor  Ophthalmology Residency   Director of Neuro-Ophthalmology  [facility] Endowed Chair  Departments of Ophthalmology, Neurology, and Neurosurgery  [facility] [address]  [address] [place] [address]  [id number]  T - [phone]  F - [phone]  [email or web address]      CC: Williams Madera MD  [facility]  [address] [facility] [address]  Via Fax: [phone]     Karen Taylor MD  [facility]  [address] [facility] [address]  Via In Basket     MEGAN Juan  [address] [facility] [address]  Via In Basket    DX = vision loss, giant cell arteritis

## 2021-09-29 NOTE — NURSING NOTE
Chief Complaints and History of Present Illnesses   Patient presents with     Blurred Vision Evaluation     Chief Complaint(s) and History of Present Illness(es)     Blurred Vision Evaluation               Comments     Jenny Iglesias is a 76 year old female who presents today for    1. Suspected AION right eye  2. Stable AION left eye  3. GCA positive   4. Steroid induced glaucoma   5. Dry eye syndrome.     Vision loss, right eye. Gradual onset two months ago. Patient was on prednisone, and tapered down to 5 mg, but vision in the right eye decreased. Has been on 60 mg prednisone for the last three weeks. Feels no improvement in vision since the dose was increased.   No headaches.     Aquiles BAILON 8:25 AM September 29, 2021

## 2021-10-03 ENCOUNTER — HEALTH MAINTENANCE LETTER (OUTPATIENT)
Age: 76
End: 2021-10-03

## 2021-10-08 ENCOUNTER — NURSE TRIAGE (OUTPATIENT)
Dept: NURSING | Facility: CLINIC | Age: 76
End: 2021-10-08

## 2021-10-08 ENCOUNTER — OFFICE VISIT (OUTPATIENT)
Dept: INTERNAL MEDICINE | Facility: CLINIC | Age: 76
End: 2021-10-08
Payer: COMMERCIAL

## 2021-10-08 ENCOUNTER — TELEPHONE (OUTPATIENT)
Dept: INTERNAL MEDICINE | Facility: CLINIC | Age: 76
End: 2021-10-08

## 2021-10-08 VITALS — HEART RATE: 72 BPM | DIASTOLIC BLOOD PRESSURE: 74 MMHG | OXYGEN SATURATION: 99 % | SYSTOLIC BLOOD PRESSURE: 138 MMHG

## 2021-10-08 DIAGNOSIS — G40.109 LOCALIZATION-RELATED FOCAL EPILEPSY WITH SIMPLE PARTIAL SEIZURES (H): ICD-10-CM

## 2021-10-08 DIAGNOSIS — R73.03 PREDIABETES: ICD-10-CM

## 2021-10-08 DIAGNOSIS — D50.9 IRON DEFICIENCY ANEMIA, UNSPECIFIED IRON DEFICIENCY ANEMIA TYPE: ICD-10-CM

## 2021-10-08 DIAGNOSIS — Z11.59 NEED FOR HEPATITIS C SCREENING TEST: Primary | ICD-10-CM

## 2021-10-08 DIAGNOSIS — E83.52 HYPERCALCEMIA: ICD-10-CM

## 2021-10-08 DIAGNOSIS — F51.04 CHRONIC INSOMNIA: ICD-10-CM

## 2021-10-08 DIAGNOSIS — M31.6 GIANT CELL ARTERITIS (H): ICD-10-CM

## 2021-10-08 DIAGNOSIS — E55.9 VITAMIN D INSUFFICIENCY: ICD-10-CM

## 2021-10-08 DIAGNOSIS — N18.32 STAGE 3B CHRONIC KIDNEY DISEASE (H): ICD-10-CM

## 2021-10-08 DIAGNOSIS — E04.1 THYROID NODULE: ICD-10-CM

## 2021-10-08 DIAGNOSIS — G25.81 RESTLESS LEG SYNDROME: ICD-10-CM

## 2021-10-08 DIAGNOSIS — I10 ESSENTIAL HYPERTENSION: ICD-10-CM

## 2021-10-08 DIAGNOSIS — Z23 HIGH PRIORITY FOR 2019-NCOV VACCINE: ICD-10-CM

## 2021-10-08 DIAGNOSIS — M81.0 AGE-RELATED OSTEOPOROSIS WITHOUT CURRENT PATHOLOGICAL FRACTURE: ICD-10-CM

## 2021-10-08 DIAGNOSIS — E78.2 MIXED HYPERLIPIDEMIA: ICD-10-CM

## 2021-10-08 DIAGNOSIS — I65.29 CAROTID ATHEROSCLEROSIS, UNSPECIFIED LATERALITY: ICD-10-CM

## 2021-10-08 DIAGNOSIS — Z00.00 ROUTINE HISTORY AND PHYSICAL EXAMINATION OF ADULT: ICD-10-CM

## 2021-10-08 DIAGNOSIS — F41.9 ANXIETY: ICD-10-CM

## 2021-10-08 LAB
ALBUMIN SERPL-MCNC: 3.7 G/DL (ref 3.5–5)
ALP SERPL-CCNC: 82 U/L (ref 45–120)
ALT SERPL W P-5'-P-CCNC: 26 U/L (ref 0–45)
ANION GAP SERPL CALCULATED.3IONS-SCNC: 13 MMOL/L (ref 5–18)
AST SERPL W P-5'-P-CCNC: 19 U/L (ref 0–40)
BILIRUB SERPL-MCNC: 0.6 MG/DL (ref 0–1)
BUN SERPL-MCNC: 22 MG/DL (ref 8–28)
CALCIUM SERPL-MCNC: 12.2 MG/DL (ref 8.5–10.5)
CHLORIDE BLD-SCNC: 95 MMOL/L (ref 98–107)
CHOLEST SERPL-MCNC: 265 MG/DL
CO2 SERPL-SCNC: 32 MMOL/L (ref 22–31)
CREAT SERPL-MCNC: 1.12 MG/DL (ref 0.6–1.1)
ERYTHROCYTE [DISTWIDTH] IN BLOOD BY AUTOMATED COUNT: 13.7 % (ref 10–15)
FASTING STATUS PATIENT QL REPORTED: NO
GFR SERPL CREATININE-BSD FRML MDRD: 47 ML/MIN/1.73M2
GLUCOSE BLD-MCNC: 108 MG/DL (ref 70–125)
HBA1C MFR BLD: 6.2 % (ref 0–5.6)
HCT VFR BLD AUTO: 47.9 % (ref 35–47)
HCV AB SERPL QL IA: NONREACTIVE
HDLC SERPL-MCNC: 130 MG/DL
HGB BLD-MCNC: 15.3 G/DL (ref 11.7–15.7)
IRON SATN MFR SERPL: 31 % (ref 15–46)
IRON SERPL-MCNC: 118 UG/DL (ref 35–180)
LDLC SERPL CALC-MCNC: 119 MG/DL
MCH RBC QN AUTO: 31.2 PG (ref 26.5–33)
MCHC RBC AUTO-ENTMCNC: 31.9 G/DL (ref 31.5–36.5)
MCV RBC AUTO: 98 FL (ref 78–100)
PLATELET # BLD AUTO: 223 10E3/UL (ref 150–450)
POTASSIUM BLD-SCNC: 3.8 MMOL/L (ref 3.5–5)
PROT SERPL-MCNC: 6.4 G/DL (ref 6–8)
RBC # BLD AUTO: 4.91 10E6/UL (ref 3.8–5.2)
SODIUM SERPL-SCNC: 140 MMOL/L (ref 136–145)
TIBC SERPL-MCNC: 376 UG/DL (ref 240–430)
TRIGL SERPL-MCNC: 80 MG/DL
TSH SERPL DL<=0.005 MIU/L-ACNC: 1.8 UIU/ML (ref 0.3–5)
WBC # BLD AUTO: 14.1 10E3/UL (ref 4–11)

## 2021-10-08 PROCEDURE — 99397 PER PM REEVAL EST PAT 65+ YR: CPT | Mod: 25 | Performed by: INTERNAL MEDICINE

## 2021-10-08 PROCEDURE — 0003A COVID-19,PF,PFIZER (12+ YRS): CPT | Performed by: INTERNAL MEDICINE

## 2021-10-08 PROCEDURE — 80053 COMPREHEN METABOLIC PANEL: CPT | Performed by: INTERNAL MEDICINE

## 2021-10-08 PROCEDURE — 90662 IIV NO PRSV INCREASED AG IM: CPT | Performed by: INTERNAL MEDICINE

## 2021-10-08 PROCEDURE — 84165 PROTEIN E-PHORESIS SERUM: CPT | Performed by: INTERNAL MEDICINE

## 2021-10-08 PROCEDURE — G0008 ADMIN INFLUENZA VIRUS VAC: HCPCS | Performed by: INTERNAL MEDICINE

## 2021-10-08 PROCEDURE — 99214 OFFICE O/P EST MOD 30 MIN: CPT | Mod: 25 | Performed by: INTERNAL MEDICINE

## 2021-10-08 PROCEDURE — 86334 IMMUNOFIX E-PHORESIS SERUM: CPT | Performed by: INTERNAL MEDICINE

## 2021-10-08 PROCEDURE — 83036 HEMOGLOBIN GLYCOSYLATED A1C: CPT | Performed by: INTERNAL MEDICINE

## 2021-10-08 PROCEDURE — 91300 COVID-19,PF,PFIZER (12+ YRS): CPT | Performed by: INTERNAL MEDICINE

## 2021-10-08 PROCEDURE — 82306 VITAMIN D 25 HYDROXY: CPT | Performed by: INTERNAL MEDICINE

## 2021-10-08 PROCEDURE — 80061 LIPID PANEL: CPT | Performed by: INTERNAL MEDICINE

## 2021-10-08 PROCEDURE — 36415 COLL VENOUS BLD VENIPUNCTURE: CPT | Performed by: INTERNAL MEDICINE

## 2021-10-08 PROCEDURE — 86803 HEPATITIS C AB TEST: CPT | Performed by: INTERNAL MEDICINE

## 2021-10-08 PROCEDURE — 83970 ASSAY OF PARATHORMONE: CPT | Performed by: INTERNAL MEDICINE

## 2021-10-08 PROCEDURE — 85027 COMPLETE CBC AUTOMATED: CPT | Performed by: INTERNAL MEDICINE

## 2021-10-08 PROCEDURE — 83550 IRON BINDING TEST: CPT | Performed by: INTERNAL MEDICINE

## 2021-10-08 PROCEDURE — 84443 ASSAY THYROID STIM HORMONE: CPT | Performed by: INTERNAL MEDICINE

## 2021-10-08 RX ORDER — LOSARTAN POTASSIUM AND HYDROCHLOROTHIAZIDE 25; 100 MG/1; MG/1
1 TABLET ORAL DAILY
Qty: 90 TABLET | Refills: 3 | Status: SHIPPED | OUTPATIENT
Start: 2021-10-08 | End: 2021-10-15

## 2021-10-08 RX ORDER — PRAMIPEXOLE DIHYDROCHLORIDE 0.25 MG/1
0.25 TABLET ORAL 2 TIMES DAILY
Qty: 180 TABLET | Refills: 3 | Status: SHIPPED | OUTPATIENT
Start: 2021-10-08 | End: 2021-11-08

## 2021-10-08 RX ORDER — ATORVASTATIN CALCIUM 40 MG/1
40 TABLET, FILM COATED ORAL DAILY
Qty: 90 TABLET | Refills: 3 | Status: SHIPPED | OUTPATIENT
Start: 2021-10-08 | End: 2021-10-08

## 2021-10-08 RX ORDER — ZOLPIDEM TARTRATE 10 MG/1
10 TABLET ORAL AT BEDTIME
Qty: 90 TABLET | Refills: 3 | Status: SHIPPED | OUTPATIENT
Start: 2021-10-08 | End: 2022-04-08

## 2021-10-08 RX ORDER — METOPROLOL SUCCINATE 25 MG/1
TABLET, EXTENDED RELEASE ORAL
Qty: 180 TABLET | Refills: 3 | Status: SHIPPED | OUTPATIENT
Start: 2021-10-08 | End: 2022-08-09

## 2021-10-08 RX ORDER — METOPROLOL SUCCINATE 25 MG/1
25 TABLET, EXTENDED RELEASE ORAL DAILY
Qty: 90 TABLET | Refills: 3 | Status: SHIPPED | OUTPATIENT
Start: 2021-10-08 | End: 2021-10-08

## 2021-10-08 RX ORDER — DIAZEPAM 2 MG
2 TABLET ORAL 2 TIMES DAILY PRN
Qty: 60 TABLET | Refills: 1 | Status: SHIPPED | OUTPATIENT
Start: 2021-10-08 | End: 2021-10-11

## 2021-10-08 RX ORDER — ALENDRONATE SODIUM 70 MG/1
TABLET ORAL
Qty: 12 TABLET | Refills: 3 | Status: SHIPPED | OUTPATIENT
Start: 2021-10-08 | End: 2022-10-06

## 2021-10-08 ASSESSMENT — ACTIVITIES OF DAILY LIVING (ADL): CURRENT_FUNCTION: NO ASSISTANCE NEEDED

## 2021-10-08 NOTE — PROGRESS NOTES
"SUBJECTIVE:   Jenny Iglesias is a 76 year old very pleasant lady here for her annual wellness  Visit    Preventive Visit.  In summary patient has had a complex case with recurrent focal cells that she has had about 2-3 over 3 years this is associated with night and inability to speak. They been treated as TIAs but no stroke has been found on any imaging. Last one was in early 2021. Extensive imaging did not show any strokelike lesion. She was put on Plavix but EEG was abnormal. So the CT the cells were thought to be epilepsy and not vascular in etiology. 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 She subsequently unfortunately has lost part of her vision in 1 eye. She continues to be on high dose and is being followed by a rheumatologist. Her Plavix was discontinued. Her Keppra was changed to Lamictal. She continues to have a terrible pervasive dizzy lightheaded feeling. It does not appear to be vestibular she has reported this to neurology. They attribute it to her prednisone and other medication use. She is continuing to see vestibular therapy.  plavix was stopped by neurology . Is on aspirin alone   Se[facility] neurology at [facility]   Sees rheumatology who still has put her on high dose prednisone . This has been upsetting for her . She has gained weight .   Patient has been advised of split billing requirements and indicates understanding: Yes   Are you in the first 12 months of your Medicare coverage?  No  Mammogram annual   Healthy Habits:     In general, how would you rate your overall health?  Fair    Frequency of exercise:  2-3 days/week    Duration of exercise:  Less than 15 minutes    Do you usually eat at least 4 servings of fruit and vegetables a day, include whole grains    & fiber and avoid regularly eating high fat or \"junk\" foods?  Yes    Taking " medications regularly:  Yes    Medication side effects:  Significant flushing and Lightheadedness    Ability to successfully perform activities of daily living:  No assistance needed    Home Safety:  No safety concerns identified    Hearing Impairment:  Need to ask people to speak up or repeat themselves    In the past 6 months, have you been bothered by leaking of urine?  No    In general, how would you rate your overall mental or emotional health?  Good      PHQ-2 Total Score: 0    Additional concerns today:  Yes    Do you feel safe in your environment? Yes  Colon 2017 normal   Have you ever done Advance Care Planning? (For example, a Health Directive, POLST, or a discussion with a medical provider or your loved ones about your wishes): Yes, advance care planning is on file.       Fall risk      Cognitive Screening   1) Repeat 3 items (Leader, Season, Table)    2) Clock draw: NORMAL  3) 3 item recall: Recalls 3 objects  Results: 3 items recalled: COGNITIVE IMPAIRMENT LESS LIKELY    Mini-CogTM Copyright ELLIOT Orozco. Licensed by the author for use in [facility]; reprinted with permission (kristin@[facility]). All rights reserved.      Do you have sleep apnea, excessive snoring or daytime drowsiness?: no    Reviewed and updated as needed this visit by clinical staff  Tobacco  Allergies    Med Hx  Surg Hx  Fam Hx  Soc Hx        Reviewed and updated as needed this visit by Provider                Social History     Tobacco Use     Smoking status: Former Smoker     Quit date: 10/22/2000     Years since quittin.9     Smokeless tobacco: Never Used   Substance Use Topics     Alcohol use: Yes         Alcohol Use 10/8/2021   Prescreen: >3 drinks/day or >7 drinks/week? No           Patient Active Problem List   Diagnosis     Vertigo     Essential hypertension     Restless leg syndrome     Perimenopausal vasomotor symptoms     Carotid stenosis, asymptomatic     Chronic insomnia     Other chronic pain     Chronic  kidney disease, stage 3 (H)     Ischemic optic neuropathy     TIA (transient ischemic attack)     GCA (giant cell arteritis) (H)     Seizure disorder (H)     Localization-related focal epilepsy with simple partial seizures (H)     Current Outpatient Medications   Medication     alendronate (FOSAMAX) 70 MG tablet     aspirin (ASA) 81 MG EC tablet     Calcium-Magnesium-Vitamin D (CALCIUM MAGNESIUM PO)     diazepam (VALIUM) 2 MG tablet     lamoTRIgine (LAMICTAL) 25 MG tablet     losartan-hydrochlorothiazide (HYZAAR) 100-25 MG tablet     metoprolol succinate ER (TOPROL-XL) 25 MG 24 hr tablet     Multiple Vitamins-Minerals (CENTRUM PO)     potassium chloride (KLOR-CON 10) 10 MEQ tablet     pramipexole (MIRAPEX) 0.25 MG tablet     predniSONE (DELTASONE) 20 MG tablet     sertraline (ZOLOFT) 50 MG tablet     zolpidem (AMBIEN) 10 MG tablet     No current facility-administered medications for this visit.     The Ambien she uses as needed and is in the Valium as well. As needed    Current providers sharing in care for this patient include:   Patient Care Team:  Karen Taylor MD as PCP - General  Karen Taylor MD as Assigned PCP  Mark Han MBBS as Assigned Rheumatology Provider    The following health maintenance items are reviewed in Epic and correct as of today:  Health Maintenance Due   Topic Date Due     URINE DRUG SCREEN  Never done     ANNUAL REVIEW OF  ORDERS  Never done     HEPATITIS C SCREENING  Never done     DTAP/TDAP/TD IMMUNIZATION (1 - Tdap) Never done     ZOSTER IMMUNIZATION (2 of 3) 12/09/2008     COVID-19 Vaccine (3 - Pfizer risk 3-dose series) 03/26/2021     INFLUENZA VACCINE (1) 09/01/2021     FALL RISK ASSESSMENT  10/07/2021     MEDICARE ANNUAL WELLNESS VISIT  10/07/2021             Pertinent mammograms are reviewed under the imaging tab.    Review of Systems  Constitutional, HEENT, cardiovascular, pulmonary, gi and gu systems are negative, except as otherwise noted.    OBJECTIVE:   There were no  "vitals taken for this visit. Estimated body mass index is 25.75 kg/m  as calculated from the following:    Height as of 7/7/21: 1.626 m (5' 4\").    Weight as of 9/9/21: 68 kg (150 lb).  Physical Exam  GENERAL: healthy, alert and no distress  EYES: Eyes grossly normal to inspection, PERRL and conjunctivae and sclerae normal  HENT: ear canals and TM's normal, nose and mouth without ulcers or lesions  NECK: no adenopathy, no asymmetry, masses, or scars and thyroid normal to palpation  RESP: lungs clear to auscultation - no rales, rhonchi or wheezes  BREAST: normal without masses, tenderness or nipple discharge and no palpable axillary masses or adenopathy  CV: regular rate and rhythm, normal S1 S2, no S3 or S4, no murmur, click or rub, no peripheral edema and peripheral pulses strong  ABDOMEN: soft, nontender, no hepatosplenomegaly, no masses and bowel sounds normal  MS: no gross musculoskeletal defects noted, no edema  SKIN: no suspicious lesions or rashes  NEURO: Normal strength and tone, mentation intact and speech normal  PSYCH: mentation appears normal, affect normal/bright    Diagnostic Test Results:  Labs reviewed in Epic    ASSESSMENT / PLAN:   1. Need for hepatitis C screening test    - Hepatitis C Screen Reflex to HCV RNA Quant and Genotype; Future  - Hepatitis C Screen Reflex to HCV RNA Quant and Genotype    2. High priority for 2019-nCoV vaccine  She will get the flu and a third shot today.  - COVID-19,PF,PFIZER    3. Routine history and physical examination of adult  Health maintenance is up-to-date. She does not have osteoporosis in the DEXA scan but because of the prednisone is has osteopenia has osteopenia so she was Fosamax was added.    4. Prediabetes  She has noticed weight gain we'll check her diabetes test on prednisone.  - Hemoglobin A1c; Future  - Comprehensive metabolic panel; Future  - CBC with platelets; Future  - Hemoglobin A1c  - Comprehensive metabolic panel  - CBC with platelets    5. " Mixed hyperlipidemia  She stopped her statin because she was worried that it was causing her dizziness. I do believe and her chronic pain in her back she has been off it for 2 months. She has been drinking excess water and has been keeping her up at night. We'll check lipids and possibly restart her statin. Even though her spells were not TIAs from ischemia she still is at risk for stroke.  - Lipid panel reflex to direct LDL Fasting; Future  - losartan-hydrochlorothiazide (HYZAAR) 100-25 MG tablet; Take 1 tablet by mouth daily  Dispense: 90 tablet; Refill: 3  - Lipid panel reflex to direct LDL Fasting    6. Iron deficiency anemia, unspecified iron deficiency anemia type    - Iron and iron binding capacity; Future  - Iron and iron binding capacity    7. Thyroid nodule  - TSH; Future  - TSH    8. Carotid atherosclerosis, unspecified laterality    - US Carotid Bilateral; Future    9. Anxiety  She is very uncomfortable taking all these medications I believe she has some anxiety. Which is understandable with multiple chronic disease  . That she is facing and the threat of losing more vision.  We discussed adding sertraline. It is not contraindicated in people with seizure disorders. We'll start low-dose  - sertraline (ZOLOFT) 50 MG tablet; Take half a tablet once daily for three days  Dispense: 34 tablet; Refill: 3  - diazepam (VALIUM) 2 MG tablet; Take 1 tablet (2 mg) by mouth 2 times daily as needed  Dispense: 60 tablet; Refill: 1    10. Chronic insomnia  We discussed risks versus benefit of Ambien  - zolpidem (AMBIEN) 10 MG tablet; Take 1 tablet (10 mg) by mouth At Bedtime  Dispense: 90 tablet; Refill: 3    11. Essential hypertension  Continue metoprolol  - metoprolol succinate ER (TOPROL-XL) 25 MG 24 hr tablet; Twice daily  Dispense: 180 tablet; Refill: 3    12. Age-related osteoporosis without current pathological fracture    - alendronate (FOSAMAX) 70 MG tablet; TAKE 1 TAB A WEEK IN THE MORNING ON EMPTY STOMACH  "WITH A FULL GLASS OF WATER 30MIN BEFORE FOOD  Dispense: 12 tablet; Refill: 3    13. Restless leg syndrome    - pramipexole (MIRAPEX) 0.25 MG tablet; Take 1 tablet (0.25 mg) by mouth 2 times daily  Dispense: 180 tablet; Refill: 3    14. Stage 3b chronic kidney disease (H)      15. Giant cell arteritis (H)  Continue tapering recommendation by rheumatology she is currently on 60 mg    16. Localization-related focal epilepsy with simple partial seizures (H)  She will monitor her symptoms with the [place] epilepsy which she should she saw in June and she was to see them in October. Currently on Lamictal    17. Hypercalcemia  This was noticed after her visit she was taking high-dose calcium  Physician on-call recommended holding the calcium I have added on parathyroid hormone and IPAP. Consider adding Lasix and repeating the test in 2 weeks. - Parathyroid Hormone Intact; Future  - Protein Electrophoresis with IELP Reflex; Future      Patient has been advised of split billing requirements and indicates understanding: Yes  COUNSELING:  Reviewed preventive health counseling, as reflected in patient instructions    Estimated body mass index is 25.75 kg/m  as calculated from the following:    Height as of 7/7/21: 1.626 m (5' 4\").    Weight as of 9/9/21: 68 kg (150 lb).        She reports that she quit smoking about 20 years ago. She has never used smokeless tobacco.      Appropriate preventive services were discussed with this patient, including applicable screening as appropriate for cardiovascular disease, diabetes, osteopenia/osteoporosis, and glaucoma.  As appropriate for age/gender, discussed screening for colorectal cancer, prostate cancer, breast cancer, and cervical cancer. Checklist reviewing preventive services available has been given to the patient.    Reviewed patients plan of care and provided an AVS. The Basic Care Plan (routine screening as documented in Health Maintenance) for [place] meets the Care Plan " "requirement. This Care Plan has been established and reviewed with the Patient.    Counseling Resources:  ATP IV Guidelines  Pooled Cohorts Equation Calculator  Breast Cancer Risk Calculator  Breast Cancer: Medication to Reduce Risk  FRAX Risk Assessment  ICSI Preventive Guidelines  Dietary Guidelines for Americans, 2010  USDA's MyPlate  ASA Prophylaxis  Lung CA Screening    Karen Taylor MD  [facility]    Identified Health Risks:  Answers for HPI/ROS submitted by the patient on 10/8/2021  In general, how would you rate your overall physical health?: fair  Frequency of exercise:: 2-3 days/week  Do you usually eat at least 4 servings of fruit and vegetables a day, include whole grains & fiber, and avoid regularly eating high fat or \"junk\" foods? : Yes  Taking medications regularly:: Yes  Medication side effects:: Significant flushing, Lightheadedness  Activities of Daily Living: no assistance needed  Home safety: no safety concerns identified  Hearing Impairment:: need to ask people to speak up or repeat themselves  In the past 6 months, have you been bothered by leaking of urine?: No  In general, how would you rate your overall mental or emotional health?: good  Additional concerns today:: Yes  Duration of exercise:: Less than 15 minutes      "

## 2021-10-08 NOTE — TELEPHONE ENCOUNTER
Reason for Call:  Other call back    Detailed comments: Need to clarify Sertraline -directions    Please advise    Phone Number Patient can be reached at: Other phone number:  [phone]    Best Time: anytime    Can we leave a detailed message on this number? YES    Call taken on 10/8/2021 at 1:06 PM by Miriam Meyer

## 2021-10-08 NOTE — TELEPHONE ENCOUNTER
Clinic Action Needed: Yes, please contact patient on Monday  FNA Triage Call  Presenting Problem: Critical calcium level, see FNA note below.     Routed to: JEFF Rosenbaum RN/FNA

## 2021-10-08 NOTE — TELEPHONE ENCOUNTER
Contacted [facility] pharmacy and clarified Rx Sertraline instructions per note below.

## 2021-10-08 NOTE — PATIENT INSTRUCTIONS
TDAP priority ( because it has whooping cough ) both done at the pharmacy   SHINGRIX     Check cholesterol today and see if you need a lower dose of  Statin    stop excess drinking

## 2021-10-08 NOTE — TELEPHONE ENCOUNTER
Lab calling FNA to report a critical calcium level of 12.2 drawn today at 11:33AM.  This RN will page the provider on-call.     05:52PM: Smart Web used to page on-call Dr. Meng Barajas to call this RN back at [phone].   06:17PM:  No response from provider, a second page was sent via Smart Web.   06:37PM:  No response from provider, RN contacted the answer service, call connected with Dr. Barajas who advised f/u check next week.  If pt symptomatic she should go to the ED.  Otherwise no change in care plan.   06:40PM:  RN called pt and relayed the information.  Pt states she takes Calcium 1200mg daily.  Pt was taking 600mg but since she's on Prednisone she was advised to double up on Calcium.    06:48PM:  RN paged Dr. Barajas again, he advises to hold the Calcium for now and to f/u with PCP on Monday.     Patient verbalized understanding and had no further questions.      RN will route this message to PCP.     Raya Rosenbaum RN/BECCA    Reason for Disposition    Lab or radiology calling with CRITICAL test results    Additional Information    Negative: Lab calling with strep throat test results and triager can call in prescription    Negative: Lab calling with urinalysis test results and triager can call in prescription    Negative: Medication questions    Negative: ED call to PCP    Negative: Physician call to PCP    Negative: Call about patient who is currently hospitalized    Protocols used: [facility]

## 2021-10-10 PROBLEM — G40.109 LOCALIZATION-RELATED FOCAL EPILEPSY WITH SIMPLE PARTIAL SEIZURES (H): Status: ACTIVE | Noted: 2021-10-10

## 2021-10-11 ENCOUNTER — TELEPHONE (OUTPATIENT)
Dept: INTERNAL MEDICINE | Facility: CLINIC | Age: 76
End: 2021-10-11

## 2021-10-11 DIAGNOSIS — F41.9 ANXIETY: ICD-10-CM

## 2021-10-11 DIAGNOSIS — I10 ESSENTIAL HYPERTENSION: Primary | ICD-10-CM

## 2021-10-11 LAB
PTH-INTACT SERPL-MCNC: 55 PG/ML (ref 10–86)
TOTAL PROTEIN SERUM FOR ELP: 6.2 G/DL (ref 6–8)

## 2021-10-11 RX ORDER — LOSARTAN POTASSIUM 100 MG/1
100 TABLET ORAL DAILY
Qty: 90 TABLET | Refills: 3 | Status: SHIPPED | OUTPATIENT
Start: 2021-10-11 | End: 2022-01-13

## 2021-10-11 RX ORDER — DIAZEPAM 2 MG
2 TABLET ORAL 2 TIMES DAILY PRN
Qty: 60 TABLET | Refills: 1 | Status: SHIPPED | OUTPATIENT
Start: 2021-10-11 | End: 2021-10-21

## 2021-10-11 NOTE — TELEPHONE ENCOUNTER
Called pt about calcium . Stop ALL calcium supplements    switch hyzaar to losartan , follow up in ne month wth repeat calcium . Await additional test

## 2021-10-11 NOTE — TELEPHONE ENCOUNTER
Reason for Call:  Other call back    Detailed comments: Pt having an MRI Head, asking for:  10mg Valium, so that she can take that prior to her MRI    Pharm:  [facility]    Phone Number Patient can be reached at: Home number on file [phone] (home)    Best Time: anytime    Can we leave a detailed message on this number? YES    Call taken on 10/11/2021 at 11:31 AM by Miriam Meyer

## 2021-10-13 ENCOUNTER — TRANSFERRED RECORDS (OUTPATIENT)
Dept: HEALTH INFORMATION MANAGEMENT | Facility: CLINIC | Age: 76
End: 2021-10-13

## 2021-10-13 LAB — DEPRECATED CALCIDIOL+CALCIFEROL SERPL-MC: 63 UG/L (ref 20–75)

## 2021-10-15 ENCOUNTER — MYC MEDICAL ADVICE (OUTPATIENT)
Dept: INTERNAL MEDICINE | Facility: CLINIC | Age: 76
End: 2021-10-15

## 2021-10-15 LAB
ALBUMIN PERCENT: 64.2 % (ref 51–67)
ALBUMIN SERPL ELPH-MCNC: 4 G/DL (ref 3.2–4.7)
ALPHA 1 PERCENT: 1.9 % (ref 2–4)
ALPHA 2 PERCENT: 13.7 % (ref 5–13)
ALPHA1 GLOB SERPL ELPH-MCNC: 0.1 G/DL (ref 0.1–0.3)
ALPHA2 GLOB SERPL ELPH-MCNC: 0.8 G/DL (ref 0.4–0.9)
B-GLOBULIN SERPL ELPH-MCNC: 0.8 G/DL (ref 0.7–1.2)
BETA PERCENT: 12.8 % (ref 10–17)
GAMMA GLOB SERPL ELPH-MCNC: 0.5 G/DL (ref 0.6–1.4)
GAMMA GLOBULIN PERCENT: 7.4 % (ref 9–20)
PATH ICD:: ABNORMAL
PROT PATTERN SERPL ELPH-IMP: ABNORMAL
REVIEWING PATHOLOGIST: ABNORMAL
TOTAL PROTEIN SERUM FOR ELP (SYNCED VALUE): 6.2 G/DL

## 2021-10-18 ENCOUNTER — TELEPHONE (OUTPATIENT)
Dept: INTERNAL MEDICINE | Facility: CLINIC | Age: 76
End: 2021-10-18

## 2021-10-18 DIAGNOSIS — R60.9 FLUID RETENTION: Primary | ICD-10-CM

## 2021-10-18 RX ORDER — FUROSEMIDE 20 MG
20 TABLET ORAL DAILY
Qty: 34 TABLET | Refills: 1 | Status: SHIPPED | OUTPATIENT
Start: 2021-10-18 | End: 2021-11-08

## 2021-10-18 NOTE — TELEPHONE ENCOUNTER
Called patient she is having increased elevation in blood pressure and fluid retention will add Lasix.  Most likely this is due to increase prednisone.  We will have her come back for a recheck her blood pressure and examine her next week as an add-on on Tuesday.  Patient agreed with plan

## 2021-10-19 ENCOUNTER — HOSPITAL ENCOUNTER (OUTPATIENT)
Dept: ULTRASOUND IMAGING | Facility: HOSPITAL | Age: 76
Discharge: HOME | End: 2021-10-19
Attending: INTERNAL MEDICINE | Admitting: INTERNAL MEDICINE
Payer: COMMERCIAL

## 2021-10-19 ENCOUNTER — OFFICE VISIT (OUTPATIENT)
Dept: RHEUMATOLOGY | Facility: CLINIC | Age: 76
End: 2021-10-19
Payer: COMMERCIAL

## 2021-10-19 VITALS
WEIGHT: 156.5 LBS | HEART RATE: 84 BPM | DIASTOLIC BLOOD PRESSURE: 90 MMHG | BODY MASS INDEX: 26.86 KG/M2 | SYSTOLIC BLOOD PRESSURE: 150 MMHG

## 2021-10-19 DIAGNOSIS — Z78.0 OSTEOPENIA AFTER MENOPAUSE: ICD-10-CM

## 2021-10-19 DIAGNOSIS — I65.29 CAROTID ATHEROSCLEROSIS, UNSPECIFIED LATERALITY: ICD-10-CM

## 2021-10-19 DIAGNOSIS — M31.6 GCA (GIANT CELL ARTERITIS) (H): Primary | ICD-10-CM

## 2021-10-19 DIAGNOSIS — H47.019 ISCHEMIC OPTIC NEUROPATHY, UNSPECIFIED LATERALITY: ICD-10-CM

## 2021-10-19 DIAGNOSIS — M85.80 OSTEOPENIA AFTER MENOPAUSE: ICD-10-CM

## 2021-10-19 DIAGNOSIS — Z79.899 HIGH RISK MEDICATION USE: ICD-10-CM

## 2021-10-19 PROCEDURE — 99214 OFFICE O/P EST MOD 30 MIN: CPT | Performed by: INTERNAL MEDICINE

## 2021-10-19 PROCEDURE — 93880 EXTRACRANIAL BILAT STUDY: CPT

## 2021-10-19 RX ORDER — PREDNISONE 10 MG/1
TABLET ORAL
Qty: 160 TABLET | Refills: 1 | Status: SHIPPED | OUTPATIENT
Start: 2021-10-19 | End: 2022-01-04

## 2021-10-19 NOTE — PROGRESS NOTES
"      Rheumatology follow-up visit note     Virginia is a 76 year old female presents today for follow-up.    Virginia was seen today for recheck.    Diagnoses and all orders for this visit:    GCA (giant cell arteritis) (H)  -     predniSONE (DELTASONE) 10 MG tablet; 55 mg daily, reduce by 5 mg every 10 days to 50/45/40/35/30/25/20 mg and follow-up.    High risk medication use    Osteopenia after menopause    Ischemic optic neuropathy, unspecified laterality        Her AGC appears to be under good control, begin to taper prednisone as noted, she is on Fosamax, we discussed Actemra I have given her literature at this point she feels that she is not interested in that.  But does realize that in case the prednisone cannot be tapered this would be prime tries for her to go for.  We will meet here in 2-1/2 months.      HPI    Jenny Iglesias is a 76 year old female is here for follow-up of biopsy-proven temporal arteritis.  Originally she had atypical symptoms not the usual headache jaw claudication but had this \"funny feeling like a blast of sunlight\" and speech impairment in February this year.  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   On 3/1/2021 she was seen in Dr. Stockton's office, and  ischemic optic neuropathy was suspected, temporal artery biopsies were recommended, this was done on 3/3/2021 the report of that is attached.  She continues to taper prednisone.  She has not had deleterious effects that can be detected.  A month ago she noted blurriness of vision in the right eye.  This was her good eye.  This morning she was seen in ophthalmology cotton-wool spots were noted.  She brings paperwork with her.  Noted to increase her prednisone to 60 mg daily.  She is continued on this for the past for weeks, she has noted side effects, she is swelling up in her face, she feels that she is getting additional water retention.  She has not had recurrence of original symptoms.  Her most recent labs show " normal sed rate and CRP done at her local clinic..  We discussed importance of taking calcium and vitamin D while she is on Fosamax,  background of osteopenia in addition to significant doses of prednisone.  Monitoring in her case would be more involved than usual because of her presenting symptoms are so atypical.  [place]  Her ophthalmologist wondered if she might be a candidate for Actemra.  She is already done some reading on this.  At this point she is not amenable to that suggestion.    DETAILED EXAMINATION  10/19/21  :    Vitals:    10/19/21 1242   BP: (!) 150/90   Pulse: 84   Weight: 71 kg (156 lb 8 oz)     Alert oriented. Head including the face is examined for malar rash, heliotropes, scarring, lupus pernio. Eyes examined for redness such as in episcleritis/scleritis, periorbital lesions.   Neck/ Face examined for parotid gland swelling, range of motion of neck.  Left upper and lower and right upper and lower extremities examined for tenderness, swelling, warmth of the appendicular joints, range of motion, edema, rash.  Some of the important findings included: she does not have evidence of synovitis in the palpable joints of the upper extremities.  No significant deformities of the digits.  + Heberden nodes.  Range of motion of the shoulders  show full abduction.  No JLT effusion or warmth of the knees.  she does not have dactylitis of the digits.     Patient Active Problem List    Diagnosis Date Noted     Localization-related focal epilepsy with simple partial seizures (H) 10/10/2021     Priority: Medium     Seizure disorder (H) 06/09/2021     Priority: Medium     GCA (giant cell arteritis) (H) 03/10/2021     Priority: Medium     TIA (transient ischemic attack)      Priority: Medium     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     Priority: Medium     Added automatically from request for surgery [id number]         Chronic kidney disease, stage 3 (H)  02/15/2021     Priority: Medium     Other chronic pain 10/07/2020     Priority: Medium     Chronic insomnia 02/25/2016     Priority: Medium     Vertigo 10/27/2015     Priority: Medium     Essential hypertension 10/27/2015     Priority: Medium     Restless leg syndrome 10/27/2015     Priority: Medium     Perimenopausal vasomotor symptoms 10/27/2015     Priority: Medium     Carotid stenosis, asymptomatic 10/27/2015     Priority: Medium     Past Surgical History:   Procedure Laterality Date     APPENDECTOMY       CATARACT EXTRACTION       CHOLECYSTECTOMY       HYSTERECTOMY       OTHER SURGICAL HISTORY Right 1998    fifth toe amputation     OR TEMPORAL ARTERY LIGATN OR BX Bilateral 3/4/2021    Procedure: BIOPSY, ARTERY, TEMPORAL;  Surgeon: Ramesh Box MD;  Location: [facility];  Service: General     TUBAL LIGATION        Past Medical History:   Diagnosis Date     Anxiety      Carotid artery stenosis 11/2013    50% left internal carotid artery      Chronic low back pain      Hypertension      Osteoarthritis of lumbar spine      Osteopenia     mild, resolved     Postmenopausal 11/2013    on hormone     Restless leg syndrome      Seizures (H)     Feb 18, 2021, Small lost vision and could not speak for 4-5 seconds     Surgical menopause     age 47     Thyroid nodule 11/2013    35mm, rightthyroid lobe biopsy-benign nodule     TIA (transient ischemic attack)      TIA (transient ischemic attack) 11/2013     Vertigo      Allergies   Allergen Reactions     Codeine Sulfate Shortness Of Breath and Anaphylaxis     Current Outpatient Medications   Medication Sig Dispense Refill     alendronate (FOSAMAX) 70 MG tablet TAKE 1 TAB A WEEK IN THE MORNING ON EMPTY STOMACH WITH A FULL GLASS OF WATER 30MIN BEFORE FOOD 12 tablet 3     aspirin (ASA) 81 MG EC tablet Take 81 mg by mouth       Calcium-Magnesium-Vitamin D (CALCIUM MAGNESIUM PO) Take 1 tablet by mouth daily       diazepam (VALIUM) 2 MG tablet Take 1 tablet (2 mg) by  mouth 2 times daily as needed 60 tablet 1     furosemide (LASIX) 20 MG tablet Take 1 tablet (20 mg) by mouth daily 34 tablet 1     lamoTRIgine (LAMICTAL) 25 MG tablet Take 25 mg by mouth       losartan (COZAAR) 100 MG tablet Take 1 tablet (100 mg) by mouth daily 90 tablet 3     metoprolol succinate ER (TOPROL-XL) 25 MG 24 hr tablet Twice daily 180 tablet 3     Multiple Vitamins-Minerals (CENTRUM PO) Take 1 tablet by mouth daily       potassium chloride (KLOR-CON 10) 10 MEQ tablet Take 1 tablet by mouth daily       pramipexole (MIRAPEX) 0.25 MG tablet Take 1 tablet (0.25 mg) by mouth 2 times daily 180 tablet 3     predniSONE (DELTASONE) 20 MG tablet Take 3 tablets (60 mg) by mouth daily 90 tablet 0     sertraline (ZOLOFT) 50 MG tablet Take half a tablet once daily for three days 34 tablet 3     zolpidem (AMBIEN) 10 MG tablet Take 1 tablet (10 mg) by mouth At Bedtime 90 tablet 3     family history includes Breast Cancer (age of onset: 57.00) in her mother; Hypertension in her sister; Lung Cancer (age of onset: 62.00) in her father.     Social Connections:      Frequency of Communication with Friends and Family:      Frequency of Social Gatherings with Friends and Family:      Attends [employer] Services:      Active Member of Clubs or Organizations:      Attends Club or Organization Meetings:      Marital Status:           WBC Count   Date Value Ref Range Status   10/08/2021 14.1 (H) 4.0 - 11.0 10e3/uL Final     RBC Count   Date Value Ref Range Status   10/08/2021 4.91 3.80 - 5.20 10e6/uL Final     Hemoglobin   Date Value Ref Range Status   10/08/2021 15.3 11.7 - 15.7 g/dL Final     Hematocrit   Date Value Ref Range Status   10/08/2021 47.9 (H) 35.0 - 47.0 % Final     MCV   Date Value Ref Range Status   10/08/2021 98 78 - 100 fL Final     MCH   Date Value Ref Range Status   10/08/2021 31.2 26.5 - 33.0 pg Final     Platelet Count   Date Value Ref Range Status   10/08/2021 223 150 - 450 10e3/uL Final     % Lymphocytes    Date Value Ref Range Status   10/08/2020 34 20 - 40 % Final     AST   Date Value Ref Range Status   10/08/2021 19 0 - 40 U/L Final     ALT   Date Value Ref Range Status   10/08/2021 26 0 - 45 U/L Final     Albumin   Date Value Ref Range Status   10/08/2021 3.7 3.5 - 5.0 g/dL Final     Alkaline Phosphatase   Date Value Ref Range Status   10/08/2021 82 45 - 120 U/L Final     Creatinine   Date Value Ref Range Status   10/08/2021 1.12 (H) 0.60 - 1.10 mg/dL Final     GFR Estimate   Date Value Ref Range Status   10/08/2021 47 (L) >60 mL/min/1.73m2 Final     Comment:     As of July 11, 2021, eGFR is calculated by the CKD-EPI creatinine equation, without race adjustment. eGFR can be influenced by muscle mass, exercise, and diet. The reported eGFR is an estimation only and is only applicable if the renal function is stable.   04/06/2021 39 (L) >60 mL/min/1.73m2 Final     GFR Estimate If Black   Date Value Ref Range Status   04/06/2021 48 (L) >60 mL/min/1.73m2 Final     Erythrocyte Sedimentation Rate   Date Value Ref Range Status   03/15/2021 2 0 - 20 mm/hr Final     CRP   Date Value Ref Range Status   03/15/2021 <0.1 0.0 - 0.8 mg/dL Final

## 2021-10-21 ENCOUNTER — OFFICE VISIT (OUTPATIENT)
Dept: INTERNAL MEDICINE | Facility: CLINIC | Age: 76
End: 2021-10-21
Payer: COMMERCIAL

## 2021-10-21 VITALS
WEIGHT: 151.9 LBS | SYSTOLIC BLOOD PRESSURE: 152 MMHG | BODY MASS INDEX: 26.07 KG/M2 | HEART RATE: 70 BPM | OXYGEN SATURATION: 95 % | DIASTOLIC BLOOD PRESSURE: 70 MMHG

## 2021-10-21 DIAGNOSIS — I10 ESSENTIAL HYPERTENSION: ICD-10-CM

## 2021-10-21 DIAGNOSIS — E04.1 THYROID NODULE: Primary | ICD-10-CM

## 2021-10-21 DIAGNOSIS — R42 DIZZINESS: ICD-10-CM

## 2021-10-21 DIAGNOSIS — F41.9 ANXIETY: ICD-10-CM

## 2021-10-21 DIAGNOSIS — M31.6 GCA (GIANT CELL ARTERITIS) (H): ICD-10-CM

## 2021-10-21 DIAGNOSIS — G40.909 SEIZURE DISORDER (H): ICD-10-CM

## 2021-10-21 PROCEDURE — 99214 OFFICE O/P EST MOD 30 MIN: CPT | Performed by: INTERNAL MEDICINE

## 2021-11-02 ENCOUNTER — HOSPITAL ENCOUNTER (OUTPATIENT)
Dept: ULTRASOUND IMAGING | Facility: HOSPITAL | Age: 76
Discharge: HOME | End: 2021-11-02
Attending: INTERNAL MEDICINE | Admitting: INTERNAL MEDICINE
Payer: COMMERCIAL

## 2021-11-02 DIAGNOSIS — E04.1 THYROID NODULE: ICD-10-CM

## 2021-11-02 PROCEDURE — 76536 US EXAM OF HEAD AND NECK: CPT

## 2021-11-08 ENCOUNTER — OFFICE VISIT (OUTPATIENT)
Dept: INTERNAL MEDICINE | Facility: CLINIC | Age: 76
End: 2021-11-08
Payer: COMMERCIAL

## 2021-11-08 ENCOUNTER — TELEPHONE (OUTPATIENT)
Dept: INTERNAL MEDICINE | Facility: CLINIC | Age: 76
End: 2021-11-08

## 2021-11-08 VITALS
BODY MASS INDEX: 26.32 KG/M2 | DIASTOLIC BLOOD PRESSURE: 82 MMHG | HEART RATE: 62 BPM | WEIGHT: 154.2 LBS | SYSTOLIC BLOOD PRESSURE: 154 MMHG | RESPIRATION RATE: 18 BRPM | HEIGHT: 64 IN | OXYGEN SATURATION: 97 %

## 2021-11-08 DIAGNOSIS — F41.9 ANXIETY: ICD-10-CM

## 2021-11-08 DIAGNOSIS — I10 ESSENTIAL HYPERTENSION: Primary | ICD-10-CM

## 2021-11-08 DIAGNOSIS — R60.9 FLUID RETENTION: ICD-10-CM

## 2021-11-08 LAB
ANION GAP SERPL CALCULATED.3IONS-SCNC: 11 MMOL/L (ref 5–18)
BUN SERPL-MCNC: 18 MG/DL (ref 8–28)
CALCIUM SERPL-MCNC: 9.7 MG/DL (ref 8.5–10.5)
CHLORIDE BLD-SCNC: 102 MMOL/L (ref 98–107)
CO2 SERPL-SCNC: 28 MMOL/L (ref 22–31)
CREAT SERPL-MCNC: 1.17 MG/DL (ref 0.6–1.1)
GFR SERPL CREATININE-BSD FRML MDRD: 45 ML/MIN/1.73M2
GLUCOSE BLD-MCNC: 126 MG/DL (ref 70–125)
POTASSIUM BLD-SCNC: 4.1 MMOL/L (ref 3.5–5)
SODIUM SERPL-SCNC: 141 MMOL/L (ref 136–145)

## 2021-11-08 PROCEDURE — 36415 COLL VENOUS BLD VENIPUNCTURE: CPT | Performed by: INTERNAL MEDICINE

## 2021-11-08 PROCEDURE — 80048 BASIC METABOLIC PNL TOTAL CA: CPT | Performed by: INTERNAL MEDICINE

## 2021-11-08 PROCEDURE — 99214 OFFICE O/P EST MOD 30 MIN: CPT | Performed by: INTERNAL MEDICINE

## 2021-11-08 RX ORDER — FUROSEMIDE 20 MG
20 TABLET ORAL DAILY
Qty: 90 TABLET | Refills: 1 | Status: SHIPPED | OUTPATIENT
Start: 2021-11-08

## 2021-11-08 RX ORDER — METOPROLOL TARTRATE 25 MG/1
TABLET, FILM COATED ORAL
Qty: 135 TABLET | Refills: 3 | Status: SHIPPED | OUTPATIENT
Start: 2021-11-08 | End: 2022-01-13

## 2021-11-08 ASSESSMENT — MIFFLIN-ST. JEOR: SCORE: 1164.45

## 2021-11-08 NOTE — PROGRESS NOTES
Assess/plan  1. Fluid retention  Lasix really helped her feeling of being swollen but still has ptosis and heaviness in upper eyelids which she never had before prednisone . I did reassure her that it is surgically treatable  But we can decide that if she still has it after prednisone has tapered down to a lower level .  - furosemide (LASIX) 20 MG tablet; Take 1 tablet (20 mg) by mouth daily  Dispense: 90 tablet; Refill: 1    2. Anxiety  Sertraline really helped . She wants to continue the current dose     3. Essential hypertension  Lasix was added to help a little with the bp but she still has some elevation . Heart rate is in the 60's . So will cautiously icnrease metoporlol . Will not add another medication at this time . Go up to 37.5 mg a day metoprolol   - Basic metabolic panel  (Ca, Cl, CO2, Creat, Gluc, K, Na, BUN); Future  - Physical Therapy Referral; Future  - metoprolol tartrate (LOPRESSOR) 25 MG tablet; 25mg in the morning and half a tablet 12.5 in the evening  Dispense: 135 tablet; Refill: 3  - Basic metabolic panel  (Ca, Cl, CO2, Creat, Gluc, K, Na, BUN)  Lightheadedness could be due to the high dose prednisone . Trial of some more PT     Subjective   Sara is a 76 year old who presents for the following health issues follow up BP and anxiety . Has a h/o temporal arteritis and newly diagnosed epilepsy. Continues to have constant lightheadedness . Has been to vestibular rehab but really doesn't have true vertigo . Adjustment of her lamictal did not help her lightheadedness .  Sertraline has not helped it either     HPI     Patient Active Problem List   Diagnosis     Vertigo     Essential hypertension     Restless leg syndrome     Perimenopausal vasomotor symptoms     Carotid stenosis, asymptomatic     Chronic insomnia     Other chronic pain     Chronic kidney disease, stage 3 (H)     Ischemic optic neuropathy     TIA (transient ischemic attack)     GCA (giant cell arteritis) (H)     Seizure disorder  "(H)     Localization-related focal epilepsy with simple partial seizures (H)     Current Outpatient Medications   Medication     furosemide (LASIX) 20 MG tablet     metoprolol tartrate (LOPRESSOR) 25 MG tablet     alendronate (FOSAMAX) 70 MG tablet     lamoTRIgine (LAMICTAL) 25 MG tablet     losartan (COZAAR) 100 MG tablet     metoprolol succinate ER (TOPROL-XL) 25 MG 24 hr tablet     Multiple Vitamins-Minerals (CENTRUM PO)     potassium chloride (KLOR-CON 10) 10 MEQ tablet     predniSONE (DELTASONE) 10 MG tablet     sertraline (ZOLOFT) 50 MG tablet     zolpidem (AMBIEN) 10 MG tablet     No current facility-administered medications for this visit.     no shortness of breath ,no  chest pain ,no  Falls, no urinary incontinence , no weight changes , sleep and moodhave been good . Independent in ADLS and IADLS         Review of Systems   Constitutional, HEENT, cardiovascular, pulmonary, gi and gu systems are negative, except as otherwise noted.      Objective    BP (!) 154/82   Pulse 62   Resp 18   Ht 1.626 m (5' 4\")   Wt 69.9 kg (154 lb 3.2 oz)   SpO2 97%   BMI 26.47 kg/m    Body mass index is 26.47 kg/m .  Physical Exam   GENERAL: healthy, alert and no distress  NECK: no adenopathy, no asymmetry, masses, or scars and thyroid normal to palpation  RESP: lungs clear to auscultation - no rales, rhonchi or wheezes  CV: regular rate and rhythm, normal S1 S2, no S3 or S4, no murmur, click or rub, no peripheral edema and peripheral pulses strong  MS: no gross musculoskeletal defects noted, no edema  rombergs negative . Cannot do heel to toe has normal heel lift                 "

## 2021-11-08 NOTE — TELEPHONE ENCOUNTER
Reason for Call:  Other call back    Detailed comments: pharmacy calling to clarify:  Directions for the:  Zoloft 50mg        Phone Number Patient can be reached at: Other phone number:  [phone]    Best Time: anytime    Can we leave a detailed message on this number? YES    Call taken on 11/8/2021 at 12:27 PM by Miriam Meyer

## 2021-11-10 ENCOUNTER — OFFICE VISIT (OUTPATIENT)
Dept: OPHTHALMOLOGY | Facility: CLINIC | Age: 76
End: 2021-11-10
Payer: COMMERCIAL

## 2021-11-10 DIAGNOSIS — H18.20 CORNEAL EDEMA OF BOTH EYES: Primary | ICD-10-CM

## 2021-11-10 DIAGNOSIS — H53.40 VISUAL FIELD DEFECT: ICD-10-CM

## 2021-11-10 DIAGNOSIS — H53.40 VISUAL FIELD DEFECT: Primary | ICD-10-CM

## 2021-11-10 PROCEDURE — 92014 COMPRE OPH EXAM EST PT 1/>: CPT | Performed by: OPHTHALMOLOGY

## 2021-11-10 PROCEDURE — 92083 EXTENDED VISUAL FIELD XM: CPT | Performed by: OPHTHALMOLOGY

## 2021-11-10 PROCEDURE — 92133 CPTRZD OPH DX IMG PST SGM ON: CPT | Performed by: OPHTHALMOLOGY

## 2021-11-10 ASSESSMENT — REFRACTION_WEARINGRX
OS_SPHERE: -1.25
OD_SPHERE: PLANO
OD_ADD: +2.75
OS_AXIS: 065
OS_ADD: +2.75
OS_CYLINDER: +2.00

## 2021-11-10 ASSESSMENT — CONF VISUAL FIELD
OS_INFERIOR_NASAL_RESTRICTION: 3
OD_NORMAL: 1
OS_INFERIOR_TEMPORAL_RESTRICTION: 3

## 2021-11-10 ASSESSMENT — VISUAL ACUITY
OD_CC: 20/40
OS_CC: 20/30
METHOD: SNELLEN - LINEAR

## 2021-11-10 ASSESSMENT — MARGIN REFLEX DISTANCE
OS_MRD1: 0.5
OD_MRD1: 0.5

## 2021-11-10 ASSESSMENT — TONOMETRY
IOP_METHOD: ICARE
OS_IOP_MMHG: 15
OD_IOP_MMHG: 13

## 2021-11-10 ASSESSMENT — SLIT LAMP EXAM - LIDS
COMMENTS: NORMAL
COMMENTS: NORMAL

## 2021-11-10 NOTE — PROGRESS NOTES
Assessment & Plan     Jenny Iglesias is a 76 year old female with the following diagnoses:   1. Corneal edema of both eyes    2. Visual field defect         Patient was last seen on 9/29/21 for evaluation of new decrease in vision in setting of biopsy proven GCA.  She was endorsing blurred vision RIGHT eye.  I did not find an abnormality of the RIGHT eye.  There was a possible homonymous hemianopia on the right and I wanted a MRI brain to investigate.  She did not have that because she has a device.  She feels that the vision is the same.  Current glasses are from September.  She has had Cataract extraction both eyes.    She has biopsy proven Giant cell arteritis. She is currently on 45 mg prednisone. She is not on actemra.      Her visual field in the RIGHT eye is much better.  She has no evidence of ocular ischemia in the RIGHT eye.  I believe her vision is down because of her cornea.  Last visit, I said the cornea looked normal.  I am not sure whether I missed it or not.  The corneal edema is in both eyes.  There are also some corneal opacities in the left eye.  I do not know what is going on here.  I would recommend that she follow-up with cornea.  Referral was placed.              Attending Physician Attestation:  Complete documentation of historical and exam elements from today's encounter can be found in the full encounter summary report (not reduplicated in this progress note).  I personally obtained the chief complaint(s) and history of present illness.  I confirmed and edited as necessary the review of systems, past medical/surgical history, family history, social history, and examination findings as documented by others; and I examined the patient myself.  I personally reviewed the relevant tests, images, and reports as documented above.  I formulated and edited as necessary the assessment and plan and discussed the findings and management plan with the patient and family. I personally  reviewed the ophthalmic test(s) associated with this encounter, agree with the interpretation(s) as documented by the resident/fellow, and have edited the corresponding report(s) as necessary.  - Tung Joshi MD

## 2021-12-13 ENCOUNTER — OFFICE VISIT (OUTPATIENT)
Dept: OPHTHALMOLOGY | Facility: CLINIC | Age: 76
End: 2021-12-13
Attending: OPHTHALMOLOGY
Payer: COMMERCIAL

## 2021-12-13 DIAGNOSIS — H18.49: ICD-10-CM

## 2021-12-13 DIAGNOSIS — H18.513 FUCHS' CORNEAL DYSTROPHY OF BOTH EYES: ICD-10-CM

## 2021-12-13 DIAGNOSIS — H18.20 CORNEAL EDEMA OF BOTH EYES: Primary | ICD-10-CM

## 2021-12-13 PROCEDURE — 99215 OFFICE O/P EST HI 40 MIN: CPT | Mod: GC | Performed by: OPHTHALMOLOGY

## 2021-12-13 PROCEDURE — 92286 ANT SGM IMG I&R SPECLR MIC: CPT | Performed by: OPHTHALMOLOGY

## 2021-12-13 PROCEDURE — 76514 ECHO EXAM OF EYE THICKNESS: CPT | Performed by: OPHTHALMOLOGY

## 2021-12-13 PROCEDURE — 92025 CPTRIZED CORNEAL TOPOGRAPHY: CPT | Performed by: OPHTHALMOLOGY

## 2021-12-13 PROCEDURE — G0463 HOSPITAL OUTPT CLINIC VISIT: HCPCS

## 2021-12-13 ASSESSMENT — EXTERNAL EXAM - LEFT EYE: OS_EXAM: NORMAL

## 2021-12-13 ASSESSMENT — TONOMETRY
IOP_METHOD: TONOPEN
OD_IOP_MMHG: 25
OS_IOP_MMHG: 28
OS_IOP_MMHG: 27
IOP_METHOD: TONOPEN
OD_IOP_MMHG: 26

## 2021-12-13 ASSESSMENT — PACHYMETRY
OS_CT(UM): 604
OD_CT(UM): 579

## 2021-12-13 ASSESSMENT — REFRACTION_WEARINGRX
OD_SPHERE: PLANO
OS_ADD: +2.75
OS_AXIS: 065
OD_ADD: +2.75
OS_CYLINDER: +2.00
OS_SPHERE: -1.25

## 2021-12-13 ASSESSMENT — VISUAL ACUITY
METHOD: SNELLEN - LINEAR
OD_CC+: +1
OS_CC+: -1
OS_CC: 20/25
OD_CC: 20/30
CORRECTION_TYPE: GLASSES

## 2021-12-13 ASSESSMENT — CONF VISUAL FIELD
METHOD: COUNTING FINGERS
OD_NORMAL: 1

## 2021-12-13 ASSESSMENT — SLIT LAMP EXAM - LIDS
COMMENTS: MILD PTOSIS
COMMENTS: MILD PTOSIS

## 2021-12-13 ASSESSMENT — EXTERNAL EXAM - RIGHT EYE: OD_EXAM: NORMAL

## 2021-12-13 NOTE — NURSING NOTE
Chief Complaints and History of Present Illnesses   Patient presents with     Corneal Edema Evaluation     Both eyes.     Chief Complaint(s) and History of Present Illness(es)     Corneal Edema Evaluation     Comments: Both eyes.              Comments     Pt states vision is the same as 1 month ago at Dr Joshi visit.  No eye pain today. Occasional floaters in RE that comes and goes, no changes.  No flashes. No redness. Tearing from both eyes on and off for the past 3 months.    MATILDA Siddiqui December 13, 2021 12:20 PM

## 2021-12-13 NOTE — PROGRESS NOTES
CC: corneal edema    Referring Provider: Dr. Tung Joshi    HPI: Jenny Iglesias 76 year old White female with mild decrease in vision each eye, seen by Dr. Joshi for biopsy-proven GCA, referred due to Dr. Joshi's exam showing corneal edema.     Interval Hx: First visit with Dr. Rodriguez. No new flashes, has chronic floaters,. No diplopia. Mild photophobia - chronic. Patient denies redness, discharge. Has watering. No pain.    POHx:  CEIOL OU ~2006  H/o GCA - bx proven    CTL wearer: no  Glasses: yes    Family Hx of eye disease: none    Gtts Currenly Taking:  Timolol (using only once in the evening) each eye   Prednisone 45mg po daily    Allergies:  Codeine (anaphylaxis)  Sinemet (dystonia)  Diclofenac (diarrhea)  Morphine (doesn't recall using morphine)    A/P:  # Corneal stromal opacities each eye:  - consistent with Polyamphorphic amlyoid degeneration of the cornea   - unclear if associated with pt's corneal edema - typically asymptomatic  - rare guttae - suggestive possible possible fuchs' dystrophy   - recommend pentacam to evaluate global pachy and posterior float  - recommend specular microscopy to evaluate endothelial cell counts  - discussed possible need for DMEK in the future, but would defer for now given relatively good vision OU    # Pseudophakia each eye: stable, no PCO     # Ocular hypertension each eye:   - start Timolol QAM OU - recheck IOP    Follow up Cornea:  6 months    ALSO SEES:  Dr. Joshi    Attending Physician Attestation:  Complete documentation of historical and exam elements from today's encounter can be found in the full encounter summary report (not reduplicated in this progress note).  I personally obtained the chief complaint(s) and history of present illness.  I confirmed and edited as necessary the review of systems, past medical/surgical history, family history, social history, and examination findings as documented by others; and I examined the patient myself.  I personally reviewed the  relevant tests, images, and reports as documented above.  I formulated and edited as necessary the assessment and plan and discussed the findings and management plan with the patient and family. I personally reviewed the ophthalmic test(s) associated with this encounter, agree with the interpretation(s) as documented by the resident/fellow, and have edited the corresponding report(s) as necessary. - Pola Rodriguez MD    --------------------------------------------------------------------------------------------------------------------------------------------  Pachymetry - Interpretation & Report  Indication: fuchs' dystrophy OU  Performed by: Pola Rodriguez MD  Reliability: good  Patient cooperation: good  Findings:   Right eye:  579 micrometers centrally    Left eye:  604 micrometers centrally   Interval Change, Assessment, & Impact on treatment:   Right eye:  Slightly thick baseline CCT   Left eye:  Slightly thick baseline CCT   Signed: Pola Rodriguez MD 12/13/2021 1:52 PM      I personally spent great than 40min with the patient, of which >50% of the time was spent face to face with the patient, counseling and coordinating care with the patient. We discussed the complexity of her diagnosis, the need for further information prior to proceeding with yet another surgery, and the unknown prognosis for the patient at this time.    Pola Rodriguez MD

## 2021-12-28 ENCOUNTER — TELEPHONE (OUTPATIENT)
Dept: PHYSICAL THERAPY | Facility: CLINIC | Age: 76
End: 2021-12-28
Payer: COMMERCIAL

## 2022-01-04 ENCOUNTER — OFFICE VISIT (OUTPATIENT)
Dept: RHEUMATOLOGY | Facility: CLINIC | Age: 77
End: 2022-01-04
Payer: COMMERCIAL

## 2022-01-04 VITALS
BODY MASS INDEX: 26.16 KG/M2 | SYSTOLIC BLOOD PRESSURE: 142 MMHG | WEIGHT: 152.4 LBS | DIASTOLIC BLOOD PRESSURE: 80 MMHG | HEART RATE: 84 BPM

## 2022-01-04 DIAGNOSIS — M15.0 PRIMARY OSTEOARTHRITIS INVOLVING MULTIPLE JOINTS: ICD-10-CM

## 2022-01-04 DIAGNOSIS — M31.6 GCA (GIANT CELL ARTERITIS) (H): Primary | ICD-10-CM

## 2022-01-04 DIAGNOSIS — Z79.899 HIGH RISK MEDICATION USE: ICD-10-CM

## 2022-01-04 DIAGNOSIS — H47.019 ISCHEMIC OPTIC NEUROPATHY, UNSPECIFIED LATERALITY: ICD-10-CM

## 2022-01-04 PROCEDURE — 99214 OFFICE O/P EST MOD 30 MIN: CPT | Performed by: INTERNAL MEDICINE

## 2022-01-04 RX ORDER — PREDNISONE 5 MG/1
TABLET ORAL
Qty: 150 TABLET | Refills: 1 | Status: SHIPPED | OUTPATIENT
Start: 2022-01-04 | End: 2022-05-17

## 2022-01-04 NOTE — PROGRESS NOTES
"      Rheumatology follow-up visit note     Virginia is a 76 year old female presents today for follow-up.    Virginia was seen today for recheck.    Diagnoses and all orders for this visit:    GCA (giant cell arteritis) (H)  -     predniSONE (DELTASONE) 5 MG tablet; 17.5 mg daily, reduce by 2.5 mg every 2 weeks down to 15, 12.5, 10, 7.5 mg daily.  -     Erythrocyte sedimentation rate auto; Standing  -     CRP inflammation; Standing    High risk medication use  -     Erythrocyte sedimentation rate auto; Standing  -     CRP inflammation; Standing    Ischemic optic neuropathy, unspecified laterality    Primary osteoarthritis involving multiple joints        This patient with giant cell arteritis continues to taper prednisone without features suggestive of relapse.  As she had noted some of the symptoms as described below unlikely related with GCA.  She will continue taper at a more gentle incline and now, follow-up in 2 months with labs prior.    Follow up in 2 months.    HPI    Jenny Iglesias is a 76 year old female is here for follow-up of biopsy-proven temporal arteritis.  She is now down to 20 mg of prednisone.  She noted jaw pain, pain across her shoulder, and scalp scaling.  The jaw pain is on the right side comes on toward the evening when she is at rest, such as sewing, not related with chewing it is not the jaw claudication.  As per the shoulder pain as she just describes pain across her trapezius also that troubles remote were the latter part of the day.  She has felt as if there is some scaly lesions in her scalp and wondered if that is anything to do with a GCA or reduction in the dose of prednisone.  Originally she had atypical symptoms not the usual headache jaw claudication but had this \"funny feeling like a blast of sunlight\" and speech impairment in February this year.  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   On 3/1/2021 she was seen in Dr. Stockton's office, and  ischemic optic " Cardiology Progress Note        Patient: David Flynn Sr.        Sex: male          DOA: 10/23/2020  YOB: 1959      Age:  59 y.o.        LOS:  LOS: 5 days    Patient seen and examined, chart reviewed. Assessment/Plan     Patient Active Problem List   Diagnosis Code    Bursitis of elbow M70.30    Medication overdose T50.901A    Polio A80.9    Depressive disorder, not elsewhere classified F32.9    Type II or unspecified type diabetes mellitus without mention of complication, uncontrolled [id number]    Hypotension, unspecified I95.9    Amputation of both lower extremities ([facility] [address] S88.911A, R10.284P    PNA (pneumonia) J18.9    ALEXANDER (acute kidney injury) ([facility] [address] N17.9    Hyponatremia E87.1    Marijuana abuse F12.10    Centrilobular emphysema ([facility] [address] J43.2    CAP (community acquired pneumonia) J18.9    Sepsis ([facility] [address] A41.9    Hard of hearing H91.90    Legal blindness H54.8    Acute encephalopathy G93.40    Septic shock (HCC) A41.9, R65.21    Chronic obstructive pulmonary disease with acute exacerbation ([facility]) J44.1    Severe protein-calorie malnutrition ([facility] [address] T15    Metabolic encephalopathy S87.74    Acute respiratory failure with hypoxia ([facility]) J96.01    Aspiration pneumonia ([facility]) J69.0    Deep vein thrombosis (DVT) of lower extremity ([facility]) I82.409     Pulmonary hypertension  Borderline troponin elevation of unknown significance could be from pulmonary hypertension   Hypokalemia     Echocardiogram revealed     · Left Ventricle: Normal cavity size and wall thickness. The estimated EF is 50 - 55%. Low normal systolic function. There is mild (grade 1) left ventricular diastolic dysfunction E/E' ratio is 10.31.  · Pulmonary Artery: Pulmonary arteries not well visualized. Pulmonary arterial systolic pressure (PASP) is 74 mmHg. Pulmonary hypertension found to be severe. CT chest reported    1.   No evidence of pulmonary embolism.     2.  Relatively diffuse bronchial neuropathy was suspected, temporal artery biopsies were recommended, this was done on 3/3/2021 the report of that is attached.  She continues to taper prednisone.  She has not had deleterious effects that can be detected.  A month ago she noted blurriness of vision in the right eye.  This was her good eye.  This morning she was seen in ophthalmology cotton-wool spots were noted.  She brings paperwork with her.  Noted to increase her prednisone to 60 mg daily.  She is continued on this for the past for weeks, she has noted side effects, she is swelling up in her face, she feels that she is getting additional water retention.  She has not had recurrence of original symptoms.  Her most recent labs show normal sed rate and CRP done at her local clinic..  We discussed importance of taking calcium and vitamin D while she is on Fosamax,  background of osteopenia in addition to significant doses of prednisone.  Monitoring in her case would be more involved than usual because of her presenting symptoms are so atypical.  [place]  Her ophthalmologist wondered if she might be a candidate for Actemra.  She is already done some reading on this.  At this point she is not amenable to that suggestion.      DETAILED EXAMINATION  01/04/22  :    Vitals:    01/04/22 1000   BP: (!) 142/80   Pulse: 84   Weight: 69.1 kg (152 lb 6.4 oz)     Alert oriented. Head including the face is examined for malar rash, heliotropes, scarring, lupus pernio. Eyes examined for redness such as in episcleritis/scleritis, periorbital lesions.   Neck/ Face examined for parotid gland swelling, range of motion of neck.  Left upper and lower and right upper and lower extremities examined for tenderness, swelling, warmth of the appendicular joints, range of motion, edema, rash.  Some of the important findings included: she does not have evidence of synovitis in the palpable joints of the upper extremities.  No  wall thickening and several areas of  endobronchial impaction/occlusion.     3. Left lower lobe pneumonia with additional area of peribronchial alveolar  consolidation posterior right upper lobe.     > Recommend radiographic follow-up to document expected clinical resolution  following appropriate treatment in 4-6 weeks.     4. Moderately severe upper lobe predominant centrilobular emphysema.     5. Small left and trace right pleural effusions.     6. Extensive atherosclerotic vascular disease both above and below the  diaphragm. Plan:    Continue Metoprolol, amlodipine and atorvastatin. Cardiology sign off. Call us if needed. Subjective:    cc:  Confused       REVIEW OF SYSTEMS:     Can not obtain     Objective:      Visit Vitals  /78   Pulse (!) 114   Temp 98.1 °F (36.7 °C)   Resp 29   Ht 5' 4\" (1.626 m)   Wt 43.1 kg (95 lb)   SpO2 93%   BMI 16.31 kg/m²     Body mass index is 16.31 kg/m². Physical Exam:  General Appearance: Comfortable, not using accessory muscles of respiration. HEENT: HANNAH. HEAD: Atraumatic  NECK: No JVD, no thyroidomeglay. CAROTIDS: No bruit  LUNGS: Clear bilaterally. HEART: S1+S2 audible, no murmur, no pericardial rub.      ABD: Non-tender, BS Audible    NEUROLOGICAL: Alert but confused      Medication:  Current Facility-Administered Medications   Medication Dose Route Frequency    [START ON 10/29/2020] methylPREDNISolone (PF) (SOLU-MEDROL) injection 40 mg  40 mg IntraVENous Q24H    pantoprazole (PROTONIX) tablet 40 mg  40 mg Oral ACB    QUEtiapine (SEROquel) tablet 50 mg  50 mg Oral QHS    hydrALAZINE (APRESOLINE) 20 mg/mL injection 20 mg  20 mg IntraVENous Q6H PRN    amLODIPine (NORVASC) tablet 10 mg  10 mg Oral DAILY    metoprolol tartrate (LOPRESSOR) tablet 12.5 mg  12.5 mg Oral Q12H    enoxaparin (LOVENOX) injection 40 mg  1 mg/kg SubCUTAneous Q12H    ipratropium (ATROVENT) 0.02 % nebulizer solution 0.5 mg  0.5 mg Nebulization QID RT    significant deformities of the digits.  + [place] nodes.  Range of motion of the shoulders  show full abduction.  No JLT effusion or warmth of the knees.  she does not have dactylitis of the digits.     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     Priority: Medium     Fuchs' corneal dystrophy of both eyes 12/13/2021     Priority: Medium     Corneal edema of both eyes 12/13/2021     Priority: Medium     Localization-related focal epilepsy with simple partial seizures (H) 10/10/2021     Priority: Medium     Seizure disorder (H) 06/09/2021     Priority: Medium     GCA (giant cell arteritis) (H) 03/10/2021     Priority: Medium     TIA (transient ischemic attack)      Priority: Medium     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     Priority: Medium     Added automatically from request for surgery [id number]         Chronic kidney disease, stage 3 (H) 02/15/2021     Priority: Medium     Other chronic pain 10/07/2020     Priority: Medium     Chronic insomnia 02/25/2016     Priority: Medium     Vertigo 10/27/2015     Priority: Medium     Essential hypertension 10/27/2015     Priority: Medium     Restless leg syndrome 10/27/2015     Priority: Medium     Perimenopausal vasomotor symptoms 10/27/2015     Priority: Medium     Carotid stenosis, asymptomatic 10/27/2015     Priority: Medium     Past Surgical History:   Procedure Laterality Date     APPENDECTOMY       CATARACT EXTRACTION       CHOLECYSTECTOMY       HYSTERECTOMY       OTHER SURGICAL HISTORY Right 1998    fifth toe amputation     KY TEMPORAL ARTERY LIGATN OR BX Bilateral 3/4/2021    Procedure: BIOPSY, ARTERY, TEMPORAL;  Surgeon: Ramesh Box MD;  Location: [facility];  Service: General     TUBAL LIGATION        Past Medical History:   Diagnosis Date     Anxiety      Carotid artery stenosis 11/2013    50% left internal carotid artery      Chronic low back pain      Hypertension       vancomycin (VANCOCIN) 500 mg in 0.9% sodium chloride (MBP/ADV) 100 mL MBP  500 mg IntraVENous Q12H    atorvastatin (LIPITOR) tablet 20 mg  20 mg Oral QHS    clonazePAM (KlonoPIN) tablet 0.5 mg  0.5 mg Oral TID PRN    gabapentin (NEURONTIN) capsule 100 mg  100 mg Oral TID    oxyCODONE-acetaminophen (PERCOCET) 5-325 mg per tablet 1 Tab  1 Tab Oral Q4H PRN    LORazepam (ATIVAN) injection 1 mg  1 mg IntraVENous Q4H PRN    piperacillin-tazobactam (ZOSYN) 3.375 g in 0.9% sodium chloride (MBP/ADV) 100 mL MBP  3.375 g IntraVENous Q8H    haloperidol lactate (HALDOL) injection 5 mg  5 mg IntraVENous Q8H PRN    nicotine (NICODERM CQ) 21 mg/24 hr patch 1 Patch  1 Patch TransDERmal DAILY    albuterol-ipratropium (DUO-NEB) 2.5 MG-0.5 MG/3 ML  3 mL Nebulization Q4H PRN    sodium chloride (NS) flush 5-10 mL  5-10 mL IntraVENous PRN    Vancomycin - Pharmacokinetic Dosing  1 Each Other Rx Dosing/Monitoring    insulin lispro (HUMALOG) injection   SubCUTAneous AC&HS    glucose chewable tablet 16 g  4 Tab Oral PRN    glucagon (GLUCAGEN) injection 1 mg  1 mg IntraMUSCular PRN    dextrose 10% infusion 125-250 mL  125-250 mL IntraVENous PRN    budesonide (PULMICORT) 250 mcg/2ml nebulizer susp  500 mcg Nebulization BID RT    ELECTROLYTE REPLACEMENT PROTOCOL - Potassium Standard Dosing   1 Each Other PRN    ELECTROLYTE REPLACEMENT PROTOCOL - Potassium Standard  Dosing Details for Fluid Restricted Patients  1 Each Other PRN    ELECTROLYTE REPLACEMENT PROTOCOL  - Phosphorus Renal Dosing  1 Each Other PRN    sodium chloride (NS) flush 5-40 mL  5-40 mL IntraVENous Q8H    sodium chloride (NS) flush 5-40 mL  5-40 mL IntraVENous PRN    acetaminophen (TYLENOL) tablet 650 mg  650 mg Oral Q6H PRN    Or    acetaminophen (TYLENOL) suppository 650 mg  650 mg Rectal Q6H PRN    polyethylene glycol (MIRALAX) packet 17 g  17 g Oral DAILY PRN    promethazine (PHENERGAN) tablet 12.5 mg  12.5 mg Oral Q6H PRN    Or    ondansetron [facility] injection 4 mg  4 mg IntraVENous Q6H PRN               Lab/Data Reviewed:       Recent Labs     10/28/20  0500 10/27/20  0539 10/26/20  0345   WBC 14.2* 8.6 9.2   HGB 10.9* 10.7* 9.3*   HCT 33.4* 31.9* 28.0*    304 297     Recent Labs     10/28/20  0500 10/27/20  1758 10/27/20  0539  10/26/20  0345     --  143  --  142   K 4.2 3.6 3.3*   < > 3.2*     --  110  --  111   CO2 23  --  22  --  23   GLU 92  --  104*  --  103*   BUN 20*  --  21*  --  16   CREA 0.78  --  0.84  --  0.79   CA 9.3  --  9.0  --  9.1    < > = values in this interval not displayed.        Signed By: Jackqulyn Bernheim, MD     October 28, 2020 Osteoarthritis of lumbar spine      Osteopenia     mild, resolved     Postmenopausal 11/2013    on hormone     Restless leg syndrome      Seizures (H)     Feb 18, 2021, Small lost vision and could not speak for 4-5 seconds     Surgical menopause     age 47     Thyroid nodule 11/2013    35mm, rightthyroid lobe biopsy-benign nodule     TIA (transient ischemic attack)      TIA (transient ischemic attack) 11/2013     Vertigo      Allergies   Allergen Reactions     Codeine Sulfate Shortness Of Breath and Anaphylaxis     Carbidopa-Levodopa      dystonia     Diclofenac      Diarrhea     Morphine      Current Outpatient Medications   Medication Sig Dispense Refill     alendronate (FOSAMAX) 70 MG tablet TAKE 1 TAB A WEEK IN THE MORNING ON EMPTY STOMACH WITH A FULL GLASS OF WATER 30MIN BEFORE FOOD 12 tablet 3     furosemide (LASIX) 20 MG tablet Take 1 tablet (20 mg) by mouth daily 90 tablet 1     lamoTRIgine (LAMICTAL) 25 MG tablet Take 25 mg by mouth       losartan (COZAAR) 100 MG tablet Take 1 tablet (100 mg) by mouth daily 90 tablet 3     losartan-hydrochlorothiazide (HYZAAR) 100-25 MG tablet TAKE 1 TAB BY MOUTH ONE TIME DAILY 90 tablet 3     metoprolol succinate ER (TOPROL-XL) 25 MG 24 hr tablet Twice daily 180 tablet 3     metoprolol tartrate (LOPRESSOR) 25 MG tablet 25mg in the morning and half a tablet 12.5 in the evening 135 tablet 3     Multiple Vitamins-Minerals (CENTRUM PO) Take 1 tablet by mouth daily       potassium chloride (KLOR-CON 10) 10 MEQ tablet Take 1 tablet by mouth daily       predniSONE (DELTASONE) 10 MG tablet 55 mg daily, reduce by 5 mg every 10 days to 50/45/40/35/30/25/20 mg and follow-up. 160 tablet 1     sertraline (ZOLOFT) 50 MG tablet Take 1 tablet (50 mg) by mouth daily 90 tablet 3     zolpidem (AMBIEN) 10 MG tablet Take 1 tablet (10 mg) by mouth At Bedtime 90 tablet 3     family history includes Breast Cancer (age of onset: 57.00) in her mother; Hypertension in her sister; Lung Cancer (age of  onset: 62.00) in her father.  Social Connections: Not on file          WBC Count   Date Value Ref Range Status   10/08/2021 14.1 (H) 4.0 - 11.0 10e3/uL Final     RBC Count   Date Value Ref Range Status   10/08/2021 4.91 3.80 - 5.20 10e6/uL Final     Hemoglobin   Date Value Ref Range Status   10/08/2021 15.3 11.7 - 15.7 g/dL Final     Hematocrit   Date Value Ref Range Status   10/08/2021 47.9 (H) 35.0 - 47.0 % Final     MCV   Date Value Ref Range Status   10/08/2021 98 78 - 100 fL Final     MCH   Date Value Ref Range Status   10/08/2021 31.2 26.5 - 33.0 pg Final     Platelet Count   Date Value Ref Range Status   10/08/2021 223 150 - 450 10e3/uL Final     % Lymphocytes   Date Value Ref Range Status   10/08/2020 34 20 - 40 % Final     AST   Date Value Ref Range Status   10/08/2021 19 0 - 40 U/L Final     ALT   Date Value Ref Range Status   10/08/2021 26 0 - 45 U/L Final     Albumin   Date Value Ref Range Status   10/08/2021 3.7 3.5 - 5.0 g/dL Final     Alkaline Phosphatase   Date Value Ref Range Status   10/08/2021 82 45 - 120 U/L Final     Creatinine   Date Value Ref Range Status   11/08/2021 1.17 (H) 0.60 - 1.10 mg/dL Final     GFR Estimate   Date Value Ref Range Status   11/08/2021 45 (L) >60 mL/min/1.73m2 Final     Comment:     As of July 11, 2021, eGFR is calculated by the CKD-EPI creatinine equation, without race adjustment. eGFR can be influenced by muscle mass, exercise, and diet. The reported eGFR is an estimation only and is only applicable if the renal function is stable.   04/06/2021 39 (L) >60 mL/min/1.73m2 Final     GFR Estimate If Black   Date Value Ref Range Status   04/06/2021 48 (L) >60 mL/min/1.73m2 Final     Erythrocyte Sedimentation Rate   Date Value Ref Range Status   03/15/2021 2 0 - 20 mm/hr Final     CRP   Date Value Ref Range Status   03/15/2021 <0.1 0.0 - 0.8 mg/dL Final

## 2022-01-11 ENCOUNTER — HOSPITAL ENCOUNTER (OUTPATIENT)
Dept: PHYSICAL THERAPY | Facility: CLINIC | Age: 77
Setting detail: THERAPIES SERIES
End: 2022-01-11
Attending: INTERNAL MEDICINE
Payer: COMMERCIAL

## 2022-01-11 DIAGNOSIS — I10 ESSENTIAL HYPERTENSION: ICD-10-CM

## 2022-01-11 PROCEDURE — 97161 PT EVAL LOW COMPLEX 20 MIN: CPT | Mod: GP | Performed by: PHYSICAL THERAPIST

## 2022-01-11 PROCEDURE — 97116 GAIT TRAINING THERAPY: CPT | Mod: GP | Performed by: PHYSICAL THERAPIST

## 2022-01-11 NOTE — DISCHARGE INSTRUCTIONS
1/11/22     Pole- adjustable - light weight - rubber tip.     Look at [employer] - call first  [employer] has them.       ==Walk with pole or cane - for more efficient walking.  Good to do the steps at your building - have phone w/ you.    --Bring cane or pole next time.     --Carry phone in halls when you walk- track steps and if you need help.    I think your balance is off partly due to lower vision ---   we will work on balance.  --For now - stand on one leg  5 to 10 seconds per leg at a countertop for safety.   Whitney

## 2022-01-12 NOTE — PROGRESS NOTES
[facility]                                                                                   OUTPATIENT PHYSICAL THERAPY FUNCTIONAL EVALUATION  PLAN OF TREATMENT FOR OUTPATIENT REHABILITATION  (COMPLETE FOR INITIAL CLAIMS ONLY)  Patient's Last Name, First Name, M.I.  YOB: 1945  Jenny Iglesias     Provider's Name   [facility]   Medical Record No.  [id number]     Start of Care Date:  01/11/22   Onset Date:   11/8/21   Type:     _X__PT   ____OT  ____SLP Medical Diagnosis:   Htn, dizzy sxs     PT Diagnosis:  imbalance w/ risk of falls Visits from SOC:  1                              __________________________________________________________________________________  Plan of Treatment/Functional Goals:  balance training,gait training,neuromuscular re-education,strengthening,transfer training,other (see comments)  crm if nec        GOALS  gait  25ft walk in 9 sec or less for improved gait quality  04/10/22    gait  DGI to improve to 19+ for improved gait safety  04/10/22    sxs  dhi score to improve to 40 or less for improved sxs.   04/10/22         Therapy Frequency:  other (see comments)   Predicted Duration of Therapy Intervention:  up to 8x in 90 days    Whitney Renee, PT                                    I CERTIFY THE NEED FOR THESE SERVICES FURNISHED UNDER        THIS PLAN OF TREATMENT AND WHILE UNDER MY CARE     (Physician co-signature of this document indicates review and certification of the therapy plan).                Certification Date From:    1/11/22  Certification Date To:   4/10/22    Referring Provider:  Karen Taylor    Initial Assessment  See Epic Evaluation- Start of Care Date: 01/11/22

## 2022-01-12 NOTE — PROGRESS NOTES
01/11/22 1500   Quick Adds   Quick Adds Vestibular Eval   General Information   Start of Care Date 01/11/22   Referring Physician Karen Taylor   Orders Evaluate and Treat as Indicated   Order Date 11/08/21   Medical Diagnosis htn, dizzy sxs, GCA   Special Instructions on steroid taper   Surgical/Medical history reviewed Yes   Pertinent history of current problem (include personal factors and/or comorbidities that impact the POC) reports, JAQUELINE - on meds for a year, dizzy 5months.  may be the steroids.  on 17.5 mg /day.  started 58.   describes as no room spin.  off balance.  i run into doors w/ elbows.  off balance. dizzy most of the time.  walk slower.  no falls in past yr.  husb dropped her off.  does not drive.  vision issue. one year arteritis. I have limited my activities.    Pertinent Visual History  vision issues due to arteritis; also ptosis and harder to see due to this     Prior level of functional mobility Ambulation   Ambulation active, gardens   Current Community Support Family/friend caregiver   Patient role/Employment history Retired   Living environment Apartment/condo   Home/Community Accessibility Comments elev and steps; she does steps for exer   Current Assistive Devices Standard Cane   Assistive Devices Comments has cane but not using much at all   Patient/Family Goals Statement be not dizzy and move easier.  sees RA doctor in 2 months.   General Information Comments doesn't walk as much as used to; covid too.  cooks, cleans, laundry.  gardening bothered her head down.  keeps head up when bends down.   Fall Risk Screen   Fall screen completed by PT   Have you fallen 2 or more times in the past year? No   Have you fallen and had an injury in the past year? No   System Outcome Measures   Outcome Measures BPPV   Dizziness Handicap Inventory (score out of 100) A decrease in score by 17.18 or greater indicates a clinically significant change in symptoms. 50   Pain   Patient currently in pain Yes    Pain rating leg pain comes and goes   Vitals Signs   Heart Rate 82   Blood Pressure 174/87   Vital Signs Comments sit R arm   Cognitive Status Examination   Orientation orientation to person, place and time   Level of Consciousness alert   Follows Commands and Answers Questions 100% of the time   Personal Safety and Judgment intact   Memory intact   Integumentary   Integumentary No deficits were identified   Posture   Posture Forward head position   Range of Motion (ROM)   ROM Comment wfls   Strength   Strength Comments wfls- did not mmt   Bed Mobility   Bed Mobility Comments indep   Transfer Skills   Transfer Comments STS indep   Locomotion   Wheel Chair Mobility Comments n/a   Gait   Gait Comments wide path width, hesitant   Gait Special Tests   Gait Special Tests 25 FOOT TIMED WALK;DYNAMIC GAIT INDEX   Gait Special Tests 25 Foot Timed Walk   Seconds 11.3   Gait Special Tests Dynamic Gait Index   Score out of 24 16   Balance   Balance Comments sls 3 to 6 sec bilat   Sensory Examination   Sensory Perception no deficits were identified   Coordination   Coordination no deficits were identified   Muscle Tone   Muscle Tone no deficits were identified   Cervicogenic Screen   Neck ROM 60 deg rotation, can flex/ext neck   Oculomotor Exam   Smooth Pursuit Normal   Saccades Normal   VOR Normal   Rapid Head Thrust Comments ?? R positive   Infrared Goggle Exam or Frenzel Lense Exam   Vestibular Suppressant in Last 24 Hours? No   Exam completed with Infrared Goggles   Spontaneous Nystagmus Negative   Gaze Evoked Nystagmus Negative   Darin-Hallpike (right) Negative   Doss-Hallpike (right) comments dizzy   Doss-Hallpike (Left) Negative   HSCC Supine Roll Test (Right) Negative   HSCC Supine Roll Test (Left) Negative   Planned Therapy Interventions   Planned Therapy Interventions balance training;gait training;neuromuscular re-education;strengthening;transfer training;other (see comments)   Planned Therapy Interventions Comment crm  if nec   Clinical Impression   Criteria for Skilled Therapeutic Interventions Met yes, treatment indicated   PT Diagnosis imbalance w/ risk of falls   Influenced by the following impairments imbalance, dizzy sxs   Functional limitations due to impairments gait impaired   Clinical Presentation Stable/Uncomplicated   Clinical Decision Making (Complexity) Low complexity   Therapy Frequency other (see comments)   Predicted Duration of Therapy Intervention (days/wks) up to 8x in 90 days   Risk & Benefits of therapy have been explained Yes   Patient, Family & other staff in agreement with plan of care Yes   Education Assessment   Preferred Learning Style Demonstration;Listening   Barriers to Learning Visual;Physical   GOALS   PT Eval Goals 1;2;3   Goal 1   Goal Identifier gait   Goal Description 25ft walk in 9 sec or less for improved gait quality   Target Date 04/10/22   Goal 2   Goal Identifier gait   Goal Description DGI to improve to 19+ for improved gait safety   Target Date 04/10/22   Goal 3   Goal Identifier sxs   Goal Description dhi score to improve to 40 or less for improved sxs.    Target Date 04/10/22   Total Evaluation Time   PT Eval, Low Complexity Minutes (03004) 22

## 2022-01-13 ENCOUNTER — OFFICE VISIT (OUTPATIENT)
Dept: INTERNAL MEDICINE | Facility: CLINIC | Age: 77
End: 2022-01-13
Payer: COMMERCIAL

## 2022-01-13 VITALS
HEIGHT: 64 IN | OXYGEN SATURATION: 98 % | SYSTOLIC BLOOD PRESSURE: 130 MMHG | BODY MASS INDEX: 26.29 KG/M2 | WEIGHT: 154 LBS | HEART RATE: 78 BPM | DIASTOLIC BLOOD PRESSURE: 78 MMHG

## 2022-01-13 DIAGNOSIS — R42 DIZZINESS: ICD-10-CM

## 2022-01-13 DIAGNOSIS — M31.6 TEMPORAL ARTERITIS (H): ICD-10-CM

## 2022-01-13 DIAGNOSIS — I10 ESSENTIAL HYPERTENSION: ICD-10-CM

## 2022-01-13 DIAGNOSIS — R73.03 PREDIABETES: ICD-10-CM

## 2022-01-13 DIAGNOSIS — G40.909 SEIZURE DISORDER (H): Primary | ICD-10-CM

## 2022-01-13 DIAGNOSIS — F51.04 CHRONIC INSOMNIA: ICD-10-CM

## 2022-01-13 LAB
ALBUMIN SERPL-MCNC: 3.8 G/DL (ref 3.5–5)
ALP SERPL-CCNC: 85 U/L (ref 45–120)
ALT SERPL W P-5'-P-CCNC: 30 U/L (ref 0–45)
ANION GAP SERPL CALCULATED.3IONS-SCNC: 10 MMOL/L (ref 5–18)
AST SERPL W P-5'-P-CCNC: 23 U/L (ref 0–40)
BILIRUB SERPL-MCNC: 0.9 MG/DL (ref 0–1)
BUN SERPL-MCNC: 22 MG/DL (ref 8–28)
C REACTIVE PROTEIN LHE: 0.1 MG/DL (ref 0–0.8)
CALCIUM SERPL-MCNC: 11 MG/DL (ref 8.5–10.5)
CHLORIDE BLD-SCNC: 99 MMOL/L (ref 98–107)
CO2 SERPL-SCNC: 31 MMOL/L (ref 22–31)
CREAT SERPL-MCNC: 1.27 MG/DL (ref 0.6–1.1)
ERYTHROCYTE [DISTWIDTH] IN BLOOD BY AUTOMATED COUNT: 16.6 % (ref 10–15)
ERYTHROCYTE [SEDIMENTATION RATE] IN BLOOD BY WESTERGREN METHOD: 7 MM/HR (ref 0–20)
GFR SERPL CREATININE-BSD FRML MDRD: 43 ML/MIN/1.73M2
GLUCOSE BLD-MCNC: 110 MG/DL (ref 70–125)
HBA1C MFR BLD: 6.5 % (ref 0–5.6)
HCT VFR BLD AUTO: 45.2 % (ref 35–47)
HGB BLD-MCNC: 14.2 G/DL (ref 11.7–15.7)
MCH RBC QN AUTO: 31.2 PG (ref 26.5–33)
MCHC RBC AUTO-ENTMCNC: 31.4 G/DL (ref 31.5–36.5)
MCV RBC AUTO: 99 FL (ref 78–100)
PLATELET # BLD AUTO: 209 10E3/UL (ref 150–450)
POTASSIUM BLD-SCNC: 4.2 MMOL/L (ref 3.5–5)
PROT SERPL-MCNC: 6.7 G/DL (ref 6–8)
RBC # BLD AUTO: 4.55 10E6/UL (ref 3.8–5.2)
SODIUM SERPL-SCNC: 140 MMOL/L (ref 136–145)
WBC # BLD AUTO: 13.7 10E3/UL (ref 4–11)

## 2022-01-13 PROCEDURE — 36415 COLL VENOUS BLD VENIPUNCTURE: CPT | Performed by: INTERNAL MEDICINE

## 2022-01-13 PROCEDURE — 85652 RBC SED RATE AUTOMATED: CPT | Performed by: INTERNAL MEDICINE

## 2022-01-13 PROCEDURE — 85027 COMPLETE CBC AUTOMATED: CPT | Performed by: INTERNAL MEDICINE

## 2022-01-13 PROCEDURE — 83036 HEMOGLOBIN GLYCOSYLATED A1C: CPT | Performed by: INTERNAL MEDICINE

## 2022-01-13 PROCEDURE — 99214 OFFICE O/P EST MOD 30 MIN: CPT | Performed by: INTERNAL MEDICINE

## 2022-01-13 PROCEDURE — 80053 COMPREHEN METABOLIC PANEL: CPT | Performed by: INTERNAL MEDICINE

## 2022-01-13 PROCEDURE — 86140 C-REACTIVE PROTEIN: CPT | Performed by: INTERNAL MEDICINE

## 2022-01-13 RX ORDER — PRAMIPEXOLE DIHYDROCHLORIDE 0.25 MG/1
1 TABLET ORAL 2 TIMES DAILY
COMMUNITY
Start: 2022-01-04 | End: 2022-10-30

## 2022-01-13 RX ORDER — METOPROLOL SUCCINATE 100 MG/1
100 TABLET, EXTENDED RELEASE ORAL DAILY
Qty: 90 TABLET | Refills: 3 | Status: SHIPPED | OUTPATIENT
Start: 2022-01-13 | End: 2022-08-09

## 2022-01-13 RX ORDER — DIAZEPAM 2 MG
1 TABLET ORAL DAILY PRN
COMMUNITY
Start: 2022-01-11 | End: 2022-07-06

## 2022-01-13 RX ORDER — TIMOLOL MALEATE 5 MG/ML
1 SOLUTION/ DROPS OPHTHALMIC DAILY
COMMUNITY
Start: 2021-11-10 | End: 2024-02-14

## 2022-01-13 RX ORDER — SERTRALINE HYDROCHLORIDE 100 MG/1
100 TABLET, FILM COATED ORAL DAILY
Qty: 90 TABLET | Refills: 3 | Status: SHIPPED | OUTPATIENT
Start: 2022-01-13 | End: 2023-04-03

## 2022-01-13 ASSESSMENT — MIFFLIN-ST. JEOR: SCORE: 1163.54

## 2022-01-13 NOTE — PATIENT INSTRUCTIONS
1. Increase sertraline to 100mg a day for anxiety and agitation   2. Consider actemra as most of the symptoms you are describing , the bruising , the swelling , the tremor are due to prednisone   3. You should check your blood pressure meds    you can only be on 100mg of losartan a day currently you are on losartan/hydrochlorthiazide

## 2022-01-13 NOTE — PROGRESS NOTES
Assessment & Plan     Chronic kidney disease, stage 3 (H)  Stable     Seizure disorder (H) no relapse on lamictal     Essential hypertension  Well controlled but it appears she was taking both losartan and losartan hydrochlorothiazide . Will clarify with her pharmacy   - metoprolol succinate ER (TOPROL-XL) 100 MG 24 hr tablet  Dispense: 90 tablet; Refill: 3  - sertraline (ZOLOFT) 100 MG tablet  Dispense: 90 tablet; Refill: 3    Chronic insomnia  She does not want to stop or taper down ambien . Did discuss risks     Prediabetes    - Comprehensive metabolic panel  - CBC with platelets  - Hemoglobin A1c  - Comprehensive metabolic panel  - CBC with platelets  - Hemoglobin A1c    Temporal arteritis (H)  She does not want to use actemra . I do advise considering it as she ma not experience   - CRP, inflammation  - ESR: Erythrocyte sedimentation rate  - CRP, inflammation  - ESR: Erythrocyte sedimentation rate    Dizziness  I do believe her symptoms are related to prednisone . She is already losing some swelling with reduction in dose   - [facility] Order for DME - ONLY FOR DME    Return in about 3 months (around 4/13/2022).    Karen Taylor MD  [facility]   Sara is a 76 year old who presents for the following health issues ongoing issues with dizziness and swelling ,   Slowly rises from bed and wakes up and turns the corner to go to the bathroom then feels dizzy , turning around and feels dizy , also feels shaky , is seeing PT .  Therapist suggested walking stick and has been told it is not vertigo . And has not fallen . Waking down long hallway , feels dizzy   Adding zoloft helped a little but did not reduce symptoms   Is still on high dose prednisone   Prednisone 17.5mg is reducing by 3mg  Patient Active Problem List   Diagnosis     Vertigo     Essential hypertension     Restless leg syndrome     Perimenopausal vasomotor symptoms     Carotid stenosis, asymptomatic     Chronic  "insomnia     Other chronic pain     Chronic kidney disease, stage 3 (H)     Ischemic optic neuropathy     TIA (transient ischemic attack)     GCA (giant cell arteritis) (H)     Seizure disorder (H)     Localization-related focal epilepsy with simple partial seizures (H)     Polymorphic amyloid degeneration of cornea     Fuchs' corneal dystrophy of both eyes     Corneal edema of both eyes     Current Outpatient Medications   Medication     alendronate (FOSAMAX) 70 MG tablet     diazepam (VALIUM) 2 MG tablet     furosemide (LASIX) 20 MG tablet     lamoTRIgine (LAMICTAL) 25 MG tablet     losartan-hydrochlorothiazide (HYZAAR) 100-25 MG tablet     metoprolol succinate ER (TOPROL-XL) 100 MG 24 hr tablet     metoprolol succinate ER (TOPROL-XL) 25 MG 24 hr tablet     Multiple Vitamins-Minerals (CENTRUM PO)     potassium chloride (KLOR-CON 10) 10 MEQ tablet     pramipexole (MIRAPEX) 0.25 MG tablet     predniSONE (DELTASONE) 5 MG tablet     sertraline (ZOLOFT) 100 MG tablet     timolol maleate (TIMOPTIC) 0.5 % ophthalmic solution     zolpidem (AMBIEN) 10 MG tablet     No current facility-administered medications for this visit.           Review of Systems   Constitutional, HEENT, cardiovascular, pulmonary, gi and gu systems are negative, except as otherwise noted.      Objective    /78 (BP Location: Left arm, Patient Position: Sitting, Cuff Size: Adult Small)   Pulse 78   Ht 1.626 m (5' 4\")   Wt 69.9 kg (154 lb)   SpO2 98%   BMI 26.43 kg/m    Body mass index is 26.43 kg/m .  Physical Exam   GENERAL: healthy, alert and no distress  NECK: no adenopathy, no asymmetry, masses, or scars and thyroid normal to palpation  RESP: lungs clear to auscultation - no rales, rhonchi or wheezes  CV: regular rate and rhythm, normal S1 S2, no S3 or S4, no murmur, click or rub, no peripheral edema and peripheral pulses strong  rombergs negative            "

## 2022-01-18 ENCOUNTER — TELEPHONE (OUTPATIENT)
Dept: INTERNAL MEDICINE | Facility: CLINIC | Age: 77
End: 2022-01-18
Payer: COMMERCIAL

## 2022-01-18 NOTE — TELEPHONE ENCOUNTER
Left a message for [facility] pharmacy- could not get an actual person- stating to not authorize any refills for losartan 100mg as that has been discontinued, med is now losartan hzth (which was sent to diff pharm- [facility]).     RN also called patient to do a med check. Sending patient instructions via [employer] [facility] as well to only be taking the losartan hzth and have these refills transferred over to [facility] (patient will need to call [facility] to do this).     TAIWO Biswas RN  [facility]          ----- Message from Karen Taylor MD sent at 1/17/2022  1:27 PM CST -----  Pleasecall her pharamcy [facility] .and see  what they have been dispensing for this patient as she told me she was taking both losrtan and losratn HZTH and I am concerned she may be making other errors ,then call pt and do a med reconciliation with her thanks!

## 2022-01-27 ENCOUNTER — TELEPHONE (OUTPATIENT)
Dept: INTERNAL MEDICINE | Facility: CLINIC | Age: 77
End: 2022-01-27

## 2022-01-27 DIAGNOSIS — I10 ESSENTIAL HYPERTENSION: Primary | ICD-10-CM

## 2022-01-27 NOTE — TELEPHONE ENCOUNTER
losartan-hydrochlorothiazide (HYZAAR) 100-25 MG tablet is on back order, please send two separate rx's for this medication.

## 2022-01-31 RX ORDER — LOSARTAN POTASSIUM 100 MG/1
100 TABLET ORAL DAILY
Qty: 90 TABLET | Refills: 3 | Status: SHIPPED | OUTPATIENT
Start: 2022-01-31 | End: 2023-04-26

## 2022-01-31 RX ORDER — HYDROCHLOROTHIAZIDE 25 MG/1
25 TABLET ORAL DAILY
Qty: 90 TABLET | Refills: 3 | Status: SHIPPED | OUTPATIENT
Start: 2022-01-31 | End: 2023-03-06

## 2022-01-31 NOTE — TELEPHONE ENCOUNTER
Contacted patient and relayed message below. Discussed in depth that patient is to stop taking the Hyzaar once she has picked up the Losartan and Hydrochlorothiazide from the pharmacy. Patient had her Hyzaar pill bottle in front of her and also wrote down the Losartan and Hydrochlorothiazide dosages and instructions. Patient states understanding that she will be taking the Losartan 100 MG and Hydrochlorothiazide 25MG separately whereas before she was taking Hyzaar 100-25 MG as one tablet.  Discussed with patient to call clinic with any concerns or questions.                               Please clarify this with patient  last time this happened she continued to take losartan and hyzaar . To simplify things and because hyzaar often is limited in supply , recommend always taking them separately

## 2022-02-11 ENCOUNTER — OFFICE VISIT (OUTPATIENT)
Dept: INTERNAL MEDICINE | Facility: CLINIC | Age: 77
End: 2022-02-11
Payer: COMMERCIAL

## 2022-02-11 VITALS
WEIGHT: 157 LBS | BODY MASS INDEX: 26.95 KG/M2 | OXYGEN SATURATION: 96 % | SYSTOLIC BLOOD PRESSURE: 128 MMHG | HEART RATE: 53 BPM | DIASTOLIC BLOOD PRESSURE: 74 MMHG

## 2022-02-11 DIAGNOSIS — Z01.818 PREOP EXAM FOR INTERNAL MEDICINE: ICD-10-CM

## 2022-02-11 DIAGNOSIS — H02.409 PTOSIS OF EYELID, UNSPECIFIED LATERALITY: ICD-10-CM

## 2022-02-11 DIAGNOSIS — N18.31 STAGE 3A CHRONIC KIDNEY DISEASE (H): ICD-10-CM

## 2022-02-11 DIAGNOSIS — Z79.899 ENCOUNTER FOR LONG-TERM (CURRENT) USE OF MEDICATIONS: ICD-10-CM

## 2022-02-11 DIAGNOSIS — I10 ESSENTIAL HYPERTENSION: Primary | ICD-10-CM

## 2022-02-11 PROBLEM — E11.9 DIABETES MELLITUS, TYPE 2 (H): Status: ACTIVE | Noted: 2022-02-11

## 2022-02-11 PROCEDURE — 99213 OFFICE O/P EST LOW 20 MIN: CPT | Performed by: INTERNAL MEDICINE

## 2022-02-11 RX ORDER — ATORVASTATIN CALCIUM 40 MG/1
40 TABLET, FILM COATED ORAL DAILY
Qty: 90 TABLET | Refills: 3 | COMMUNITY
Start: 2022-02-11 | End: 2022-10-30

## 2022-02-11 NOTE — PATIENT INSTRUCTIONS
Nothing by mouth in the morning of the surgery    take morning meds when you wake up \    CUT atorvastatin  in half  To take 20mg for your stroke prevention .

## 2022-02-11 NOTE — PROGRESS NOTES
[facility]  [address] [place] [address]  Phone: [phone]  Fax: [phone]  Primary Provider: Chastity Kim  Pre-op Performing Provider: CHASTITY KIM      PREOPERATIVE EVALUATION:  Today's date: 2/11/2022    Jenny Iglesias is a 76 year old female who presents for a preoperative evaluation.    Surgical Information:  Surgery/Procedure: eye lid  Surgery Location: [facility]  Surgeon: Dr. Marti  Surgery Date: 02/22/22  Time of Surgery:tbd  Where patient plans to recover: At home with family  Fax number for surgical facility:     Type of Anesthesia Anticipated:  1. Stage 3a chronic kidney disease (H)    2. Encounter for long-term (current) use of medications      3. Essential hypertension      4. Ptosis of eyelid, unspecified laterality      5. Preop exam for internal medicine  Medially approved for surgery     Subjective     HPI related to upcoming procedure:  Ptosis diminishing vision    Preop Questions 2/11/2022   1. Have you ever had a heart attack or stroke? No   2. Have you ever had surgery on your heart or blood vessels, such as a stent placement, a coronary artery bypass, or surgery on an artery in your head, neck, heart, or legs? No   3. Do you have chest pain with activity? No   4. Do you have a history of  heart failure? No   5. Do you currently have a cold, bronchitis or symptoms of other infection? No   6. Do you have a cough, shortness of breath, or wheezing? No   7. Do you or anyone in your family have previous history of blood clots? No   8. Do you or does anyone in your family have a serious bleeding problem such as prolonged bleeding following surgeries or cuts? No   9. Have you ever had problems with anemia or been told to take iron pills? No   10. Have you had any abnormal blood loss such as black, tarry or bloody stools, or abnormal vaginal bleeding? No   11. Have you ever had a blood transfusion? No   12. Are you  willing to have a blood transfusion if it is medically needed before, during, or after your surgery? Yes   13. Have you or any of your relatives ever had problems with anesthesia? No   14. Do you have sleep apnea, excessive snoring or daytime drowsiness? No   15. Do you have any artifical heart valves or other implanted medical devices like a pacemaker, defibrillator, or continuous glucose monitor? No   16. Do you have artificial joints? No   17. Are you allergic to latex? No       Health Care Directive:  Patient does not have a Health Care Directive or Living Will:     Preoperative Review of :            Review of Systems  Constitutional, neuro, ENT, endocrine, pulmonary, cardiac, gastrointestinal, genitourinary, musculoskeletal, integument and psychiatric systems are negative, except as otherwise noted.    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     Priority: Medium     Fuchs' corneal dystrophy of both eyes 12/13/2021     Priority: Medium     Corneal edema of both eyes 12/13/2021     Priority: Medium     Localization-related focal epilepsy with simple partial seizures (H) 10/10/2021     Priority: Medium     Seizure disorder (H) 06/09/2021     Priority: Medium     GCA (giant cell arteritis) (H) 03/10/2021     Priority: Medium     TIA (transient ischemic attack)      Priority: Medium     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     Priority: Medium     Added automatically from request for surgery [id number]         Chronic kidney disease, stage 3 (H) 02/15/2021     Priority: Medium     Other chronic pain 10/07/2020     Priority: Medium     Chronic insomnia 02/25/2016     Priority: Medium     Vertigo 10/27/2015     Priority: Medium     Essential hypertension 10/27/2015     Priority: Medium     Restless leg syndrome 10/27/2015     Priority: Medium     Perimenopausal vasomotor symptoms 10/27/2015     Priority: Medium     Carotid stenosis, asymptomatic  10/27/2015     Priority: Medium      Past Medical History:   Diagnosis Date     Anxiety      Carotid artery stenosis 11/2013    50% left internal carotid artery      Chronic low back pain      Hypertension      Osteoarthritis of lumbar spine      Osteopenia     mild, resolved     Postmenopausal 11/2013    on hormone     Restless leg syndrome      Seizures (H)     Feb 18, 2021, Small lost vision and could not speak for 4-5 seconds     Surgical menopause     age 47     Thyroid nodule 11/2013    35mm, rightthyroid lobe biopsy-benign nodule     TIA (transient ischemic attack)      TIA (transient ischemic attack) 11/2013     Vertigo      Past Surgical History:   Procedure Laterality Date     APPENDECTOMY       CATARACT EXTRACTION       CHOLECYSTECTOMY       HYSTERECTOMY       OTHER SURGICAL HISTORY Right 1998    fifth toe amputation     ME TEMPORAL ARTERY LIGATN OR BX Bilateral 3/4/2021    Procedure: BIOPSY, ARTERY, TEMPORAL;  Surgeon: Ramesh Box MD;  Location: [facility];  Service: General     TUBAL LIGATION       Current Outpatient Medications   Medication Sig Dispense Refill     alendronate (FOSAMAX) 70 MG tablet TAKE 1 TAB A WEEK IN THE MORNING ON EMPTY STOMACH WITH A FULL GLASS OF WATER 30MIN BEFORE FOOD 12 tablet 3     diazepam (VALIUM) 2 MG tablet Take 1 tablet by mouth daily as needed       furosemide (LASIX) 20 MG tablet Take 1 tablet (20 mg) by mouth daily 90 tablet 1     hydrochlorothiazide (HYDRODIURIL) 25 MG tablet Take 1 tablet (25 mg) by mouth daily 90 tablet 3     lamoTRIgine (LAMICTAL) 25 MG tablet Take 50 mg by mouth 2 times daily       losartan (COZAAR) 100 MG tablet Take 1 tablet (100 mg) by mouth daily 90 tablet 3     losartan-hydrochlorothiazide (HYZAAR) 100-25 MG tablet TAKE 1 TAB BY MOUTH ONE TIME DAILY 90 tablet 3     metoprolol succinate ER (TOPROL-XL) 100 MG 24 hr tablet Take 1 tablet (100 mg) by mouth daily One tablet in the morning along with 25mg at night 90 tablet 3      metoprolol succinate ER (TOPROL-XL) 25 MG 24 hr tablet Twice daily 180 tablet 3     Multiple Vitamins-Minerals (CENTRUM PO) Take 1 tablet by mouth daily       potassium chloride (KLOR-CON 10) 10 MEQ tablet Take 1 tablet by mouth daily       pramipexole (MIRAPEX) 0.25 MG tablet Take 1 tablet by mouth 2 times daily       predniSONE (DELTASONE) 5 MG tablet 17.5 mg daily, reduce by 2.5 mg every 2 weeks down to 15, 12.5, 10, 7.5 mg daily. 150 tablet 1     sertraline (ZOLOFT) 100 MG tablet Take 1 tablet (100 mg) by mouth daily 90 tablet 3     timolol maleate (TIMOPTIC) 0.5 % ophthalmic solution Place 1 drop into both eyes daily       zolpidem (AMBIEN) 10 MG tablet Take 1 tablet (10 mg) by mouth At Bedtime 90 tablet 3       Allergies   Allergen Reactions     Codeine Sulfate Shortness Of Breath and Anaphylaxis     Carbidopa-Levodopa      dystonia     Diclofenac      Diarrhea     Morphine         Social History     Tobacco Use     Smoking status: Former Smoker     Quit date: 10/22/2000     Years since quittin.3     Smokeless tobacco: Never Used   Substance Use Topics     Alcohol use: Yes       History   Drug Use Unknown         Objective     /74   Pulse 53   Wt 71.2 kg (157 lb)   SpO2 96%   BMI 26.95 kg/m      Physical Exam  GENERAL APPEARANCE: healthy, alert and no distress  HENT: ear canals and TM's normal and nose and mouth without ulcers or lesions  RESP: lungs clear to auscultation - no rales, rhonchi or wheezes  CV: regular rate and rhythm, normal S1 S2, no S3 or S4 and no murmur, click or rub   ABDOMEN: soft, nontender, no HSM or masses and bowel sounds normal  NEURO: Normal strength and tone, sensory exam grossly normal, mentation intact and speech normal    Recent Labs   Lab Test 22  1154 22  1153 21  1306 10/08/21  1133   HGB 14.2  --   --  15.3     --   --  223   NA  --  140 141 140   POTASSIUM  --  4.2 4.1 3.8   CR  --  1.27* 1.17* 1.12*   A1C  --  6.5*  --  6.2*         Diagnostics:  No labs were ordered during this visit.   No EKG required for low risk surgery (cataract, skin procedure, breast biopsy, etc).    Revised Cardiac Risk Index (RCRI):  The patient has the following serious cardiovascular risks for perioperative complications:   - No serious cardiac risks = 0 points     RCRI Interpretation: 0 points: Class I (very low risk - 0.4% complication rate)         Went over meds in detail   There had been confusion over atorvastatin and losartan. Went through her meds .  Given shortage of hyzaar . She can take losartan and hydrochlorothiazide separately . Restart statin at 20mg a day ( half a tablet ) was stopped because of muscle aches but those disnt really improve .   .   Signed Electronically by: Karen Taylor MD  Copy of this evaluation report is provided to requesting physician.

## 2022-02-21 ENCOUNTER — TELEPHONE (OUTPATIENT)
Dept: NURSING | Facility: CLINIC | Age: 77
End: 2022-02-21
Payer: COMMERCIAL

## 2022-02-21 ENCOUNTER — TELEPHONE (OUTPATIENT)
Dept: INTERNAL MEDICINE | Facility: CLINIC | Age: 77
End: 2022-02-21

## 2022-02-21 NOTE — TELEPHONE ENCOUNTER
Opened in error, see charting in other telephone encounter from today 2/21/22.    Yamel Munguia RN  [facility]

## 2022-02-21 NOTE — TELEPHONE ENCOUNTER
Recvd call from Zainab amos/[facility], Zainab is still waiting for the updated pre-op stating pt/[place] has been cleared for surgery.    Pt/Nette is scheduled for surgery at 9:00am tomorrow 02.22.2022. If this updated pre-op with clearance for surgery is not received by [facility] prior to surgery date/time surgery will be rescheduled    Please fax updated pre-op to [phone]

## 2022-02-21 NOTE — TELEPHONE ENCOUNTER
Reason for Call:  Other call back    Detailed comments:   Surgery Center is calling in and preop note they received is not complete.    Please complete note and send to CA's to fax to Surgery center today    Please complete this note   f- [phone]    Best Time: any    Can we leave a detailed message on this number? Not Applicable    Call taken on 2/21/2022 at 8:42 AM by Pam J. Behr  ;

## 2022-02-21 NOTE — TELEPHONE ENCOUNTER
Chema calls looking for an update regarding pre-op. Patient is scheduled for surgery tomorrow. Please fax updated pre-op or call with any further questions.    Yamel Munguia RN  [facility]

## 2022-02-23 ENCOUNTER — TELEPHONE (OUTPATIENT)
Dept: INTERNAL MEDICINE | Facility: CLINIC | Age: 77
End: 2022-02-23
Payer: COMMERCIAL

## 2022-02-23 DIAGNOSIS — I10 ESSENTIAL HYPERTENSION: Primary | ICD-10-CM

## 2022-02-23 RX ORDER — POTASSIUM CHLORIDE 750 MG/1
10 TABLET, EXTENDED RELEASE ORAL DAILY
Qty: 90 TABLET | Refills: 3 | Status: SHIPPED | OUTPATIENT
Start: 2022-02-23 | End: 2023-03-05

## 2022-02-23 NOTE — TELEPHONE ENCOUNTER
Pending Prescriptions:                       Disp   Refills    potassium chloride ER (KLOR-CON) 10 MEQ C*90 tab*3            Sig: Take 1 tablet (10 mEq) by mouth daily

## 2022-02-23 NOTE — TELEPHONE ENCOUNTER
Reason for Call:  Other call back    Detailed comments: pt very low medication on:  Potassium Chloride    Pharm:  [facility]    Phone Number Patient can be reached at: Home number on file [phone] (home)    Best Time: anytime    Can we leave a detailed message on this number? YES    Call taken on 2/23/2022 at 12:49 PM by Miriam Meyer

## 2022-03-03 ENCOUNTER — LAB (OUTPATIENT)
Dept: LAB | Facility: CLINIC | Age: 77
End: 2022-03-03
Payer: COMMERCIAL

## 2022-03-03 DIAGNOSIS — M31.6 GCA (GIANT CELL ARTERITIS) (H): ICD-10-CM

## 2022-03-03 DIAGNOSIS — Z79.899 HIGH RISK MEDICATION USE: ICD-10-CM

## 2022-03-03 LAB
C REACTIVE PROTEIN LHE: 0.1 MG/DL (ref 0–0.8)
ERYTHROCYTE [SEDIMENTATION RATE] IN BLOOD BY WESTERGREN METHOD: 8 MM/HR (ref 0–20)

## 2022-03-03 PROCEDURE — 85652 RBC SED RATE AUTOMATED: CPT

## 2022-03-03 PROCEDURE — 86140 C-REACTIVE PROTEIN: CPT

## 2022-03-03 PROCEDURE — 36415 COLL VENOUS BLD VENIPUNCTURE: CPT

## 2022-03-08 ENCOUNTER — OFFICE VISIT (OUTPATIENT)
Dept: RHEUMATOLOGY | Facility: CLINIC | Age: 77
End: 2022-03-08
Payer: COMMERCIAL

## 2022-03-08 ENCOUNTER — TELEPHONE (OUTPATIENT)
Dept: RHEUMATOLOGY | Facility: CLINIC | Age: 77
End: 2022-03-08

## 2022-03-08 VITALS
BODY MASS INDEX: 26.19 KG/M2 | HEART RATE: 60 BPM | WEIGHT: 153.4 LBS | HEIGHT: 64 IN | SYSTOLIC BLOOD PRESSURE: 110 MMHG | DIASTOLIC BLOOD PRESSURE: 66 MMHG

## 2022-03-08 DIAGNOSIS — M15.0 PRIMARY OSTEOARTHRITIS INVOLVING MULTIPLE JOINTS: ICD-10-CM

## 2022-03-08 DIAGNOSIS — H47.019 ISCHEMIC OPTIC NEUROPATHY, UNSPECIFIED LATERALITY: ICD-10-CM

## 2022-03-08 DIAGNOSIS — Z79.899 HIGH RISK MEDICATION USE: ICD-10-CM

## 2022-03-08 DIAGNOSIS — M81.0 AGE-RELATED OSTEOPOROSIS WITHOUT CURRENT PATHOLOGICAL FRACTURE: ICD-10-CM

## 2022-03-08 DIAGNOSIS — M31.6 GCA (GIANT CELL ARTERITIS) (H): Primary | ICD-10-CM

## 2022-03-08 PROCEDURE — 99214 OFFICE O/P EST MOD 30 MIN: CPT | Performed by: INTERNAL MEDICINE

## 2022-03-08 RX ORDER — PREDNISONE 2.5 MG/1
TABLET ORAL
Qty: 90 TABLET | Refills: 1 | Status: SHIPPED | OUTPATIENT
Start: 2022-03-08 | End: 2022-10-06

## 2022-03-08 ASSESSMENT — JOINT PAIN
TOTAL NUMBER OF TENDER JOINTS: 0
TOTAL NUMBER OF SWOLLEN JOINTS: 0

## 2022-03-08 NOTE — PROGRESS NOTES
"      Rheumatology follow-up visit note     Virginia is a 76 year old female presents today for follow-up.    Virginia was seen today for recheck.    Diagnoses and all orders for this visit:    GCA (giant cell arteritis) (H)  -     predniSONE (DELTASONE) 2.5 MG tablet; 7.5 mg daily for 4 weeks, reduce by 2.5 mg to 5 milligrams daily    High risk medication use    Ischemic optic neuropathy, unspecified laterality    Primary osteoarthritis involving multiple joints    Age-related osteoporosis without current pathological fracture        This patient with biopsy-proven GCA continues to do well as she is tapered her prednisone now down to 10 mg a day, year ago she was at 70. She is going to continue taper she will go down to 7.5 and then down to 4:05 weeks. We will meet here in 2 months.    Follow up in 2 months.    HPI    Jenny Iglesias is a 76 year old female is here for follow-up of biopsy-proven temporal arteritis.  She is now down to 10 mg of prednisone. She noted no headache jaw discomfort or blurry vision of new onset she does have visual impairment in her left eye more so than the right that has been stable she follows up in ophthalmology. She has noted no significant issues as she tapered from 20 mg down to 10 recently. Her sed rate and CRP remain normal.      She is going to begin to taper next at 7.5 mg daily and then 5 and then will meet her in 2 months.    Following is the excerpt from a previous note: Originally she had atypical symptoms not the usual headache jaw claudication but had this \"funny feeling like a blast of sunlight\" and speech impairment in February this year.  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   On 3/1/2021 she was seen in Dr. Stockton's office, and  ischemic optic neuropathy was suspected, temporal artery biopsies were recommended, this was done on 3/3/2021 the report of that is attached.  She continues to taper prednisone.  She has not had deleterious effects that " "can be detected.  A month ago she noted blurriness of vision in the right eye.  This was her good eye.  This morning she was seen in ophthalmology cotton-wool spots were noted.  She brings paperwork with her.  Noted to increase her prednisone to 60 mg daily.  She is continued on this for the past for weeks, she has noted side effects, she is swelling up in her face, she feels that she is getting additional water retention.  She has not had recurrence of original symptoms.  Her most recent labs show normal sed rate and CRP done at her local clinic..  We discussed importance of taking calcium and vitamin D while she is on Fosamax,  background of osteopenia in addition to significant doses of prednisone.  Monitoring in her case would be more involved than usual because of her presenting symptoms are so atypical.  [place]  Her ophthalmologist wondered if she might be a candidate for Actemra.  She is already done some reading on this.  At this point she is not amenable to that suggestion.      DETAILED EXAMINATION  03/08/22  :    Vitals:    03/08/22 1015   BP: 110/66   BP Location: Right arm   Patient Position: Sitting   Cuff Size: Adult Regular   Pulse: 60   Weight: 69.6 kg (153 lb 6.4 oz)   Height: 1.626 m (5' 4\")     Alert oriented. Head including the face is examined for malar rash, heliotropes, scarring, lupus pernio. Eyes examined for redness such as in episcleritis/scleritis, periorbital lesions.   Neck/ Face examined for parotid gland swelling, range of motion of neck.  Left upper and lower and right upper and lower extremities examined for tenderness, swelling, warmth of the appendicular joints, range of motion, edema, rash.  Some of the important findings included: she does not have evidence of synovitis in the palpable joints of the upper extremities.  No significant deformities of the digits.  ++ Heberden nodes.  Range of motion of the shoulders  show " full abduction.  No JLT effusion or warmth of the knees.  she does not have dactylitis of the digits.     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     Priority: Medium     Fuchs' corneal dystrophy of both eyes 12/13/2021     Priority: Medium     Corneal edema of both eyes 12/13/2021     Priority: Medium     Localization-related focal epilepsy with simple partial seizures (H) 10/10/2021     Priority: Medium     Seizure disorder (H) 06/09/2021     Priority: Medium     GCA (giant cell arteritis) (H) 03/10/2021     Priority: Medium     TIA (transient ischemic attack)      Priority: Medium     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     Priority: Medium     Added automatically from request for surgery [id number]         Chronic kidney disease, stage 3 (H) 02/15/2021     Priority: Medium     Other chronic pain 10/07/2020     Priority: Medium     Chronic insomnia 02/25/2016     Priority: Medium     Vertigo 10/27/2015     Priority: Medium     Essential hypertension 10/27/2015     Priority: Medium     Restless leg syndrome 10/27/2015     Priority: Medium     Perimenopausal vasomotor symptoms 10/27/2015     Priority: Medium     Carotid stenosis, asymptomatic 10/27/2015     Priority: Medium     Past Surgical History:   Procedure Laterality Date     APPENDECTOMY       CATARACT EXTRACTION       CHOLECYSTECTOMY       HYSTERECTOMY       OTHER SURGICAL HISTORY Right 1998    fifth toe amputation     KS TEMPORAL ARTERY LIGATN OR BX Bilateral 3/4/2021    Procedure: BIOPSY, ARTERY, TEMPORAL;  Surgeon: Ramesh Box MD;  Location: [facility];  Service: General     TUBAL LIGATION        Past Medical History:   Diagnosis Date     Anxiety      Carotid artery stenosis 11/2013    50% left internal carotid artery      Chronic low back pain      Hypertension      Osteoarthritis of lumbar spine       Osteopenia     mild, resolved     Postmenopausal 11/2013    on hormone     Restless leg syndrome      Seizures (H)     Feb 18, 2021, Small lost vision and could not speak for 4-5 seconds     Surgical menopause     age 47     Thyroid nodule 11/2013    35mm, rightthyroid lobe biopsy-benign nodule     TIA (transient ischemic attack)      TIA (transient ischemic attack) 11/2013     Vertigo      Allergies   Allergen Reactions     Codeine Sulfate Shortness Of Breath and Anaphylaxis     Carbidopa-Levodopa      dystonia     Diclofenac      Diarrhea     Morphine      Current Outpatient Medications   Medication Sig Dispense Refill     alendronate (FOSAMAX) 70 MG tablet TAKE 1 TAB A WEEK IN THE MORNING ON EMPTY STOMACH WITH A FULL GLASS OF WATER 30MIN BEFORE FOOD 12 tablet 3     atorvastatin (LIPITOR) 40 MG tablet Take 1 tablet (40 mg) by mouth daily 90 tablet 3     diazepam (VALIUM) 2 MG tablet Take 1 tablet by mouth daily as needed       furosemide (LASIX) 20 MG tablet Take 1 tablet (20 mg) by mouth daily 90 tablet 1     hydrochlorothiazide (HYDRODIURIL) 25 MG tablet Take 1 tablet (25 mg) by mouth daily 90 tablet 3     lamoTRIgine (LAMICTAL) 25 MG tablet Take 50 mg by mouth 2 times daily       losartan (COZAAR) 100 MG tablet Take 1 tablet (100 mg) by mouth daily 90 tablet 3     metoprolol succinate ER (TOPROL-XL) 100 MG 24 hr tablet Take 1 tablet (100 mg) by mouth daily One tablet in the morning along with 25mg at night 90 tablet 3     metoprolol succinate ER (TOPROL-XL) 25 MG 24 hr tablet Twice daily 180 tablet 3     Multiple Vitamins-Minerals (CENTRUM PO) Take 1 tablet by mouth daily       potassium chloride ER (KLOR-CON) 10 MEQ CR tablet Take 1 tablet (10 mEq) by mouth daily 90 tablet 3     pramipexole (MIRAPEX) 0.25 MG tablet Take 1 tablet by mouth 2 times daily       predniSONE (DELTASONE) 5 MG tablet 17.5 mg daily, reduce by 2.5 mg every 2 weeks down to 15, 12.5, 10, 7.5 mg daily. 150 tablet 1     sertraline (ZOLOFT)  100 MG tablet Take 1 tablet (100 mg) by mouth daily 90 tablet 3     timolol maleate (TIMOPTIC) 0.5 % ophthalmic solution Place 1 drop into both eyes daily       zolpidem (AMBIEN) 10 MG tablet Take 1 tablet (10 mg) by mouth At Bedtime 90 tablet 3     family history includes Breast Cancer (age of onset: 57.00) in her mother; Hypertension in her sister; Lung Cancer (age of onset: 62.00) in her father.  Social Connections: Not on file          WBC Count   Date Value Ref Range Status   01/13/2022 13.7 (H) 4.0 - 11.0 10e3/uL Final     RBC Count   Date Value Ref Range Status   01/13/2022 4.55 3.80 - 5.20 10e6/uL Final     Hemoglobin   Date Value Ref Range Status   01/13/2022 14.2 11.7 - 15.7 g/dL Final     Hematocrit   Date Value Ref Range Status   01/13/2022 45.2 35.0 - 47.0 % Final     MCV   Date Value Ref Range Status   01/13/2022 99 78 - 100 fL Final     MCH   Date Value Ref Range Status   01/13/2022 31.2 26.5 - 33.0 pg Final     Platelet Count   Date Value Ref Range Status   01/13/2022 209 150 - 450 10e3/uL Final     % Lymphocytes   Date Value Ref Range Status   10/08/2020 34 20 - 40 % Final     AST   Date Value Ref Range Status   01/13/2022 23 0 - 40 U/L Final     ALT   Date Value Ref Range Status   01/13/2022 30 0 - 45 U/L Final     Albumin   Date Value Ref Range Status   01/13/2022 3.8 3.5 - 5.0 g/dL Final     Alkaline Phosphatase   Date Value Ref Range Status   01/13/2022 85 45 - 120 U/L Final     Creatinine   Date Value Ref Range Status   01/13/2022 1.27 (H) 0.60 - 1.10 mg/dL Final     GFR Estimate   Date Value Ref Range Status   01/13/2022 43 (L) >60 mL/min/1.73m2 Final     Comment:     Effective December 21, 2021 eGFRcr in adults is calculated using the 2021 CKD-EPI creatinine equation which includes age and gender (Efrain munson al., NEJM, DOI: 10.1056/MFTBuo1383274)   04/06/2021 39 (L) >60 mL/min/1.73m2 Final     GFR Estimate If Black   Date Value Ref Range Status   04/06/2021 48 (L) >60 mL/min/1.73m2 Final      Erythrocyte Sedimentation Rate   Date Value Ref Range Status   03/03/2022 8 0 - 20 mm/hr Final     CRP   Date Value Ref Range Status   03/03/2022 0.1 0.0-<0.8 mg/dL Final

## 2022-03-08 NOTE — TELEPHONE ENCOUNTER
[facility] Call Center    Phone Message    May a detailed message be left on voicemail: no     Reason for Call: Pt reports that she met with Dr. Han earlier today, and he wanted her to call and let him know what size tablets of prednisone she has.   She reports she has plenty of the 5 mg and 10 mg tablets. Does not have any of the 2.5 mg tablets at home, but will be picking that up from the pharmacy later.   No action needed, routed to Dr. Han/care team as an FYI.     Action Taken: Message routed to:  Other: RHEUMATOLOGY SUPPORT POOL    Travel Screening: Not Applicable

## 2022-04-12 ENCOUNTER — TRANSFERRED RECORDS (OUTPATIENT)
Dept: HEALTH INFORMATION MANAGEMENT | Facility: CLINIC | Age: 77
End: 2022-04-12
Payer: COMMERCIAL

## 2022-04-13 ENCOUNTER — OFFICE VISIT (OUTPATIENT)
Dept: INTERNAL MEDICINE | Facility: CLINIC | Age: 77
End: 2022-04-13
Payer: COMMERCIAL

## 2022-04-13 VITALS
HEIGHT: 64 IN | SYSTOLIC BLOOD PRESSURE: 106 MMHG | WEIGHT: 152.3 LBS | OXYGEN SATURATION: 97 % | DIASTOLIC BLOOD PRESSURE: 62 MMHG | HEART RATE: 58 BPM | BODY MASS INDEX: 26 KG/M2

## 2022-04-13 DIAGNOSIS — E11.9 TYPE 2 DIABETES MELLITUS WITHOUT COMPLICATION, WITHOUT LONG-TERM CURRENT USE OF INSULIN (H): ICD-10-CM

## 2022-04-13 DIAGNOSIS — Z23 HIGH PRIORITY FOR 2019-NCOV VACCINE: ICD-10-CM

## 2022-04-13 DIAGNOSIS — G40.909 SEIZURE DISORDER (H): ICD-10-CM

## 2022-04-13 DIAGNOSIS — F51.04 CHRONIC INSOMNIA: ICD-10-CM

## 2022-04-13 DIAGNOSIS — N18.31 STAGE 3A CHRONIC KIDNEY DISEASE (H): ICD-10-CM

## 2022-04-13 DIAGNOSIS — E55.9 VITAMIN D INSUFFICIENCY: ICD-10-CM

## 2022-04-13 DIAGNOSIS — Z79.899 ENCOUNTER FOR LONG-TERM (CURRENT) USE OF MEDICATIONS: Primary | ICD-10-CM

## 2022-04-13 DIAGNOSIS — R19.7 DIARRHEA, UNSPECIFIED TYPE: ICD-10-CM

## 2022-04-13 DIAGNOSIS — R42 DIZZINESS: ICD-10-CM

## 2022-04-13 DIAGNOSIS — I10 ESSENTIAL HYPERTENSION: ICD-10-CM

## 2022-04-13 DIAGNOSIS — L21.9 SEBORRHEIC DERMATITIS: ICD-10-CM

## 2022-04-13 LAB
ANION GAP SERPL CALCULATED.3IONS-SCNC: 12 MMOL/L (ref 5–18)
BUN SERPL-MCNC: 21 MG/DL (ref 8–28)
CALCIUM SERPL-MCNC: 10.1 MG/DL (ref 8.5–10.5)
CHLORIDE BLD-SCNC: 104 MMOL/L (ref 98–107)
CO2 SERPL-SCNC: 26 MMOL/L (ref 22–31)
CREAT SERPL-MCNC: 1.15 MG/DL (ref 0.6–1.1)
GFR SERPL CREATININE-BSD FRML MDRD: 49 ML/MIN/1.73M2
GLUCOSE BLD-MCNC: 111 MG/DL (ref 70–125)
HBA1C MFR BLD: 6 % (ref 0–5.6)
POTASSIUM BLD-SCNC: 3.5 MMOL/L (ref 3.5–5)
SODIUM SERPL-SCNC: 142 MMOL/L (ref 136–145)

## 2022-04-13 PROCEDURE — 82306 VITAMIN D 25 HYDROXY: CPT | Performed by: INTERNAL MEDICINE

## 2022-04-13 PROCEDURE — 80048 BASIC METABOLIC PNL TOTAL CA: CPT | Performed by: INTERNAL MEDICINE

## 2022-04-13 PROCEDURE — 36415 COLL VENOUS BLD VENIPUNCTURE: CPT | Performed by: INTERNAL MEDICINE

## 2022-04-13 PROCEDURE — 99214 OFFICE O/P EST MOD 30 MIN: CPT | Performed by: INTERNAL MEDICINE

## 2022-04-13 PROCEDURE — 0054A COVID-19,PF,PFIZER (12+ YRS): CPT | Performed by: INTERNAL MEDICINE

## 2022-04-13 PROCEDURE — 91305 COVID-19,PF,PFIZER (12+ YRS): CPT | Performed by: INTERNAL MEDICINE

## 2022-04-13 PROCEDURE — 83036 HEMOGLOBIN GLYCOSYLATED A1C: CPT | Performed by: INTERNAL MEDICINE

## 2022-04-13 RX ORDER — SELENIUM SULFIDE 2.5 MG/100ML
LOTION TOPICAL DAILY PRN
Qty: 118 ML | Refills: 3 | Status: SHIPPED | OUTPATIENT
Start: 2022-04-13 | End: 2024-02-22

## 2022-04-13 NOTE — PROGRESS NOTES
Assessment & Plan     Encounter for long-term (current) use of medications  Reviewed all medicines went through them and corrected them    Type 2 diabetes mellitus without complication, without long-term current use of insulin (H)  BP controlled,on statin , on aspirin, up to date on eye checks and microalbumin .     - HEMOGLOBIN A1C  - HEMOGLOBIN A1C    Essential hypertension  Was struggling with blood pressure issues but they have now finally stabilized her blood pressure is now actually low normal.    Stage 3a chronic kidney disease (H)  Creatinine   Date Value Ref Range Status   04/13/2022 1.15 (H) 0.60 - 1.10 mg/dL Final     She has mild stage IIIa chronic kidney disease explained that this is not significant and can be monitored    Seizure disorder (H)      Chronic insomnia  She continues to be on Ambien we have talked about reducing the dose and she wants to continue the current dose she is aware of risks  She only takes Valium sparingly does not use it to help her sleep  Dizziness  She continues to have nonspecific dizziness.  Although she is dramatically better ever since her prednisone has started to taper.  She is down to 5 mg her puffiness in the face is better  Sertraline was also increased and that considerably helped her anxiety.  High priority for 2019-nCoV vaccine  She should get the second booster today  - COVID-19,PF,PFIZER (12+ Yrs GRAY LABEL)    Diarrhea, unspecified type  She is complaining of new onset watery diarrhea without significant abdominal pain or bleeding or fever over the past 3 to 2 weeks we will get the C. difficile testing done she has not been on recent antibiotics  - Basic metabolic panel  (Ca, Cl, CO2, Creat, Gluc, K, Na, BUN)  - Clostridium difficile Toxin B PCR  - Fecal Lactoferrin  - Basic metabolic panel  (Ca, Cl, CO2, Creat, Gluc, K, Na, BUN)    Vitamin D insufficiency    - Vitamin D Deficiency  - Vitamin D Deficiency    Seborrheic dermatitis  She has some new scaly  "lesions in her scalp that is typical of seborrheic dermatitis  - selenium sulfide (SELSUN) 2.5 % external lotion  Dispense: 118 mL; Refill: 3               BMI:   Estimated body mass index is 26.14 kg/m  as calculated from the following:    Height as of this encounter: 1.626 m (5' 4\").    Weight as of this encounter: 69.1 kg (152 lb 4.8 oz).           Return in about 6 months (around 10/13/2022) for Routine preventive.    Karen Taylor MD  [facility]   Sara is a 76 year old who presents for the following health issues pleasant patient with a history of giant cell arteritis with amaurosis fugax presentation and is on a prednisone taper.  She also has type 2 diabetes  Was also recently diagnosed with epilepsy and was started on Lamictal.  History of Present Illness       Hypertension: She presents for follow up of hypertension.  She does check blood pressure  regularly outside of the clinic. Outpatient blood pressures have not been over 140/90. She follows a low salt diet.     She eats 2-3 servings of fruits and vegetables daily.She consumes 1 sweetened beverage(s) daily.She exercises with enough effort to increase her heart rate 10 to 19 minutes per day.  She exercises with enough effort to increase her heart rate 3 or less days per week.   She is taking medications regularly.       Patient Active Problem List   Diagnosis     Vertigo     Essential hypertension     Restless leg syndrome     Perimenopausal vasomotor symptoms     Carotid stenosis, asymptomatic     Chronic insomnia     Other chronic pain     Chronic kidney disease, stage 3 (H)     Ischemic optic neuropathy     TIA (transient ischemic attack)     GCA (giant cell arteritis) (H)     Seizure disorder (H)     Localization-related focal epilepsy with simple partial seizures (H)     Polymorphic amyloid degeneration of cornea     Fuchs' corneal dystrophy of both eyes     Corneal edema of both eyes     Diabetes " "mellitus, type 2 (H)     Current Outpatient Medications   Medication     alendronate (FOSAMAX) 70 MG tablet     atorvastatin (LIPITOR) 40 MG tablet     diazepam (VALIUM) 2 MG tablet     furosemide (LASIX) 20 MG tablet     hydrochlorothiazide (HYDRODIURIL) 25 MG tablet     lamoTRIgine (LAMICTAL) 25 MG tablet     losartan (COZAAR) 100 MG tablet     metoprolol succinate ER (TOPROL-XL) 100 MG 24 hr tablet     metoprolol succinate ER (TOPROL-XL) 25 MG 24 hr tablet     Multiple Vitamins-Minerals (CENTRUM PO)     potassium chloride ER (KLOR-CON) 10 MEQ CR tablet     pramipexole (MIRAPEX) 0.25 MG tablet     predniSONE (DELTASONE) 2.5 MG tablet     predniSONE (DELTASONE) 5 MG tablet     selenium sulfide (SELSUN) 2.5 % external lotion     sertraline (ZOLOFT) 100 MG tablet     timolol maleate (TIMOPTIC) 0.5 % ophthalmic solution     zolpidem (AMBIEN) 10 MG tablet     metroNIDAZOLE (FLAGYL) 500 MG tablet          No current facility-administered medications for this visit.           Review of Systems   no shortness of breath ,no  chest pain ,no  Falls, no urinary incontinence , no weight changes , sleep and moodhave been good . Independent in ADLS and IADLS         Objective    /62 (BP Location: Left arm, Patient Position: Sitting, Cuff Size: Adult Regular)   Pulse 58   Ht 1.626 m (5' 4\")   Wt 69.1 kg (152 lb 4.8 oz)   SpO2 97%   BMI 26.14 kg/m    Body mass index is 26.14 kg/m .  Physical Exam   GENERAL: healthy, alert and no distress  NECK: no adenopathy, no asymmetry, masses, or scars and thyroid normal to palpation  RESP: lungs clear to auscultation - no rales, rhonchi or wheezes  CV: regular rate and rhythm, normal S1 S2, no S3 or S4, no murmur, click or rub, no peripheral edema and peripheral pulses strong  ABDOMEN: soft, nontender, no hepatosplenomegaly, no masses and bowel sounds normal  MS: no gross musculoskeletal defects noted, no edema                    "

## 2022-04-14 ENCOUNTER — TRANSFERRED RECORDS (OUTPATIENT)
Dept: HEALTH INFORMATION MANAGEMENT | Facility: CLINIC | Age: 77
End: 2022-04-14

## 2022-04-14 ENCOUNTER — LAB (OUTPATIENT)
Dept: LAB | Facility: CLINIC | Age: 77
End: 2022-04-14
Payer: COMMERCIAL

## 2022-04-14 DIAGNOSIS — N18.30 CHRONIC KIDNEY DISEASE, STAGE 3 (H): Primary | ICD-10-CM

## 2022-04-14 DIAGNOSIS — R19.7 DIARRHEA, UNSPECIFIED TYPE: ICD-10-CM

## 2022-04-14 LAB
C DIFF TOX B STL QL: POSITIVE
DEPRECATED CALCIDIOL+CALCIFEROL SERPL-MC: 61 UG/L (ref 20–75)
LACTOFERRIN STL QL IA: NEGATIVE

## 2022-04-14 PROCEDURE — 83630 LACTOFERRIN FECAL (QUAL): CPT

## 2022-04-14 PROCEDURE — 87493 C DIFF AMPLIFIED PROBE: CPT

## 2022-04-15 ENCOUNTER — TELEPHONE (OUTPATIENT)
Dept: INTERNAL MEDICINE | Facility: CLINIC | Age: 77
End: 2022-04-15
Payer: COMMERCIAL

## 2022-04-15 DIAGNOSIS — A04.72 C. DIFFICILE COLITIS: Primary | ICD-10-CM

## 2022-04-15 RX ORDER — METRONIDAZOLE 500 MG/1
500 TABLET ORAL 3 TIMES DAILY
Qty: 30 TABLET | Refills: 0 | Status: SHIPPED | OUTPATIENT
Start: 2022-04-15 | End: 2022-10-06

## 2022-04-15 RX ORDER — VANCOMYCIN HYDROCHLORIDE 125 MG/1
125 CAPSULE ORAL 4 TIMES DAILY
Qty: 40 CAPSULE | Refills: 0 | Status: SHIPPED | OUTPATIENT
Start: 2022-04-15 | End: 2022-10-06

## 2022-04-15 NOTE — TELEPHONE ENCOUNTER
Pt is calling in and the medication is $90-she is hoping for something less expensive.  Please send in new medication.    Pharm is updated [facility]

## 2022-04-15 NOTE — TELEPHONE ENCOUNTER
Called patient she has C. difficile positive.  She still has active diarrhea lactoferrin was negative.  It is possible this is just a carrier state but because she is having symptoms we will treat we will send in vancomycin.  I did tell her if is not covered by insurance then do we will send Flagyl.  Patient agreed with plan

## 2022-05-17 ENCOUNTER — TRANSFERRED RECORDS (OUTPATIENT)
Dept: HEALTH INFORMATION MANAGEMENT | Facility: CLINIC | Age: 77
End: 2022-05-17

## 2022-05-17 ENCOUNTER — OFFICE VISIT (OUTPATIENT)
Dept: RHEUMATOLOGY | Facility: CLINIC | Age: 77
End: 2022-05-17
Payer: COMMERCIAL

## 2022-05-17 VITALS
SYSTOLIC BLOOD PRESSURE: 140 MMHG | BODY MASS INDEX: 26.09 KG/M2 | HEART RATE: 74 BPM | DIASTOLIC BLOOD PRESSURE: 74 MMHG | WEIGHT: 152 LBS

## 2022-05-17 DIAGNOSIS — Z79.899 HIGH RISK MEDICATION USE: ICD-10-CM

## 2022-05-17 DIAGNOSIS — M31.6 GCA (GIANT CELL ARTERITIS) (H): Primary | ICD-10-CM

## 2022-05-17 DIAGNOSIS — M15.0 PRIMARY OSTEOARTHRITIS INVOLVING MULTIPLE JOINTS: ICD-10-CM

## 2022-05-17 PROCEDURE — 99214 OFFICE O/P EST MOD 30 MIN: CPT | Performed by: INTERNAL MEDICINE

## 2022-05-17 RX ORDER — PREDNISONE 2.5 MG/1
2.5 TABLET ORAL DAILY
Qty: 30 TABLET | Refills: 0 | Status: SHIPPED | OUTPATIENT
Start: 2022-05-17 | End: 2022-06-16

## 2022-05-17 NOTE — PROGRESS NOTES
Rheumatology follow-up visit note     [place] is a 76 year old female presents today for follow-up.    [place] was seen today for recheck.    Diagnoses and all orders for this visit:    GCA (giant cell arteritis) (H)  -     predniSONE (DELTASONE) 2.5 MG tablet; Take 1 tablet (2.5 mg) by mouth daily  -     Erythrocyte sedimentation rate auto; Standing  -     CRP inflammation; Standing    High risk medication use  -     Erythrocyte sedimentation rate auto; Standing  -     CRP inflammation; Standing    Primary osteoarthritis involving multiple joints      GCA remains under good control as she has tapered prednisone down to 5 mg we discussed options going forward we chose to go for 2.5 mg a day for 30 days and discontinue.  She has osteoarthritis, management and suppose of those were reviewed.  We will meet here in 3 months with labs prior.  We discussed the potential for relapse in some patients.      HPI    Jenny Iglesias is a 76 year old female is here for follow-up of temporal arteritis.  She is now down to 5 mg of prednisone. She noted no headache jaw discomfort or blurry vision of new onset she does have visual impairment in her left eye more so than the right that has been stable she follows up in ophthalmology.  She gets occasional discomfort as he has tapered prednisone she felt the best when she was on the higher dose.  She has noted bruising.  She has noted hair loss.  She has not had original symptoms come back.  Even at the beginning her sed rate and CRP were only minimally elevated.    DETAILED EXAMINATION  05/17/22  :    Vitals:    05/17/22 0938   BP: (!) 140/74   Pulse: 74   Weight: 68.9 kg (152 lb)     Alert oriented. Head including the face is examined for malar rash, heliotropes, scarring, lupus pernio. Eyes examined for redness such as in episcleritis/scleritis, periorbital lesions.   Neck/ Face examined for parotid gland swelling, range of motion of neck.  Left upper and lower and right  upper and lower extremities examined for tenderness, swelling, warmth of the appendicular joints, range of motion, edema, rash.  Some of the important findings included: she does not have evidence of synovitis in the palpable joints of the upper extremities.  No significant deformities of the digits.  Small Heberden nodes.  Range of motion of the shoulders  show full abduction.  No JLT effusion or warmth of the knees.  she does not have dactylitis of the digits.     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     Priority: Medium     Fuchs' corneal dystrophy of both eyes 12/13/2021     Priority: Medium     Corneal edema of both eyes 12/13/2021     Priority: Medium     Localization-related focal epilepsy with simple partial seizures (H) 10/10/2021     Priority: Medium     Seizure disorder (H) 06/09/2021     Priority: Medium     GCA (giant cell arteritis) (H) 03/10/2021     Priority: Medium     TIA (transient ischemic attack)      Priority: Medium     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     Priority: Medium     Added automatically from request for surgery [id number]         Chronic kidney disease, stage 3 (H) 02/15/2021     Priority: Medium     Other chronic pain 10/07/2020     Priority: Medium     Chronic insomnia 02/25/2016     Priority: Medium     Vertigo 10/27/2015     Priority: Medium     Essential hypertension 10/27/2015     Priority: Medium     Restless leg syndrome 10/27/2015     Priority: Medium     Perimenopausal vasomotor symptoms 10/27/2015     Priority: Medium     Carotid stenosis, asymptomatic 10/27/2015     Priority: Medium     Past Surgical History:   Procedure Laterality Date     APPENDECTOMY       CATARACT EXTRACTION       CHOLECYSTECTOMY       HYSTERECTOMY       OTHER SURGICAL HISTORY Right 1998    fifth toe amputation     NC TEMPORAL ARTERY LIGATN OR BX Bilateral 3/4/2021     Procedure: BIOPSY, ARTERY, TEMPORAL;  Surgeon: Ramesh Box MD;  Location: [facility];  Service: General     TUBAL LIGATION        Past Medical History:   Diagnosis Date     Anxiety      Carotid artery stenosis 11/2013    50% left internal carotid artery      Chronic low back pain      Hypertension      Osteoarthritis of lumbar spine      Osteopenia     mild, resolved     Postmenopausal 11/2013    on hormone     Restless leg syndrome      Seizures (H)     Feb 18, 2021, Small lost vision and could not speak for 4-5 seconds     Surgical menopause     age 47     Thyroid nodule 11/2013    35mm, rightthyroid lobe biopsy-benign nodule     TIA (transient ischemic attack)      TIA (transient ischemic attack) 11/2013     Vertigo      Allergies   Allergen Reactions     Codeine Sulfate Shortness Of Breath and Anaphylaxis     Carbidopa-Levodopa      dystonia     Diclofenac      Diarrhea     Morphine      Current Outpatient Medications   Medication Sig Dispense Refill     alendronate (FOSAMAX) 70 MG tablet TAKE 1 TAB A WEEK IN THE MORNING ON EMPTY STOMACH WITH A FULL GLASS OF WATER 30MIN BEFORE FOOD 12 tablet 3     atorvastatin (LIPITOR) 40 MG tablet Take 1 tablet (40 mg) by mouth daily 90 tablet 3     diazepam (VALIUM) 2 MG tablet Take 1 tablet by mouth daily as needed       furosemide (LASIX) 20 MG tablet Take 1 tablet (20 mg) by mouth daily 90 tablet 1     hydrochlorothiazide (HYDRODIURIL) 25 MG tablet Take 1 tablet (25 mg) by mouth daily 90 tablet 3     lamoTRIgine (LAMICTAL) 25 MG tablet Take 50 mg by mouth 2 times daily       losartan (COZAAR) 100 MG tablet Take 1 tablet (100 mg) by mouth daily 90 tablet 3     metoprolol succinate ER (TOPROL-XL) 100 MG 24 hr tablet Take 1 tablet (100 mg) by mouth daily One tablet in the morning along with 25mg at night 90 tablet 3     metoprolol succinate ER (TOPROL-XL) 25 MG 24 hr tablet Twice daily 180 tablet 3     metroNIDAZOLE (FLAGYL) 500 MG tablet Take 1 tablet (500  mg) by mouth 3 times daily 30 tablet 0     Multiple Vitamins-Minerals (CENTRUM PO) Take 1 tablet by mouth daily       potassium chloride ER (KLOR-CON) 10 MEQ CR tablet Take 1 tablet (10 mEq) by mouth daily 90 tablet 3     pramipexole (MIRAPEX) 0.25 MG tablet Take 1 tablet by mouth 2 times daily       predniSONE (DELTASONE) 2.5 MG tablet 7.5 mg daily for 4 weeks, reduce by 2.5 mg to 5 milligrams daily (Patient taking differently: 5 mg 7.5 mg daily for 4 weeks, reduce by 2.5 mg to 5 milligrams daily) 90 tablet 1     predniSONE (DELTASONE) 5 MG tablet 17.5 mg daily, reduce by 2.5 mg every 2 weeks down to 15, 12.5, 10, 7.5 mg daily. 150 tablet 1     selenium sulfide (SELSUN) 2.5 % external lotion Apply topically daily as needed for itching or irritation 118 mL 3     sertraline (ZOLOFT) 100 MG tablet Take 1 tablet (100 mg) by mouth daily 90 tablet 3     timolol maleate (TIMOPTIC) 0.5 % ophthalmic solution Place 1 drop into both eyes daily       vancomycin (VANCOCIN) 125 MG capsule Take 1 capsule (125 mg) by mouth 4 times daily 40 capsule 0     zolpidem (AMBIEN) 10 MG tablet TAKE ONE TABLET BY MOUTH AT BEDTIME 90 tablet 3     family history includes Breast Cancer (age of onset: 57.00) in her mother; Hypertension in her sister; Lung Cancer (age of onset: 62.00) in her father.  Social Connections: Not on file          WBC Count   Date Value Ref Range Status   01/13/2022 13.7 (H) 4.0 - 11.0 10e3/uL Final     RBC Count   Date Value Ref Range Status   01/13/2022 4.55 3.80 - 5.20 10e6/uL Final     Hemoglobin   Date Value Ref Range Status   01/13/2022 14.2 11.7 - 15.7 g/dL Final     Hematocrit   Date Value Ref Range Status   01/13/2022 45.2 35.0 - 47.0 % Final     MCV   Date Value Ref Range Status   01/13/2022 99 78 - 100 fL Final     MCH   Date Value Ref Range Status   01/13/2022 31.2 26.5 - 33.0 pg Final     Platelet Count   Date Value Ref Range Status   01/13/2022 209 150 - 450 10e3/uL Final     % Lymphocytes   Date Value Ref  Range Status   10/08/2020 34 20 - 40 % Final     AST   Date Value Ref Range Status   01/13/2022 23 0 - 40 U/L Final     ALT   Date Value Ref Range Status   01/13/2022 30 0 - 45 U/L Final     Albumin   Date Value Ref Range Status   01/13/2022 3.8 3.5 - 5.0 g/dL Final     Alkaline Phosphatase   Date Value Ref Range Status   01/13/2022 85 45 - 120 U/L Final     Creatinine   Date Value Ref Range Status   04/13/2022 1.15 (H) 0.60 - 1.10 mg/dL Final     GFR Estimate   Date Value Ref Range Status   04/13/2022 49 (L) >60 mL/min/1.73m2 Final     Comment:     Effective December 21, 2021 eGFRcr in adults is calculated using the 2021 CKD-EPI creatinine equation which includes age and gender (Efrain et al., NEJ, DOI: 10.1056/UGHHky4948865)   04/06/2021 39 (L) >60 mL/min/1.73m2 Final     GFR Estimate If Black   Date Value Ref Range Status   04/06/2021 48 (L) >60 mL/min/1.73m2 Final     Erythrocyte Sedimentation Rate   Date Value Ref Range Status   03/03/2022 8 0 - 20 mm/hr Final     CRP   Date Value Ref Range Status   03/03/2022 0.1 0.0-<0.8 mg/dL Final

## 2022-07-05 DIAGNOSIS — F41.9 ANXIETY DISORDER, UNSPECIFIED: ICD-10-CM

## 2022-07-06 RX ORDER — DIAZEPAM 2 MG
TABLET ORAL
Qty: 60 TABLET | Refills: 3 | Status: SHIPPED | OUTPATIENT
Start: 2022-07-06 | End: 2022-10-06

## 2022-07-20 ENCOUNTER — TELEPHONE (OUTPATIENT)
Dept: OPHTHALMOLOGY | Facility: CLINIC | Age: 77
End: 2022-07-20

## 2022-08-18 ENCOUNTER — TRANSFERRED RECORDS (OUTPATIENT)
Dept: HEALTH INFORMATION MANAGEMENT | Facility: CLINIC | Age: 77
End: 2022-08-18

## 2022-09-10 ENCOUNTER — HEALTH MAINTENANCE LETTER (OUTPATIENT)
Age: 77
End: 2022-09-10

## 2022-09-14 ENCOUNTER — TRANSFERRED RECORDS (OUTPATIENT)
Dept: HEALTH INFORMATION MANAGEMENT | Facility: CLINIC | Age: 77
End: 2022-09-14

## 2022-10-06 ENCOUNTER — OFFICE VISIT (OUTPATIENT)
Dept: INTERNAL MEDICINE | Facility: CLINIC | Age: 77
End: 2022-10-06
Payer: COMMERCIAL

## 2022-10-06 VITALS
TEMPERATURE: 98.1 F | BODY MASS INDEX: 24.43 KG/M2 | DIASTOLIC BLOOD PRESSURE: 59 MMHG | RESPIRATION RATE: 15 BRPM | HEIGHT: 65 IN | HEART RATE: 48 BPM | OXYGEN SATURATION: 97 % | WEIGHT: 146.6 LBS | SYSTOLIC BLOOD PRESSURE: 128 MMHG

## 2022-10-06 DIAGNOSIS — R19.7 DIARRHEA, UNSPECIFIED TYPE: ICD-10-CM

## 2022-10-06 DIAGNOSIS — Z13.220 SCREENING FOR HYPERLIPIDEMIA: ICD-10-CM

## 2022-10-06 DIAGNOSIS — I10 ESSENTIAL HYPERTENSION: ICD-10-CM

## 2022-10-06 DIAGNOSIS — M19.90 ARTHRITIS: Primary | ICD-10-CM

## 2022-10-06 DIAGNOSIS — H18.513 FUCHS' CORNEAL DYSTROPHY OF BOTH EYES: ICD-10-CM

## 2022-10-06 DIAGNOSIS — G45.9 TIA (TRANSIENT ISCHEMIC ATTACK): ICD-10-CM

## 2022-10-06 DIAGNOSIS — G25.81 RESTLESS LEG SYNDROME: ICD-10-CM

## 2022-10-06 DIAGNOSIS — Z79.899 ENCOUNTER FOR LONG-TERM (CURRENT) USE OF MEDICATIONS: ICD-10-CM

## 2022-10-06 DIAGNOSIS — H47.019 ISCHEMIC OPTIC NEUROPATHY, UNSPECIFIED LATERALITY: ICD-10-CM

## 2022-10-06 DIAGNOSIS — E11.9 TYPE 2 DIABETES MELLITUS WITHOUT COMPLICATION, WITHOUT LONG-TERM CURRENT USE OF INSULIN (H): ICD-10-CM

## 2022-10-06 DIAGNOSIS — F51.04 CHRONIC INSOMNIA: ICD-10-CM

## 2022-10-06 DIAGNOSIS — M17.11 PRIMARY OSTEOARTHRITIS OF RIGHT KNEE: ICD-10-CM

## 2022-10-06 DIAGNOSIS — M31.6 GCA (GIANT CELL ARTERITIS) (H): ICD-10-CM

## 2022-10-06 DIAGNOSIS — M81.0 AGE-RELATED OSTEOPOROSIS WITHOUT CURRENT PATHOLOGICAL FRACTURE: ICD-10-CM

## 2022-10-06 DIAGNOSIS — G40.909 SEIZURE DISORDER (H): ICD-10-CM

## 2022-10-06 DIAGNOSIS — N18.32 STAGE 3B CHRONIC KIDNEY DISEASE (H): ICD-10-CM

## 2022-10-06 DIAGNOSIS — A04.72 C. DIFFICILE COLITIS: ICD-10-CM

## 2022-10-06 PROBLEM — M53.3 COCCYX PAIN: Status: ACTIVE | Noted: 2019-09-13

## 2022-10-06 LAB
ALBUMIN SERPL BCG-MCNC: 4.1 G/DL (ref 3.5–5.2)
ALP SERPL-CCNC: 149 U/L (ref 35–104)
ALT SERPL W P-5'-P-CCNC: 19 U/L (ref 10–35)
ANION GAP SERPL CALCULATED.3IONS-SCNC: 10 MMOL/L (ref 7–15)
AST SERPL W P-5'-P-CCNC: 33 U/L (ref 10–35)
BILIRUB SERPL-MCNC: 0.4 MG/DL
BUN SERPL-MCNC: 23.9 MG/DL (ref 8–23)
CALCIUM SERPL-MCNC: 10.5 MG/DL (ref 8.8–10.2)
CHLORIDE SERPL-SCNC: 102 MMOL/L (ref 98–107)
CHOLEST SERPL-MCNC: 155 MG/DL
CREAT SERPL-MCNC: 1.26 MG/DL (ref 0.51–0.95)
CRP SERPL-MCNC: <3 MG/L
DEPRECATED HCO3 PLAS-SCNC: 27 MMOL/L (ref 22–29)
ERYTHROCYTE [DISTWIDTH] IN BLOOD BY AUTOMATED COUNT: 13.8 % (ref 10–15)
ERYTHROCYTE [SEDIMENTATION RATE] IN BLOOD BY WESTERGREN METHOD: 13 MM/HR (ref 0–20)
GFR SERPL CREATININE-BSD FRML MDRD: 43 ML/MIN/1.73M2
GLUCOSE SERPL-MCNC: 99 MG/DL (ref 70–99)
HBA1C MFR BLD: 5.9 % (ref 0–5.6)
HCT VFR BLD AUTO: 42.7 % (ref 35–47)
HDLC SERPL-MCNC: 82 MG/DL
HGB BLD-MCNC: 13.7 G/DL (ref 11.7–15.7)
LDLC SERPL CALC-MCNC: 61 MG/DL
MCH RBC QN AUTO: 30 PG (ref 26.5–33)
MCHC RBC AUTO-ENTMCNC: 32.1 G/DL (ref 31.5–36.5)
MCV RBC AUTO: 93 FL (ref 78–100)
NONHDLC SERPL-MCNC: 73 MG/DL
PLATELET # BLD AUTO: 247 10E3/UL (ref 150–450)
POTASSIUM SERPL-SCNC: 3.6 MMOL/L (ref 3.4–5.3)
PROT SERPL-MCNC: 6.8 G/DL (ref 6.4–8.3)
RBC # BLD AUTO: 4.57 10E6/UL (ref 3.8–5.2)
SODIUM SERPL-SCNC: 139 MMOL/L (ref 136–145)
TRIGL SERPL-MCNC: 61 MG/DL
WBC # BLD AUTO: 8.7 10E3/UL (ref 4–11)

## 2022-10-06 PROCEDURE — 83630 LACTOFERRIN FECAL (QUAL): CPT | Performed by: INTERNAL MEDICINE

## 2022-10-06 PROCEDURE — 86039 ANTINUCLEAR ANTIBODIES (ANA): CPT | Performed by: INTERNAL MEDICINE

## 2022-10-06 PROCEDURE — 86038 ANTINUCLEAR ANTIBODIES: CPT | Performed by: INTERNAL MEDICINE

## 2022-10-06 PROCEDURE — G0008 ADMIN INFLUENZA VIRUS VAC: HCPCS | Mod: 59 | Performed by: INTERNAL MEDICINE

## 2022-10-06 PROCEDURE — 90662 IIV NO PRSV INCREASED AG IM: CPT | Performed by: INTERNAL MEDICINE

## 2022-10-06 PROCEDURE — 86200 CCP ANTIBODY: CPT | Performed by: INTERNAL MEDICINE

## 2022-10-06 PROCEDURE — 85652 RBC SED RATE AUTOMATED: CPT | Performed by: INTERNAL MEDICINE

## 2022-10-06 PROCEDURE — 80053 COMPREHEN METABOLIC PANEL: CPT | Performed by: INTERNAL MEDICINE

## 2022-10-06 PROCEDURE — 0124A COVID-19,PF,PFIZER BOOSTER BIVALENT: CPT | Performed by: INTERNAL MEDICINE

## 2022-10-06 PROCEDURE — 80061 LIPID PANEL: CPT | Performed by: INTERNAL MEDICINE

## 2022-10-06 PROCEDURE — 86431 RHEUMATOID FACTOR QUANT: CPT | Performed by: INTERNAL MEDICINE

## 2022-10-06 PROCEDURE — 85027 COMPLETE CBC AUTOMATED: CPT | Performed by: INTERNAL MEDICINE

## 2022-10-06 PROCEDURE — 83036 HEMOGLOBIN GLYCOSYLATED A1C: CPT | Performed by: INTERNAL MEDICINE

## 2022-10-06 PROCEDURE — 99000 SPECIMEN HANDLING OFFICE-LAB: CPT | Performed by: INTERNAL MEDICINE

## 2022-10-06 PROCEDURE — 80175 DRUG SCREEN QUAN LAMOTRIGINE: CPT | Mod: 90 | Performed by: INTERNAL MEDICINE

## 2022-10-06 PROCEDURE — 36415 COLL VENOUS BLD VENIPUNCTURE: CPT | Performed by: INTERNAL MEDICINE

## 2022-10-06 PROCEDURE — G0439 PPPS, SUBSEQ VISIT: HCPCS | Performed by: INTERNAL MEDICINE

## 2022-10-06 PROCEDURE — 91312 COVID-19,PF,PFIZER BOOSTER BIVALENT: CPT | Performed by: INTERNAL MEDICINE

## 2022-10-06 PROCEDURE — 99214 OFFICE O/P EST MOD 30 MIN: CPT | Mod: 25 | Performed by: INTERNAL MEDICINE

## 2022-10-06 PROCEDURE — 86140 C-REACTIVE PROTEIN: CPT | Performed by: INTERNAL MEDICINE

## 2022-10-06 RX ORDER — METRONIDAZOLE 500 MG/1
500 TABLET ORAL 3 TIMES DAILY
Qty: 30 TABLET | Refills: 0 | Status: SHIPPED | OUTPATIENT
Start: 2022-10-06 | End: 2023-04-03

## 2022-10-06 RX ORDER — MIRTAZAPINE 15 MG/1
15 TABLET, FILM COATED ORAL AT BEDTIME
Qty: 90 TABLET | Refills: 3 | Status: SHIPPED | OUTPATIENT
Start: 2022-10-06 | End: 2023-04-03

## 2022-10-06 RX ORDER — ALENDRONATE SODIUM 70 MG/1
TABLET ORAL
Qty: 12 TABLET | Refills: 3 | Status: SHIPPED | OUTPATIENT
Start: 2022-10-06 | End: 2023-06-02

## 2022-10-06 ASSESSMENT — ENCOUNTER SYMPTOMS
NAUSEA: 0
COUGH: 0
BREAST MASS: 0
HEADACHES: 0
DIARRHEA: 1
MYALGIAS: 1
ARTHRALGIAS: 1
DYSURIA: 0
FEVER: 0
HEMATURIA: 0
HEMATOCHEZIA: 0
ABDOMINAL PAIN: 0
FREQUENCY: 0
NERVOUS/ANXIOUS: 0
PARESTHESIAS: 1
CHILLS: 0
SHORTNESS OF BREATH: 0
SORE THROAT: 0
PALPITATIONS: 0
HEARTBURN: 0
JOINT SWELLING: 1
CONSTIPATION: 0
DIZZINESS: 1
WEAKNESS: 0

## 2022-10-06 ASSESSMENT — ACTIVITIES OF DAILY LIVING (ADL): CURRENT_FUNCTION: NO ASSISTANCE NEEDED

## 2022-10-06 NOTE — PROGRESS NOTES
"SUBJECTIVE:   Sara is a 77 year old who presents for Preventive Visit.  She has a history of giant cell arteritis, seizure disorder, and ischemic optic neuropathy, she used to be on high-dose steroids and had quite a lot of side effects with the higher dose.       Are you in the first 12 months of your Medicare coverage?  No    Healthy Habits:     In general, how would you rate your overall health?  Good    Frequency of exercise:  2-3 days/week    Duration of exercise:  15-30 minutes    Do you usually eat at least 4 servings of fruit and vegetables a day, include whole grains    & fiber and avoid regularly eating high fat or \"junk\" foods?  No    Taking medications regularly:  Yes    Medication side effects:  Muscle aches and Lightheadedness    Ability to successfully perform activities of daily living:  No assistance needed    Home Safety:  No safety concerns identified    Hearing Impairment:  Difficulty following a conversation in a noisy restaurant or crowded room and difficulty understanding soft or whispered speech    In the past 6 months, have you been bothered by leaking of urine?  No    In general, how would you rate your overall mental or emotional health?  Good      PHQ-2 Total Score: 0    Additional concerns today:  No    Do you feel safe in your environment? Yes    Have you ever done Advance Care Planning? (For example, a Health Directive, POLST, or a discussion with a medical provider or your loved ones about your wishes): Yes, patient states has an Advance Care Planning document and will bring a copy to the clinic.       Fall risk  Fallen 2 or more times in the past year?: No  Any fall with injury in the past year?: No    Cognitive Screening :L        Six Item Cognitive Impairment Test   (6CIT):      What year is it?                               Correct - 0 points    What month is it?                               Correct - 0 points      Give the patient an address to remember with five " components:   [place] ( first and last name - 2 components)   [address]  (number and name of street - 2 components)   [place] ( city - 1 component)      About what time is it (within the hour)? Correct - 0 points    Count backwards from 20 to 1:   Correct - 0 points    Say the months of the year in reverse: Correct - 0 points    Repeat the address phrase:   1 error - 2 points    Total 6CIT Score:      2/28    Interpretation: The 6CIT uses an inverse score and questions are weighted to produce a total out of 28. Scores of 0-7 are considered normal and 8 or more significant.    Advantages The test has high sensitivity without compromising specificity even in mild dementia. It is easy to translate linguistically and culturally.  Disadvantages The main disadvantage is in the scoring and weighting of the test, which is initially confusing, however computer models have simplified this greatly.    Probability Statistics: At the 7/8 cut off: Overall figures sensitivity 90% specificity 100%, in mild dementia sensitivity = 78% , specificity = 100%    Copyright 2000 The [facility]. Courtesy of Dr. Taj Montesinos  Patient Active Problem List   Diagnosis     Vertigo     Essential hypertension     Restless leg syndrome     Perimenopausal vasomotor symptoms     Carotid stenosis, asymptomatic     Chronic insomnia     Other chronic pain     Chronic kidney disease, stage 3 (H)     Ischemic optic neuropathy     TIA (transient ischemic attack)     GCA (giant cell arteritis) (H)     Seizure disorder (H)     Localization-related focal epilepsy with simple partial seizures (H)     Polymorphic amyloid degeneration of cornea     Fuchs' corneal dystrophy of both eyes     Corneal edema of both eyes     Diabetes mellitus, type 2 (H)     Current Outpatient Medications   Medication     alendronate (FOSAMAX) 70 MG tablet     atorvastatin (LIPITOR) 40 MG tablet     furosemide (LASIX) 20 MG tablet      hydrochlorothiazide (HYDRODIURIL) 25 MG tablet     lamoTRIgine (LAMICTAL) 25 MG tablet     losartan (COZAAR) 100 MG tablet     metoprolol succinate ER (TOPROL XL) 100 MG 24 hr tablet     metoprolol succinate ER (TOPROL XL) 25 MG 24 hr tablet     Multiple Vitamins-Minerals (CENTRUM PO)     potassium chloride ER (KLOR-CON) 10 MEQ CR tablet     pramipexole (MIRAPEX) 0.25 MG tablet     selenium sulfide (SELSUN) 2.5 % external lotion     sertraline (ZOLOFT) 100 MG tablet     timolol maleate (TIMOPTIC) 0.5 % ophthalmic solution     vancomycin (VANCOCIN) 125 MG capsule     diazepam (VALIUM) 2 MG tablet     metroNIDAZOLE (FLAGYL) 500 MG tablet     predniSONE (DELTASONE) 2.5 MG tablet     zolpidem (AMBIEN) 10 MG tablet     No current facility-administered medications for this visit.         Do you have sleep apnea, excessive snoring or daytime drowsiness?: no    Reviewed and updated as needed this visit by clinical staff   Tobacco  Allergies  Meds                Reviewed and updated as needed this visit by Provider                   Social History     Tobacco Use     Smoking status: Former Smoker     Quit date: 10/22/2000     Years since quittin.9     Smokeless tobacco: Never Used   Substance Use Topics     Alcohol use: Yes         Alcohol Use 10/6/2022   Prescreen: >3 drinks/day or >7 drinks/week? No               Current providers sharing in care for this patient include:   Patient Care Team:  Karen Taylor MD as PCP - General  Karen Taylor MD as Assigned PCP  Mark Han MBBS as Assigned Rheumatology Provider  Tung Joshi MD as Assigned Surgical Provider    The following health maintenance items are reviewed in Epic and correct as of today:  Health Maintenance   Topic Date Due     MICROALBUMIN  Never done     DIABETIC FOOT EXAM  Never done     URINE DRUG SCREEN  Never done     DTAP/TDAP/TD IMMUNIZATION (1 - Tdap) Never done     ZOSTER IMMUNIZATION (2 of 3) 2008     COVID-19 Vaccine (5 - Booster  for Pfizer series) 06/08/2022     A1C  07/13/2022     INFLUENZA VACCINE (1) 09/01/2022     MEDICARE ANNUAL WELLNESS VISIT  10/08/2022     LIPID  10/08/2022     ANNUAL REVIEW OF HM ORDERS  10/08/2022     EYE EXAM  11/10/2022     HEMOGLOBIN  01/13/2023     BMP  04/13/2023     FALL RISK ASSESSMENT  10/06/2023     ADVANCE CARE PLANNING  10/10/2026     DEXA  02/24/2036     HEPATITIS C SCREENING  Completed     PHQ-2 (once per calendar year)  Completed     Pneumococcal Vaccine: 65+ Years  Completed     URINALYSIS  Completed     IPV IMMUNIZATION  Aged Out     MENINGITIS IMMUNIZATION  Aged Out     Patient Active Problem List   Diagnosis     Vertigo     Essential hypertension     Restless leg syndrome     Perimenopausal vasomotor symptoms     Carotid stenosis, asymptomatic     Chronic insomnia     Other chronic pain     Chronic kidney disease, stage 3 (H)     Ischemic optic neuropathy     TIA (transient ischemic attack)     GCA (giant cell arteritis) (H)     Seizure disorder (H)     Localization-related focal epilepsy with simple partial seizures (H)     Polymorphic amyloid degeneration of cornea     Fuchs' corneal dystrophy of both eyes     Corneal edema of both eyes     Diabetes mellitus, type 2 (H)     Coccyx pain     Current Outpatient Medications   Medication     alendronate (FOSAMAX) 70 MG tablet     atorvastatin (LIPITOR) 40 MG tablet     diclofenac (VOLTAREN) 1 % topical gel     furosemide (LASIX) 20 MG tablet     hydrochlorothiazide (HYDRODIURIL) 25 MG tablet     lamoTRIgine (LAMICTAL) 25 MG tablet     losartan (COZAAR) 100 MG tablet     metoprolol succinate ER (TOPROL XL) 100 MG 24 hr tablet     metoprolol succinate ER (TOPROL XL) 25 MG 24 hr tablet     metroNIDAZOLE (FLAGYL) 500 MG tablet     mirtazapine (REMERON) 15 MG tablet     Multiple Vitamins-Minerals (CENTRUM PO)     potassium chloride ER (KLOR-CON) 10 MEQ CR tablet     pramipexole (MIRAPEX) 0.25 MG tablet     selenium sulfide (SELSUN) 2.5 % external lotion  "    sertraline (ZOLOFT) 100 MG tablet     timolol maleate (TIMOPTIC) 0.5 % ophthalmic solution     No current facility-administered medications for this visit.             Pertinent mammograms are reviewed under the imaging tab.    Review of Systems   Constitutional: Negative for chills and fever.   HENT: Positive for hearing loss. Negative for congestion and sore throat.    Eyes: Negative for visual disturbance.   Respiratory: Negative for cough and shortness of breath.    Cardiovascular: Positive for peripheral edema. Negative for chest pain and palpitations.   Gastrointestinal: Positive for diarrhea. Negative for abdominal pain, constipation, heartburn, hematochezia and nausea.   Breasts:  Negative for tenderness, breast mass and discharge.   Genitourinary: Negative for dysuria, frequency, genital sores, hematuria, pelvic pain, urgency, vaginal bleeding and vaginal discharge.   Musculoskeletal: Positive for arthralgias, joint swelling and myalgias.   Skin: Negative for rash.   Neurological: Positive for dizziness and paresthesias. Negative for weakness and headaches.   Psychiatric/Behavioral: Negative for mood changes. The patient is not nervous/anxious.          OBJECTIVE:   /59 (BP Location: Left arm, Patient Position: Sitting, Cuff Size: Adult Regular)   Pulse (!) 48   Temp 98.1  F (36.7  C) (Oral)   Resp 15   Ht 1.638 m (5' 4.5\")   Wt 66.5 kg (146 lb 9.6 oz)   SpO2 97%   BMI 24.78 kg/m   Estimated body mass index is 24.78 kg/m  as calculated from the following:    Height as of this encounter: 1.638 m (5' 4.5\").    Weight as of this encounter: 66.5 kg (146 lb 9.6 oz).  Physical Exam  GENERAL: healthy, alert and no distress  EYES: Eyes grossly normal to inspection, PERRL and conjunctivae and sclerae normal  HENT: ear canals and TM's normal, nose and mouth without ulcers or lesions  NECK: no adenopathy, no asymmetry, masses, or scars and thyroid normal to palpation  RESP: lungs clear to auscultation " - no rales, rhonchi or wheezes  BREAST: normal without masses, tenderness or nipple discharge and no palpable axillary masses or adenopathy  CV: regular rate and rhythm, normal S1 S2, no S3 or S4, no murmur, click or rub, no peripheral edema and peripheral pulses strong  ABDOMEN: soft, nontender, no hepatosplenomegaly, no masses and bowel sounds normal  MS: no gross musculoskeletal defects noted, no edema  SKIN: no suspicious lesions or rashes  NEURO: Normal strength and tone, mentation intact and speech normal  PSYCH: mentation appears normal, affect normal/bright    rombergs negative    heel lift normal     ASSESSMENT / PLAN:   (M19.90) Arthritis  (primary encounter diagnosis)  Comment:   Plan: no synovitis but pain flared up after stopping prednisone. Prednisone can help degenerative arthritis.   Can try tyelnol and OTC NSAID as needed . Now that she is off prednisone . Will do autoimmune work up   (Z79.349) Encounter for long-term (current) use of medications  Comment:  Plan:     (Z13.220) Screening for hyperlipidemia  Comment:   Plan: Lipid panel reflex to direct LDL Non-fasting            (I10) Essential hypertension  Comment: controlled , heart rate is low . But home readings pulse rate is higher   Plan:  (F51.04) Chronic insomnia  Comment: obnly ambien helps but she has tapered off it and the few tablets she has left she only uses as needed. She realizes risk of taking ambien . Is willing to try alternative . Recommend mirtazapine . Currently she has diarrhea so should be well tolerated   Plan:     (N18.32) Stage 3b chronic kidney disease (H)  Comment:   Plan:     (H47.019) Ischemic optic neuropathy, unspecified laterality  Comment: stable   Plan:     (G45.9) TIA (transient ischemic attack)  Comment:   Plan: CRP, inflammation, ESR: Erythrocyte         sedimentation rate, Rheumatoid factor, Cyclic         Citrullinated Peptide Antibody IgG, Anti         Nuclear Zoe IgG by IFA with Reflex, mirtazapine         "(REMERON) 15 MG tablet            (M31.6) GCA (giant cell arteritis) (H)  Comment:   Plan:  Slow taper off prednisone completed and sed rate and CRP are now consistently normal     (H18.513) Fuchs' corneal dystrophy of both eyes  Comment:   Plan:     (G25.81) Restless leg syndrome  Comment: on mirapex   Plan:     (E11.9) Type 2 diabetes mellitus without complication, without long-term current use of insulin (H)  Comment: well controlled on diet alone   Plan: HEMOGLOBIN A1C            (G40.909) Seizure disorder (H)  Comment: presenting like TIAs hon long term   Plan: Lamotrigine Level            (R19.7) Diarrhea, unspecified type  Comment: was c diff positive  , vancomycin was not covered so she was given flagyl which but symptoms have relapsed . I did tell her to check c diff before starting flagyl again   Plan: Fecal Lactoferrin, Clostridium difficile Toxin . Vancomycin was not covered .         B PCR, COLOGUARD([employer]), Comprehensive        metabolic panel, CBC with platelets            (M81.0) Age-related osteoporosis without current pathological fracture  Comment:   Plan: DX Hip/Pelvis/Spine, alendronate (FOSAMAX) 70         MG tablet        Needs repeat Dexa on fosamax to see if bone density is improving     (M17.11) Primary osteoarthritis of right knee  Comment:   Plan: Physical Therapy Referral, diclofenac         (VOLTAREN) 1 % topical gel            (A04.72) C. difficile colitis  Comment:    Plan: metroNIDAZOLE (FLAGYL) 500 MG tablet                  COUNSELING:  Reviewed preventive health counseling, as reflected in patient instructions    Estimated body mass index is 24.78 kg/m  as calculated from the following:    Height as of this encounter: 1.638 m (5' 4.5\").    Weight as of this encounter: 66.5 kg (146 lb 9.6 oz).        She reports that she quit smoking about 21 years ago. She has never used smokeless tobacco.      Appropriate preventive services were discussed with this patient, including " applicable screening as appropriate for cardiovascular disease, diabetes, osteopenia/osteoporosis, and glaucoma.  As appropriate for age/gender, discussed screening for colorectal cancer, prostate cancer, breast cancer, and cervical cancer. Checklist reviewing preventive services available has been given to the patient.    Reviewed patients plan of care and provided an AVS. The Basic Care Plan (routine screening as documented in Health Maintenance) for [place] meets the Care Plan requirement. This Care Plan has been established and reviewed with the Patient.    Counseling Resources:  ATP IV Guidelines  Pooled Cohorts Equation Calculator  Breast Cancer Risk Calculator  Breast Cancer: Medication to Reduce Risk  FRAX Risk Assessment  ICSI Preventive Guidelines  Dietary Guidelines for Americans, 2010  USDA's MyPlate  ASA Prophylaxis  Lung CA Screening    Karen Taylor MD  [facility]    Identified Health Risks:

## 2022-10-07 LAB
ANA PAT SER IF-IMP: ABNORMAL
ANA SER QL IF: POSITIVE
ANA TITR SER IF: ABNORMAL {TITER}
C DIFF TOX B STL QL: POSITIVE
LACTOFERRIN STL QL IA: POSITIVE
LAMOTRIGINE SERPL-MCNC: 5.8 UG/ML
RHEUMATOID FACT SER NEPH-ACNC: <6 IU/ML

## 2022-10-07 PROCEDURE — 87493 C DIFF AMPLIFIED PROBE: CPT | Performed by: INTERNAL MEDICINE

## 2022-10-08 NOTE — RESULT ENCOUNTER NOTE
Please let her know that she has C diff . This was the infection she had in may . The best medication for it is vancomycin but she said it was not covered so flagyl was given / it helped her and that is why it has been resent for ten days  but since this is a recurrence she needs to come back if her diarrhea is not normal because then we have no choice but to give her vancomycin . She should also not wait for so long next time because untreated the infection could have spread .

## 2022-10-10 ENCOUNTER — TELEPHONE (OUTPATIENT)
Dept: INTERNAL MEDICINE | Facility: CLINIC | Age: 77
End: 2022-10-10

## 2022-10-10 DIAGNOSIS — I10 ESSENTIAL HYPERTENSION: ICD-10-CM

## 2022-10-10 LAB — CCP AB SER IA-ACNC: 1.4 U/ML

## 2022-10-10 RX ORDER — METOPROLOL SUCCINATE 100 MG/1
TABLET, EXTENDED RELEASE ORAL
Qty: 90 TABLET | Refills: 2 | Status: SHIPPED | OUTPATIENT
Start: 2022-10-10 | End: 2023-02-28

## 2022-10-10 NOTE — TELEPHONE ENCOUNTER
----- Message from Karen Taylor MD sent at 10/7/2022 11:20 PM CDT -----  Please let her know that she has C diff . This was the infection she had in may . The best medication for it is vancomycin but she said it was not covered so flagyl was given / it helped her and that is why it has been resent for ten days  but since this is a recurrence she needs to come back if her diarrhea is not normal because then we have no choice but to give her vancomycin . She should also not wait for so long next time because untreated the infection could have spread .

## 2022-10-10 NOTE — TELEPHONE ENCOUNTER
Spoke with patient and relayed information below from Dr Taylor. Patient verbalized understanding and has no further questions.

## 2022-10-11 ENCOUNTER — TRANSFERRED RECORDS (OUTPATIENT)
Dept: HEALTH INFORMATION MANAGEMENT | Facility: CLINIC | Age: 77
End: 2022-10-11

## 2022-10-13 ENCOUNTER — TRANSFERRED RECORDS (OUTPATIENT)
Dept: HEALTH INFORMATION MANAGEMENT | Facility: CLINIC | Age: 77
End: 2022-10-13

## 2022-10-26 ENCOUNTER — ANCILLARY PROCEDURE (OUTPATIENT)
Dept: BONE DENSITY | Facility: CLINIC | Age: 77
End: 2022-10-26
Attending: INTERNAL MEDICINE
Payer: COMMERCIAL

## 2022-10-26 DIAGNOSIS — M81.0 AGE-RELATED OSTEOPOROSIS WITHOUT CURRENT PATHOLOGICAL FRACTURE: ICD-10-CM

## 2022-10-26 PROCEDURE — 77080 DXA BONE DENSITY AXIAL: CPT | Performed by: INTERNAL MEDICINE

## 2022-10-29 DIAGNOSIS — Z13.220 SCREENING FOR HYPERLIPIDEMIA: Primary | ICD-10-CM

## 2022-10-30 RX ORDER — ATORVASTATIN CALCIUM 40 MG/1
TABLET, FILM COATED ORAL
Qty: 90 TABLET | Refills: 3 | Status: SHIPPED | OUTPATIENT
Start: 2022-10-30 | End: 2022-11-30

## 2022-10-30 RX ORDER — PRAMIPEXOLE DIHYDROCHLORIDE 0.25 MG/1
TABLET ORAL
Qty: 180 TABLET | Refills: 3 | Status: SHIPPED | OUTPATIENT
Start: 2022-10-30 | End: 2022-11-30

## 2022-10-30 NOTE — TELEPHONE ENCOUNTER
"Routing refill request to provider for review/approval because:  Medication is reported/historical        Last Written Prescription Date:  1/13/22  Last Fill Quantity: ,  # refills:    Last office visit provider:  10/6/22     Requested Prescriptions   Pending Prescriptions Disp Refills     atorvastatin (LIPITOR) 40 MG tablet [Pharmacy Med Name: ATORVASTATIN 40 MG TABLET] 90 tablet 3     Sig: TAKE 1 TABLET BY MOUTH EVERY DAY       Statins Protocol Passed - 10/29/2022 12:17 AM        Passed - LDL on file in past 12 months     Recent Labs   Lab Test 10/06/22  1258   LDL 61             Passed - No abnormal creatine kinase in past 12 months     No lab results found.             Passed - Recent (12 mo) or future (30 days) visit within the authorizing provider's specialty     Patient has had an office visit with the authorizing provider or a provider within the authorizing providers department within the previous 12 mos or has a future within next 30 days. See \"Patient Info\" tab in inbasket, or \"Choose Columns\" in Meds & Orders section of the refill encounter.              Passed - Medication is active on med list        Passed - Patient is age 18 or older        Passed - No active pregnancy on record        Passed - No positive pregnancy test in past 12 months           pramipexole (MIRAPEX) 0.25 MG tablet [Pharmacy Med Name: PRAMIPEXOLE 0.25 MG TABLET] 180 tablet      Sig: TAKE ONE TAB BY MOUTH TWICE DAILY       Antiparkinson's Agents Protocol Passed - 10/29/2022 12:17 AM        Passed - Blood pressure under 140/90 in past 12 months     BP Readings from Last 3 Encounters:   10/06/22 128/59   05/17/22 (!) 140/74   04/13/22 106/62                 Passed - CBC on record in past 12 months     Recent Labs   Lab Test 10/06/22  1258   WBC 8.7   RBC 4.57   HGB 13.7   HCT 42.7                    Passed - ALT on record in past 12 months         Recent Labs   Lab Test 10/06/22  1258   ALT 19             Passed - Serum " "Creatinine on file in past 12 months     Recent Labs   Lab Test 10/06/22  1258   CR 1.26*       Ok to refill medication if creatinine is low          Passed - Medication is active on med list        Passed - Patient is age 18 or older        Passed - No active pregnancy on record        Passed - No positive pregnancy test in the past 12 months        Passed - Recent (6 mo) or future (30 days) visit within the authorizing provider's specialty     Patient had office visit in the last 6 months or has a visit in the next 30 days with authorizing provider or within the authorizing provider's specialty.  See \"Patient Info\" tab in inbasket, or \"Choose Columns\" in Meds & Orders section of the refill encounter.                 Jessica Anderson RN 10/30/22 12:06 PM  "

## 2022-11-01 LAB — NONINV COLON CA DNA+OCC BLD SCRN STL QL: NEGATIVE

## 2022-11-02 ENCOUNTER — HOSPITAL ENCOUNTER (OUTPATIENT)
Dept: GENERAL RADIOLOGY | Facility: HOSPITAL | Age: 77
Discharge: HOME | End: 2022-11-02
Attending: INTERNAL MEDICINE | Admitting: INTERNAL MEDICINE
Payer: COMMERCIAL

## 2022-11-02 ENCOUNTER — OFFICE VISIT (OUTPATIENT)
Dept: RHEUMATOLOGY | Facility: CLINIC | Age: 77
End: 2022-11-02
Payer: COMMERCIAL

## 2022-11-02 ENCOUNTER — LAB (OUTPATIENT)
Dept: LAB | Facility: CLINIC | Age: 77
End: 2022-11-02
Payer: COMMERCIAL

## 2022-11-02 VITALS
DIASTOLIC BLOOD PRESSURE: 70 MMHG | SYSTOLIC BLOOD PRESSURE: 122 MMHG | BODY MASS INDEX: 24.31 KG/M2 | HEART RATE: 64 BPM | WEIGHT: 145.9 LBS | HEIGHT: 65 IN

## 2022-11-02 DIAGNOSIS — M25.50 POLYARTHRALGIA: ICD-10-CM

## 2022-11-02 DIAGNOSIS — Z79.899 HIGH RISK MEDICATION USE: ICD-10-CM

## 2022-11-02 DIAGNOSIS — M15.0 PRIMARY OSTEOARTHRITIS INVOLVING MULTIPLE JOINTS: ICD-10-CM

## 2022-11-02 DIAGNOSIS — Z87.39 HISTORY OF TEMPORAL ARTERITIS: ICD-10-CM

## 2022-11-02 DIAGNOSIS — R76.8 ANA POSITIVE: ICD-10-CM

## 2022-11-02 DIAGNOSIS — M25.50 POLYARTHRALGIA: Primary | ICD-10-CM

## 2022-11-02 DIAGNOSIS — M31.6 GCA (GIANT CELL ARTERITIS) (H): ICD-10-CM

## 2022-11-02 LAB
CRP SERPL-MCNC: <3 MG/L
ERYTHROCYTE [SEDIMENTATION RATE] IN BLOOD BY WESTERGREN METHOD: 17 MM/HR (ref 0–20)
URATE SERPL-MCNC: 6.1 MG/DL (ref 2.4–5.7)

## 2022-11-02 PROCEDURE — 36415 COLL VENOUS BLD VENIPUNCTURE: CPT

## 2022-11-02 PROCEDURE — 84550 ASSAY OF BLOOD/URIC ACID: CPT

## 2022-11-02 PROCEDURE — 99214 OFFICE O/P EST MOD 30 MIN: CPT | Mod: 25 | Performed by: INTERNAL MEDICINE

## 2022-11-02 PROCEDURE — 86160 COMPLEMENT ANTIGEN: CPT

## 2022-11-02 PROCEDURE — 85652 RBC SED RATE AUTOMATED: CPT

## 2022-11-02 PROCEDURE — 20610 DRAIN/INJ JOINT/BURSA W/O US: CPT | Mod: 50 | Performed by: INTERNAL MEDICINE

## 2022-11-02 PROCEDURE — 73560 X-RAY EXAM OF KNEE 1 OR 2: CPT | Mod: 50,FY

## 2022-11-02 PROCEDURE — 86140 C-REACTIVE PROTEIN: CPT

## 2022-11-02 PROCEDURE — 86225 DNA ANTIBODY NATIVE: CPT

## 2022-11-02 RX ORDER — TRIAMCINOLONE ACETONIDE 40 MG/ML
40 INJECTION, SUSPENSION INTRA-ARTICULAR; INTRAMUSCULAR ONCE
Status: COMPLETED | OUTPATIENT
Start: 2022-11-02 | End: 2022-11-02

## 2022-11-02 RX ADMIN — TRIAMCINOLONE ACETONIDE 40 MG: 40 INJECTION, SUSPENSION INTRA-ARTICULAR; INTRAMUSCULAR at 15:12

## 2022-11-02 RX ADMIN — TRIAMCINOLONE ACETONIDE 40 MG: 40 INJECTION, SUSPENSION INTRA-ARTICULAR; INTRAMUSCULAR at 15:11

## 2022-11-02 NOTE — PROGRESS NOTES
Rheumatology follow-up visit note     Virginia is a 77 year old female presents today for follow-up.    Virginia was seen today for recheck.    Diagnoses and all orders for this visit:    Polyarthralgia  -     XR Knee AP/Lat Standing Bilateral; Future  -     Complement C3; Future  -     Complement C4; Future  -     DNA double stranded antibodies; Future  -     Uric acid; Future  -     triamcinolone (KENALOG-40) injection 40 mg  -     triamcinolone (KENALOG-40) injection 40 mg  -     ASPIRATION/INJECTION MAJOR JOINT    CHRISTIAN positive  -     Complement C3; Future  -     Complement C4; Future  -     DNA double stranded antibodies; Future  -     Uric acid; Future    Primary osteoarthritis involving multiple joints  -     XR Knee AP/Lat Standing Bilateral; Future  -     triamcinolone (KENALOG-40) injection 40 mg  -     triamcinolone (KENALOG-40) injection 40 mg  -     ASPIRATION/INJECTION MAJOR JOINT    History of temporal arteritis        Her polyarthralgias require further looking into particularly given her recent finding of a positive CHRISTIAN at 1: 160, rheumatoid factor anti-CCP were negative, she does not, at this juncture, have clear evidence of inflammatory joint disease or active connective tissue disease.  This is discussed with her.  Her worst pains are in the knees and likely due to osteoarthritis would like to proceed local injection pros and cons outlined 40 mg of Kenalog injected anterolateral approach on each of the knees.  Follow-up in 3 months or sooner..     Follow up in 3 months.    HPI    Jenny Iglesias is a 77 year old female is here for follow-up.  Over the last several week she has been complaining of increasing pain.  Indeed she noted that she can see finished a course of prednisone her symptoms have been gradually progressive.  This was back in May of this year when she had tapering and then discontinuation of prednisone for giant cell arteritis.  She has noted the worst pain in the knees.   "Interfering with activity.  Moderately severe to severe.  She has noted some discomfort in the hands and shoulders.  She feels stiff in the morning for half an hour.  Recent work-up is reviewed positive for CHRISTIAN at 1: 160.  She has not observed swelling in the joints.        DETAILED EXAMINATION  11/02/22  :    Vitals:    11/02/22 0956   BP: 122/70   BP Location: Right arm   Patient Position: Sitting   Cuff Size: Adult Regular   Pulse: 64   Weight: 66.2 kg (145 lb 14.4 oz)   Height: 1.638 m (5' 4.5\")     Alert oriented. Head including the face is examined for malar rash, heliotropes, scarring, lupus pernio. Eyes examined for redness such as in episcleritis/scleritis, periorbital lesions.   Neck/ Face examined for parotid gland swelling, range of motion of neck.  Left upper and lower and right upper and lower extremities examined for tenderness, swelling, warmth of the appendicular joints, range of motion, edema, rash.  Some of the important findings included: she does not have evidence of synovitis in the palpable joints of the upper extremities.  No significant deformities of the digits.  + Heberden nodes.  Range of motion of the shoulders  show full abduction.  She has marked joint line tenderness of the knee especially in the right side.  She does not have dactylitis of the digits.     Patient Active Problem List    Diagnosis Date Noted     C. 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     Priority: Medium     Fuchs' corneal dystrophy of both eyes 12/13/2021     Priority: Medium     Corneal edema of both eyes 12/13/2021     Priority: Medium     Localization-related focal epilepsy with simple partial seizures (H) 10/10/2021     Priority: Medium     Seizure disorder (H) 06/09/2021     Priority: Medium     GCA (giant cell arteritis) (H) 03/10/2021     Priority: Medium     TIA (transient " ischemic attack)      Priority: Medium     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     Priority: Medium     Added automatically from request for surgery [id number]         Chronic kidney disease, stage 3 (H) 02/15/2021     Priority: Medium     Other chronic pain 10/07/2020     Priority: Medium     Coccyx pain 09/13/2019     Priority: Medium     Chronic insomnia 02/25/2016     Priority: Medium     Vertigo 10/27/2015     Priority: Medium     Essential hypertension 10/27/2015     Priority: Medium     Restless leg syndrome 10/27/2015     Priority: Medium     Perimenopausal vasomotor symptoms 10/27/2015     Priority: Medium     Carotid stenosis, asymptomatic 10/27/2015     Priority: Medium     Past Surgical History:   Procedure Laterality Date     APPENDECTOMY       CATARACT EXTRACTION       CHOLECYSTECTOMY       HYSTERECTOMY       OTHER SURGICAL HISTORY Right 1998    fifth toe amputation     [place] TEMPORAL ARTERY LIGATN OR BX Bilateral 3/4/2021    Procedure: BIOPSY, ARTERY, TEMPORAL;  Surgeon: Ramesh Box MD;  Location: [facility];  Service: General     TUBAL LIGATION        Past Medical History:   Diagnosis Date     Anxiety      Carotid artery stenosis 11/2013    50% left internal carotid artery      Chronic low back pain      Hypertension      Osteoarthritis of lumbar spine      Osteopenia     mild, resolved     Postmenopausal 11/2013    on hormone     Restless leg syndrome      Seizures (H)     Feb 18, 2021, Small lost vision and could not speak for 4-5 seconds     Surgical menopause     age 47     Thyroid nodule 11/2013    35mm, rightthyroid lobe biopsy-benign nodule     TIA (transient ischemic attack)      TIA (transient ischemic attack) 11/2013     Vertigo      Allergies   Allergen Reactions     Codeine Sulfate Shortness Of Breath and Anaphylaxis     Carbidopa-Levodopa      dystonia     Diclofenac      Diarrhea     Morphine      Current Outpatient Medications   Medication Sig Dispense Refill     alendronate  (FOSAMAX) 70 MG tablet TAKE 1 TAB A WEEK IN THE MORNING ON EMPTY STOMACH WITH A FULL GLASS OF WATER 30MIN BEFORE FOOD 12 tablet 3     atorvastatin (LIPITOR) 40 MG tablet TAKE 1 TABLET BY MOUTH EVERY DAY 90 tablet 3     diclofenac (VOLTAREN) 1 % topical gel Apply 4 g topically 4 times daily 350 g 3     furosemide (LASIX) 20 MG tablet Take 1 tablet (20 mg) by mouth daily 90 tablet 1     hydrochlorothiazide (HYDRODIURIL) 25 MG tablet Take 1 tablet (25 mg) by mouth daily 90 tablet 3     lamoTRIgine (LAMICTAL) 25 MG tablet Take 50 mg by mouth 2 times daily       losartan (COZAAR) 100 MG tablet Take 1 tablet (100 mg) by mouth daily 90 tablet 3     metoprolol succinate ER (TOPROL XL) 100 MG 24 hr tablet TAKE 1 TABLET BY MOUTH IN THE MORNING ALONG WITH 25 MG AT NIGHT 90 tablet 2     metoprolol succinate ER (TOPROL XL) 25 MG 24 hr tablet Twice daily 180 tablet 3     metroNIDAZOLE (FLAGYL) 500 MG tablet Take 1 tablet (500 mg) by mouth 3 times daily 30 tablet 0     mirtazapine (REMERON) 15 MG tablet Take 1 tablet (15 mg) by mouth At Bedtime 90 tablet 3     Multiple Vitamins-Minerals (CENTRUM PO) Take 1 tablet by mouth daily       potassium chloride ER (KLOR-CON) 10 MEQ CR tablet Take 1 tablet (10 mEq) by mouth daily 90 tablet 3     pramipexole (MIRAPEX) 0.25 MG tablet TAKE ONE TAB BY MOUTH TWICE DAILY 180 tablet 3     selenium sulfide (SELSUN) 2.5 % external lotion Apply topically daily as needed for itching or irritation 118 mL 3     sertraline (ZOLOFT) 100 MG tablet Take 1 tablet (100 mg) by mouth daily 90 tablet 3     timolol maleate (TIMOPTIC) 0.5 % ophthalmic solution Place 1 drop into both eyes daily       family history includes Breast Cancer (age of onset: 57.00) in her mother; Hypertension in her sister; Lung Cancer (age of onset: 62.00) in her father.  Social Connections: Not on file          WBC Count   Date Value Ref Range Status   10/06/2022 8.7 4.0 - 11.0 10e3/uL Final     RBC Count   Date Value Ref Range Status    10/06/2022 4.57 3.80 - 5.20 10e6/uL Final     Hemoglobin   Date Value Ref Range Status   10/06/2022 13.7 11.7 - 15.7 g/dL Final     Hematocrit   Date Value Ref Range Status   10/06/2022 42.7 35.0 - 47.0 % Final     MCV   Date Value Ref Range Status   10/06/2022 93 78 - 100 fL Final     MCH   Date Value Ref Range Status   10/06/2022 30.0 26.5 - 33.0 pg Final     Platelet Count   Date Value Ref Range Status   10/06/2022 247 150 - 450 10e3/uL Final     % Lymphocytes   Date Value Ref Range Status   10/08/2020 34 20 - 40 % Final     AST   Date Value Ref Range Status   10/06/2022 33 10 - 35 U/L Final     ALT   Date Value Ref Range Status   10/06/2022 19 10 - 35 U/L Final     Albumin   Date Value Ref Range Status   10/06/2022 4.1 3.5 - 5.2 g/dL Final   01/13/2022 3.8 3.5 - 5.0 g/dL Final     Alkaline Phosphatase   Date Value Ref Range Status   10/06/2022 149 (H) 35 - 104 U/L Final     Creatinine   Date Value Ref Range Status   10/06/2022 1.26 (H) 0.51 - 0.95 mg/dL Final     GFR Estimate   Date Value Ref Range Status   10/06/2022 43 (L) >60 mL/min/1.73m2 Final     Comment:     Effective December 21, 2021 eGFRcr in adults is calculated using the 2021 CKD-EPI creatinine equation which includes age and gender (Efrain et al., [place], DOI: 10.1056/RVUSnj8736813)   04/06/2021 39 (L) >60 mL/min/1.73m2 Final     GFR Estimate If Black   Date Value Ref Range Status   04/06/2021 48 (L) >60 mL/min/1.73m2 Final     Erythrocyte Sedimentation Rate   Date Value Ref Range Status   10/06/2022 13 0 - 20 mm/hr Final     CRP Inflammation   Date Value Ref Range Status   10/06/2022 <3.00 <5.00 mg/L Final     CRP   Date Value Ref Range Status   03/03/2022 0.1 0.0-<0.8 mg/dL Final

## 2022-11-03 LAB
C3 SERPL-MCNC: 139 MG/DL (ref 81–157)
C4 SERPL-MCNC: 23 MG/DL (ref 13–39)
DSDNA AB SER-ACNC: <0.6 IU/ML

## 2022-11-30 DIAGNOSIS — Z13.220 SCREENING FOR HYPERLIPIDEMIA: ICD-10-CM

## 2022-12-01 RX ORDER — PRAMIPEXOLE DIHYDROCHLORIDE 0.25 MG/1
TABLET ORAL
Qty: 180 TABLET | Refills: 2 | Status: SHIPPED | OUTPATIENT
Start: 2022-12-01 | End: 2023-06-05

## 2022-12-01 RX ORDER — ATORVASTATIN CALCIUM 40 MG/1
40 TABLET, FILM COATED ORAL DAILY
Qty: 90 TABLET | Refills: 2 | Status: SHIPPED | OUTPATIENT
Start: 2022-12-01 | End: 2024-01-04

## 2023-01-02 ENCOUNTER — OFFICE VISIT (OUTPATIENT)
Dept: RHEUMATOLOGY | Facility: CLINIC | Age: 78
End: 2023-01-02
Payer: COMMERCIAL

## 2023-01-02 VITALS
BODY MASS INDEX: 25.23 KG/M2 | SYSTOLIC BLOOD PRESSURE: 150 MMHG | HEART RATE: 68 BPM | WEIGHT: 149.3 LBS | DIASTOLIC BLOOD PRESSURE: 82 MMHG

## 2023-01-02 DIAGNOSIS — R76.8 ANA POSITIVE: ICD-10-CM

## 2023-01-02 DIAGNOSIS — M15.0 PRIMARY OSTEOARTHRITIS INVOLVING MULTIPLE JOINTS: Primary | ICD-10-CM

## 2023-01-02 DIAGNOSIS — Z87.39 HISTORY OF TEMPORAL ARTERITIS: ICD-10-CM

## 2023-01-02 PROCEDURE — 99214 OFFICE O/P EST MOD 30 MIN: CPT | Mod: 25 | Performed by: INTERNAL MEDICINE

## 2023-01-02 PROCEDURE — 20610 DRAIN/INJ JOINT/BURSA W/O US: CPT | Mod: RT | Performed by: INTERNAL MEDICINE

## 2023-01-02 RX ORDER — TRIAMCINOLONE ACETONIDE 40 MG/ML
40 INJECTION, SUSPENSION INTRA-ARTICULAR; INTRAMUSCULAR ONCE
Status: COMPLETED | OUTPATIENT
Start: 2023-01-02 | End: 2023-01-02

## 2023-01-02 RX ADMIN — TRIAMCINOLONE ACETONIDE 40 MG: 40 INJECTION, SUSPENSION INTRA-ARTICULAR; INTRAMUSCULAR at 12:35

## 2023-01-02 NOTE — PROGRESS NOTES
Rheumatology follow-up visit note     Virginia is a 77 year old female presents today for follow-up.    Virginia was seen today for recheck.    Diagnoses and all orders for this visit:    Primary osteoarthritis involving multiple joints  -     triamcinolone (KENALOG-40) injection 40 mg  -     ASPIRATION/INJECTION MAJOR JOINT    CHRISTIAN positive    History of temporal arteritis      We discussed management of OA, she is going to continue staying active strengthening of the quads, and other nonpharmacological measures online.  Acetaminophen recommended.  She would like to go for corticosteroid injection pros and cons reviewed.      After pros and cons were discussed including risk of infection, bleeding, skin changes including thinning, pigmentary alteration and scarring to name a few, right knee injected as noted in the orders section. This was done with nontouch technique.  The patient tolerated the procedure well and had a brisk Marcaine effect.  The postinjection care was discussed.      Follow up in 4 months.    HPI    Jenny Iglesias is a 77 year old female is here for follow-up. Over the last 2-3 weeks she has been complaining of increasing pain in her right knee.  Previous to that a corticosteroid injection has been very helpful.  She finds it worthwhile.  No other joint areas are affected no rash mouth ulcers photosensitivity, pleuritic symptoms no history of DVT PE in the interim.        DETAILED EXAMINATION  01/02/23  :    Vitals:    01/02/23 1055   BP: (!) 150/82   Pulse: 68   Weight: 67.7 kg (149 lb 4.8 oz)     Alert oriented. Head including the face is examined for malar rash, heliotropes, scarring, lupus pernio. Eyes examined for redness such as in episcleritis/scleritis, periorbital lesions.   Neck/ Face examined for parotid gland swelling, range of motion of neck.  Left upper and lower and right upper and lower extremities examined for tenderness, swelling, warmth of the appendicular joints,  range of motion, edema, rash.  Some of the important findings included: she does not have evidence of synovitis in the palpable joints of the upper extremities.    + Heberden nodes.  Range of motion of the shoulders  show full abduction.  No JLT effusion or warmth of the knees.  she does not have dactylitis of the digits.     Patient Active Problem List    Diagnosis Date Noted     C. 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     Priority: Medium     Fuchs' corneal dystrophy of both eyes 12/13/2021     Priority: Medium     Corneal edema of both eyes 12/13/2021     Priority: Medium     Localization-related focal epilepsy with simple partial seizures (H) 10/10/2021     Priority: Medium     Seizure disorder (H) 06/09/2021     Priority: Medium     GCA (giant cell arteritis) (H) 03/10/2021     Priority: Medium     TIA (transient ischemic attack)      Priority: Medium     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     Priority: Medium     Added automatically from request for surgery [id number]         Chronic kidney disease, stage 3 (H) 02/15/2021     Priority: Medium     Other chronic pain 10/07/2020     Priority: Medium     Coccyx pain 09/13/2019     Priority: Medium     Chronic insomnia 02/25/2016     Priority: Medium     Vertigo 10/27/2015     Priority: Medium     Essential hypertension 10/27/2015     Priority: Medium     Restless leg syndrome 10/27/2015     Priority: Medium     Perimenopausal vasomotor symptoms 10/27/2015     Priority: Medium     Carotid stenosis, asymptomatic 10/27/2015     Priority: Medium     Past Surgical History:   Procedure Laterality Date     APPENDECTOMY       CATARACT EXTRACTION       CHOLECYSTECTOMY       HYSTERECTOMY       OTHER SURGICAL HISTORY Right 1998    fifth toe amputation     [place] TEMPORAL ARTERY LIGATN OR BX Bilateral 3/4/2021    Procedure: BIOPSY, ARTERY,  TEMPORAL;  Surgeon: Ramesh Box MD;  Location: [facility];  Service: General     TUBAL LIGATION        Past Medical History:   Diagnosis Date     Anxiety      Carotid artery stenosis 11/2013    50% left internal carotid artery      Chronic low back pain      Hypertension      Osteoarthritis of lumbar spine      Osteopenia     mild, resolved     Postmenopausal 11/2013    on hormone     Restless leg syndrome      Seizures (H)     Feb 18, 2021, Small lost vision and could not speak for 4-5 seconds     Surgical menopause     age 47     Thyroid nodule 11/2013    35mm, rightthyroid lobe biopsy-benign nodule     TIA (transient ischemic attack)      TIA (transient ischemic attack) 11/2013     Vertigo      Allergies   Allergen Reactions     Codeine Sulfate Shortness Of Breath and Anaphylaxis     Carbidopa-Levodopa      dystonia     Diclofenac      Diarrhea     Morphine      Current Outpatient Medications   Medication Sig Dispense Refill     alendronate (FOSAMAX) 70 MG tablet TAKE 1 TAB A WEEK IN THE MORNING ON EMPTY STOMACH WITH A FULL GLASS OF WATER 30MIN BEFORE FOOD 12 tablet 3     atorvastatin (LIPITOR) 40 MG tablet Take 1 tablet (40 mg) by mouth daily 90 tablet 2     diclofenac (VOLTAREN) 1 % topical gel Apply 4 g topically 4 times daily 350 g 3     furosemide (LASIX) 20 MG tablet Take 1 tablet (20 mg) by mouth daily 90 tablet 1     hydrochlorothiazide (HYDRODIURIL) 25 MG tablet Take 1 tablet (25 mg) by mouth daily 90 tablet 3     lamoTRIgine (LAMICTAL) 25 MG tablet Take 50 mg by mouth 2 times daily       losartan (COZAAR) 100 MG tablet Take 1 tablet (100 mg) by mouth daily 90 tablet 3     metoprolol succinate ER (TOPROL XL) 100 MG 24 hr tablet TAKE 1 TABLET BY MOUTH IN THE MORNING ALONG WITH 25 MG AT NIGHT 90 tablet 2     metoprolol succinate ER (TOPROL XL) 25 MG 24 hr tablet Twice daily 180 tablet 3     metroNIDAZOLE (FLAGYL) 500 MG tablet Take 1 tablet (500 mg) by mouth 3 times daily 30 tablet 0      mirtazapine (REMERON) 15 MG tablet Take 1 tablet (15 mg) by mouth At Bedtime 90 tablet 3     Multiple Vitamins-Minerals (CENTRUM PO) Take 1 tablet by mouth daily       potassium chloride ER (KLOR-CON) 10 MEQ CR tablet Take 1 tablet (10 mEq) by mouth daily 90 tablet 3     pramipexole (MIRAPEX) 0.25 MG tablet TAKE ONE TAB BY MOUTH TWICE DAILY 180 tablet 2     selenium sulfide (SELSUN) 2.5 % external lotion Apply topically daily as needed for itching or irritation 118 mL 3     sertraline (ZOLOFT) 100 MG tablet Take 1 tablet (100 mg) by mouth daily 90 tablet 3     timolol maleate (TIMOPTIC) 0.5 % ophthalmic solution Place 1 drop into both eyes daily       family history includes Breast Cancer (age of onset: 57.00) in her mother; Hypertension in her sister; Lung Cancer (age of onset: 62.00) in her father.  Social Connections: Not on file          WBC Count   Date Value Ref Range Status   10/06/2022 8.7 4.0 - 11.0 10e3/uL Final     RBC Count   Date Value Ref Range Status   10/06/2022 4.57 3.80 - 5.20 10e6/uL Final     Hemoglobin   Date Value Ref Range Status   10/06/2022 13.7 11.7 - 15.7 g/dL Final     Hematocrit   Date Value Ref Range Status   10/06/2022 42.7 35.0 - 47.0 % Final     MCV   Date Value Ref Range Status   10/06/2022 93 78 - 100 fL Final     MCH   Date Value Ref Range Status   10/06/2022 30.0 26.5 - 33.0 pg Final     Platelet Count   Date Value Ref Range Status   10/06/2022 247 150 - 450 10e3/uL Final     % Lymphocytes   Date Value Ref Range Status   10/08/2020 34 20 - 40 % Final     AST   Date Value Ref Range Status   10/06/2022 33 10 - 35 U/L Final     ALT   Date Value Ref Range Status   10/06/2022 19 10 - 35 U/L Final     Albumin   Date Value Ref Range Status   10/06/2022 4.1 3.5 - 5.2 g/dL Final   01/13/2022 3.8 3.5 - 5.0 g/dL Final     Alkaline Phosphatase   Date Value Ref Range Status   10/06/2022 149 (H) 35 - 104 U/L Final     Creatinine   Date Value Ref Range Status   10/06/2022 1.26 (H) 0.51 - 0.95  mg/dL Final     GFR Estimate   Date Value Ref Range Status   10/06/2022 43 (L) >60 mL/min/1.73m2 Final     Comment:     Effective December 21, 2021 eGFRcr in adults is calculated using the 2021 CKD-EPI creatinine equation which includes age and gender (Efrain munson al., [place], DOI: 10.1056/QBJHgo8568461)   04/06/2021 39 (L) >60 mL/min/1.73m2 Final     GFR Estimate If Black   Date Value Ref Range Status   04/06/2021 48 (L) >60 mL/min/1.73m2 Final     Erythrocyte Sedimentation Rate   Date Value Ref Range Status   11/02/2022 17 0 - 20 mm/hr Final     CRP Inflammation   Date Value Ref Range Status   11/02/2022 <3.00 <5.00 mg/L Final     CRP   Date Value Ref Range Status   03/03/2022 0.1 0.0-<0.8 mg/dL Final

## 2023-01-05 DIAGNOSIS — F51.04 CHRONIC INSOMNIA: ICD-10-CM

## 2023-01-05 RX ORDER — ZOLPIDEM TARTRATE 10 MG/1
TABLET ORAL
Qty: 90 TABLET | Refills: 0 | Status: SHIPPED | OUTPATIENT
Start: 2023-01-05 | End: 2023-10-16

## 2023-01-13 DIAGNOSIS — I10 ESSENTIAL HYPERTENSION: ICD-10-CM

## 2023-01-16 RX ORDER — METOPROLOL SUCCINATE 25 MG/1
TABLET, EXTENDED RELEASE ORAL
Qty: 180 TABLET | Refills: 1 | Status: SHIPPED | OUTPATIENT
Start: 2023-01-16 | End: 2023-02-28

## 2023-01-17 ENCOUNTER — TELEPHONE (OUTPATIENT)
Dept: INTERNAL MEDICINE | Facility: CLINIC | Age: 78
End: 2023-01-17
Payer: COMMERCIAL

## 2023-01-17 NOTE — TELEPHONE ENCOUNTER
Looking for a refill on her diazepam oral tab, 2 mg.  Take 1 tab by mouth bid as needed.    Per chart you discharge on 10/6/22  Per the refill they last refilled it on 10/7/22    Please advise    Karol Vázquez CMA ([facility])

## 2023-01-21 ENCOUNTER — HEALTH MAINTENANCE LETTER (OUTPATIENT)
Age: 78
End: 2023-01-21

## 2023-02-28 ENCOUNTER — NURSE TRIAGE (OUTPATIENT)
Dept: NURSING | Facility: CLINIC | Age: 78
End: 2023-02-28
Payer: COMMERCIAL

## 2023-02-28 DIAGNOSIS — I10 ESSENTIAL HYPERTENSION: ICD-10-CM

## 2023-02-28 RX ORDER — METOPROLOL SUCCINATE 25 MG/1
TABLET, EXTENDED RELEASE ORAL
Qty: 90 TABLET | Refills: 3 | Status: SHIPPED | OUTPATIENT
Start: 2023-02-28 | End: 2023-08-23

## 2023-02-28 RX ORDER — METOPROLOL SUCCINATE 100 MG/1
TABLET, EXTENDED RELEASE ORAL
Qty: 90 TABLET | Refills: 3 | Status: SHIPPED | OUTPATIENT
Start: 2023-02-28 | End: 2023-10-16

## 2023-02-28 NOTE — TELEPHONE ENCOUNTER
Pt reports taking     Metoprolol: 100 mg in the evening  Metoprolol: 25 mg in the morning       Prescriptions are pended for provider review.

## 2023-02-28 NOTE — TELEPHONE ENCOUNTER
Ronnie Chin from [facility] calling with questions about specifics on dosing/administration of medication on recent refill order.  Question relates to ;    metoprolol succinate ER (TOPROL XL) 25 MG 24 hr tablet  Being refilled, needs greater detail on administration instructions. As it only states 2x a day. RN reviewed chart, last notes with administration instructions that could be found were not helpful in Clarifying this. OV from 10/6/22 states;     Metoprolol Succinate  100 mg Oral DAILY, One tablet in the morning along with 25mg at night     25 MG Tb24, Twice daily    Will route a HP message to PCP to address dosing with pharmacy as they will not refill until they have this information.    Jacquie Martin, JEFF, [place], PHN on 2/28/2023 at 9:08 AM  [facility]  RN utilized sound nursing judgement based on facility triage protocols during this encounter.        Reason for Disposition    Nursing judgment    Additional Information    Negative: Nursing judgment    Negative: Nursing judgment    Negative: Nursing judgment    Protocols used: INFORMATION ONLY CALL - NO TRIAGE-A-[place]

## 2023-03-03 DIAGNOSIS — I10 ESSENTIAL HYPERTENSION: ICD-10-CM

## 2023-03-05 RX ORDER — POTASSIUM CHLORIDE 750 MG/1
10 TABLET, EXTENDED RELEASE ORAL DAILY
Qty: 90 TABLET | Refills: 1 | Status: SHIPPED | OUTPATIENT
Start: 2023-03-05 | End: 2023-06-05

## 2023-03-05 NOTE — TELEPHONE ENCOUNTER
"potassium chloride ER (KLOR-CON) 10 MEQ CR tablet  Last Written Prescription Date:  2/23/2022  Last Fill Quantity: 90,  # refills: 3   Last office visit provider:  10/06/2022     Routing refill request to provider for review/approval because:  Labs out of range:  BP, CR    hydrochlorothiazide (HYDRODIURIL) 25 MG tablet  Last Written Prescription Date:  1/31/2022  Last Fill Quantity: 90,  # refills: 3   Last office visit provider:  10/06/2022     Requested Prescriptions   Pending Prescriptions Disp Refills     hydrochlorothiazide (HYDRODIURIL) 25 MG tablet 90 tablet 3     Sig: Take 1 tablet (25 mg) by mouth daily       Diuretics (Including Combos) Protocol Failed - 3/3/2023 10:11 AM        Failed - Blood pressure under 140/90 in past 12 months     BP Readings from Last 3 Encounters:   01/02/23 (!) 150/82   11/02/22 122/70   10/06/22 128/59                 Failed - Normal serum creatinine on file in past 12 months     Recent Labs   Lab Test 10/06/22  1258   CR 1.26*              Passed - Recent (12 mo) or future (30 days) visit within the authorizing provider's specialty     Patient has had an office visit with the authorizing provider or a provider within the authorizing providers department within the previous 12 mos or has a future within next 30 days. See \"Patient Info\" tab in inbasket, or \"Choose Columns\" in Meds & Orders section of the refill encounter.              Passed - Medication is active on med list        Passed - Patient is age 18 or older        Passed - No active pregancy on record        Passed - Normal serum potassium on file in past 12 months     Recent Labs   Lab Test 10/06/22  1258   POTASSIUM 3.6                    Passed - Normal serum sodium on file in past 12 months     Recent Labs   Lab Test 10/06/22  1258                 Passed - No positive pregnancy test in past 12 months         Signed Prescriptions Disp Refills    potassium chloride ER (KLOR-CON) 10 MEQ CR tablet 90 tablet 1     " "Sig: Take 1 tablet (10 mEq) by mouth daily       Potassium Supplements Protocol Passed - 3/3/2023 10:11 AM        Passed - Recent (12 mo) or future (30 days) visit within the authorizing provider's department     Patient has had an office visit with the authorizing provider or a provider within the authorizing providers department within the previous 12 mos or has a future within next 30 days. See \"Patient Info\" tab in inbasket, or \"Choose Columns\" in Meds & Orders section of the refill encounter.              Passed - Medication is active on med list        Passed - Patient is age 18 or older        Passed - Normal serum potassium in past 12 months     Recent Labs   Lab Test 10/06/22  1258   POTASSIUM 3.6                         Anay Perez RN 03/05/23 12:23 AM  "

## 2023-03-06 RX ORDER — HYDROCHLOROTHIAZIDE 25 MG/1
25 TABLET ORAL DAILY
Qty: 90 TABLET | Refills: 3 | Status: SHIPPED | OUTPATIENT
Start: 2023-03-06 | End: 2023-06-05

## 2023-04-03 ENCOUNTER — OFFICE VISIT (OUTPATIENT)
Dept: INTERNAL MEDICINE | Facility: CLINIC | Age: 78
End: 2023-04-03
Payer: COMMERCIAL

## 2023-04-03 VITALS
BODY MASS INDEX: 25.33 KG/M2 | OXYGEN SATURATION: 97 % | DIASTOLIC BLOOD PRESSURE: 72 MMHG | HEART RATE: 57 BPM | RESPIRATION RATE: 16 BRPM | HEIGHT: 65 IN | WEIGHT: 152 LBS | TEMPERATURE: 97.7 F | SYSTOLIC BLOOD PRESSURE: 112 MMHG

## 2023-04-03 DIAGNOSIS — E11.9 TYPE 2 DIABETES MELLITUS WITHOUT COMPLICATION, WITHOUT LONG-TERM CURRENT USE OF INSULIN (H): ICD-10-CM

## 2023-04-03 DIAGNOSIS — E04.1 THYROID NODULE: ICD-10-CM

## 2023-04-03 DIAGNOSIS — M31.6 GCA (GIANT CELL ARTERITIS) (H): ICD-10-CM

## 2023-04-03 DIAGNOSIS — G47.00 INSOMNIA, UNSPECIFIED TYPE: ICD-10-CM

## 2023-04-03 DIAGNOSIS — N18.32 STAGE 3B CHRONIC KIDNEY DISEASE (H): ICD-10-CM

## 2023-04-03 DIAGNOSIS — R42 VERTIGO: ICD-10-CM

## 2023-04-03 DIAGNOSIS — H47.019 ISCHEMIC OPTIC NEUROPATHY, UNSPECIFIED LATERALITY: ICD-10-CM

## 2023-04-03 DIAGNOSIS — A04.72 C. DIFFICILE COLITIS: ICD-10-CM

## 2023-04-03 DIAGNOSIS — G40.909 SEIZURE DISORDER (H): Primary | ICD-10-CM

## 2023-04-03 DIAGNOSIS — G45.9 TIA (TRANSIENT ISCHEMIC ATTACK): ICD-10-CM

## 2023-04-03 DIAGNOSIS — G25.81 RESTLESS LEG SYNDROME: ICD-10-CM

## 2023-04-03 DIAGNOSIS — R79.9 ABNORMAL FINDING OF BLOOD CHEMISTRY, UNSPECIFIED: ICD-10-CM

## 2023-04-03 DIAGNOSIS — I10 ESSENTIAL HYPERTENSION: ICD-10-CM

## 2023-04-03 LAB
ANION GAP SERPL CALCULATED.3IONS-SCNC: 14 MMOL/L (ref 7–15)
BUN SERPL-MCNC: 20.3 MG/DL (ref 8–23)
CALCIUM SERPL-MCNC: 11.2 MG/DL (ref 8.8–10.2)
CHLORIDE SERPL-SCNC: 99 MMOL/L (ref 98–107)
CREAT SERPL-MCNC: 1.07 MG/DL (ref 0.51–0.95)
DEPRECATED HCO3 PLAS-SCNC: 29 MMOL/L (ref 22–29)
GFR SERPL CREATININE-BSD FRML MDRD: 53 ML/MIN/1.73M2
GLUCOSE SERPL-MCNC: 102 MG/DL (ref 70–99)
HBA1C MFR BLD: 5.9 % (ref 0–5.6)
POTASSIUM SERPL-SCNC: 3.4 MMOL/L (ref 3.4–5.3)
PTH-INTACT SERPL-MCNC: 38 PG/ML (ref 15–65)
SODIUM SERPL-SCNC: 142 MMOL/L (ref 136–145)

## 2023-04-03 PROCEDURE — 83036 HEMOGLOBIN GLYCOSYLATED A1C: CPT | Performed by: INTERNAL MEDICINE

## 2023-04-03 PROCEDURE — 83970 ASSAY OF PARATHORMONE: CPT | Performed by: INTERNAL MEDICINE

## 2023-04-03 PROCEDURE — 80048 BASIC METABOLIC PNL TOTAL CA: CPT | Performed by: INTERNAL MEDICINE

## 2023-04-03 PROCEDURE — 36415 COLL VENOUS BLD VENIPUNCTURE: CPT | Performed by: INTERNAL MEDICINE

## 2023-04-03 PROCEDURE — 99214 OFFICE O/P EST MOD 30 MIN: CPT | Performed by: INTERNAL MEDICINE

## 2023-04-03 RX ORDER — SERTRALINE HYDROCHLORIDE 100 MG/1
100 TABLET, FILM COATED ORAL DAILY
Qty: 90 TABLET | Refills: 3 | Status: SHIPPED | OUTPATIENT
Start: 2023-04-03 | End: 2024-01-15

## 2023-04-03 NOTE — PATIENT INSTRUCTIONS
Anxiety is often a trigger for night time waking up , that is why sertraline was prescribed     Fosamax could be stopped next year   Tdap ( teanus /whooping cough combo ) is recommended at the pharmacy   You can try ambien  at 5mg a day    Reduce water intake to one glass at meal times   Shingles vaccine is optional

## 2023-04-03 NOTE — PROGRESS NOTES
"  Assessment & Plan     Diabetes mellitus, type 2 (H)  Controlled on diet alone.    Seizure disorder (H)  Taking Lamictal.    Stage 3b chronic kidney disease (H)  Creatinine   Date Value Ref Range Status   10/06/2022 1.26 (H) 0.51 - 0.95 mg/dL Final     She does not have to drink excess water.  The goal is to stop being dehydrated.  She was over drinking water and probably and having increased urination at night    TIA (transient ischemic attack)  Terms were attributed to seizure plus ischemic neuropathy.  Aspirin was stopped due to increased bruising.    Ischemic optic neuropathy, unspecified laterality  Is now resolved  Essential hypertension  Controlled even though it is low normal today and at home it is in the 130 range so we will not change her medications  - sertraline (ZOLOFT) 100 MG tablet; Take 1 tablet (100 mg) by mouth daily  - Basic metabolic panel  (Ca, Cl, CO2, Creat, Gluc, K, Na, BUN); Future  - Hemoglobin A1c; Future  - Parathyroid Hormone Intact; Future  - Basic metabolic panel  (Ca, Cl, CO2, Creat, Gluc, K, Na, BUN)  - Hemoglobin A1c  - Parathyroid Hormone Intact    Vertigo  Aims to have improved    Restless leg syndrome  Mirapex has been helpful    C. difficile colitis  Has had 2 episodes of C. difficile last year.  Now is completely back to normal.  Did  her on the pathophysiology of C. difficile  GCA (giant cell arteritis) (H)  Completed prednisone and giant cell arteritis is now resolved.    Thyroid nodule  Has had a solid nodule that is remained unchanged for many years and has been biopsied.  Patient prefers no further surveillance  Abnormal finding of blood chemistry, unspecified    - Hemoglobin A1c; Future  - Hemoglobin A1c    Insomnia, unspecified type  This is her biggest ongoing challenge.  She has been on Ambien for years she has tried and failed trazodone and mirtazapine.  She goes without it for most of the days of the week and then \"treats herself\" for the weekend.  I she had " "stopped taking the sertraline because she felt she was not anxious anymore.  I do feel that her frequent nighttime awakenings are due to subliminal anxiety and her urination desire.  We can try and help and see if sertraline helps that she can reduce the dose to 50 mg and then if that does not help then she can take Ambien 5 mg every day.  If that does not help then she can go back to 10 mg Ambien    He does not take the Lasix every day.    Counseled her on the superficial thrombophlebitis and venous insufficiency       BMI:   Estimated body mass index is 25.69 kg/m  as calculated from the following:    Height as of this encounter: 1.638 m (5' 4.5\").    Weight as of this encounter: 68.9 kg (152 lb).         Next visit is her wellness visit in October  Karen Taylor MD  [facility]    Veronica Ruiz is a 77 year old, presenting for the following health issues:  Follow Up and Recheck Medication (Pt reports that she is here for follow up of blood pressure check.)  stopped mirtazpine , dosnt have troubel sleeping and has to go the bathroom   Lasix as needed    has tried to stop ambine but she cant sleep without it   Reviewed all her medications and her problem list.  She has questions about insomnia and her Ambien and her kidney function tests.  She also has mild venous insufficiency  Lab Results   Component Value Date    A1C 5.9 10/06/2022    A1C 6.0 04/13/2022    A1C 6.5 01/13/2022    A1C 6.2 10/08/2021    A1C 6.3 04/06/2021       Creatinine   Date Value Ref Range Status   10/06/2022 1.26 (H) 0.51 - 0.95 mg/dL Final     130 /60 at home   Aspirin stopped from bruising       4/3/2023    11:08 AM   Additional Questions   Roomed by martin   Accompanied by alone         4/3/2023    11:08 AM   Patient Reported Additional Medications   Patient reports taking the following new medications no     History of Present Illness       Hypertension: She presents for follow up of hypertension.  She " "does check blood pressure  regularly outside of the clinic. Outside blood pressures have been over 140/90. She follows a low salt diet.     She eats 2-3 servings of fruits and vegetables daily.She consumes 1 sweetened beverage(s) daily.She exercises with enough effort to increase her heart rate 20 to 29 minutes per day.  She exercises with enough effort to increase her heart rate 3 or less days per week.   She is taking medications regularly.       Pt reports that she is here for follow up appointment for recheck of blood pressure monitoring.      Current Outpatient Medications   Medication     sertraline (ZOLOFT) 100 MG tablet     alendronate (FOSAMAX) 70 MG tablet     atorvastatin (LIPITOR) 40 MG tablet     diclofenac (VOLTAREN) 1 % topical gel     furosemide (LASIX) 20 MG tablet     hydrochlorothiazide (HYDRODIURIL) 25 MG tablet     lamoTRIgine (LAMICTAL) 25 MG tablet     losartan (COZAAR) 100 MG tablet     metoprolol succinate ER (TOPROL XL) 100 MG 24 hr tablet     metoprolol succinate ER (TOPROL XL) 25 MG 24 hr tablet     Multiple Vitamins-Minerals (CENTRUM PO)     potassium chloride ER (KLOR-CON) 10 MEQ CR tablet     pramipexole (MIRAPEX) 0.25 MG tablet     selenium sulfide (SELSUN) 2.5 % external lotion     timolol maleate (TIMOPTIC) 0.5 % ophthalmic solution     zolpidem (AMBIEN) 10 MG tablet     No current facility-administered medications for this visit.       Review of Systems         Objective    /72 (BP Location: Left arm, Patient Position: Sitting, Cuff Size: Adult Regular)   Pulse 57   Temp 97.7  F (36.5  C) (Tympanic)   Resp 16   Ht 1.638 m (5' 4.5\")   Wt 68.9 kg (152 lb)   SpO2 97%   BMI 25.69 kg/m    Body mass index is 25.69 kg/m .  Physical Exam   GENERAL: healthy, alert and no distress  NECK: no adenopathy, no asymmetry, masses, or scars and thyroid normal to palpation  RESP: lungs clear to auscultation - no rales, rhonchi or wheezes  CV: regular rate and rhythm, normal S1 S2, no S3 " or S4, no murmur, click or rub, no peripheral edema and peripheral pulses strong  He has minor varicosities with some superficial thrombophlebitis and minor ankle edema no venous ulcerations noted

## 2023-04-06 ENCOUNTER — TRANSFERRED RECORDS (OUTPATIENT)
Dept: HEALTH INFORMATION MANAGEMENT | Facility: CLINIC | Age: 78
End: 2023-04-06
Payer: COMMERCIAL

## 2023-04-11 ENCOUNTER — OFFICE VISIT (OUTPATIENT)
Dept: RHEUMATOLOGY | Facility: CLINIC | Age: 78
End: 2023-04-11
Payer: COMMERCIAL

## 2023-04-11 VITALS
BODY MASS INDEX: 25.27 KG/M2 | DIASTOLIC BLOOD PRESSURE: 64 MMHG | WEIGHT: 151.7 LBS | HEART RATE: 64 BPM | HEIGHT: 65 IN | SYSTOLIC BLOOD PRESSURE: 120 MMHG

## 2023-04-11 DIAGNOSIS — Z87.39 HISTORY OF TEMPORAL ARTERITIS: ICD-10-CM

## 2023-04-11 DIAGNOSIS — R76.8 ANA POSITIVE: ICD-10-CM

## 2023-04-11 DIAGNOSIS — M25.50 POLYARTHRALGIA: ICD-10-CM

## 2023-04-11 DIAGNOSIS — M15.0 PRIMARY OSTEOARTHRITIS INVOLVING MULTIPLE JOINTS: Primary | ICD-10-CM

## 2023-04-11 PROCEDURE — 99214 OFFICE O/P EST MOD 30 MIN: CPT | Performed by: INTERNAL MEDICINE

## 2023-04-11 NOTE — PROGRESS NOTES
"      Rheumatology follow-up visit note     [place] is a 77 year old female presents today for follow-up.    [place]ia was seen today for recheck.    Diagnoses and all orders for this visit:    Primary osteoarthritis involving multiple joints    History of temporal arteritis    CHRISTIAN positive    Polyarthralgia        This patient is polyarthralgia especially the knees in association with osteoarthritis was found to corticosteroid injections.  She would like to keep the frequency as was possible which is quite reasonable.  We discussed variety of options pharmacological and nonpharmacological.  I have encouraged her to walk as much as she can.  She can take acetaminophen sustained-release.  She can check with her primary physician if duloxetine might be a suitable substitute for Zoloft.  She is to return for follow-up here on as-needed basis.       HPI    Jenny Iglesias is a 77 year old female is here for follow-up.  She has polyarthralgias in the context of osteoarthritis.  She has history of temporal arteritis.  She has a known positive about connective tissue disease evidence.  She has found corticosteroid injections helpful.  However she would like to keep the frequency as low as possible.  We discussed various options.  She noted pain level in the knees moderately severe.  She wonders if she can continue to walk that she loves to.  She has not observed swelling in the knee area.  She has not had headache jaw claudication double vision.      DETAILED EXAMINATION  04/11/23  :    Vitals:    04/11/23 1349   BP: 120/64   BP Location: Right arm   Patient Position: Sitting   Cuff Size: Adult Regular   Pulse: 64   Weight: 68.8 kg (151 lb 11.2 oz)   Height: 1.638 m (5' 4.5\")     Alert oriented. Head including the face is examined for malar rash, heliotropes, scarring, lupus pernio. Eyes examined for redness such as in episcleritis/scleritis, periorbital lesions.   Neck/ Face examined for parotid gland swelling, range " of motion of neck.  Left upper and lower and right upper and lower extremities examined for tenderness, swelling, warmth of the appendicular joints, range of motion, edema, rash.  Some of the important findings included: she does not have evidence of synovitis in the palpable joints of the upper extremities.  Marked Heberden's nodes, with deformity such as on the left index finger.  Joint line tenderness the knees bilaterally without effusion or warmth.  Patient Active Problem List    Diagnosis Date Noted     C. 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     Priority: Medium     Fuchs' corneal dystrophy of both eyes 12/13/2021     Priority: Medium     Corneal edema of both eyes 12/13/2021     Priority: Medium     Localization-related focal epilepsy with simple partial seizures (H) 10/10/2021     Priority: Medium     Seizure disorder (H) 06/09/2021     Priority: Medium     GCA (giant cell arteritis) (H) 03/10/2021     Priority: Medium     TIA (transient ischemic attack)      Priority: Medium     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     Priority: Medium     Added automatically from request for surgery [id number]         Chronic kidney disease, stage 3 (H) 02/15/2021     Priority: Medium     Other chronic pain 10/07/2020     Priority: Medium     Coccyx pain 09/13/2019     Priority: Medium     Chronic insomnia 02/25/2016     Priority: Medium     Vertigo 10/27/2015     Priority: Medium     Essential hypertension 10/27/2015     Priority: Medium     Restless leg syndrome 10/27/2015     Priority: Medium     Perimenopausal vasomotor symptoms 10/27/2015     Priority: Medium     Carotid stenosis, asymptomatic 10/27/2015     Priority: Medium     Past Surgical History:   Procedure Laterality Date     APPENDECTOMY       CATARACT EXTRACTION       CHOLECYSTECTOMY       HYSTERECTOMY       OTHER SURGICAL  HISTORY Right 1998    fifth toe amputation     UT TEMPORAL ARTERY LIGATN OR BX Bilateral 3/4/2021    Procedure: BIOPSY, ARTERY, TEMPORAL;  Surgeon: Ramesh Box MD;  Location: [facility];  Service: General     TUBAL LIGATION        Past Medical History:   Diagnosis Date     Anxiety      Carotid artery stenosis 11/2013    50% left internal carotid artery      Chronic low back pain      Hypertension      Osteoarthritis of lumbar spine      Osteopenia     mild, resolved     Postmenopausal 11/2013    on hormone     Restless leg syndrome      Seizures (H)     Feb 18, 2021, Small lost vision and could not speak for 4-5 seconds     Surgical menopause     age 47     Thyroid nodule 11/2013    35mm, rightthyroid lobe biopsy-benign nodule     TIA (transient ischemic attack)      TIA (transient ischemic attack) 11/2013     Vertigo      Allergies   Allergen Reactions     Codeine Sulfate Shortness Of Breath and Anaphylaxis     Carbidopa-Levodopa      dystonia     Codeine      Diclofenac      Diarrhea     Morphine      Current Outpatient Medications   Medication Sig Dispense Refill     alendronate (FOSAMAX) 70 MG tablet TAKE 1 TAB A WEEK IN THE MORNING ON EMPTY STOMACH WITH A FULL GLASS OF WATER 30MIN BEFORE FOOD 12 tablet 3     atorvastatin (LIPITOR) 40 MG tablet Take 1 tablet (40 mg) by mouth daily 90 tablet 2     diclofenac (VOLTAREN) 1 % topical gel Apply 4 g topically 4 times daily 350 g 3     furosemide (LASIX) 20 MG tablet Take 1 tablet (20 mg) by mouth daily 90 tablet 1     hydrochlorothiazide (HYDRODIURIL) 25 MG tablet Take 1 tablet (25 mg) by mouth daily 90 tablet 3     lamoTRIgine (LAMICTAL) 25 MG tablet Take 50 mg by mouth 2 times daily       losartan (COZAAR) 100 MG tablet Take 1 tablet (100 mg) by mouth daily 90 tablet 3     metoprolol succinate ER (TOPROL XL) 100 MG 24 hr tablet Take one tablet (100 mg) by mouth every evening. 90 tablet 3     metoprolol succinate ER (TOPROL XL) 25 MG 24 hr tablet Take  one tablet (25 mg) by mouth every morning. 90 tablet 3     Multiple Vitamins-Minerals (CENTRUM PO) Take 1 tablet by mouth daily       potassium chloride ER (KLOR-CON) 10 MEQ CR tablet Take 1 tablet (10 mEq) by mouth daily 90 tablet 1     pramipexole (MIRAPEX) 0.25 MG tablet TAKE ONE TAB BY MOUTH TWICE DAILY 180 tablet 2     selenium sulfide (SELSUN) 2.5 % external lotion Apply topically daily as needed for itching or irritation 118 mL 3     sertraline (ZOLOFT) 100 MG tablet Take 1 tablet (100 mg) by mouth daily 90 tablet 3     timolol maleate (TIMOPTIC) 0.5 % ophthalmic solution Place 1 drop into both eyes daily       zolpidem (AMBIEN) 10 MG tablet TAKE ONE TABLET BY MOUTH AT BEDTIME 90 tablet 0     family history includes Breast Cancer (age of onset: 57.00) in her mother; Hypertension in her sister; Lung Cancer (age of onset: 62.00) in her father.  Social Connections: Not on file          WBC Count   Date Value Ref Range Status   10/06/2022 8.7 4.0 - 11.0 10e3/uL Final     RBC Count   Date Value Ref Range Status   10/06/2022 4.57 3.80 - 5.20 10e6/uL Final     Hemoglobin   Date Value Ref Range Status   10/06/2022 13.7 11.7 - 15.7 g/dL Final     Hematocrit   Date Value Ref Range Status   10/06/2022 42.7 35.0 - 47.0 % Final     MCV   Date Value Ref Range Status   10/06/2022 93 78 - 100 fL Final     MCH   Date Value Ref Range Status   10/06/2022 30.0 26.5 - 33.0 pg Final     Platelet Count   Date Value Ref Range Status   10/06/2022 247 150 - 450 10e3/uL Final     % Lymphocytes   Date Value Ref Range Status   10/08/2020 34 20 - 40 % Final     AST   Date Value Ref Range Status   10/06/2022 33 10 - 35 U/L Final     ALT   Date Value Ref Range Status   10/06/2022 19 10 - 35 U/L Final     Albumin   Date Value Ref Range Status   10/06/2022 4.1 3.5 - 5.2 g/dL Final   01/13/2022 3.8 3.5 - 5.0 g/dL Final     Alkaline Phosphatase   Date Value Ref Range Status   10/06/2022 149 (H) 35 - 104 U/L Final     Creatinine   Date Value Ref  Range Status   04/03/2023 1.07 (H) 0.51 - 0.95 mg/dL Final     GFR Estimate   Date Value Ref Range Status   04/03/2023 53 (L) >60 mL/min/1.73m2 Final     Comment:     eGFR calculated using 2021 CKD-EPI equation.   04/06/2021 39 (L) >60 mL/min/1.73m2 Final     GFR Estimate If Black   Date Value Ref Range Status   04/06/2021 48 (L) >60 mL/min/1.73m2 Final     Erythrocyte Sedimentation Rate   Date Value Ref Range Status   11/02/2022 17 0 - 20 mm/hr Final     CRP   Date Value Ref Range Status   03/03/2022 0.1 0.0-<0.8 mg/dL Final

## 2023-04-19 ENCOUNTER — TRANSFERRED RECORDS (OUTPATIENT)
Dept: HEALTH INFORMATION MANAGEMENT | Facility: CLINIC | Age: 78
End: 2023-04-19
Payer: COMMERCIAL

## 2023-04-24 ENCOUNTER — TELEPHONE (OUTPATIENT)
Dept: INTERNAL MEDICINE | Facility: CLINIC | Age: 78
End: 2023-04-24
Payer: COMMERCIAL

## 2023-04-24 ENCOUNTER — LAB (OUTPATIENT)
Dept: LAB | Facility: CLINIC | Age: 78
End: 2023-04-24
Payer: COMMERCIAL

## 2023-04-24 DIAGNOSIS — R19.7 DIARRHEA, UNSPECIFIED TYPE: Primary | ICD-10-CM

## 2023-04-24 DIAGNOSIS — R19.7 DIARRHEA, UNSPECIFIED TYPE: ICD-10-CM

## 2023-04-24 LAB — C DIFF TOX B STL QL: NEGATIVE

## 2023-04-24 PROCEDURE — 87493 C DIFF AMPLIFIED PROBE: CPT

## 2023-04-24 NOTE — TELEPHONE ENCOUNTER
Reason for Call:  Other call back    Detailed comments: Patient is requesting a call back from Dr. Taylor nurse.     Phone Number Patient can be reached at: Cell number on file:    Telephone Information:   Mobile [phone]       Best Time: ANYTIME     Can we leave a detailed message on this number? YES    Call taken on 4/24/2023 at 8:26 AM by Lizzy Ramon

## 2023-04-24 NOTE — TELEPHONE ENCOUNTER
Patient stating she has diarrhea similar to previous C. diff infection.  Patient staes her diarrhea has been for one week. Patient requesting Rx. Denies other symptoms.    Per chart review:   Karen Taylor MD   10/7/2022 11:20 PM CDT Back to Top      Please let her know that she has C diff . This was the infection she had in may . The best medication for it is vancomycin but she said it was not covered so flagyl was given / it helped her and that is why it has been resent for ten days  but since this is a recurrence she needs to come back if her diarrhea is not normal because then we have no choice but to give her vancomycin . She should also not wait for so long next time because untreated the infection could have spread .

## 2023-04-24 NOTE — TELEPHONE ENCOUNTER
Has to do a stool test before treatment can be given.  We need a documented proof of C. difficile before vancomycin can type of medication can be prescribed because it has to be given for a very long duration.

## 2023-04-25 DIAGNOSIS — I10 ESSENTIAL HYPERTENSION: ICD-10-CM

## 2023-04-26 RX ORDER — LOSARTAN POTASSIUM 100 MG/1
100 TABLET ORAL DAILY
Qty: 90 TABLET | Refills: 3 | Status: SHIPPED | OUTPATIENT
Start: 2023-04-26 | End: 2023-08-21

## 2023-04-26 NOTE — TELEPHONE ENCOUNTER
"Routing refill request to provider for review/approval because:  Labs out of range:  cr    Last Written Prescription Date:  1/31/22  Last Fill Quantity: 90,  # refills: 3   Last office visit provider:  4/3/23     Requested Prescriptions   Pending Prescriptions Disp Refills     losartan (COZAAR) 100 MG tablet 90 tablet 3     Sig: Take 1 tablet (100 mg) by mouth daily       Angiotensin-II Receptors Failed - 4/25/2023 11:50 AM        Failed - Normal serum creatinine on file in past 12 months     Recent Labs   Lab Test 04/03/23  1206   CR 1.07*       Ok to refill medication if creatinine is low          Passed - Last blood pressure under 140/90 in past 12 months     BP Readings from Last 3 Encounters:   04/11/23 120/64   04/03/23 112/72   01/02/23 (!) 150/82                 Passed - Recent (12 mo) or future (30 days) visit within the authorizing provider's specialty     Patient has had an office visit with the authorizing provider or a provider within the authorizing providers department within the previous 12 mos or has a future within next 30 days. See \"Patient Info\" tab in inbasket, or \"Choose Columns\" in Meds & Orders section of the refill encounter.              Passed - Medication is active on med list        Passed - Patient is age 18 or older        Passed - No active pregnancy on record        Passed - Normal serum potassium on file in past 12 months     Recent Labs   Lab Test 04/03/23  1206   POTASSIUM 3.4                    Passed - No positive pregnancy test in past 12 months             Jessica Anderson RN 04/26/23 1:06 PM  "

## 2023-05-31 DIAGNOSIS — M81.0 AGE-RELATED OSTEOPOROSIS WITHOUT CURRENT PATHOLOGICAL FRACTURE: ICD-10-CM

## 2023-05-31 NOTE — TELEPHONE ENCOUNTER
Prescription Monitoring Program activity reviewed with no discrepancies noted.      Last fill per : 10/7/20  Quantity/days supply: 60 tablets for 15 days    Controlled Substance Agreement on file: Yes  Date: 10/16/19    Last office visit with provider:  2/21/2020 Karen Taylor MD    Please advise.    
Not Applicable

## 2023-06-01 NOTE — TELEPHONE ENCOUNTER
"Routing refill request to provider for review/approval because:  Labs out of range:  cr    Last Written Prescription Date:  10/6/22  Last Fill Quantity: 12,  # refills: 3   Last office visit provider:  4/3/23     Requested Prescriptions   Pending Prescriptions Disp Refills     alendronate (FOSAMAX) 70 MG tablet [Pharmacy Med Name: ALENDRONATE SODIUM 70 MG TAB] 12 tablet 3     Sig: TAKE 1 TAB A WEEK IN THE MORNING ON AN EMPTY STOMACH WITH A FULL GLASS OF WATER 30 MINS BEFORE FOOD       Bisphosphonates Failed - 5/31/2023  2:38 PM        Failed - Normal serum creatinine on file within past 12 months     Recent Labs   Lab Test 04/03/23  1206   CR 1.07*       Ok to refill medication if creatinine is low          Passed - Recent (12 mo) or future (30 days) visit within the authorizing provider's specialty     Patient has had an office visit with the authorizing provider or a provider within the authorizing providers department within the previous 12 mos or has a future within next 30 days. See \"Patient Info\" tab in inbasket, or \"Choose Columns\" in Meds & Orders section of the refill encounter.              Passed - Dexa on file within past 2 years     Please review last Dexa result.           Passed - Medication is active on med list        Passed - Patient is age 18 or older             Jessica Anderson RN 06/01/23 5:38 PM  "

## 2023-06-02 RX ORDER — ALENDRONATE SODIUM 70 MG/1
TABLET ORAL
Qty: 12 TABLET | Refills: 3 | Status: SHIPPED | OUTPATIENT
Start: 2023-06-02 | End: 2024-02-14

## 2023-06-05 ENCOUNTER — MYC REFILL (OUTPATIENT)
Dept: INTERNAL MEDICINE | Facility: CLINIC | Age: 78
End: 2023-06-05
Payer: COMMERCIAL

## 2023-06-05 DIAGNOSIS — Z13.220 SCREENING FOR HYPERLIPIDEMIA: ICD-10-CM

## 2023-06-05 DIAGNOSIS — I10 ESSENTIAL HYPERTENSION: ICD-10-CM

## 2023-06-05 DIAGNOSIS — G40.909 SEIZURE DISORDER (H): Primary | ICD-10-CM

## 2023-06-05 RX ORDER — HYDROCHLOROTHIAZIDE 25 MG/1
25 TABLET ORAL DAILY
Qty: 90 TABLET | Refills: 2 | Status: SHIPPED | OUTPATIENT
Start: 2023-06-05 | End: 2024-02-14

## 2023-06-05 RX ORDER — PRAMIPEXOLE DIHYDROCHLORIDE 0.25 MG/1
TABLET ORAL
Qty: 180 TABLET | Refills: 2 | Status: SHIPPED | OUTPATIENT
Start: 2023-06-05 | End: 2023-11-28

## 2023-06-05 RX ORDER — POTASSIUM CHLORIDE 750 MG/1
10 TABLET, EXTENDED RELEASE ORAL DAILY
Qty: 90 TABLET | Refills: 0 | Status: SHIPPED | OUTPATIENT
Start: 2023-06-05 | End: 2023-07-24

## 2023-06-05 RX ORDER — LAMOTRIGINE 25 MG/1
50 TABLET ORAL 2 TIMES DAILY
Qty: 180 TABLET | Refills: 3 | Status: SHIPPED | OUTPATIENT
Start: 2023-06-05 | End: 2023-06-08

## 2023-06-06 NOTE — TELEPHONE ENCOUNTER
"Routing refill request to provider for review/approval because:  Labs out of range:  Cr  Needs review    Hydrochlorothiazide filled 3/6/23 90/3  K+ filled 3/5/23 90/1    Last Written Prescription Date:  12/1/22  Last Fill Quantity: 180,  # refills: 2   Last office visit provider:  4/3/23     Requested Prescriptions   Pending Prescriptions Disp Refills     pramipexole (MIRAPEX) 0.25 MG tablet 180 tablet 2     Sig: TAKE ONE TAB BY MOUTH TWICE DAILY       Antiparkinson's Agents Protocol Passed - 6/5/2023 10:16 AM        Passed - Blood pressure under 140/90 in past 12 months     BP Readings from Last 3 Encounters:   04/11/23 120/64   04/03/23 112/72   01/02/23 (!) 150/82                 Passed - CBC on record in past 12 months     Recent Labs   Lab Test 10/06/22  1258   WBC 8.7   RBC 4.57   HGB 13.7   HCT 42.7                    Passed - ALT on record in past 12 months         Recent Labs   Lab Test 10/06/22  1258   ALT 19             Passed - Serum Creatinine on file in past 12 months     Recent Labs   Lab Test 04/03/23  1206   CR 1.07*       Ok to refill medication if creatinine is low          Passed - Medication is active on med list        Passed - Patient is age 18 or older        Passed - No active pregnancy on record        Passed - No positive pregnancy test in the past 12 months        Passed - Recent (6 mo) or future (30 days) visit within the authorizing provider's specialty     Patient had office visit in the last 6 months or has a visit in the next 30 days with authorizing provider or within the authorizing provider's specialty.  See \"Patient Info\" tab in inbasket, or \"Choose Columns\" in Meds & Orders section of the refill encounter.               lamoTRIgine (LAMICTAL) 25 MG tablet       Sig: Take 2 tablets (50 mg) by mouth 2 times daily       Anti-Seizure Meds Protocol  Failed - 6/5/2023 10:16 AM        Failed - Review Authorizing provider's last note.      Refer to last progress notes: confirm " "request is for original authorizing provider (cannot be through other providers).          Passed - Recent (12 mo) or future (30 days) visit within the authorizing provider's specialty     Patient has had an office visit with the authorizing provider or a provider within the authorizing providers department within the previous 12 mos or has a future within next 30 days. See \"Patient Info\" tab in inbasket, or \"Choose Columns\" in Meds & Orders section of the refill encounter.              Passed - Normal CBC on file in past 26 months     Recent Labs   Lab Test 10/06/22  1258   WBC 8.7   RBC 4.57   HGB 13.7   HCT 42.7                    Passed - Normal ALT or AST on file in past 26 months     Recent Labs   Lab Test 10/06/22  1258   ALT 19     Recent Labs   Lab Test 10/06/22  1258   AST 33             Passed - Normal platelet count on file in past 26 months     Recent Labs   Lab Test 10/06/22  1258                  Passed - Medication is active on med list        Passed - No active pregnancy on record        Passed - No positive pregnancy test in last 12 months           hydrochlorothiazide (HYDRODIURIL) 25 MG tablet 90 tablet 3     Sig: Take 1 tablet (25 mg) by mouth daily       Diuretics (Including Combos) Protocol Failed - 6/5/2023 10:16 AM        Failed - Normal serum creatinine on file in past 12 months     Recent Labs   Lab Test 04/03/23  1206   CR 1.07*              Passed - Blood pressure under 140/90 in past 12 months     BP Readings from Last 3 Encounters:   04/11/23 120/64   04/03/23 112/72   01/02/23 (!) 150/82                 Passed - Recent (12 mo) or future (30 days) visit within the authorizing provider's specialty     Patient has had an office visit with the authorizing provider or a provider within the authorizing providers department within the previous 12 mos or has a future within next 30 days. See \"Patient Info\" tab in inbasket, or \"Choose Columns\" in Meds & Orders section of the " "refill encounter.              Passed - Medication is active on med list        Passed - Patient is age 18 or older        Passed - No active pregancy on record        Passed - Normal serum potassium on file in past 12 months     Recent Labs   Lab Test 04/03/23  1206   POTASSIUM 3.4                    Passed - Normal serum sodium on file in past 12 months     Recent Labs   Lab Test 04/03/23  1206                 Passed - No positive pregnancy test in past 12 months           potassium chloride ER (KLOR-CON) 10 MEQ CR tablet 90 tablet 1     Sig: Take 1 tablet (10 mEq) by mouth daily       Potassium Supplements Protocol Passed - 6/5/2023 10:16 AM        Passed - Recent (12 mo) or future (30 days) visit within the authorizing provider's department     Patient has had an office visit with the authorizing provider or a provider within the authorizing providers department within the previous 12 mos or has a future within next 30 days. See \"Patient Info\" tab in inbasket, or \"Choose Columns\" in Meds & Orders section of the refill encounter.              Passed - Medication is active on med list        Passed - Patient is age 18 or older        Passed - Normal serum potassium in past 12 months     Recent Labs   Lab Test 04/03/23  1206   POTASSIUM 3.4                         Jessica Anderson, RN 06/05/23 8:24 PM  "

## 2023-06-06 NOTE — TELEPHONE ENCOUNTER
"Routing refill request to provider for review/approval because:  Medication is reported/historical    Last Written Prescription Date:  9/9/21  Last Fill Quantity: ,  # refills:    Last office visit provider:  4/3/23     Requested Prescriptions   Pending Prescriptions Disp Refills     lamoTRIgine (LAMICTAL) 25 MG tablet       Sig: Take 2 tablets (50 mg) by mouth 2 times daily       Anti-Seizure Meds Protocol  Failed - 6/5/2023  9:54 AM        Failed - Review Authorizing provider's last note.      Refer to last progress notes: confirm request is for original authorizing provider (cannot be through other providers).          Passed - Recent (12 mo) or future (30 days) visit within the authorizing provider's specialty     Patient has had an office visit with the authorizing provider or a provider within the authorizing providers department within the previous 12 mos or has a future within next 30 days. See \"Patient Info\" tab in inbasket, or \"Choose Columns\" in Meds & Orders section of the refill encounter.              Passed - Normal CBC on file in past 26 months     Recent Labs   Lab Test 10/06/22  1258   WBC 8.7   RBC 4.57   HGB 13.7   HCT 42.7                    Passed - Normal ALT or AST on file in past 26 months     Recent Labs   Lab Test 10/06/22  1258   ALT 19     Recent Labs   Lab Test 10/06/22  1258   AST 33             Passed - Normal platelet count on file in past 26 months     Recent Labs   Lab Test 10/06/22  1258                  Passed - Medication is active on med list        Passed - No active pregnancy on record        Passed - No positive pregnancy test in last 12 months             Jessica Anderson, RN 06/05/23 8:21 PM  "

## 2023-06-08 ENCOUNTER — TELEPHONE (OUTPATIENT)
Dept: INTERNAL MEDICINE | Facility: CLINIC | Age: 78
End: 2023-06-08
Payer: COMMERCIAL

## 2023-06-08 RX ORDER — LAMOTRIGINE 25 MG/1
75 TABLET ORAL 2 TIMES DAILY
COMMUNITY

## 2023-06-08 NOTE — TELEPHONE ENCOUNTER
----- Message from Laura Cortez RN sent at 6/8/2023  8:58 AM CDT -----    ----- Message -----  From: Karen Taylor MD  Sent: 6/5/2023  11:51 PM CDT  To: [facility] Internal Medicine Support Pool    Please confirm if patient is taking 50mg or 25mg of lamotrigine twice daily and cancel the other refill . As both were sent to me and signed

## 2023-06-08 NOTE — TELEPHONE ENCOUNTER
Spoke with Sara:     She reports that she is taking Lamotrigine: 75 mg in morning and 75 mg in evening, 25 mg tablets.     Pt sees Dr. Negin Hernandes, [facility]    She reports that Dr. Hernandes is the prescribing provider for Lamotrigine. I advised her to reach out to Dr. Hernandes for this refill/medication management. Pt was in agreement and will reach out to Dr. Hernandes for this refill.       Medication list was updated.

## 2023-06-30 DIAGNOSIS — I10 ESSENTIAL HYPERTENSION: ICD-10-CM

## 2023-06-30 RX ORDER — METOPROLOL SUCCINATE 100 MG/1
TABLET, EXTENDED RELEASE ORAL
Qty: 90 TABLET | Refills: 0 | OUTPATIENT
Start: 2023-06-30

## 2023-06-30 NOTE — CONFIDENTIAL NOTE
Medication refill request declined: should have refills on file    Disp Refills Start End ABRAM    metoprolol succinate ER (TOPROL XL) 100 MG 24 hr tablet 90 tablet 3 2/28/2023  No   Sig: Take one tablet (100 mg) by mouth every evening.   Sent to pharmacy as: Metoprolol Succinate  MG Oral Tablet Extended Release 24 Hour (TOPROL XL)   Class: E-Prescribe   Order: [id number]   E-Prescribing Status: Receipt confirmed by pharmacy (2/28/2023  1:40 PM CST)   Mirna Thomson RN BSN 6/30/2023 2:21 PM

## 2023-07-20 ENCOUNTER — TELEPHONE (OUTPATIENT)
Dept: INTERNAL MEDICINE | Facility: CLINIC | Age: 78
End: 2023-07-20
Payer: COMMERCIAL

## 2023-07-20 NOTE — TELEPHONE ENCOUNTER
Spoke with Dr Angel Luis Bunch from [facility]. He is requesting a fax number to send his notes to Dr Taylor. Clinic fax number provided.     He would also like to make sure Dr Taylor is aware that the patient was seen in the ED in [place] and wanted to make sure she reviews the notes. He states blood cultures were negative for endocarditis in the hospital but her aortic regurgitation is worsening. States she will likely need follow up with cardiology when she returns to the [facility], but she is asymptomatic at this time.

## 2023-07-20 NOTE — TELEPHONE ENCOUNTER
07/20 I called and lm on cell that Brandon is out on vacation for 2 weeks put in same day slot when she gets back or see another provider who has a 40 min hosp follow up slot available in Dr. Ernie light

## 2023-07-20 NOTE — TELEPHONE ENCOUNTER
Please schedul her for an ER visit with me when I come back ( 20 min spot ok ) unless she wants to see another provider

## 2023-07-23 ENCOUNTER — HEALTH MAINTENANCE LETTER (OUTPATIENT)
Age: 78
End: 2023-07-23

## 2023-07-24 DIAGNOSIS — I10 ESSENTIAL HYPERTENSION: ICD-10-CM

## 2023-07-24 RX ORDER — POTASSIUM CHLORIDE 750 MG/1
TABLET, EXTENDED RELEASE ORAL
Qty: 90 TABLET | Refills: 0 | Status: SHIPPED | OUTPATIENT
Start: 2023-07-24 | End: 2023-11-20

## 2023-07-24 NOTE — TELEPHONE ENCOUNTER
"Routing refill request to provider for review/approval because:  Early refill requested.    Last Written Prescription Date:  6/5/23  Last Fill Quantity: 90,  # refills: 0   Last office visit provider:  4/3/23     Requested Prescriptions   Pending Prescriptions Disp Refills    potassium chloride ER (K-TAB/KLOR-CON) 10 MEQ CR tablet [Pharmacy Med Name: POTASSIUM CL ER 10 MEQ TABLET] 90 tablet 0     Sig: TAKE 1 TABLET BY MOUTH EVERY DAY       Potassium Supplements Protocol Passed - 7/24/2023 11:04 AM        Passed - Recent (12 mo) or future (30 days) visit within the authorizing provider's department     Patient has had an office visit with the authorizing provider or a provider within the authorizing providers department within the previous 12 mos or has a future within next 30 days. See \"Patient Info\" tab in inbasket, or \"Choose Columns\" in Meds & Orders section of the refill encounter.              Passed - Medication is active on med list        Passed - Patient is age 18 or older        Passed - Normal serum potassium in past 12 months     Recent Labs   Lab Test 04/03/23  1206   POTASSIUM 3.4                         Terrance Jolly RN 07/24/23 2:19 PM  "

## 2023-08-19 ENCOUNTER — MYC MEDICAL ADVICE (OUTPATIENT)
Dept: INTERNAL MEDICINE | Facility: CLINIC | Age: 78
End: 2023-08-19
Payer: COMMERCIAL

## 2023-08-19 DIAGNOSIS — I10 ESSENTIAL HYPERTENSION: ICD-10-CM

## 2023-08-21 RX ORDER — LOSARTAN POTASSIUM 100 MG/1
100 TABLET ORAL DAILY
Qty: 90 TABLET | Refills: 0 | Status: SHIPPED | OUTPATIENT
Start: 2023-08-21 | End: 2023-11-06

## 2023-08-23 DIAGNOSIS — I10 ESSENTIAL HYPERTENSION: ICD-10-CM

## 2023-08-23 RX ORDER — METOPROLOL SUCCINATE 25 MG/1
TABLET, EXTENDED RELEASE ORAL
Qty: 180 TABLET | Refills: 2 | Status: SHIPPED | OUTPATIENT
Start: 2023-08-23 | End: 2024-02-22

## 2023-08-23 NOTE — TELEPHONE ENCOUNTER
Prescription approved per [facility] Refill Protocol.     JEFF Licea [facility]

## 2023-10-10 ENCOUNTER — HOSPITAL ENCOUNTER (OUTPATIENT)
Dept: GENERAL RADIOLOGY | Facility: HOSPITAL | Age: 78
Discharge: HOME | End: 2023-10-10
Attending: INTERNAL MEDICINE | Admitting: INTERNAL MEDICINE
Payer: COMMERCIAL

## 2023-10-10 ENCOUNTER — OFFICE VISIT (OUTPATIENT)
Dept: RHEUMATOLOGY | Facility: CLINIC | Age: 78
End: 2023-10-10
Payer: COMMERCIAL

## 2023-10-10 VITALS
WEIGHT: 144.9 LBS | BODY MASS INDEX: 24.49 KG/M2 | SYSTOLIC BLOOD PRESSURE: 130 MMHG | DIASTOLIC BLOOD PRESSURE: 80 MMHG | HEART RATE: 68 BPM

## 2023-10-10 DIAGNOSIS — M15.0 PRIMARY OSTEOARTHRITIS INVOLVING MULTIPLE JOINTS: ICD-10-CM

## 2023-10-10 DIAGNOSIS — R76.8 ANA POSITIVE: ICD-10-CM

## 2023-10-10 DIAGNOSIS — M25.50 POLYARTHRALGIA: ICD-10-CM

## 2023-10-10 DIAGNOSIS — M15.0 PRIMARY OSTEOARTHRITIS INVOLVING MULTIPLE JOINTS: Primary | ICD-10-CM

## 2023-10-10 DIAGNOSIS — Z87.39 HISTORY OF TEMPORAL ARTERITIS: ICD-10-CM

## 2023-10-10 PROCEDURE — 73030 X-RAY EXAM OF SHOULDER: CPT | Mod: 50

## 2023-10-10 PROCEDURE — 20610 DRAIN/INJ JOINT/BURSA W/O US: CPT | Mod: 50 | Performed by: INTERNAL MEDICINE

## 2023-10-10 PROCEDURE — 99214 OFFICE O/P EST MOD 30 MIN: CPT | Mod: 25 | Performed by: INTERNAL MEDICINE

## 2023-10-10 RX ORDER — TRIAMCINOLONE ACETONIDE 40 MG/ML
40 INJECTION, SUSPENSION INTRA-ARTICULAR; INTRAMUSCULAR ONCE
Status: COMPLETED | OUTPATIENT
Start: 2023-10-10 | End: 2023-10-10

## 2023-10-10 RX ADMIN — TRIAMCINOLONE ACETONIDE 40 MG: 40 INJECTION, SUSPENSION INTRA-ARTICULAR; INTRAMUSCULAR at 11:32

## 2023-10-10 NOTE — PROGRESS NOTES
Rheumatology follow-up visit jolanta Alvarado is a 78 year old female presents today for follow-up.    Ginger was seen today for recheck.    Diagnoses and all orders for this visit:    Primary osteoarthritis involving multiple joints  -     triamcinolone (KENALOG-40) injection 40 mg  -     triamcinolone (KENALOG-40) injection 40 mg  -     ASPIRATION/INJECTION MAJOR JOINT  -     XR Shoulder Bilateral 3 Views; Future    CHRISTIAN positive    History of temporal arteritis    Polyarthralgia  -     triamcinolone (KENALOG-40) injection 40 mg  -     triamcinolone (KENALOG-40) injection 40 mg  -     ASPIRATION/INJECTION MAJOR JOINT  -     XR Shoulder Bilateral 3 Views; Future        This patient has worsening pain in her knees bilaterally commensurate with osteoarthritis she would like to proceed local injection done after pros and cons were outlined with 40 mg of Kenalog into each knee anterolateral approach.  She tolerated the procedure well.    Follow-up as needed for this issue.    HPI    Jenny Iglesias is a 78 year old female is here for follow-up. She has polyarthralgias in the context of osteoarthritis. She has history of temporal arteritis. She has a known positive about connective tissue disease evidence. She has found corticosteroid injections helpful. However she would like to keep the frequency as low as possible. We discussed various options. She noted pain level in the knees moderately severe. She wonders if she can continue to walk that she loves to. She has not observed swelling in the knee area. She has not had headache jaw claudication double vision.       DETAILED EXAMINATION  10/10/23  :    Vitals:    10/10/23 1048   BP: 130/80   Pulse: 68   Weight: 65.7 kg (144 lb 14.4 oz)     Alert oriented. Head including the face is examined for malar rash, heliotropes, scarring, lupus pernio. Eyes examined for redness such as in episcleritis/scleritis, periorbital lesions.   Neck/ Face examined for parotid  gland swelling, range of motion of neck.  Left upper and lower and right upper and lower extremities examined for tenderness, swelling, warmth of the appendicular joints, range of motion, edema, rash.  Some of the important findings included: she does not have evidence of synovitis in the palpable joints of the upper extremities.  No significant deformities of the digits.  She has joint line tenderness of the knees bilaterally worse on the medial side with warmth without detectable clinically stable, effusion.  Patient Active Problem List    Diagnosis Date Noted    C. 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     Priority: Medium    Fuchs' corneal dystrophy of both eyes 12/13/2021     Priority: Medium    Corneal edema of both eyes 12/13/2021     Priority: Medium    Localization-related focal epilepsy with simple partial seizures (H) 10/10/2021     Priority: Medium    Seizure disorder (H) 06/09/2021     Priority: Medium    GCA (giant cell arteritis) (H) 03/10/2021     Priority: Medium    TIA (transient ischemic attack)      Priority: Medium    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     Priority: Medium     Added automatically from request for surgery [id number]        Chronic kidney disease, stage 3 (H) 02/15/2021     Priority: Medium    Other chronic pain 10/07/2020     Priority: Medium    Coccyx pain 09/13/2019     Priority: Medium    Chronic insomnia 02/25/2016     Priority: Medium    Vertigo 10/27/2015     Priority: Medium    Essential hypertension 10/27/2015     Priority: Medium    Restless leg syndrome 10/27/2015     Priority: Medium    Perimenopausal vasomotor symptoms 10/27/2015     Priority: Medium    Carotid stenosis, asymptomatic 10/27/2015     Priority: Medium     Past Surgical History:   Procedure Laterality Date    APPENDECTOMY      CATARACT EXTRACTION      CHOLECYSTECTOMY       HYSTERECTOMY      OTHER SURGICAL HISTORY Right 1998    fifth toe amputation    AZ TEMPORAL ARTERY LIGATN OR BX Bilateral 3/4/2021    Procedure: BIOPSY, ARTERY, TEMPORAL;  Surgeon: Ramesh Box MD;  Location: [facility];  Service: General    TUBAL LIGATION        Past Medical History:   Diagnosis Date    Anxiety     Carotid artery stenosis 11/2013    50% left internal carotid artery     Chronic low back pain     Hypertension     Osteoarthritis of lumbar spine     Osteopenia     mild, resolved    Postmenopausal 11/2013    on hormone    Restless leg syndrome     Seizures (H)     Feb 18, 2021, Small lost vision and could not speak for 4-5 seconds    Surgical menopause     age 47    Thyroid nodule 11/2013    35mm, rightthyroid lobe biopsy-benign nodule    TIA (transient ischemic attack)     TIA (transient ischemic attack) 11/2013    Vertigo      Allergies   Allergen Reactions    Codeine Sulfate Shortness Of Breath and Anaphylaxis    Carbidopa-Levodopa      dystonia    Codeine     Diclofenac      Diarrhea    Morphine      Current Outpatient Medications   Medication Sig Dispense Refill    alendronate (FOSAMAX) 70 MG tablet TAKE 1 TAB A WEEK IN THE MORNING ON AN EMPTY STOMACH WITH A FULL GLASS OF WATER 30 MINS BEFORE FOOD 12 tablet 3    atorvastatin (LIPITOR) 40 MG tablet Take 1 tablet (40 mg) by mouth daily 90 tablet 2    diclofenac (VOLTAREN) 1 % topical gel Apply 4 g topically 4 times daily 350 g 3    furosemide (LASIX) 20 MG tablet Take 1 tablet (20 mg) by mouth daily 90 tablet 1    hydrochlorothiazide (HYDRODIURIL) 25 MG tablet Take 1 tablet (25 mg) by mouth daily 90 tablet 2    lamoTRIgine (LAMICTAL) 25 MG tablet Take 75 mg by mouth 2 times daily      losartan (COZAAR) 100 MG tablet Take 1 tablet (100 mg) by mouth daily 90 tablet 0    metoprolol succinate ER (TOPROL XL) 100 MG 24 hr tablet Take one tablet (100 mg) by mouth every evening. 90 tablet 3    metoprolol succinate ER (TOPROL XL) 25 MG 24 hr  tablet TAKE 1 TABLET BY MOUTH TWICE A  tablet 2    Multiple Vitamins-Minerals (CENTRUM PO) Take 1 tablet by mouth daily      potassium chloride ER (K-TAB/KLOR-CON) 10 MEQ CR tablet TAKE 1 TABLET BY MOUTH EVERY DAY 90 tablet 0    pramipexole (MIRAPEX) 0.25 MG tablet TAKE ONE TAB BY MOUTH TWICE DAILY 180 tablet 2    selenium sulfide (SELSUN) 2.5 % external lotion Apply topically daily as needed for itching or irritation 118 mL 3    sertraline (ZOLOFT) 100 MG tablet Take 1 tablet (100 mg) by mouth daily 90 tablet 3    timolol maleate (TIMOPTIC) 0.5 % ophthalmic solution Place 1 drop into both eyes daily      zolpidem (AMBIEN) 10 MG tablet TAKE ONE TABLET BY MOUTH AT BEDTIME 90 tablet 0     family history includes Breast Cancer (age of onset: 57.00) in her mother; Hypertension in her sister; Lung Cancer (age of onset: 62.00) in her father.  Social Connections: Not on file          WBC Count   Date Value Ref Range Status   10/06/2022 8.7 4.0 - 11.0 10e3/uL Final     RBC Count   Date Value Ref Range Status   10/06/2022 4.57 3.80 - 5.20 10e6/uL Final     Hemoglobin   Date Value Ref Range Status   10/06/2022 13.7 11.7 - 15.7 g/dL Final     Hematocrit   Date Value Ref Range Status   10/06/2022 42.7 35.0 - 47.0 % Final     MCV   Date Value Ref Range Status   10/06/2022 93 78 - 100 fL Final     MCH   Date Value Ref Range Status   10/06/2022 30.0 26.5 - 33.0 pg Final     Platelet Count   Date Value Ref Range Status   10/06/2022 247 150 - 450 10e3/uL Final     % Lymphocytes   Date Value Ref Range Status   10/08/2020 34 20 - 40 % Final     AST   Date Value Ref Range Status   10/06/2022 33 10 - 35 U/L Final     ALT   Date Value Ref Range Status   10/06/2022 19 10 - 35 U/L Final     Albumin   Date Value Ref Range Status   10/06/2022 4.1 3.5 - 5.2 g/dL Final   01/13/2022 3.8 3.5 - 5.0 g/dL Final     Alkaline Phosphatase   Date Value Ref Range Status   10/06/2022 149 (H) 35 - 104 U/L Final     Creatinine   Date Value Ref Range  Status   04/03/2023 1.07 (H) 0.51 - 0.95 mg/dL Final     GFR Estimate   Date Value Ref Range Status   04/03/2023 53 (L) >60 mL/min/1.73m2 Final     Comment:     eGFR calculated using 2021 CKD-EPI equation.   04/06/2021 39 (L) >60 mL/min/1.73m2 Final     GFR Estimate If Black   Date Value Ref Range Status   04/06/2021 48 (L) >60 mL/min/1.73m2 Final     Erythrocyte Sedimentation Rate   Date Value Ref Range Status   11/02/2022 17 0 - 20 mm/hr Final     CRP   Date Value Ref Range Status   03/03/2022 0.1 0.0-<0.8 mg/dL Final

## 2023-10-16 ENCOUNTER — OFFICE VISIT (OUTPATIENT)
Dept: INTERNAL MEDICINE | Facility: CLINIC | Age: 78
End: 2023-10-16
Payer: COMMERCIAL

## 2023-10-16 VITALS
RESPIRATION RATE: 14 BRPM | HEIGHT: 65 IN | TEMPERATURE: 98.3 F | DIASTOLIC BLOOD PRESSURE: 70 MMHG | BODY MASS INDEX: 23.74 KG/M2 | SYSTOLIC BLOOD PRESSURE: 132 MMHG | OXYGEN SATURATION: 97 % | HEART RATE: 54 BPM | WEIGHT: 142.5 LBS

## 2023-10-16 DIAGNOSIS — E55.9 VITAMIN D DEFICIENCY: ICD-10-CM

## 2023-10-16 DIAGNOSIS — L03.115 CELLULITIS OF RIGHT LOWER EXTREMITY: ICD-10-CM

## 2023-10-16 DIAGNOSIS — K12.0 CANKER SORES ORAL: ICD-10-CM

## 2023-10-16 DIAGNOSIS — Z23 NEED FOR SHINGLES VACCINE: ICD-10-CM

## 2023-10-16 DIAGNOSIS — M31.6 GCA (GIANT CELL ARTERITIS) (H): ICD-10-CM

## 2023-10-16 DIAGNOSIS — Z23 NEED FOR TDAP VACCINATION: ICD-10-CM

## 2023-10-16 DIAGNOSIS — A04.72 C. DIFFICILE COLITIS: ICD-10-CM

## 2023-10-16 DIAGNOSIS — Z00.00 ENCOUNTER FOR MEDICARE ANNUAL WELLNESS EXAM: ICD-10-CM

## 2023-10-16 DIAGNOSIS — I10 ESSENTIAL HYPERTENSION: ICD-10-CM

## 2023-10-16 DIAGNOSIS — I65.29 ASYMPTOMATIC CAROTID ARTERY STENOSIS, UNSPECIFIED LATERALITY: ICD-10-CM

## 2023-10-16 DIAGNOSIS — E11.9 TYPE 2 DIABETES MELLITUS WITHOUT COMPLICATION, WITHOUT LONG-TERM CURRENT USE OF INSULIN (H): ICD-10-CM

## 2023-10-16 DIAGNOSIS — H47.019 ISCHEMIC OPTIC NEUROPATHY, UNSPECIFIED LATERALITY: ICD-10-CM

## 2023-10-16 DIAGNOSIS — G47.09 OTHER INSOMNIA: ICD-10-CM

## 2023-10-16 DIAGNOSIS — N18.31 STAGE 3A CHRONIC KIDNEY DISEASE (H): Primary | ICD-10-CM

## 2023-10-16 DIAGNOSIS — Z29.11 NEED FOR VACCINATION AGAINST RESPIRATORY SYNCYTIAL VIRUS: ICD-10-CM

## 2023-10-16 DIAGNOSIS — I35.1 AORTIC VALVE INSUFFICIENCY, ETIOLOGY OF CARDIAC VALVE DISEASE UNSPECIFIED: ICD-10-CM

## 2023-10-16 DIAGNOSIS — G25.81 RESTLESS LEG SYNDROME: ICD-10-CM

## 2023-10-16 DIAGNOSIS — E83.52 HYPERCALCEMIA: ICD-10-CM

## 2023-10-16 DIAGNOSIS — G40.109 LOCALIZATION-RELATED FOCAL EPILEPSY WITH SIMPLE PARTIAL SEIZURES (H): ICD-10-CM

## 2023-10-16 LAB
ALBUMIN SERPL BCG-MCNC: 4.2 G/DL (ref 3.5–5.2)
ALBUMIN UR-MCNC: 100 MG/DL
ALP SERPL-CCNC: 143 U/L (ref 35–104)
ALT SERPL W P-5'-P-CCNC: 41 U/L (ref 0–50)
ANION GAP SERPL CALCULATED.3IONS-SCNC: 14 MMOL/L (ref 7–15)
APPEARANCE UR: CLEAR
AST SERPL W P-5'-P-CCNC: 41 U/L (ref 0–45)
BACTERIA #/AREA URNS HPF: ABNORMAL /HPF
BILIRUB SERPL-MCNC: 0.4 MG/DL
BILIRUB UR QL STRIP: NEGATIVE
BUN SERPL-MCNC: 28.2 MG/DL (ref 8–23)
CALCIUM SERPL-MCNC: 11 MG/DL (ref 8.8–10.2)
CHLORIDE SERPL-SCNC: 101 MMOL/L (ref 98–107)
CHOLEST SERPL-MCNC: 176 MG/DL
COLOR UR AUTO: YELLOW
CREAT SERPL-MCNC: 1.49 MG/DL (ref 0.51–0.95)
CREAT UR-MCNC: 214 MG/DL
CRP SERPL-MCNC: <3 MG/L
DEPRECATED HCO3 PLAS-SCNC: 25 MMOL/L (ref 22–29)
EGFRCR SERPLBLD CKD-EPI 2021: 35 ML/MIN/1.73M2
ERYTHROCYTE [DISTWIDTH] IN BLOOD BY AUTOMATED COUNT: 14.3 % (ref 10–15)
ERYTHROCYTE [SEDIMENTATION RATE] IN BLOOD BY WESTERGREN METHOD: 9 MM/HR (ref 0–30)
GLUCOSE SERPL-MCNC: 100 MG/DL (ref 70–99)
GLUCOSE UR STRIP-MCNC: NEGATIVE MG/DL
HBA1C MFR BLD: 5.8 % (ref 0–5.6)
HCT VFR BLD AUTO: 46.9 % (ref 35–47)
HDLC SERPL-MCNC: 101 MG/DL
HGB BLD-MCNC: 14.8 G/DL (ref 11.7–15.7)
HGB UR QL STRIP: ABNORMAL
KETONES UR STRIP-MCNC: NEGATIVE MG/DL
LDLC SERPL CALC-MCNC: 66 MG/DL
LEUKOCYTE ESTERASE UR QL STRIP: ABNORMAL
MCH RBC QN AUTO: 29.8 PG (ref 26.5–33)
MCHC RBC AUTO-ENTMCNC: 31.6 G/DL (ref 31.5–36.5)
MCV RBC AUTO: 94 FL (ref 78–100)
MICROALBUMIN UR-MCNC: 125 MG/L
MICROALBUMIN/CREAT UR: 58.41 MG/G CR (ref 0–25)
NITRATE UR QL: POSITIVE
NONHDLC SERPL-MCNC: 75 MG/DL
PH UR STRIP: 6 [PH] (ref 5–8)
PLATELET # BLD AUTO: 272 10E3/UL (ref 150–450)
POTASSIUM SERPL-SCNC: 4.3 MMOL/L (ref 3.4–5.3)
PROT SERPL-MCNC: 7 G/DL (ref 6.4–8.3)
RBC # BLD AUTO: 4.97 10E6/UL (ref 3.8–5.2)
RBC #/AREA URNS AUTO: ABNORMAL /HPF
SODIUM SERPL-SCNC: 140 MMOL/L (ref 135–145)
SP GR UR STRIP: 1.02 (ref 1–1.03)
SQUAMOUS #/AREA URNS AUTO: ABNORMAL /LPF
TRIGL SERPL-MCNC: 46 MG/DL
TSH SERPL DL<=0.005 MIU/L-ACNC: 4.42 UIU/ML (ref 0.3–4.2)
UROBILINOGEN UR STRIP-ACNC: 0.2 E.U./DL
VIT D+METAB SERPL-MCNC: 55 NG/ML (ref 20–50)
WBC # BLD AUTO: 12.9 10E3/UL (ref 4–11)
WBC #/AREA URNS AUTO: ABNORMAL /HPF
WBC CLUMPS #/AREA URNS HPF: PRESENT /HPF

## 2023-10-16 PROCEDURE — 85652 RBC SED RATE AUTOMATED: CPT | Performed by: INTERNAL MEDICINE

## 2023-10-16 PROCEDURE — 91320 SARSCV2 VAC 30MCG TRS-SUC IM: CPT | Performed by: INTERNAL MEDICINE

## 2023-10-16 PROCEDURE — 80053 COMPREHEN METABOLIC PANEL: CPT | Performed by: INTERNAL MEDICINE

## 2023-10-16 PROCEDURE — 36415 COLL VENOUS BLD VENIPUNCTURE: CPT | Performed by: INTERNAL MEDICINE

## 2023-10-16 PROCEDURE — 90480 ADMN SARSCOV2 VAC 1/ONLY CMP: CPT | Performed by: INTERNAL MEDICINE

## 2023-10-16 PROCEDURE — 81001 URINALYSIS AUTO W/SCOPE: CPT | Performed by: INTERNAL MEDICINE

## 2023-10-16 PROCEDURE — 84443 ASSAY THYROID STIM HORMONE: CPT | Performed by: INTERNAL MEDICINE

## 2023-10-16 PROCEDURE — 82043 UR ALBUMIN QUANTITATIVE: CPT | Performed by: INTERNAL MEDICINE

## 2023-10-16 PROCEDURE — 87086 URINE CULTURE/COLONY COUNT: CPT | Performed by: INTERNAL MEDICINE

## 2023-10-16 PROCEDURE — 87088 URINE BACTERIA CULTURE: CPT | Performed by: INTERNAL MEDICINE

## 2023-10-16 PROCEDURE — G0439 PPPS, SUBSEQ VISIT: HCPCS | Performed by: INTERNAL MEDICINE

## 2023-10-16 PROCEDURE — 87186 SC STD MICRODIL/AGAR DIL: CPT | Performed by: INTERNAL MEDICINE

## 2023-10-16 PROCEDURE — 83036 HEMOGLOBIN GLYCOSYLATED A1C: CPT | Performed by: INTERNAL MEDICINE

## 2023-10-16 PROCEDURE — 82570 ASSAY OF URINE CREATININE: CPT | Performed by: INTERNAL MEDICINE

## 2023-10-16 PROCEDURE — 90662 IIV NO PRSV INCREASED AG IM: CPT | Performed by: INTERNAL MEDICINE

## 2023-10-16 PROCEDURE — 82306 VITAMIN D 25 HYDROXY: CPT | Performed by: INTERNAL MEDICINE

## 2023-10-16 PROCEDURE — 85027 COMPLETE CBC AUTOMATED: CPT | Performed by: INTERNAL MEDICINE

## 2023-10-16 PROCEDURE — 80061 LIPID PANEL: CPT | Performed by: INTERNAL MEDICINE

## 2023-10-16 PROCEDURE — G0008 ADMIN INFLUENZA VIRUS VAC: HCPCS | Mod: 59 | Performed by: INTERNAL MEDICINE

## 2023-10-16 PROCEDURE — 86140 C-REACTIVE PROTEIN: CPT | Performed by: INTERNAL MEDICINE

## 2023-10-16 PROCEDURE — 99214 OFFICE O/P EST MOD 30 MIN: CPT | Mod: 25 | Performed by: INTERNAL MEDICINE

## 2023-10-16 RX ORDER — ZOLPIDEM TARTRATE 5 MG/1
5 TABLET ORAL
Qty: 90 TABLET | Refills: 3 | Status: SHIPPED | OUTPATIENT
Start: 2023-10-16 | End: 2024-04-27

## 2023-10-16 RX ORDER — METOPROLOL SUCCINATE 100 MG/1
TABLET, EXTENDED RELEASE ORAL
Qty: 90 TABLET | Refills: 3 | Status: SHIPPED | OUTPATIENT
Start: 2023-10-16

## 2023-10-16 RX ORDER — CEFUROXIME AXETIL 250 MG/1
TABLET ORAL
COMMUNITY
Start: 2023-06-25 | End: 2024-02-14

## 2023-10-16 RX ORDER — TRIAMCINOLONE ACETONIDE 0.1 %
PASTE (GRAM) DENTAL 2 TIMES DAILY
Qty: 5 G | Refills: 1 | Status: SHIPPED | OUTPATIENT
Start: 2023-10-16 | End: 2024-02-14

## 2023-10-16 RX ORDER — RESPIRATORY SYNCYTIAL VIRUS VACCINE 120MCG/0.5
0.5 KIT INTRAMUSCULAR ONCE
Qty: 1 EACH | Refills: 0 | Status: SHIPPED | OUTPATIENT
Start: 2023-10-16 | End: 2023-10-16

## 2023-10-16 ASSESSMENT — ENCOUNTER SYMPTOMS
ARTHRALGIAS: 1
BREAST MASS: 0
DIARRHEA: 0
FREQUENCY: 1
DIZZINESS: 0

## 2023-10-16 ASSESSMENT — PAIN SCALES - GENERAL: PAINLEVEL: MILD PAIN (3)

## 2023-10-16 ASSESSMENT — ACTIVITIES OF DAILY LIVING (ADL): CURRENT_FUNCTION: NO ASSISTANCE NEEDED

## 2023-10-16 NOTE — PROGRESS NOTES
"The patient was counseled and encouraged to consider modifying their diet and eating habits. She was provided with information on recommended healthy diet options.  Information on urinary incontinence and treatment options given to patient.  Answers submitted by the patient for this visit:  Annual Preventive Visit (Submitted on 10/16/2023)  Chief Complaint: Annual Exam:  In general, how would you rate your overall physical health?: good  Frequency of exercise:: 2-3 days/week  Do you usually eat at least 4 servings of fruit and vegetables a day, include whole grains & fiber, and avoid regularly eating high fat or \"junk\" foods? : No  Taking medications regularly:: Yes  Medication side effects:: Lightheadedness  Activities of Daily Living: no assistance needed  Home safety: no safety concerns identified  Hearing Impairment:: no hearing concerns  In the past 6 months, have you been bothered by leaking of urine?: Yes  diarrhea: No  dizziness: No  frequency: Yes  arthralgias: Yes  urgency: Yes  pelvic pain: No  vaginal bleeding: No  vaginal discharge: No  tenderness: No  breast mass: No  breast discharge: No  In general, how would you rate your overall mental or emotional health?: good  Additional concerns today:: No  Exercise outside of work (Submitted on 10/16/2023)  Chief Complaint: Annual Exam:  Duration of exercise:: 15-30 minutes    "

## 2023-10-16 NOTE — PATIENT INSTRUCTIONS
RSV vaccine   Shingles and TDAP vaccines    All three given at the pharmacy         Patient Education   Personalized Prevention Plan  You are due for the preventive services outlined below.  Your care team is available to assist you in scheduling these services.  If you have already completed any of these items, please share that information with your care team to update in your medical record.  Health Maintenance Due   Topic Date Due    Kidney Microalbumin Urine Test  Never done    Diabetic Foot Exam  Never done    URINE DRUG SCREEN  Never done    Diptheria Tetanus Pertussis (DTAP/TDAP/TD) Vaccine (1 - Tdap) Never done    Hepatitis B Vaccine (1 of 3 - Risk 3-dose series) Never done    RSV VACCINE 60+ (1 - 1-dose 60+ series) Never done    Zoster (Shingles) Vaccine (2 of 3) 12/09/2008    Eye Exam  11/10/2022    A1C Lab  07/03/2023    Flu Vaccine (1) 09/01/2023    COVID-19 Vaccine (6 - 2023-24 season) 09/01/2023    Cholesterol Lab  10/06/2023    ANNUAL REVIEW OF HM ORDERS  10/06/2023    Annual Wellness Visit  10/06/2023    Hemoglobin  10/06/2023     Learning About Dietary Guidelines  What are the Dietary Guidelines for Americans?     Dietary Guidelines for Americans provide tips for eating well and staying healthy. This helps reduce the risk for long-term (chronic) diseases.  These guidelines recommend that you:  Eat and drink the right amount for you. The U.S. government's food guide is called MyPlate. It can help you make your own well-balanced eating plan.  Try to balance your eating with your activity. This helps you stay at a healthy weight.  Drink alcohol in moderation, if at all.  Limit foods high in salt, saturated fat, trans fat, and added sugar.  These guidelines are from the U.S. Department of Agriculture and the U.S. Department of Health and Human Services. They are updated every 5 years.  What is MyPlate?  MyPlate is the U.S. government's food guide. It can help you make your own well-balanced eating  "plan. A balanced eating plan means that you eat enough, but not too much, and that your food gives you the nutrients you need to stay healthy.  MyPlate focuses on eating plenty of whole grains, fruits, and vegetables, and on limiting fat and sugar. It is available online at www.ChooseMyPlate.gov.  How can you get started?  If you're trying to eat healthier, you can slowly change your eating habits over time. You don't have to make big changes all at once. Start by adding one or two healthy foods to your meals each day.  Grains  Choose whole-grain breads and cereals and whole-wheat pasta and whole-grain crackers.  Vegetables  Eat a variety of vegetables every day. They have lots of nutrients and are part of a heart-healthy diet.  Fruits  Eat a variety of fruits every day. Fruits contain lots of nutrients. Choose fresh fruit instead of fruit juice.  Protein foods  Choose fish and lean poultry more often. Eat red meat and fried meats less often. Dried beans, tofu, and nuts are also good sources of protein.  Dairy  Choose low-fat or fat-free products from this food group. If you have problems digesting milk, try eating cheese or yogurt instead.  Fats and oils  Limit fats and oils if you're trying to cut calories. Choose healthy fats when you cook. These include canola oil and olive oil.  Where can you learn more?  Go to https://www.[employer].net/patiented  Enter D676 in the search box to learn more about \"Learning About Dietary Guidelines.\"  Current as of: March 1, 2023               Content Version: 13.7    7365-4514 [employer].   Care instructions adapted under license by your healthcare professional. If you have questions about a medical condition or this instruction, always ask your healthcare professional. [employer] disclaims any warranty or liability for your use of this information.      Bladder Training: Care Instructions  Your Care Instructions     Bladder training is used to treat " urge incontinence and stress incontinence. Urge incontinence means that the need to urinate comes on so fast that you can't get to a toilet in time. Stress incontinence means that you leak urine because of pressure on your bladder. For example, it may happen when you laugh, cough, or lift something heavy.  Bladder training can increase how long you can wait before you have to urinate. It can also help your bladder hold more urine. And it can give you better control over the urge to urinate.  It is important to remember that bladder training takes a few weeks to a few months to make a difference. You may not see results right away, but don't give up.  Follow-up care is a key part of your treatment and safety. Be sure to make and go to all appointments, and call your doctor if you are having problems. It's also a good idea to know your test results and keep a list of the medicines you take.  How can you care for yourself at home?  Work with your doctor to come up with a bladder training program that is right for you. You may use one or more of the following methods.  Delayed urination  In the beginning, try to keep from urinating for 5 minutes after you first feel the need to go.  While you wait, take deep, slow breaths to relax. Kegel exercises can also help you delay the need to go to the bathroom.  After some practice, when you can easily wait 5 minutes to urinate, try to wait 10 minutes before you urinate.  Slowly increase the waiting period until you are able to control when you have to urinate.  Scheduled urination  Empty your bladder when you first wake up in the morning.  Schedule times throughout the day when you will urinate.  Start by going to the bathroom every hour, even if you don't need to go.  Slowly increase the time between trips to the bathroom.  When you have found a schedule that works well for you, keep doing it.  If you wake up during the night and have to urinate, do it. Apply your schedule to  "waking hours only.  Kegel exercises  These tighten and strengthen pelvic muscles, which can help you control the flow of urine. (If doing these exercises causes pain, stop doing them and talk with your doctor.) To do Kegel exercises:  Squeeze your muscles as if you were trying not to pass gas. Or squeeze your muscles as if you were stopping the flow of urine. Your belly, legs, and buttocks shouldn't move.  Hold the squeeze for 3 seconds, then relax for 5 to 10 seconds.  Start with 3 seconds, then add 1 second each week until you are able to squeeze for 10 seconds.  Repeat the exercise 10 times a session. Do 3 to 8 sessions a day.  When should you call for help?  Watch closely for changes in your health, and be sure to contact your doctor if:    Your incontinence is getting worse.     You do not get better as expected.   Where can you learn more?  Go to https://www.[employer].net/patiented  Enter V684 in the search box to learn more about \"Bladder Training: Care Instructions.\"  Current as of: March 1, 2023               Content Version: 13.7    1412-4369 [employer].   Care instructions adapted under license by your healthcare professional. If you have questions about a medical condition or this instruction, always ask your healthcare professional. [employer] disclaims any warranty or liability for your use of this information.         "

## 2023-10-16 NOTE — PROGRESS NOTES
"SUBJECTIVE:   Sara is a 78 year old who presents for Preventive Visit.  She has a h/o giant cell arteritis , seizure disorder , recurrent C diff   Currently doing well       10/16/2023    12:47 PM   Additional Questions   Roomed by joya guerrero       Are you in the first 12 months of your Medicare coverage?  No    Healthy Habits:     In general, how would you rate your overall health?  Good    Frequency of exercise:  2-3 days/week    Duration of exercise:  15-30 minutes    Do you usually eat at least 4 servings of fruit and vegetables a day, include whole grains    & fiber and avoid regularly eating high fat or \"junk\" foods?  No    Taking medications regularly:  Yes    Medication side effects:  Lightheadedness    Ability to successfully perform activities of daily living:  No assistance needed    Home Safety:  No safety concerns identified    Hearing Impairment:  No hearing concerns    In the past 6 months, have you been bothered by leaking of urine? Yes    In general, how would you rate your overall mental or emotional health?  Good    Additional concerns today:  No      Today's PHQ-2 Score:       10/16/2023    12:56 PM   PHQ-2 ( 1999 [employer])   Q1: Little interest or pleasure in doing things 0   Q2: Feeling down, depressed or hopeless 0   PHQ-2 Score 0   Q1: Little interest or pleasure in doing things Not at all   Q2: Feeling down, depressed or hopeless Not at all   PHQ-2 Score 0           Have you ever done Advance Care Planning? (For example, a Health Directive, POLST, or a discussion with a medical provider or your loved ones about your wishes): No, advance care planning information given to patient to review.  Advanced care planning was discussed at today's visit.       Fall risk  Fallen 2 or more times in the past year?: No  Any fall with injury in the past year?: No    Cognitive Screening   1) Repeat 3 items (Leader, Season, Table)    2) Clock draw: NORMAL  3) 3 item recall: Recalls 1 object   Results: NORMAL " clock, 1-2 items recalled: COGNITIVE IMPAIRMENT LESS LIKELY    Mini-CogTM Copyright ELLIOT Orozco. Licensed by the author for use in [facility]; reprinted with permission ([email or web address]). All rights reserved.      Do you have sleep apnea, excessive snoring or daytime drowsiness? : no    Reviewed and updated as needed this visit by clinical staff   Tobacco  Allergies  Meds            Current Outpatient Medications   Medication     alendronate (FOSAMAX) 70 MG tablet     atorvastatin (LIPITOR) 40 MG tablet     cefuroxime (CEFTIN) 250 MG tablet     diclofenac (VOLTAREN) 1 % topical gel     furosemide (LASIX) 20 MG tablet     hydrochlorothiazide (HYDRODIURIL) 25 MG tablet     lamoTRIgine (LAMICTAL) 25 MG tablet     losartan (COZAAR) 100 MG tablet     metoprolol succinate ER (TOPROL XL) 100 MG 24 hr tablet     metoprolol succinate ER (TOPROL XL) 25 MG 24 hr tablet     Multiple Vitamins-Minerals (CENTRUM PO)     potassium chloride ER (K-TAB/KLOR-CON) 10 MEQ CR tablet     pramipexole (MIRAPEX) 0.25 MG tablet     selenium sulfide (SELSUN) 2.5 % external lotion     sertraline (ZOLOFT) 100 MG tablet     timolol maleate (TIMOPTIC) 0.5 % ophthalmic solution     No current facility-administered medications for this visit.       Reviewed and updated as needed this visit by Provider                 Social History     Tobacco Use     Smoking status: Former     Types: Cigarettes     Quit date: 10/22/2000     Years since quittin.9     Smokeless tobacco: Never   Substance Use Topics     Alcohol use: Yes             10/16/2023    12:53 PM   Alcohol Use   Prescreen: >3 drinks/day or >7 drinks/week? No     Do you have a current opioid prescription? No  Do you use any other controlled substances or medications that are not prescribed by a provider? None              Current providers sharing in care for this patient include:   Patient Care Team:  Karen Taylor MD as PCP - General  Karen Taylor MD as Assigned  PCP  Mark Han MBBS as Assigned Rheumatology Provider    The following health maintenance items are reviewed in Epic and correct as of today:  Health Maintenance   Topic Date Due     MICROALBUMIN  Never done     DIABETIC FOOT EXAM  Never done     URINE DRUG SCREEN  Never done     DTAP/TDAP/TD IMMUNIZATION (1 - Tdap) Never done     HEPATITIS B IMMUNIZATION (1 of 3 - Risk 3-dose series) Never done     RSV VACCINE 60+ (1 - 1-dose 60+ series) Never done     ZOSTER IMMUNIZATION (2 of 3) 12/09/2008     EYE EXAM  11/10/2022     A1C  07/03/2023     INFLUENZA VACCINE (1) 09/01/2023     COVID-19 Vaccine (6 - 2023-24 season) 09/01/2023     LIPID  10/06/2023     ANNUAL REVIEW OF HM ORDERS  10/06/2023     MEDICARE ANNUAL WELLNESS VISIT  10/06/2023     HEMOGLOBIN  10/06/2023     BMP  04/03/2024     FALL RISK ASSESSMENT  10/16/2024     ADVANCE CARE PLANNING  10/06/2027     DEXA  10/26/2037     PHQ-2 (once per calendar year)  Completed     Pneumococcal Vaccine: 65+ Years  Completed     URINALYSIS  Completed     IPV IMMUNIZATION  Aged Out     HPV IMMUNIZATION  Aged Out     MENINGITIS IMMUNIZATION  Aged Out     COLORECTAL CANCER SCREENING  Discontinued             Pertinent mammograms are reviewed under the imaging tab.    Review of Systems   Gastrointestinal:  Negative for diarrhea.   Breasts:  Negative for tenderness, breast mass and discharge.   Genitourinary:  Positive for frequency and urgency. Negative for pelvic pain, vaginal bleeding and vaginal discharge.   Musculoskeletal:  Positive for arthralgias.   Neurological:  Negative for dizziness.     Current Outpatient Medications   Medication     alendronate (FOSAMAX) 70 MG tablet     atorvastatin (LIPITOR) 40 MG tablet     cefuroxime (CEFTIN) 250 MG tablet     diclofenac (VOLTAREN) 1 % topical gel     furosemide (LASIX) 20 MG tablet     hydrochlorothiazide (HYDRODIURIL) 25 MG tablet     lamoTRIgine (LAMICTAL) 25 MG tablet     losartan (COZAAR) 100 MG tablet     metoprolol  "succinate ER (TOPROL XL) 100 MG 24 hr tablet     metoprolol succinate ER (TOPROL XL) 25 MG 24 hr tablet     Multiple Vitamins-Minerals (CENTRUM PO)     potassium chloride ER (K-TAB/KLOR-CON) 10 MEQ CR tablet     pramipexole (MIRAPEX) 0.25 MG tablet     selenium sulfide (SELSUN) 2.5 % external lotion     sertraline (ZOLOFT) 100 MG tablet     timolol maleate (TIMOPTIC) 0.5 % ophthalmic solution     triamcinolone (KENALOG) 0.1 % paste     zolpidem (AMBIEN) 5 MG tablet     No current facility-administered medications for this visit.         OBJECTIVE:   /70 (BP Location: Left arm, Patient Position: Sitting, Cuff Size: Adult Large)   Pulse 54   Temp 98.3  F (36.8  C)   Resp 14   Ht 1.638 m (5' 4.5\")   Wt 64.6 kg (142 lb 8 oz)   LMP  (LMP Unknown)   SpO2 97%   BMI 24.08 kg/m   Estimated body mass index is 24.08 kg/m  as calculated from the following:    Height as of this encounter: 1.638 m (5' 4.5\").    Weight as of this encounter: 64.6 kg (142 lb 8 oz).  Physical Exam  GENERAL: healthy, alert and no distress  EYES: Eyes grossly normal to inspection, PERRL and conjunctivae and sclerae normal  HENT: ear canals and TM's normal, nose and mouth without ulcers or lesions  NECK: no adenopathy, no asymmetry, masses, or scars and thyroid normal to palpation  RESP: lungs clear to auscultation - no rales, rhonchi or wheezes  BREAST: normal without masses, tenderness or nipple discharge and no palpable axillary masses or adenopathy  CV: regular rate and rhythm, normal S1 S2, no S3 or S4, no murmur, click or rub, no peripheral edema and peripheral pulses strong  ABDOMEN: soft, nontender, no hepatosplenomegaly, no masses and bowel sounds normal  MS: no gross musculoskeletal defects noted, no edema  SKIN: no suspicious lesions or rashes  NEURO: Normal strength and tone, mentation intact and speech normal  PSYCH: mentation appears normal, affect normal/bright        ASSESSMENT / PLAN:   (N18.31) Stage 3a chronic kidney " disease (H)  (primary encounter diagnosis)  Comment:  Creatinine   Date Value Ref Range Status   10/16/2023 1.49 (H) 0.51 - 0.95 mg/dL Final     CKD a little worse   GFR Estimate   Date Value Ref Range Status   10/16/2023 35 (L) >60 mL/min/1.73m2 Final   04/06/2021 39 (L) >60 mL/min/1.73m2 Final     GFR Estimate If Black   Date Value Ref Range Status   04/06/2021 48 (L) >60 mL/min/1.73m2 Final     Non of her meds need dosage adjusmtent    Is not on NSAIDs  Continue to monitor   Plan: Albumin Random Urine Quantitative with Creat         Ratio, Hemoglobin, CBC with platelets,         Comprehensive metabolic panel            (I10) Essential hypertension  Comment: well controlled   Plan: metoprolol succinate ER (TOPROL XL) 100 MG 24         hr tablet            (Z23) Need for shingles vaccine  Comment:   Plan: zoster vaccine recombinant adjuvanted         (SHINGRIX) injection            (Z23) Need for Tdap vaccination  Comment:   Plan: Tdap, tetanus-diptheria-acell pertussis,         (BOOSTRIX) 5-2.5-18.5 LF-MCG/0.5 PASCUAL injection            (Z29.11) Need for vaccination against respiratory syncytial virus  Comment:   Plan: respiratory syncytial virus vaccine, bivalent         (ABRYSVO) injection            (E11.9) Type 2 diabetes mellitus without complication, without long-term current use of insulin (H)  Comment:   Plan: HEMOGLOBIN A1C, Lipid panel reflex to direct         LDL Non-fasting, Hemoglobin A1c, TSH          Lab Results   Component Value Date    A1C 5.8 10/16/2023    A1C 5.9 04/03/2023    A1C 5.9 10/06/2022    A1C 6.0 04/13/2022    A1C 6.5 01/13/2022     Diagnosed based on 1 single A1c is diet controlled on aspirin and statin  (Z00.00) Encounter for Medicare annual wellness exam  Comment:   Plan:     (I65.29) Asymptomatic carotid artery stenosis, unspecified laterality  Comment:   Plan:     (G25.81) Restless leg syndrome  Comment:   Plan: stable on mirapex     (M31.6) GCA (giant cell arteritis) (H)  Comment:    Plan: ESR: Erythrocyte sedimentation rate, CRP,         inflammation        Now in remission completed prednisone taper   No symptoms of loss of vision , or headache . Or Athralgias     (G40.109) Localization-related focal epilepsy with simple partial seizures (H)  Comment: no relapse on lamotrigine   Plan:     (L03.115) Cellulitis of right lower extremity  Comment:   Plan: A minor injury was complicated by civic significant cellulitis was admitted to the another hospital for IV antibiotics.  Now it is completely healed    (G47.09) Other insomnia  Comment: she tried other meds with no improvement   Recommend taking 5mg instead of 10mg . Did risk of cognitive dysfunction in older adults.  Plan: zolpidem (AMBIEN) 5 MG tablet            (A04.72) C. difficile colitis  Comment: She has a standing order in place she has had 2 episodes I believe.  Last episode of watery diarrhea was C. difficile negative.  She was told only chest test if the diarrhea is watery and not just with a change in bowel habits  Plan: C. difficile Toxin B PCR with reflex to C.         difficile Antigen and Toxins A/B EIA            (K12.0) Canker sores oral  Comment: Minor sores no features to suggest malignancy trial of triamcinolone dental paste.  Plan: triamcinolone (KENALOG) 0.1 % paste            (I35.1) Aortic valve insufficiency, etiology of cardiac valve disease unspecified  Comment: In the last admission I believe they were concerned about sepsis and endocarditis echocardiogram was done she was found to have moderate aortic regurgitation so serial echocardiograms his neck recommended.  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  We will continue to complete 5 years  Other health maintenance reviewed    Immunization all reviewed and updated  Colonoscopy 2017 was normal colon no further needed  Mammogram annual  DEXA up-to-date  She did have recent urinary some dysuria and urine test was sent today        COUNSELING:  Reviewed preventive health counseling, as reflected in patient instructions        She reports that she quit smoking about 22 years ago. She has never used smokeless tobacco.    She has mild hypercalcemia with normal PTH and vitamin D levels.  Appropriate preventive services were discussed with this patient, including applicable screening as appropriate for fall prevention, nutrition, physical activity, Tobacco-use cessation, weight loss and cognition.  Checklist reviewing preventive services available has been given to the patient.    Reviewed patients plan of care and provided an AVS. The Basic Care Plan (routine screening as documented in Health Maintenance) for [place] meets the Care Plan requirement. This Care Plan has been established and reviewed with the Patient.        Karen Taylor MD  [facility]    Identified Health Risks:

## 2023-10-17 LAB — BACTERIA UR CULT: ABNORMAL

## 2023-10-18 ENCOUNTER — TRANSFERRED RECORDS (OUTPATIENT)
Dept: HEALTH INFORMATION MANAGEMENT | Facility: CLINIC | Age: 78
End: 2023-10-18
Payer: COMMERCIAL

## 2023-10-22 PROBLEM — I35.1 AORTIC VALVE INSUFFICIENCY, ETIOLOGY OF CARDIAC VALVE DISEASE UNSPECIFIED: Status: ACTIVE | Noted: 2023-10-22

## 2023-10-30 ENCOUNTER — TRANSFERRED RECORDS (OUTPATIENT)
Dept: HEALTH INFORMATION MANAGEMENT | Facility: CLINIC | Age: 78
End: 2023-10-30
Payer: COMMERCIAL

## 2023-11-06 DIAGNOSIS — I10 ESSENTIAL HYPERTENSION: ICD-10-CM

## 2023-11-07 RX ORDER — LOSARTAN POTASSIUM 100 MG/1
100 TABLET ORAL DAILY
Qty: 90 TABLET | Refills: 3 | Status: SHIPPED | OUTPATIENT
Start: 2023-11-07 | End: 2023-11-28

## 2023-11-17 DIAGNOSIS — Z13.220 SCREENING FOR HYPERLIPIDEMIA: ICD-10-CM

## 2023-11-17 RX ORDER — PRAMIPEXOLE DIHYDROCHLORIDE 0.25 MG/1
TABLET ORAL
Qty: 180 TABLET | Refills: 2 | OUTPATIENT
Start: 2023-11-17

## 2023-11-20 DIAGNOSIS — I10 ESSENTIAL HYPERTENSION: ICD-10-CM

## 2023-11-20 RX ORDER — POTASSIUM CHLORIDE 750 MG/1
TABLET, EXTENDED RELEASE ORAL
Qty: 90 TABLET | Refills: 2 | Status: SHIPPED | OUTPATIENT
Start: 2023-11-20 | End: 2024-02-22

## 2023-11-28 ENCOUNTER — MYC REFILL (OUTPATIENT)
Dept: INTERNAL MEDICINE | Facility: CLINIC | Age: 78
End: 2023-11-28
Payer: COMMERCIAL

## 2023-11-28 DIAGNOSIS — I10 ESSENTIAL HYPERTENSION: ICD-10-CM

## 2023-11-28 DIAGNOSIS — Z13.220 SCREENING FOR HYPERLIPIDEMIA: ICD-10-CM

## 2023-11-28 RX ORDER — LOSARTAN POTASSIUM 100 MG/1
100 TABLET ORAL DAILY
Qty: 90 TABLET | Refills: 3 | Status: SHIPPED | OUTPATIENT
Start: 2023-11-28 | End: 2024-08-26

## 2023-11-28 RX ORDER — PRAMIPEXOLE DIHYDROCHLORIDE 0.25 MG/1
TABLET ORAL
Qty: 180 TABLET | Refills: 2 | Status: SHIPPED | OUTPATIENT
Start: 2023-11-28 | End: 2024-08-26

## 2024-01-02 DIAGNOSIS — Z13.220 SCREENING FOR HYPERLIPIDEMIA: ICD-10-CM

## 2024-01-04 RX ORDER — ATORVASTATIN CALCIUM 40 MG/1
40 TABLET, FILM COATED ORAL DAILY
Qty: 90 TABLET | Refills: 2 | Status: SHIPPED | OUTPATIENT
Start: 2024-01-04 | End: 2024-09-19

## 2024-01-14 DIAGNOSIS — I10 ESSENTIAL HYPERTENSION: ICD-10-CM

## 2024-01-14 NOTE — TELEPHONE ENCOUNTER
Medication Question or Refill    Contacts         Type Contact Phone/Fax    01/14/2024 01:33 PM CST Phone (Incoming) Sara Iglesias (Self) [phone] (M)            What medication are you calling about (include dose and sig)?: sertraline (ZOLOFT) 100 MG tablet     Preferred Pharmacy:       [id number] - [place], [place] - [address]  [address]  [place] [address]  Phone: [phone] Fax: [phone]          Controlled Substance Agreement on file:   CSA -- Patient Level:     [Media Unavailable] Controlled Substance Agreement - Opioid - Scan on 10/17/2019       Who prescribed the medication?: Karen Taylor    Do you need a refill? Yes    When did you use the medication last? N/A    Patient offered an appointment? No    Do you have any questions or concerns?  Yes: [facility] won't refill the prescription, they're saying it needs Dr. Lyman. When PT looks in their mychart it shows they have 3 refills available. They're hoping someone call call [facility] and get the issue resolved.      Could we send this information to you in [employer] or would you prefer to receive a phone call?:   Patient would prefer a phone call   Okay to leave a detailed message?: Yes at Home number on file [phone] (home)

## 2024-01-15 RX ORDER — SERTRALINE HYDROCHLORIDE 100 MG/1
100 TABLET, FILM COATED ORAL DAILY
Qty: 90 TABLET | Refills: 2 | Status: SHIPPED | OUTPATIENT
Start: 2024-01-15 | End: 2024-08-07

## 2024-01-31 NOTE — TELEPHONE ENCOUNTER
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 CTA head/neck showed unruptured 5mm right pericallosal/ERIC aneurysm. Per daughter and pt, the meningioma (NOT aneurysm) was diagnosed 15 years ago and was being conservatively monitored but have not followed up recently. Returned to [facility] ED 1/1/24 with intermittent severe headaches, agitation. CT head showed no evidence of acute intracranial pathology. Moderate chronic microvascular ischemic disease. And known 2.2 x 1.5 x 2cm left parafalcine meningioma. Headaches likely post-concussive with delirium brought on by pain.

## 2024-02-06 ENCOUNTER — LAB (OUTPATIENT)
Dept: LAB | Facility: CLINIC | Age: 79
End: 2024-02-06
Payer: COMMERCIAL

## 2024-02-06 DIAGNOSIS — E83.52 HYPERCALCEMIA: ICD-10-CM

## 2024-02-06 LAB
ANION GAP SERPL CALCULATED.3IONS-SCNC: 10 MMOL/L (ref 7–15)
BASOPHILS # BLD AUTO: 0 10E3/UL (ref 0–0.2)
BASOPHILS NFR BLD AUTO: 1 %
BUN SERPL-MCNC: 32.1 MG/DL (ref 8–23)
CA-I BLD-MCNC: 5.4 MG/DL (ref 4.4–5.2)
CALCIUM SERPL-MCNC: 10.6 MG/DL (ref 8.8–10.2)
CHLORIDE SERPL-SCNC: 103 MMOL/L (ref 98–107)
CREAT SERPL-MCNC: 1.45 MG/DL (ref 0.51–0.95)
DEPRECATED HCO3 PLAS-SCNC: 29 MMOL/L (ref 22–29)
EGFRCR SERPLBLD CKD-EPI 2021: 36 ML/MIN/1.73M2
EOSINOPHIL # BLD AUTO: 0.2 10E3/UL (ref 0–0.7)
EOSINOPHIL NFR BLD AUTO: 2 %
ERYTHROCYTE [DISTWIDTH] IN BLOOD BY AUTOMATED COUNT: 14.1 % (ref 10–15)
GLUCOSE SERPL-MCNC: 110 MG/DL (ref 70–99)
HCT VFR BLD AUTO: 43.4 % (ref 35–47)
HGB BLD-MCNC: 13.5 G/DL (ref 11.7–15.7)
IMM GRANULOCYTES # BLD: 0 10E3/UL
IMM GRANULOCYTES NFR BLD: 0 %
LYMPHOCYTES # BLD AUTO: 3.2 10E3/UL (ref 0.8–5.3)
LYMPHOCYTES NFR BLD AUTO: 38 %
MCH RBC QN AUTO: 29.8 PG (ref 26.5–33)
MCHC RBC AUTO-ENTMCNC: 31.1 G/DL (ref 31.5–36.5)
MCV RBC AUTO: 96 FL (ref 78–100)
MONOCYTES # BLD AUTO: 0.6 10E3/UL (ref 0–1.3)
MONOCYTES NFR BLD AUTO: 8 %
NEUTROPHILS # BLD AUTO: 4.4 10E3/UL (ref 1.6–8.3)
NEUTROPHILS NFR BLD AUTO: 52 %
PLATELET # BLD AUTO: 273 10E3/UL (ref 150–450)
POTASSIUM SERPL-SCNC: 4.2 MMOL/L (ref 3.4–5.3)
PTH-INTACT SERPL-MCNC: 68 PG/ML (ref 15–65)
RBC # BLD AUTO: 4.53 10E6/UL (ref 3.8–5.2)
SODIUM SERPL-SCNC: 142 MMOL/L (ref 135–145)
TOTAL PROTEIN SERUM FOR ELP: 6.7 G/DL (ref 6.4–8.3)
TSH SERPL DL<=0.005 MIU/L-ACNC: 2.45 UIU/ML (ref 0.3–4.2)
VIT D+METAB SERPL-MCNC: 40 NG/ML (ref 20–50)
WBC # BLD AUTO: 8.4 10E3/UL (ref 4–11)

## 2024-02-06 PROCEDURE — 80048 BASIC METABOLIC PNL TOTAL CA: CPT

## 2024-02-06 PROCEDURE — 83970 ASSAY OF PARATHORMONE: CPT

## 2024-02-06 PROCEDURE — 84165 PROTEIN E-PHORESIS SERUM: CPT | Performed by: PATHOLOGY

## 2024-02-06 PROCEDURE — 36415 COLL VENOUS BLD VENIPUNCTURE: CPT

## 2024-02-06 PROCEDURE — 84155 ASSAY OF PROTEIN SERUM: CPT

## 2024-02-06 PROCEDURE — 82306 VITAMIN D 25 HYDROXY: CPT

## 2024-02-06 PROCEDURE — 85025 COMPLETE CBC W/AUTO DIFF WBC: CPT

## 2024-02-06 PROCEDURE — 82330 ASSAY OF CALCIUM: CPT

## 2024-02-06 PROCEDURE — 84443 ASSAY THYROID STIM HORMONE: CPT

## 2024-02-07 LAB
ALBUMIN SERPL ELPH-MCNC: 3.6 G/DL (ref 3.7–5.1)
ALPHA1 GLOB SERPL ELPH-MCNC: 0.4 G/DL (ref 0.2–0.4)
ALPHA2 GLOB SERPL ELPH-MCNC: 1 G/DL (ref 0.5–0.9)
B-GLOBULIN SERPL ELPH-MCNC: 0.9 G/DL (ref 0.6–1)
GAMMA GLOB SERPL ELPH-MCNC: 0.8 G/DL (ref 0.7–1.6)
M PROTEIN SERPL ELPH-MCNC: 0 G/DL
PROT PATTERN SERPL ELPH-IMP: ABNORMAL

## 2024-02-08 LAB
PATH REPORT.COMMENTS IMP SPEC: NORMAL
PATH REPORT.FINAL DX SPEC: NORMAL
PATH REPORT.MICROSCOPIC SPEC OTHER STN: NORMAL
PATH REPORT.RELEVANT HX SPEC: NORMAL

## 2024-02-08 PROCEDURE — 88189 FLOWCYTOMETRY/READ 16 & >: CPT | Performed by: STUDENT IN AN ORGANIZED HEALTH CARE EDUCATION/TRAINING PROGRAM

## 2024-02-08 PROCEDURE — 88184 FLOWCYTOMETRY/ TC 1 MARKER: CPT | Performed by: INTERNAL MEDICINE

## 2024-02-08 PROCEDURE — 88185 FLOWCYTOMETRY/TC ADD-ON: CPT | Performed by: INTERNAL MEDICINE

## 2024-02-14 ENCOUNTER — VIRTUAL VISIT (OUTPATIENT)
Dept: INTERNAL MEDICINE | Facility: CLINIC | Age: 79
End: 2024-02-14
Payer: COMMERCIAL

## 2024-02-14 DIAGNOSIS — E83.52 HYPERCALCEMIA: ICD-10-CM

## 2024-02-14 DIAGNOSIS — N18.32 STAGE 3B CHRONIC KIDNEY DISEASE (H): ICD-10-CM

## 2024-02-14 DIAGNOSIS — G40.109 LOCALIZATION-RELATED FOCAL EPILEPSY WITH SIMPLE PARTIAL SEIZURES (H): ICD-10-CM

## 2024-02-14 DIAGNOSIS — E11.22 TYPE 2 DIABETES MELLITUS WITH STAGE 3B CHRONIC KIDNEY DISEASE, UNSPECIFIED WHETHER LONG TERM INSULIN USE (H): ICD-10-CM

## 2024-02-14 DIAGNOSIS — D72.829 LEUKOCYTOSIS, UNSPECIFIED TYPE: ICD-10-CM

## 2024-02-14 DIAGNOSIS — N18.32 TYPE 2 DIABETES MELLITUS WITH STAGE 3B CHRONIC KIDNEY DISEASE, UNSPECIFIED WHETHER LONG TERM INSULIN USE (H): ICD-10-CM

## 2024-02-14 DIAGNOSIS — I10 ESSENTIAL HYPERTENSION: Primary | ICD-10-CM

## 2024-02-14 DIAGNOSIS — M81.0 AGE-RELATED OSTEOPOROSIS WITHOUT CURRENT PATHOLOGICAL FRACTURE: ICD-10-CM

## 2024-02-14 DIAGNOSIS — M31.6 GCA (GIANT CELL ARTERITIS) (H): ICD-10-CM

## 2024-02-14 PROCEDURE — 99443 PR PHYSICIAN TELEPHONE EVALUATION 21-30 MIN: CPT | Mod: 93 | Performed by: INTERNAL MEDICINE

## 2024-02-14 NOTE — PROGRESS NOTES
Sara is a 78 year old who is being evaluated via a billable telephone encounter   How would you like to obtain your AVS? MyChart  If the video visit is dropped, the invitation should be resent by: Text to cell phone: [phone]  Will anyone else be joining your video visit? No          Assessment & Plan     GCA (giant cell arteritis) (H)  Was on prednisone , had optic neuritis . Successfully tapered off . No relapse     Localization-related focal epilepsy with simple partial seizures (H)  Sees epilepsy neurologist . Stable on lamotrigine     Type 2 diabetes mellitus with stage 3b chronic kidney disease, unspecified whether long term insulin use (H)  Lab Results   Component Value Date    A1C 5.8 10/16/2023    A1C 5.9 04/03/2023    A1C 5.9 10/06/2022    A1C 6.0 04/13/2022    A1C 6.5 01/13/2022     Single AIC was elevated  ever since then prediabetic  on diet control     Stage 3b chronic kidney disease (H)  Creatinine   Date Value Ref Range Status   02/06/2024 1.45 (H) 0.51 - 0.95 mg/dL Final     GFR has now gone below 40 and is in the 35 range.  He has mild microalbuminuria.  Most likely causes essential hypertension.  She is not using any NSAIDs but did use them in the past.  Remote CT scan in the past it does not show kidney stones or kidney structural problems.  Will do renal ultrasound with arterial Doppler , stop hydrochlorothiazide as it may be causing mild hypercalcemia and volume contraction without really added benefit to her blood pressure.  Continue furosemide for now as she gets ankle edema consider discontinuing that at a future point.  She will see pharmacist without hydrochlorothiazide to check her blood pressure to review all her medication supplements to make sure there is no nephrotoxin agent that she is using  - Adult Nephrology  Referral; Future  - US Renal Complete w Arterial Duplex; Future  - Med Therapy Management Referral    Essential hypertension  Follow-up by Pharm.D. then she  can follow-up with me in October.  - US Renal Complete w Arterial Duplex; Future  - Med Therapy Management Referral    Leukocytosis, unspecified type  Up-and-down white cells never exceeding 15,000.  Flow cytometry did not show any suspicious clonal variant.    Age-related osteoporosis without current pathological fracture  History of osteoporosis with positive response to Fosamax for 3 years given her renal dysfunction we will stop the Fosamax because she no longer has osteoporosis will defer Prolia as it is a lifelong therapy.  Be reasonable to start Prolia only if she gets decline in bone density in 2 years.  Fosamax can have prolonged lag effect.  Hypercalcemia  Mild hypercalcemia mild hyperparathyroidism normal vitamin D is also mildly hypoalbuminemic.  She will bring in all her supplements she may need to reduce calcium but it could be hyper calcium Mel due to thiazide diuretic.  Can be reviewed by nephrology as well.                  Veronica Ruiz is a 78 year old, presenting for the following health issues:  GFR Estimate   Date Value Ref Range Status   02/06/2024 36 (L) >60 mL/min/1.73m2 Final   04/06/2021 39 (L) >60 mL/min/1.73m2 Final     GFR Estimate If Black   Date Value Ref Range Status   04/06/2021 48 (L) >60 mL/min/1.73m2 Final     Current Outpatient Medications   Medication    alendronate (FOSAMAX) 70 MG tablet    atorvastatin (LIPITOR) 40 MG tablet    furosemide (LASIX) 20 MG tablet    hydrochlorothiazide (HYDRODIURIL) 25 MG tablet    lamoTRIgine (LAMICTAL) 25 MG tablet    losartan (COZAAR) 100 MG tablet    metoprolol succinate ER (TOPROL XL) 100 MG 24 hr tablet    metoprolol succinate ER (TOPROL XL) 25 MG 24 hr tablet    Multiple Vitamins-Minerals (CENTRUM PO)    potassium chloride ER (K-TAB/KLOR-CON) 10 MEQ CR tablet    pramipexole (MIRAPEX) 0.25 MG tablet    selenium sulfide (SELSUN) 2.5 % external lotion    sertraline (ZOLOFT) 100 MG tablet    zolpidem (AMBIEN) 5 MG tablet    cefuroxime  (CEFTIN) 250 MG tablet    diclofenac (VOLTAREN) 1 % topical gel    timolol maleate (TIMOPTIC) 0.5 % ophthalmic solution    triamcinolone (KENALOG) 0.1 % paste     No current facility-administered medications for this visit.     Lab Results   Component Value Date    A1C 5.8 10/16/2023    A1C 5.9 04/03/2023    A1C 5.9 10/06/2022    A1C 6.0 04/13/2022    A1C 6.5 01/13/2022         Follow Up      2/14/2024    10:44 AM   Additional Questions   Roomed by Lillie HOUSER CMA     History of Present Illness       Reason for visit:  Follow up appointment    She eats 2-3 servings of fruits and vegetables daily.She consumes 1 sweetened beverage(s) daily.She exercises with enough effort to increase her heart rate 10 to 19 minutes per day.  She exercises with enough effort to increase her heart rate 5 days per week.   She is taking medications regularly.                   Objective           Vitals:  No vitals were obtained today due to virtual visit.    Physical Exam     Telephone call 25 minutes

## 2024-02-18 ENCOUNTER — HEALTH MAINTENANCE LETTER (OUTPATIENT)
Age: 79
End: 2024-02-18

## 2024-02-19 ENCOUNTER — TELEPHONE (OUTPATIENT)
Dept: NEPHROLOGY | Facility: CLINIC | Age: 79
End: 2024-02-19
Payer: COMMERCIAL

## 2024-02-19 ENCOUNTER — MYC MEDICAL ADVICE (OUTPATIENT)
Dept: INTERNAL MEDICINE | Facility: CLINIC | Age: 79
End: 2024-02-19
Payer: COMMERCIAL

## 2024-02-19 DIAGNOSIS — M25.511 ACUTE PAIN OF RIGHT SHOULDER: Primary | ICD-10-CM

## 2024-02-19 NOTE — TELEPHONE ENCOUNTER
[facility] Call Center    Phone Message    May a detailed message be left on voicemail: no     Reason for Call: Order(s): Other:   Reason for requested: Lab orders  Date needed: 05/28/24  Provider name: Dr Fatima    Action Taken: Message routed to:  [facility] ([facility]): Nephrology    Travel Screening: Not Applicable

## 2024-02-21 RX ORDER — HYDROCODONE BITARTRATE AND ACETAMINOPHEN 5; 325 MG/1; MG/1
1 TABLET ORAL EVERY 6 HOURS PRN
Qty: 30 TABLET | Refills: 0 | Status: SHIPPED | OUTPATIENT
Start: 2024-02-21 | End: 2024-02-29

## 2024-02-22 ENCOUNTER — OFFICE VISIT (OUTPATIENT)
Dept: PHARMACY | Facility: CLINIC | Age: 79
End: 2024-02-22
Attending: INTERNAL MEDICINE
Payer: COMMERCIAL

## 2024-02-22 VITALS — SYSTOLIC BLOOD PRESSURE: 105 MMHG | DIASTOLIC BLOOD PRESSURE: 51 MMHG | HEART RATE: 61 BPM

## 2024-02-22 DIAGNOSIS — G40.109 LOCALIZATION-RELATED FOCAL EPILEPSY WITH SIMPLE PARTIAL SEIZURES (H): ICD-10-CM

## 2024-02-22 DIAGNOSIS — Z78.9 TAKES DIETARY SUPPLEMENTS: ICD-10-CM

## 2024-02-22 DIAGNOSIS — F32.A DEPRESSION, UNSPECIFIED DEPRESSION TYPE: ICD-10-CM

## 2024-02-22 DIAGNOSIS — F51.04 CHRONIC INSOMNIA: ICD-10-CM

## 2024-02-22 DIAGNOSIS — E78.5 HYPERLIPIDEMIA LDL GOAL <100: Primary | ICD-10-CM

## 2024-02-22 DIAGNOSIS — I10 ESSENTIAL HYPERTENSION: ICD-10-CM

## 2024-02-22 DIAGNOSIS — G25.81 RESTLESS LEG SYNDROME: ICD-10-CM

## 2024-02-22 DIAGNOSIS — M25.519 SHOULDER PAIN, UNSPECIFIED CHRONICITY, UNSPECIFIED LATERALITY: ICD-10-CM

## 2024-02-22 PROCEDURE — 99605 MTMS BY PHARM NP 15 MIN: CPT

## 2024-02-22 PROCEDURE — 99607 MTMS BY PHARM ADDL 15 MIN: CPT

## 2024-02-22 RX ORDER — METOPROLOL SUCCINATE 25 MG/1
TABLET, EXTENDED RELEASE ORAL
COMMUNITY
Start: 2024-02-22 | End: 2024-02-22

## 2024-02-22 NOTE — LETTER
"Recommended To-Do List      Prepared on: 02/24/2024       You can get the best results from your medications by completing the items on this \"To-Do List.\"      Bring your To-Do List when you go to your doctor. And, share it with your family or caregivers.    My To-Do List:  What we talked about: What I should do:   Your medication dosage being too high    Decrease your dosage of metoprolol succinate ER (TOPROL XL) to 100 mg  and after 7 days decrease it to 50 mg daily           What we talked about: What I should do:                     "

## 2024-02-22 NOTE — PATIENT INSTRUCTIONS
"Recommendations from today's MTM visit:                                                      MTM (medication therapy management) is a service provided by a clinical pharmacist designed to help you get the most of out of your medicines.   Today we reviewed what your medicines are for, how to know if they are working, that your medicines are safe and how to make your medicine regimen as easy as possible.      Lower the metoprolol dose to 100 mg daily for about 7 days and check if you notice a difference, and then lower it to 50 mg daily   Please check your blood pressure twice daily: morning and evening and report it to PharmD    Follow-up: Due on 03/07/2024 @ 11:30 AM    It was great speaking with you today.  I value your experience and would be very thankful for your time in providing feedback in our clinic survey. In the next few days, you may receive an email or text message from [employer] with a link to a survey related to your  clinical pharmacist.\"     To schedule another MTM appointment, please call the clinic directly or you may call the MTM scheduling line at [phone].    My Clinical Pharmacist's contact information:                                                      Please feel free to contact me with any questions or concerns you have.        Ifeanyi Daley, PharmD     Medication Therapy Management (MTM) Pharmacist     [phone]     [email or web address]     [facility]    "

## 2024-02-22 NOTE — PROGRESS NOTES
Medication Therapy Management (MTM) Encounter    ASSESSMENT:                            Medication Adherence/Access: No issues identified    Hypertension: In clinic blood pressure within goal of < 140/90. Considering low BP and patient experiencing symptoms of low BP appropriate to lower the dose of Beta blockers. Beta blockers cause fatigue, dizziness and lethargy.     Hyperlipidemia:Stable on atorvastatin 40 mg daily    Depression/Anxiety:Stable on sertraline 100 mg daily    Restless Legs:Stable on Pramipexole 0.25 mg     Shoulder pain:Have not started this medication yet      Insomnia:Stable on Zolpidem 5 mg at bedtime    Seizures disorder:Stable on lamotrigine 75 mg twice daily    Dietary Supplements:Continue taking current dietary supplements      PLAN:                            Lower the metoprolol dose to 100 mg daily for about 7 days and check if you notice a difference, and then lower it to 50 mg daily   Please check your blood pressure twice daily: morning and evening and report it to PharmD    Follow-up: Due on 03/07/2024 @ 11:30 AM    SUBJECTIVE/OBJECTIVE:                          Sara Iglesias is a 78 year old female seen for an initial visit. She was referred to me from Dr. Taylor.      Reason for visit: Essential Hypertension.    Allergies/ADRs: Reviewed in chart  Past Medical History: Reviewed in chart  Tobacco: She reports that she quit smoking about 23 years ago. She has never used smokeless tobacco.    Medication Adherence/Access: no issues reported    Hypertension    Losartan 100 mg daily    Metoprolol succinate    Furosemide 20 mg daily    Potassium ER 10 MEq daily  Patient feels tired/she has frequent dizziness . Discussed about changing or dose reducing medications that might be responsible for causing patient's fatigue.       BP Readings from Last 3 Encounters:   10/16/23 132/70   10/10/23 130/80   04/11/23 120/64     Pulse Readings from Last 3 Encounters:   10/16/23 54   10/10/23 68    04/11/23 64     Home BP: 118/62, 90/57, 118/62, 121/64, 120/62, 113/62, 128/70, and 118/61  Home machine, but in the clinic: 90/57, and the pulse is 56; 118/61 59.     Hyperlipidemia :  Atorvastatin 40 mg daily  Patient reports no significant myalgias or other side effects.  The ASCVD Risk score (Celestino DK, et al., 2019) failed to calculate for the following reasons:    The 2019 ASCVD risk score is only valid for ages 40 to 79     Recent Labs   Lab Test 10/16/23  1422 10/06/22  1258   CHOL 176 155    82   LDL 66 61   TRIG 46 61     Depression/Anxiety: Patient said this medication is helping    Sertraline 100 mg daily        No data to display              Restless Legs: Patient noted it is working fine    Pramipexole 0.25 mg and takes it twice daily ( At around supper and before bedtime)     Shoulder pain:    Norco 5 - 325 mg 1 tablet every 6 hours as needed.Just ordered not picked up yet.     Insomnia: It does help. She used to take 10 mg, but for the fear of fall, it was reduced to 5 mg    Zolpidem 5 mg daily at bedtime     Seizures disorder:  Noted she had first seizures around 3.5 years ago; doctors thought it was mini stroke, but after a couple of tests it was determined to be seizures, and was started on lamotrigine. Since she started taking this medication, she did not have any seizure episodes.   Lamotrigine 75 mg twice daily     Dietary Supplements:    Centrum PO multivitamin and minerals daily (patient buys it from [employer])       Today's Vitals: LMP  (LMP Unknown)   ----------------      I spent 78 minutes with this patient today. All changes were made via collaborative practice agreement with Karen Taylor MD. A copy of the visit note was provided to the patient's provider(s).    A summary of these recommendations was given to the patient.       Medication Therapy Recommendations  No medication therapy recommendations to display     Ifeanyi Daley, Giuseppe     Medication Therapy Management  (MTM) Pharmacist     [phone]     [email or web address]

## 2024-02-22 NOTE — LETTER
February 24, 2024  Jenny Iglesias  [address]   [place] [address]    Dear Ms. Iglesias, [facility]     Thank you for talking with me on Feb 22, 2024 about your health and medications. As a follow-up to our conversation, I have included two documents:      Your Recommended To-Do List has steps you should take to get the best results from your medications.  Your Medication List will help you keep track of your medications and how to take them.    If you want to talk about these documents, please call COURT GREER RPH at phone: [phone], Monday-Friday 8-4:30pm.    I look forward to working with you and your doctors to make sure your medications work well for you.    Sincerely,  COURT GREER RPH  [facility] Pharmacist, [facility]

## 2024-02-22 NOTE — LETTER
_  Medication List        Prepared on: 02/24/2024     Bring your Medication List when you go to the doctor, hospital, or   emergency room. And, share it with your family or caregivers.     Note any changes to how you take your medications.  Cross out medications when you no longer use them.    Medication How I take it Why I use it Prescriber   atorvastatin (LIPITOR) 40 MG tablet TAKE ONE TABLET BY MOUTH ONE TIME DAILY Screening for Hyperlipidemia Karen Taylor MD   furosemide (LASIX) 20 MG tablet Take 1 tablet (20 mg) by mouth daily Fluid Retention Karen Taylor MD   HYDROcodone-acetaminophen (NORCO) 5-325 MG tablet Take 1 tablet by mouth every 6 hours as needed Acute pain of right shoulder Karen Taylor MD   lamoTRIgine (LAMICTAL) 25 MG tablet Take 75 mg by mouth 2 times daily  Seizures  Negin Hernandes MD   losartan (COZAAR) 100 MG tablet Take 1 tablet (100 mg) by mouth daily Essential Hypertension Karen Taylor MD   metoprolol succinate ER (TOPROL XL) 100 MG 24 hr tablet Take one tablet (100 mg) by mouth every evening. Essential Hypertension Karen Taylor MD   Multiple Vitamins-Minerals (CENTRUM PO) Take 1 tablet by mouth daily  General Health Patient Reported   pramipexole (MIRAPEX) 0.25 MG tablet TAKE ONE TAB BY MOUTH TWICE DAILY Screening for Hyperlipidemia Karen Taylor MD   sertraline (ZOLOFT) 100 MG tablet Take 1 tablet (100 mg) by mouth daily Essential Hypertension Karen Taylor MD   zolpidem (AMBIEN) 5 MG tablet Take 1 tablet (5 mg) by mouth nightly as needed for sleep Other insomnia Karen Taylor MD         Add new medications, over-the-counter drugs, herbals, vitamins, or  minerals in the blank rows below.    Medication How I take it Why I use it Prescriber                                      Allergies:      codeine sulfate; carbidopa-levodopa; codeine; diclofenac; morphine        Side effects I have had:               Other Information:              My notes and questions:

## 2024-02-23 NOTE — TELEPHONE ENCOUNTER
DIAGNOSIS: Acute pain of left shoulder, no images, Jose Taylor    APPOINTMENT DATE: 2/28/2024    NOTES STATUS DETAILS   OFFICE NOTE from referring provider Internal Karen Taylor MD    MEDICATION LIST Internal    XRAYS (IMAGES & REPORTS) Internal Xray Shoulder 10/10/2023

## 2024-02-26 ENCOUNTER — HOSPITAL ENCOUNTER (OUTPATIENT)
Dept: ULTRASOUND IMAGING | Facility: HOSPITAL | Age: 79
Discharge: HOME | End: 2024-02-26
Attending: INTERNAL MEDICINE | Admitting: INTERNAL MEDICINE
Payer: COMMERCIAL

## 2024-02-26 DIAGNOSIS — I10 ESSENTIAL HYPERTENSION: ICD-10-CM

## 2024-02-26 DIAGNOSIS — N18.32 STAGE 3B CHRONIC KIDNEY DISEASE (H): ICD-10-CM

## 2024-02-26 PROCEDURE — 93975 VASCULAR STUDY: CPT

## 2024-02-28 ENCOUNTER — OFFICE VISIT (OUTPATIENT)
Dept: ORTHOPEDICS | Facility: CLINIC | Age: 79
End: 2024-02-28
Attending: INTERNAL MEDICINE
Payer: COMMERCIAL

## 2024-02-28 ENCOUNTER — PRE VISIT (OUTPATIENT)
Dept: ORTHOPEDICS | Facility: CLINIC | Age: 79
End: 2024-02-28

## 2024-02-28 DIAGNOSIS — M25.511 ACUTE PAIN OF RIGHT SHOULDER: Primary | ICD-10-CM

## 2024-02-28 PROCEDURE — 99203 OFFICE O/P NEW LOW 30 MIN: CPT | Performed by: FAMILY MEDICINE

## 2024-02-28 NOTE — PROGRESS NOTES
ASSESSMENT/PLAN:  Patient is an 78 y.o. F with a 3 month history of atraumatic L anterior shoulder pain. Pain seems to be focal at pectoralis major insertion point. AROM is limited due to pain. Full PROM. Most likely diagnosis is minor tear in pectoralis major. Rotator cuff tear or injury unlikely due to full strength of all rotator cuff muscles. Biceps tendon tear unlikely due to lack of pain at biceps tendon insertion point, full strength, and no pain with flexion. Adhesive capsulitis unlikely due to full PROM.    Plan:  6 weeks of PT focusing on pectoralis major  Tylenol 1,000 mg every 8 hours as needed for pain  Ice or cold compresses at site for pain and/or swelling  Follow-up in 6 weeks if pain is not improving      Pt is a RHD 78 year old female here today for:   3 month history of anterior L shoulder pain following attempt at opening a jar in November. Patient stated that she used more force than normal to try and open the jar and immediately felt pain in her anterior shoulder, but denies hearing or feeling any clicking/popping/tearing sensation. Since then she has had pain with activity at the anterior shoulder, but denies pain with sleep. Sometimes she has mild pain at rest, but pain with activity can get to 7/10. She currently rates her pain at 4/10. Patient denies weakness, radiation, numbness, or tingling.      HPI:   Left shoulder:  Location: She states pain is over her anterior shoulder that will radiate into her left scapula.    Duration: Thanksgivings 2023, 3 months  Trauma/ Fall: No, patient explains she was opening a jar of spaghetti sauce and felt pain over her anterior shoulder. She denies a snap or pop. Just hurt and it was Thanksgiving and didn't pay much attention to this  [place]    Able to walk: No   Swelling: No   Bruising: No  Numbness/ Tingling: No   Weakness: No   Instability: No  Snapping/Clicking: No, but she describes a grinding sensation over her lateral right scapula.    Aggravating Factors: she describes difficulty with reaching over head, internal and external rotation and horizontal abduction and adduction.   Imaging:  XR of bilateral shoulders completed on  10/10/2023  Treatment: chiropractic care, applies heat packs, and rest.  She is right hand dominant    EXAM:   left Shoulder:   Inspection/Palpation: Symmetrical, Anterior pain, no crepitus/step-offs/bone deformities, focal pain at pectoralis major insertion point  ROM:   Active - AROM R>L, L has full flexion, but on extension can only raise arm about 100 degrees. L abduction arm raise to about 90 degrees. L internal rotation has full ROM, but with pain. L external rotation symmetric with R, but with pain. AROM on L limited due to pain.  Passive - has full PROM on L, though painful when maneuvering to full extension.  Strength: deltoid/supraspinatous - 5/5; infraspinatous/ teres minor - 5/5; subscapularis - 5/5   Maneuvers:   RTC - empty can - neg; painful arc - pos; lift off - neg   Impingement - Clay -neg; Neer- neg   AC - cross arm - neg   Biceps - Speed's - neg; Yergason's - neg   Labrum - Noble's - neg  Instability - Apprehension - neg   Palpation: AC - neg; Acromion/ supraspinatus - neg; scapula - neg; bicipital groove - neg, focal pain at pectoralis minor insertion point      Past Medical History:   Diagnosis Date    Anxiety     Carotid artery stenosis 11/2013    50% left internal carotid artery     Chronic low back pain     Hypertension     Osteoarthritis of lumbar spine     Osteopenia     mild, resolved    Postmenopausal 11/2013    on hormone    Restless leg syndrome     Seizures (H)     Feb 18, 2021, Small lost vision and could not speak for 4-5 seconds    Surgical menopause     age 47    Thyroid nodule 11/2013    35mm, rightthyroid lobe biopsy-benign nodule    TIA (transient ischemic attack)     TIA (transient ischemic attack) 11/2013    Vertigo       Past Surgical History:   Procedure Laterality Date     APPENDECTOMY      CATARACT EXTRACTION      CHOLECYSTECTOMY      HYSTERECTOMY      OTHER SURGICAL HISTORY Right 1998    fifth toe amputation    AR TEMPORAL ARTERY LIGATN OR BX Bilateral 3/4/2021    Procedure: BIOPSY, ARTERY, TEMPORAL;  Surgeon: Ramesh Box MD;  Location: [facility];  Service: General    TUBAL LIGATION        Current Outpatient Medications   Medication Sig Dispense Refill    atorvastatin (LIPITOR) 40 MG tablet TAKE ONE TABLET BY MOUTH ONE TIME DAILY 90 tablet 2    furosemide (LASIX) 20 MG tablet Take 1 tablet (20 mg) by mouth daily 90 tablet 1    HYDROcodone-acetaminophen (NORCO) 5-325 MG tablet Take 1 tablet by mouth every 6 hours as needed 30 tablet 0    lamoTRIgine (LAMICTAL) 25 MG tablet Take 75 mg by mouth 2 times daily      losartan (COZAAR) 100 MG tablet Take 1 tablet (100 mg) by mouth daily 90 tablet 3    metoprolol succinate ER (TOPROL XL) 100 MG 24 hr tablet Take one tablet (100 mg) by mouth every evening. 90 tablet 3    Multiple Vitamins-Minerals (CENTRUM PO) Take 1 tablet by mouth daily      pramipexole (MIRAPEX) 0.25 MG tablet TAKE ONE TAB BY MOUTH TWICE DAILY 180 tablet 2    sertraline (ZOLOFT) 100 MG tablet Take 1 tablet (100 mg) by mouth daily 90 tablet 2    zolpidem (AMBIEN) 5 MG tablet Take 1 tablet (5 mg) by mouth nightly as needed for sleep 90 tablet 3      Allergies   Allergen Reactions    Codeine Sulfate Shortness Of Breath and Anaphylaxis    Carbidopa-Levodopa      dystonia    Codeine     Diclofenac      Diarrhea    Morphine       ROS:   Gen- no fevers/chills   Rheum - no morning stiffness   Derm - no rash/ redness   Neuro - no numbness, no tingling   Remainder of ROS negative.     Exam:   LMP  (LMP Unknown)      Xray of shoulder on 10/10/2023 at [facility] location - films personally reviewed with patient at time of visit     My impression: no fractures, mild AC joint OA, left shoulder- normal        Gloria Stark, MS4       Patient seen, evaluated and discussed with  the student. I have verified the content of the note, which accurately reflects my assessment of the patient and the plan of care.   Supervising Physician:  Nicol Randall MD

## 2024-02-28 NOTE — LETTER
2/28/2024      RE: Jenny Iglesias  [address]  [place] [address]     Dear Colleague,    Thank you for referring your patient, Jenny Iglesias, to the [facility] [place]. Please see a copy of my visit note below.    ASSESSMENT/PLAN:  Patient is an 78 y.o. F with a 3 month history of atraumatic L anterior shoulder pain. Pain seems to be focal at pectoralis major insertion point. AROM is limited due to pain. Full PROM. Most likely diagnosis is minor tear in pectoralis major. Rotator cuff tear or injury unlikely due to full strength of all rotator cuff muscles. Biceps tendon tear unlikely due to lack of pain at biceps tendon insertion point, full strength, and no pain with flexion. Adhesive capsulitis unlikely due to full PROM.    Plan:  6 weeks of PT focusing on pectoralis major  Tylenol 1,000 mg every 8 hours as needed for pain  Ice or cold compresses at site for pain and/or swelling  Follow-up in 6 weeks if pain is not improving      Pt is a RHD 78 year old female here today for:   3 month history of anterior L shoulder pain following attempt at opening a jar in November. Patient stated that she used more force than normal to try and open the jar and immediately felt pain in her anterior shoulder, but denies hearing or feeling any clicking/popping/tearing sensation. Since then she has had pain with activity at the anterior shoulder, but denies pain with sleep. Sometimes she has mild pain at rest, but pain with activity can get to 7/10. She currently rates her pain at 4/10. Patient denies weakness, radiation, numbness, or tingling.      HPI:   Left shoulder:  Location: She states pain is over her anterior shoulder that will radiate into her left scapula.    Duration: Thanksgivings 2023, 3 months  Trauma/ Fall: No, patient explains she was opening a jar of spaghetti sauce and felt pain over her anterior shoulder. She denies a snap or pop. Just hurt and it  was Thanksgiving and didn't pay much attention to this  [place]    Able to walk: No   Swelling: No   Bruising: No  Numbness/ Tingling: No   Weakness: No   Instability: No  Snapping/Clicking: No, but she describes a grinding sensation over her lateral right scapula.   Aggravating Factors: she describes difficulty with reaching over head, internal and external rotation and horizontal abduction and adduction.   Imaging:  XR of bilateral shoulders completed on  10/10/2023  Treatment: chiropractic care, applies heat packs, and rest.  She is right hand dominant    EXAM:   left Shoulder:   Inspection/Palpation: Symmetrical, Anterior pain, no crepitus/step-offs/bone deformities, focal pain at pectoralis major insertion point  ROM:   Active - AROM R>L, L has full flexion, but on extension can only raise arm about 100 degrees. L abduction arm raise to about 90 degrees. L internal rotation has full ROM, but with pain. L external rotation symmetric with R, but with pain. AROM on L limited due to pain.  Passive - has full PROM on L, though painful when maneuvering to full extension.  Strength: deltoid/supraspinatous - 5/5; infraspinatous/ teres minor - 5/5; subscapularis - 5/5   Maneuvers:   RTC - empty can - neg; painful arc - pos; lift off - neg   Impingement - Clay -neg; Neer- neg   AC - cross arm - neg   Biceps - Speed's - neg; Yergason's - neg   Labrum - Noble's - neg  Instability - Apprehension - neg   Palpation: AC - neg; Acromion/ supraspinatus - neg; scapula - neg; bicipital groove - neg, focal pain at pectoralis minor insertion point      Past Medical History:   Diagnosis Date    Anxiety     Carotid artery stenosis 11/2013    50% left internal carotid artery     Chronic low back pain     Hypertension     Osteoarthritis of lumbar spine     Osteopenia     mild, resolved    Postmenopausal 11/2013    on hormone    Restless leg syndrome     Seizures (H)     Feb 18, 2021, Small lost vision and could not  speak for 4-5 seconds    Surgical menopause     age 47    Thyroid nodule 11/2013    35mm, rightthyroid lobe biopsy-benign nodule    TIA (transient ischemic attack)     TIA (transient ischemic attack) 11/2013    Vertigo       Past Surgical History:   Procedure Laterality Date    APPENDECTOMY      CATARACT EXTRACTION      CHOLECYSTECTOMY      HYSTERECTOMY      OTHER SURGICAL HISTORY Right 1998    fifth toe amputation    [place] TEMPORAL ARTERY LIGATN OR BX Bilateral 3/4/2021    Procedure: BIOPSY, ARTERY, TEMPORAL;  Surgeon: Ramesh Box MD;  Location: [facility];  Service: General    TUBAL LIGATION        Current Outpatient Medications   Medication Sig Dispense Refill    atorvastatin (LIPITOR) 40 MG tablet TAKE ONE TABLET BY MOUTH ONE TIME DAILY 90 tablet 2    furosemide (LASIX) 20 MG tablet Take 1 tablet (20 mg) by mouth daily 90 tablet 1    HYDROcodone-acetaminophen (NORCO) 5-325 MG tablet Take 1 tablet by mouth every 6 hours as needed 30 tablet 0    lamoTRIgine (LAMICTAL) 25 MG tablet Take 75 mg by mouth 2 times daily      losartan (COZAAR) 100 MG tablet Take 1 tablet (100 mg) by mouth daily 90 tablet 3    metoprolol succinate ER (TOPROL XL) 100 MG 24 hr tablet Take one tablet (100 mg) by mouth every evening. 90 tablet 3    Multiple Vitamins-Minerals (CENTRUM PO) Take 1 tablet by mouth daily      pramipexole (MIRAPEX) 0.25 MG tablet TAKE ONE TAB BY MOUTH TWICE DAILY 180 tablet 2    sertraline (ZOLOFT) 100 MG tablet Take 1 tablet (100 mg) by mouth daily 90 tablet 2    zolpidem (AMBIEN) 5 MG tablet Take 1 tablet (5 mg) by mouth nightly as needed for sleep 90 tablet 3      Allergies   Allergen Reactions    Codeine Sulfate Shortness Of Breath and Anaphylaxis    Carbidopa-Levodopa      dystonia    Codeine     Diclofenac      Diarrhea    Morphine       ROS:   Gen- no fevers/chills   Rheum - no morning stiffness   Derm - no rash/ redness   Neuro - no numbness, no tingling   Remainder of ROS negative.     Exam:    LMP  (LMP Unknown)      Xray of shoulder on 10/10/2023 at [facility] location - films personally reviewed with patient at time of visit     My impression: no fractures, mild AC joint OA, left shoulder- normal        Gloria Stark, MS4       Patient seen, evaluated and discussed with the resident. I have verified the content of the note, which accurately reflects my assessment of the patient and the plan of care.   Supervising Physician:  Nicol Randall MD       Again, thank you for allowing me to participate in the care of your patient.      Sincerely,    Nicol Randall MD

## 2024-03-06 ENCOUNTER — TRANSFERRED RECORDS (OUTPATIENT)
Dept: HEALTH INFORMATION MANAGEMENT | Facility: CLINIC | Age: 79
End: 2024-03-06
Payer: COMMERCIAL

## 2024-03-07 ENCOUNTER — VIRTUAL VISIT (OUTPATIENT)
Dept: PHARMACY | Facility: CLINIC | Age: 79
End: 2024-03-07
Payer: COMMERCIAL

## 2024-03-07 ENCOUNTER — TRANSCRIBE ORDERS (OUTPATIENT)
Dept: OTHER | Age: 79
End: 2024-03-07

## 2024-03-07 DIAGNOSIS — I10 ESSENTIAL HYPERTENSION: Primary | ICD-10-CM

## 2024-03-07 DIAGNOSIS — M54.50 LOW BACK PAIN, UNSPECIFIED: Primary | ICD-10-CM

## 2024-03-07 PROCEDURE — 99606 MTMS BY PHARM EST 15 MIN: CPT | Mod: 93

## 2024-03-07 PROCEDURE — 99607 MTMS BY PHARM ADDL 15 MIN: CPT | Mod: 93

## 2024-03-07 NOTE — PATIENT INSTRUCTIONS
"Recommendations from today's MTM visit:                                                      MTM (medication therapy management) is a service provided by a clinical pharmacist designed to help you get the most of out of your medicines.   Today we reviewed what your medicines are for, how to know if they are working, that your medicines are safe and how to make your medicine regimen as easy as possible.      Increase metoprolol dose to 100 mg daily  Please check your blood pressure twice daily: morning and evening and report it to PharmD    Follow-up: Due on 04/08/2024    It was great speaking with you today.  I value your experience and would be very thankful for your time in providing feedback in our clinic survey. In the next few days, you may receive an email or text message from [employer] with a link to a survey related to your  clinical pharmacist.\"     To schedule another MTM appointment, please call the clinic directly or you may call the MTM scheduling line at [phone].    My Clinical Pharmacist's contact information:                                                      Please feel free to contact me with any questions or concerns you have.        Ifeanyi Daley, PharmD     Medication Therapy Management (MTM) Pharmacist     [phone]     [email or web address]     [facility]    "

## 2024-03-07 NOTE — PROGRESS NOTES
Medication Therapy Management (MTM) Encounter    ASSESSMENT:                            Medication Adherence/Access: No issues identified    Hypertension: Metoprolol dose reduction increased BP, and most of home BP readings above goal of < 140/90; however, patient did not have dizziness. Fatigue did not improve up on Beta blocker dose reduction, indicating it might be secondary to patient recent fall.  Considering increased BP appropriate to increase metoprolol dose, and continue to check BP at home.      PLAN:                            Increase metoprolol dose to 100 mg daily  Please check your blood pressure twice daily: morning and evening and report it to PharmD    Follow-up: Due on 04/08/2024    SUBJECTIVE/OBJECTIVE:                          Sara Iglesias is a 78 year old female seen for a follow up visit from 02/22/2024.    Reason for visit: Essential Hypertension.    Allergies/ADRs: Reviewed in chart  Past Medical History: Reviewed in chart  Tobacco: She reports that she quit smoking about 23 years ago. She has never used smokeless tobacco.    Medication Adherence/Access: no issues reported    Hypertension    Losartan 100 mg daily    Metoprolol succinate 50 mg daily   Furosemide 20 mg daily as needed   Potassium ER 10 MEq daily  Patient feels tired/she has frequent dizziness during the last visit . Discussed about changing or dose reducing medications that might be responsible for causing patient's fatigue.  Legs are swollen and not taking furosemide. By lowering the metoprolol dose, patient didn't feel  a difference on her fatigue, but she did not have dizziness. Not sure if the swelling is affecting her movement. She fall down on a brick stairs, and she was hospitalized due that fall, and after that incident she is feeling tired. She gets tired around 12:30 PM or 1 PM. She gets up at around 8 AM. She feels relaxed after waking up.      BP Readings from Last 3 Encounters:   02/22/24 105/51   10/16/23  132/70   10/10/23 130/80     Pulse Readings from Last 3 Encounters:   02/22/24 61   10/16/23 54   10/10/23 68     Home BP: AM: 145/67, 121/89, 148/68, 148/72, 131/63, 160/78, 136/65, and 149/72                    PM: 130/63, 150/66, 134/62, 159/72, 131/63, 161/78, 148/74    Previous follow Home BP: 118/62, 90/57, 118/62, 121/64, 120/62, 113/62, 128/70, and 118/61  Home machine, but in the clinic: 90/57, and the pulse is 56; 118/61 59.         Today's Vitals: LMP  (LMP Unknown)   ----------------      I spent 28 minutes with this patient today. All changes were made via collaborative practice agreement with Karen Taylor MD. A copy of the visit note was provided to the patient's provider(s).    A summary of these recommendations was sent via [employer]bookin ([place]).    Telemedicine Visit Details  Type of service:  Telephone visit  Start Time:  11:30 AM  End Time:  11:58 AM     Medication Therapy Recommendations  Essential hypertension    Current Medication: metoprolol succinate ER (TOPROL XL) 100 MG 24 hr tablet   Rationale: Dose too low - Dosage too low - Effectiveness   Recommendation: Increase Dose - metoprolol succinate  MG 24 hr tablet - Increase metoprolol dose from 50 mg to 100 mg daily   Status: Accepted per CPA              Ifeanyi Daley, PharmD     Medication Therapy Management (MTM) Pharmacist     [phone]     [email or web address]

## 2024-03-11 ENCOUNTER — OFFICE VISIT (OUTPATIENT)
Dept: RHEUMATOLOGY | Facility: CLINIC | Age: 79
End: 2024-03-11
Payer: COMMERCIAL

## 2024-03-11 VITALS
OXYGEN SATURATION: 97 % | WEIGHT: 151.6 LBS | HEART RATE: 59 BPM | DIASTOLIC BLOOD PRESSURE: 70 MMHG | BODY MASS INDEX: 25.62 KG/M2 | SYSTOLIC BLOOD PRESSURE: 130 MMHG

## 2024-03-11 DIAGNOSIS — Z79.899 HIGH RISK MEDICATION USE: ICD-10-CM

## 2024-03-11 DIAGNOSIS — M15.0 PRIMARY OSTEOARTHRITIS INVOLVING MULTIPLE JOINTS: Primary | ICD-10-CM

## 2024-03-11 DIAGNOSIS — Z87.39 HISTORY OF TEMPORAL ARTERITIS: ICD-10-CM

## 2024-03-11 DIAGNOSIS — M25.50 POLYARTHRALGIA: ICD-10-CM

## 2024-03-11 PROCEDURE — 99213 OFFICE O/P EST LOW 20 MIN: CPT | Mod: 25 | Performed by: INTERNAL MEDICINE

## 2024-03-11 PROCEDURE — 20610 DRAIN/INJ JOINT/BURSA W/O US: CPT | Mod: RT | Performed by: INTERNAL MEDICINE

## 2024-03-11 RX ORDER — TRIAMCINOLONE ACETONIDE 40 MG/ML
40 INJECTION, SUSPENSION INTRA-ARTICULAR; INTRAMUSCULAR ONCE
Status: COMPLETED | OUTPATIENT
Start: 2024-03-11 | End: 2024-03-11

## 2024-03-11 RX ADMIN — TRIAMCINOLONE ACETONIDE 40 MG: 40 INJECTION, SUSPENSION INTRA-ARTICULAR; INTRAMUSCULAR at 13:04

## 2024-03-11 RX ADMIN — TRIAMCINOLONE ACETONIDE 40 MG: 40 INJECTION, SUSPENSION INTRA-ARTICULAR; INTRAMUSCULAR at 13:03

## 2024-03-11 NOTE — PROGRESS NOTES
Rheumatology follow-up visit note     [place] is a 78 year old female presents today for follow-up.    Sara was seen today for recheck.    Diagnoses and all orders for this visit:    Primary osteoarthritis involving multiple joints  -     triamcinolone (KENALOG-40) injection 40 mg  -     triamcinolone (KENALOG-40) injection 40 mg  -     ASPIRATION/INJECTION MAJOR JOINT    History of temporal arteritis    Polyarthralgia  -     triamcinolone (KENALOG-40) injection 40 mg  -     triamcinolone (KENALOG-40) injection 40 mg  -     ASPIRATION/INJECTION MAJOR JOINT    High risk medication use            After pros and cons were discussed including risk of infection, bleeding, skin changes including thinning, pigmentary alteration and scarring to name a few, left knee, right knee injected as noted in the orders section. This was done with nontouch technique.  The patient tolerated the procedure well and had a brisk Marcaine effect.  The postinjection care was discussed.      Follow up in 4 months.    HPI    Jenny Iglesias is a 78 year old female is here for follow-up of  polyarthralgias in the context of osteoarthritis. She has history of temporal arteritis, no longer on oral glucocorticoids. She has bilateral knee pain associated with osteoarthritis and has found corticosteroid injections helpful. However she would like to keep the frequency as low as possible. We discussed various options. She noted pain level in the knees moderately severe. She wonders if she can continue to walk that she loves to. She has not observed swelling in the knee area. She has not had headache jaw claudication double vision.      DETAILED EXAMINATION  03/11/24  :    Vitals:    03/11/24 1224   BP: 130/70   Pulse: 59   SpO2: 97%   Weight: 68.8 kg (151 lb 9.6 oz)     Alert oriented. Head including the face is examined for malar rash, heliotropes, scarring, lupus pernio. Eyes examined for redness such as in episcleritis/scleritis,  periorbital lesions.   Neck/ Face examined for parotid gland swelling, range of motion of neck.  Left upper and lower and right upper and lower extremities examined for tenderness, swelling, warmth of the appendicular joints, range of motion, edema, rash.  Some of the important findings included: she does not have evidence of synovitis in the palpable joints of the upper extremities.  No significant deformities of the digits.  Medial joint line tenderness of the knees with the minimal warmth no detectable effusion.     Patient Active Problem List    Diagnosis Date Noted    Aortic valve insufficiency, etiology of cardiac valve disease unspecified 10/22/2023     Priority: Medium    C. 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     Priority: Medium    Fuchs' corneal dystrophy of both eyes 12/13/2021     Priority: Medium    Corneal edema of both eyes 12/13/2021     Priority: Medium    Localization-related focal epilepsy with simple partial seizures (H) 10/10/2021     Priority: Medium    Seizure disorder (H) 06/09/2021     Priority: Medium    GCA (giant cell arteritis) (H) 03/10/2021     Priority: Medium    TIA (transient ischemic attack)      Priority: Medium    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     Priority: Medium     Added automatically from request for surgery [id number]        Stage 3b chronic kidney disease (H) 02/15/2021     Priority: Medium    Other chronic pain 10/07/2020     Priority: Medium    Coccyx pain 09/13/2019     Priority: Medium    Chronic insomnia 02/25/2016     Priority: Medium    Vertigo 10/27/2015     Priority: Medium    Essential hypertension 10/27/2015     Priority: Medium    Restless leg syndrome 10/27/2015     Priority: Medium    Perimenopausal vasomotor symptoms 10/27/2015     Priority:  Medium    Carotid stenosis, asymptomatic 10/27/2015     Priority: Medium     Past Surgical History:   Procedure Laterality Date    APPENDECTOMY      CATARACT EXTRACTION      CHOLECYSTECTOMY      HYSTERECTOMY      OTHER SURGICAL HISTORY Right 1998    fifth toe amputation    [place] TEMPORAL ARTERY LIGATN OR BX Bilateral 3/4/2021    Procedure: BIOPSY, ARTERY, TEMPORAL;  Surgeon: Ramesh Box MD;  Location: [facility];  Service: General    TUBAL LIGATION        Past Medical History:   Diagnosis Date    Anxiety     Carotid artery stenosis 11/2013    50% left internal carotid artery     Chronic low back pain     Hypertension     Osteoarthritis of lumbar spine     Osteopenia     mild, resolved    Postmenopausal 11/2013    on hormone    Restless leg syndrome     Seizures (H)     Feb 18, 2021, Small lost vision and could not speak for 4-5 seconds    Surgical menopause     age 47    Thyroid nodule 11/2013    35mm, rightthyroid lobe biopsy-benign nodule    TIA (transient ischemic attack)     TIA (transient ischemic attack) 11/2013    Vertigo      Allergies   Allergen Reactions    Codeine Sulfate Shortness Of Breath and Anaphylaxis    Carbidopa-Levodopa      dystonia    Codeine     Diclofenac      Diarrhea    Morphine      Current Outpatient Medications   Medication Sig Dispense Refill    atorvastatin (LIPITOR) 40 MG tablet TAKE ONE TABLET BY MOUTH ONE TIME DAILY 90 tablet 2    furosemide (LASIX) 20 MG tablet Take 1 tablet (20 mg) by mouth daily 90 tablet 1    lamoTRIgine (LAMICTAL) 25 MG tablet Take 75 mg by mouth 2 times daily      losartan (COZAAR) 100 MG tablet Take 1 tablet (100 mg) by mouth daily 90 tablet 3    metoprolol succinate ER (TOPROL XL) 100 MG 24 hr tablet Take one tablet (100 mg) by mouth every evening. 90 tablet 3    Multiple Vitamins-Minerals (CENTRUM PO) Take 1 tablet by mouth daily      pramipexole (MIRAPEX) 0.25 MG tablet TAKE ONE TAB BY MOUTH TWICE DAILY 180 tablet 2    sertraline (ZOLOFT) 100 MG  tablet Take 1 tablet (100 mg) by mouth daily 90 tablet 2    zolpidem (AMBIEN) 5 MG tablet Take 1 tablet (5 mg) by mouth nightly as needed for sleep 90 tablet 3     family history includes Breast Cancer (age of onset: 57.00) in her mother; Hypertension in her sister; Lung Cancer (age of onset: 62.00) in her father.  Social Connections: Not on file          WBC Count   Date Value Ref Range Status   02/06/2024 8.4 4.0 - 11.0 10e3/uL Final     RBC Count   Date Value Ref Range Status   02/06/2024 4.53 3.80 - 5.20 10e6/uL Final     Hemoglobin   Date Value Ref Range Status   02/06/2024 13.5 11.7 - 15.7 g/dL Final     Hematocrit   Date Value Ref Range Status   02/06/2024 43.4 35.0 - 47.0 % Final     MCV   Date Value Ref Range Status   02/06/2024 96 78 - 100 fL Final     MCH   Date Value Ref Range Status   02/06/2024 29.8 26.5 - 33.0 pg Final     Platelet Count   Date Value Ref Range Status   02/06/2024 273 150 - 450 10e3/uL Final     % Lymphocytes   Date Value Ref Range Status   02/06/2024 38 % Final     AST   Date Value Ref Range Status   10/16/2023 41 0 - 45 U/L Final     Comment:     Reference intervals for this test were updated on 6/12/2023 to more accurately reflect our healthy population. There may be differences in the flagging of prior results with similar values performed with this method. Interpretation of those prior results can be made in the context of the updated reference intervals.     ALT   Date Value Ref Range Status   10/16/2023 41 0 - 50 U/L Final     Comment:     Reference intervals for this test were updated on 6/12/2023 to more accurately reflect our healthy population. There may be differences in the flagging of prior results with similar values performed with this method. Interpretation of those prior results can be made in the context of the updated reference intervals.       Albumin   Date Value Ref Range Status   10/16/2023 4.2 3.5 - 5.2 g/dL Final   01/13/2022 3.8 3.5 - 5.0 g/dL Final      Alkaline Phosphatase   Date Value Ref Range Status   10/16/2023 143 (H) 35 - 104 U/L Final     Creatinine   Date Value Ref Range Status   02/06/2024 1.45 (H) 0.51 - 0.95 mg/dL Final     GFR Estimate   Date Value Ref Range Status   02/06/2024 36 (L) >60 mL/min/1.73m2 Final   04/06/2021 39 (L) >60 mL/min/1.73m2 Final     GFR Estimate If Black   Date Value Ref Range Status   04/06/2021 48 (L) >60 mL/min/1.73m2 Final     Creatinine Urine mg/dL   Date Value Ref Range Status   10/16/2023 214.0 mg/dL Final     Comment:     The reference ranges have not been established in urine creatinine. The results should be integrated into the clinical context for interpretation.     Erythrocyte Sedimentation Rate   Date Value Ref Range Status   10/16/2023 9 0 - 30 mm/hr Final     CRP   Date Value Ref Range Status   03/03/2022 0.1 0.0-<0.8 mg/dL Final

## 2024-03-15 ENCOUNTER — THERAPY VISIT (OUTPATIENT)
Dept: PHYSICAL THERAPY | Facility: CLINIC | Age: 79
End: 2024-03-15
Attending: FAMILY MEDICINE
Payer: COMMERCIAL

## 2024-03-15 DIAGNOSIS — M25.512 PAIN OF LEFT SHOULDER REGION: Primary | ICD-10-CM

## 2024-03-15 DIAGNOSIS — M25.552 HIP PAIN, LEFT: ICD-10-CM

## 2024-03-15 PROCEDURE — 97161 PT EVAL LOW COMPLEX 20 MIN: CPT | Mod: GP

## 2024-03-15 PROCEDURE — 97110 THERAPEUTIC EXERCISES: CPT | Mod: GP

## 2024-03-15 NOTE — Clinical Note
Dr. Taylor,  I just evaluated Sara for her left shoulder and left hip pain at Physical Therapy.  She had two different orders from two different providers for PT which would require two different episodes of care and separate appointments to work on each individual body part.  I am sending you her PT place of care to be certified as I evaluated both body regions today so we can continue to treat both body parts in the same session.  Please let me know if you have any questions.  Thank you!  Sinan Carr PT, DPT

## 2024-03-15 NOTE — PROGRESS NOTES
PHYSICAL THERAPY EVALUATION  Type of Visit: Evaluation    See electronic medical record for Abuse and Falls Screening details.  KEY PT FINDINGS:  1) No pain with resisted shoulder testing on LUE  2) Positive intra-articular tests for left sided hip pain  3) Negative cervical screening    Therapist Assessment: Jenny Iglesias is a 78 year old female patient presenting to Physical Therapy with left shoulder pain and left hip pain. Patient demonstrates pain, weakness, postural dysfunction, balance deficits, and abnormal gait. Signs and symptoms are consistent with left shoulder strain and left hip greater trochanteric pain syndrome. These impairments limit their ability to walk and reach arms above head without pain. Skilled PT services are necessary in order to reduce impairments and improve independent function.    Patient was referred by Nicol Randall MD on 2/28/24.    Precautions/restrictions/MD instructions include: None    Subjective History    Patient is a 78 year old female presenting to outpatient physical therapy with left shoulder pain that started after trying to open a jar around Thanksgiving. Pain is located superior anterior shoulder that radiates sometimes posterior shoulder and sometimes down into left pectoralis. Pain is dull and achy. Sometimes she notices a slight click in her shoulder. No recent imaging on the shoulder. Denies any numbness or tingling.    She also has a referral for low back pain. She has left sided hip pain that feels like her bursa she says. Has a history of issues with her back. DRJ implanted on her right side. Her pain is tender to palpation at her posterior hip. Does not have a whole lot of lumbar spine pain. Had a cortisone burst recently and that did not help. Walking makes her pain worse. Denies any numbness or tingling. Denies any weakness.    Red Flags review findings: None    Prior Treatment Includes: None - Had bursitis before which exercises have helped with  before    Medications: None    Imaging: None    Lifestyle & General Medical History:   - Occupation: Retired   - Experience with physical activity: None   - Notable medical history: Vertigo, HTN, CKD stage III, TIA, Epilepsy, DMII    Current Functional Status: Independent  Previous Functional Status:  fully functional prior to pain onset/injury.  Outcome measure: SPADI, GENE, and ROSAURA    General health as reported by patient: good.    Additional considerations: None    Patient Goals for Physical Therapy: Eliminate pain      Subjective       Presenting condition or subjective complaint:  Shoulder pain  Date of onset: 03/15/24  1/1/24  Relevant medical history:   Arthritis, high blood pressure, implanted device, kidney disease  Dates & types of surgery:  DRG Stimulator    Prior diagnostic imaging/testing results:       Prior therapy history for the same diagnosis, illness or injury:         Objective   LUMBAR SPINE EVALUATION  PAIN: Pain Level at Rest: 0/10  Pain Level with Use: 6/10  Pain Location: hip  Pain Quality: Aching  Pain Frequency: intermittent  Pain is Worst: dependent on activity  Pain is Exacerbated By: walking  Pain is Relieved By: rest  Pain Progression: Worsened  INTEGUMENTARY (edema, incisions): WNL  POSTURE: Standing Posture: Rounded shoulders, Forward head, Thoracic kyphosis increased  GAIT:   Weightbearing Status: WBAT  Assistive Device(s): None  Gait Deviations: Trendelenberg L  ROM:  Lumbar FLX: WFL - no pain, EXT mod loss +mild hip pain  PELVIC/SI SCREEN: WNL  STRENGTH:  Hip ABD 4-/5 on L, 4/5 on R  MYOTOMES: WNL  DTR S: WNL  CORD SIGNS: WNL  DERMATOMES: WNL  NEURAL TENSION: Lumbar WNL  FLEXIBILITY: WFL  LUMBAR/HIP Special Tests:    Left Right   TRUPTI Positive Negative    FADIR/Labrum/ISIDORO Positive Negative    Femoral Nerve     Pradip's     Piriformis     Quadrant Testing Positive Negative    SLR Negative  Negative    Slump Negative  Negative    Stork with Extension     Zaire     Log Roll Negative   Negative       PELVIS/SI SPECIAL TESTS: WNL  FUNCTIONAL TESTS: Double Leg Squat: Anterior knee translation, Improper use of glutes/hips, and Impaired weight distribution  PALPATION:  TTP to Bilateral UT, rhomboids, coracoid process, greater trochanter, and glute tendons on L  SPINAL SEGMENTAL CONCLUSIONS:  NT    SHOULDER EVALUATION  PAIN: Pain Level at Rest: 0/10  Pain Level with Use: 7/10  Pain Location: shoulder  Pain Quality: Aching and Dull  Pain Frequency: intermittent  Pain is Worst: depends on activities  Pain is Exacerbated By: Reaching up overhead  Pain is Relieved By: cold and rest  Pain Progression: Unchanged  ROM:   (Degrees) Left AROM Left PROM Right AROM  Right PROM   Shoulder Flexion 90 +pain  WNL   Shoulder Extension       Shoulder Abduction       Shoulder Adduction 95 +pain WNL 90 WNL   Shoulder Internal Rotation T9 +pain WNL T7 WNL   Shoulder External Rotation 54 WNL 40 WNL   Shoulder Horizontal Abduction       Shoulder Horizontal Adduction       Shoulder Flexion ER       Shoulder Flexion IR       Elbow Extension WNL  WNL    Elbow Flexion WNL  WNL      STRENGTH:   Pain: - none + mild ++ moderate +++ severe  Strength Scale: 0-5/5 Left Right   Shoulder Flexion 5 5   Shoulder Extension 5 5   Shoulder Abduction 5 5   Shoulder Adduction     Shoulder Internal Rotation 5 5   Shoulder External Rotation 5 5   Shoulder Horizontal Abduction     Shoulder Horizontal Adduction     Elbow Flexion 5 5   Elbow Extension 5 5   Mid Trap     Lower Trap     Rhomboid     Serratus Anterior     No pain with resisted testing*    FLEXIBILITY: Decreased rhomboids L, Decreased upper trap L, Decreased pectoralis major L, Decreased pectoralis minor L, Decreased rhomboids R, Decreased upper trap R, Decreased pectoralis minor R  SPECIAL TESTS:    Left Right   Impingement     Neer's     Hawkin's-Wili     Coracoid Impingement     Internal impingement     Labral     Anterior Slide     Tazewell's     Crank     Instability      Apprehension (anterior)     Relocation (anterior)     Anterior Load & Shift Negative  Negative    Posterior Load & Shift Negative  Negative    Posterior instability (with 90 degrees flex)     Multi-Directional Instability      Sulcus Negative  Negative    Biceps      Speed's Negative  Negative    Rotator Cuff Tear     Drop Arm Negative  Negative    Belly Press     Lift off  Negative  Negative    Negative Empty Can bilaterally  JOINT MOBILITY: WFL  CERVICAL SCREEN: WNL    Assessment & Plan   CLINICAL IMPRESSIONS  Medical Diagnosis: Acute pain of left shoulder; Chronic low back pain    Treatment Diagnosis: Left shoulder pain, weakness; L Hip pain, weakness   Impression/Assessment: Patient is a 78 year old female with left shoulder pain and left hip pain complaints.  The following significant findings have been identified: Pain, Decreased ROM/flexibility, Decreased joint mobility, Decreased strength, Impaired balance, Impaired gait, Impaired muscle performance, Decreased activity tolerance, and Impaired posture. These impairments interfere with their ability to perform self care tasks, work tasks, recreational activities, household chores, driving , household mobility, and community mobility as compared to previous level of function.     Clinical Decision Making (Complexity):  Clinical Presentation: Stable/Uncomplicated  Clinical Presentation Rationale: based on medical and personal factors listed in PT evaluation  Clinical Decision Making (Complexity): Low complexity    PLAN OF CARE  Treatment Interventions:  Interventions: Gait Training, Manual Therapy, Neuromuscular Re-education, Therapeutic Activity, Therapeutic Exercise, Self-Care/Home Management    Long Term Goals     PT Goal 1  Goal Identifier: LTG1  Goal Description: Patient will be able to reach arm up to 140 degrees without any left shoulder pain  Rationale: to maximize safety and independence with performance of ADLs and functional tasks;to maximize safety  and independence within the home;to maximize safety and independence within the community;to maximize safety and independence with transportation;to maximize safety and independence with self cares  Target Date: 05/10/24  PT Goal 2  Goal Identifier: LTG2  Goal Description: Patient will be able to have a normal gait pattern and tolerate walking any distance without any hip pain  Rationale: to maximize safety and independence with performance of ADLs and functional tasks;to maximize safety and independence within the home;to maximize safety and independence within the community;to maximize safety and independence with transportation;to maximize safety and independence with self cares  Target Date: 05/10/24      Frequency of Treatment: 1x per week  Duration of Treatment: 8 weeks    Recommended Referrals to Other Professionals:  None indicated  Education Assessment:        Risks and benefits of evaluation/treatment have been explained.   Patient/Family/caregiver agrees with Plan of Care.     Evaluation Time:     PT Eval, Low Complexity Minutes (49943): 20     Signing Clinician: Sinan Carr PT      [facility]                                                                                   OUTPATIENT PHYSICAL THERAPY      PLAN OF TREATMENT FOR OUTPATIENT REHABILITATION   Patient's Last Name, First Name, Jenny Greenberg YOB: 1945   Provider's Name   [facility]   Medical Record No.  [id number]     Onset Date: 03/15/24  Start of Care Date: 03/15/24     Medical Diagnosis:  Acute pain of left shoulder; Chronic low back pain      PT Treatment Diagnosis:  Left shoulder pain, weakness; L Hip pain, weakness Plan of Treatment  Frequency/Duration: 1x per week/ 8 weeks    Certification date from 03/15/24 to 05/10/24         See note for plan of treatment details and functional goals     Sinan Carr PT                         I CERTIFY  THE NEED FOR THESE SERVICES FURNISHED UNDER        THIS PLAN OF TREATMENT AND WHILE UNDER MY CARE     (Physician attestation of this document indicates review and certification of the therapy plan).              Referring Provider:  Nicol Randall    Initial Assessment  See Epic Evaluation- Start of Care Date: 03/15/24

## 2024-03-15 NOTE — CONFIDENTIAL NOTE
DIAGNOSIS:  Stage 3b chronic kidney disease    DATE RECEIVED: 05.28.2024    NOTES STATUS DETAILS   OFFICE NOTE from referring provider Internal 02.14.2024 Karen Taylor MD     OFFICE NOTE from other specialist      *Only VASCULITIS or LUPUS gather office notes for the following     *PULMONARY       *ENT     *DERMATOLOGY     *RHEUMATOLOGY     DISCHARGE SUMMARY from hospital     DISCHARGE REPORT from the ER Care Everywhere 07.15.2023  [facility]    MEDICATION LIST Internal / CE    IMAGING  (NEED IMAGES AND REPORTS)     KIDNEY CT SCAN     KIDNEY ULTRASOUND Internal 02.26.2024 US Renal Complete   MR ABDOMEN     NUCLEAR MEDICINE RENAL     LABS     CBC Internal 02.06.2024   CMP Internal 02.06.2024   BMP Internal 02.06.2024    UA Internal 10.16.2023   URINE PROTEIN Internal 10.16.2023    RENAL PANEL     BIOPSY     KIDNEY BIOPSY

## 2024-03-22 ENCOUNTER — THERAPY VISIT (OUTPATIENT)
Dept: PHYSICAL THERAPY | Facility: CLINIC | Age: 79
End: 2024-03-22
Attending: FAMILY MEDICINE
Payer: COMMERCIAL

## 2024-03-22 DIAGNOSIS — M25.512 PAIN OF LEFT SHOULDER REGION: Primary | ICD-10-CM

## 2024-03-22 DIAGNOSIS — M25.552 HIP PAIN, LEFT: ICD-10-CM

## 2024-03-22 PROCEDURE — 97110 THERAPEUTIC EXERCISES: CPT | Mod: GP | Performed by: PHYSICAL THERAPIST

## 2024-03-27 DIAGNOSIS — I10 ESSENTIAL HYPERTENSION: Primary | ICD-10-CM

## 2024-03-28 ENCOUNTER — LAB (OUTPATIENT)
Dept: LAB | Facility: CLINIC | Age: 79
End: 2024-03-28
Payer: COMMERCIAL

## 2024-03-28 DIAGNOSIS — E11.22 TYPE 2 DIABETES MELLITUS WITH STAGE 3B CHRONIC KIDNEY DISEASE, UNSPECIFIED WHETHER LONG TERM INSULIN USE (H): ICD-10-CM

## 2024-03-28 DIAGNOSIS — N18.32 TYPE 2 DIABETES MELLITUS WITH STAGE 3B CHRONIC KIDNEY DISEASE, UNSPECIFIED WHETHER LONG TERM INSULIN USE (H): ICD-10-CM

## 2024-03-28 LAB
CREAT UR-MCNC: 209 MG/DL
HBA1C MFR BLD: 5.8 % (ref 0–5.6)
MICROALBUMIN UR-MCNC: 143 MG/L
MICROALBUMIN/CREAT UR: 68.42 MG/G CR (ref 0–25)

## 2024-03-28 PROCEDURE — 82570 ASSAY OF URINE CREATININE: CPT

## 2024-03-28 PROCEDURE — 82043 UR ALBUMIN QUANTITATIVE: CPT

## 2024-03-28 PROCEDURE — 83036 HEMOGLOBIN GLYCOSYLATED A1C: CPT

## 2024-03-28 PROCEDURE — 36415 COLL VENOUS BLD VENIPUNCTURE: CPT

## 2024-03-29 ENCOUNTER — THERAPY VISIT (OUTPATIENT)
Dept: PHYSICAL THERAPY | Facility: CLINIC | Age: 79
End: 2024-03-29
Attending: FAMILY MEDICINE
Payer: COMMERCIAL

## 2024-03-29 DIAGNOSIS — M25.512 PAIN OF LEFT SHOULDER REGION: Primary | ICD-10-CM

## 2024-03-29 DIAGNOSIS — M25.552 HIP PAIN, LEFT: ICD-10-CM

## 2024-03-29 PROCEDURE — 97112 NEUROMUSCULAR REEDUCATION: CPT | Mod: GP | Performed by: PHYSICAL THERAPIST

## 2024-03-29 PROCEDURE — 97110 THERAPEUTIC EXERCISES: CPT | Mod: GP | Performed by: PHYSICAL THERAPIST

## 2024-03-29 RX ORDER — HYDROCHLOROTHIAZIDE 25 MG/1
25 TABLET ORAL DAILY
Qty: 90 TABLET | Refills: 0 | Status: SHIPPED | OUTPATIENT
Start: 2024-03-29 | End: 2024-04-08

## 2024-04-08 ENCOUNTER — VIRTUAL VISIT (OUTPATIENT)
Dept: PHARMACY | Facility: CLINIC | Age: 79
End: 2024-04-08
Payer: COMMERCIAL

## 2024-04-08 DIAGNOSIS — I10 ESSENTIAL HYPERTENSION: Primary | ICD-10-CM

## 2024-04-08 PROCEDURE — 99606 MTMS BY PHARM EST 15 MIN: CPT | Mod: 93

## 2024-04-08 NOTE — PROGRESS NOTES
Medication Therapy Management (MTM) Encounter    ASSESSMENT:                            Medication Adherence/Access: No issues identified    Hypertension: Home BP within goal of < 140/90 likely due to increasing the metoprolol dose; however, fatigue did not improve up on Beta blocker dose reduction, indicating it might be secondary to patient recent fall. Considering BP is controlled on the current medications it is appropriate to continue current BP medications.     PLAN:                            PharmD to look further into why she is getting tired/fatigued.   Consider eating less sugary food or carbohydrates   Continue current medications as prescribed     Follow-up: Due when needed by patient or provider    SUBJECTIVE/OBJECTIVE:                          Sara Iglesias is a 78 year old female seen for a follow up visit from 02/22/2024.    Reason for visit: Essential Hypertension.    Allergies/ADRs: Reviewed in chart  Past Medical History: Reviewed in chart  Tobacco: She reports that she quit smoking about 23 years ago. She has never used smokeless tobacco.    Medication Adherence/Access: no issues reported    Hypertension    Losartan 100 mg daily    Metoprolol succinate 50 mg daily   Furosemide 20 mg daily as needed   Potassium ER 10 MEq daily  Patient feels tired/she has frequent dizziness during the last visit . Discussed about changing or dose reducing medications that might be responsible for causing patient's fatigue.  Legs are swollen and not taking furosemide. By lowering the metoprolol dose, patient didn't feel  a difference on her fatigue, but she did not have dizziness. Not sure if the swelling is affecting her movement. She fall down on a brick stairs, and she was hospitalized due that fall, and after that incident she is feeling tired. She gets tired around 12:30 PM or 1 PM. She gets up at around 8 AM. She feels relaxed after waking up.      BP Readings from Last 3 Encounters:   03/11/24 130/70    02/22/24 105/51   10/16/23 132/70     Pulse Readings from Last 3 Encounters:   03/11/24 59   02/22/24 61   10/16/23 54     Home BP: 130/52, 128/60, 138/63, 132/55, 142/53, 137/57, and 132/83.     --> She still has the dizziness and she is tired in the morning and then at 1 PM. Metoprolol dose reduction did not seem to help with tiredness or fatigue.   --> She still have this fatigue started a year ago.     Previous visit Home BP: AM: 145/67, 121/89, 148/68, 148/72, 131/63, 160/78, 136/65, and 149/72                                             PM: 130/63, 150/66, 134/62, 159/72, 131/63, 161/78, 148/74    Previous follow Home BP: 118/62, 90/57, 118/62, 121/64, 120/62, 113/62, 128/70, and 118/61  Home machine, but in the clinic: 90/57, and the pulse is 56; 118/61 59.       Today's Vitals: LMP  (LMP Unknown)   ----------------      I spent 39 minutes with this patient today. All changes were made via collaborative practice agreement with Karen Taylor MD. A copy of the visit note was provided to the patient's provider(s).    A summary of these recommendations was sent via [employer].        Telemedicine Visit Details  Type of service:  Telephone visit  Start Time:  11:00 AM  End Time: 11:39 AM     Medication Therapy Recommendations  No medication therapy recommendations to display     Ifeanyi Daley, PharmD     Medication Therapy Management (MTM) Pharmacist     [phone]     [email or web address]

## 2024-04-10 NOTE — PATIENT INSTRUCTIONS
"Recommendations from today's MTM visit:                                                      MTM (medication therapy management) is a service provided by a clinical pharmacist designed to help you get the most of out of your medicines.   Today we reviewed what your medicines are for, how to know if they are working, that your medicines are safe and how to make your medicine regimen as easy as possible.      PharmD to look further into why she is getting tired/fatigued.   Consider eating less sugary food or carbohydrates   Continue current medications as prescribed     Follow-up: Due when needed by patient or provider    It was great speaking with you today.  I value your experience and would be very thankful for your time in providing feedback in our clinic survey. In the next few days, you may receive an email or text message from [employer] with a link to a survey related to your  clinical pharmacist.\"     To schedule another MTM appointment, please call the clinic directly or you may call the MTM scheduling line at [phone].    My Clinical Pharmacist's contact information:                                                      Please feel free to contact me with any questions or concerns you have.        Ifeanyi Daley, PharmD     Medication Therapy Management (MTM) Pharmacist     [phone]     [email or web address]    "

## 2024-04-15 ENCOUNTER — THERAPY VISIT (OUTPATIENT)
Dept: PHYSICAL THERAPY | Facility: CLINIC | Age: 79
End: 2024-04-15
Payer: COMMERCIAL

## 2024-04-15 DIAGNOSIS — M25.512 PAIN OF LEFT SHOULDER REGION: Primary | ICD-10-CM

## 2024-04-15 DIAGNOSIS — M25.552 HIP PAIN, LEFT: ICD-10-CM

## 2024-04-15 PROCEDURE — 97110 THERAPEUTIC EXERCISES: CPT | Mod: GP

## 2024-04-15 NOTE — PROGRESS NOTES
04/15/24 0500   Appointment Info   Signing clinician's name / credentials Sinan Kasvibha PT, DPT   Total/Authorized Visits E&T (8 planned)   Visits Used 4   Medical Diagnosis Acute pain of left shoulder; Chronic low back pain   PT Tx Diagnosis Left shoulder pain, weakness; L Hip pain, weakness   Precautions/Limitations DRG implant   Quick Adds Certification   Progress Note/Certification   Start of Care Date 03/15/24   Onset of illness/injury or Date of Surgery 03/15/24   Therapy Frequency 1x per week   Predicted Duration 8 weeks   Certification date from 03/15/24   Certification date to 05/10/24   Progress Note Due Date 05/10/24   Progress Note Completed Date 03/15/24   GOALS   PT Goals 2   PT Goal 1   Goal Identifier LTG1   Goal Description Patient will be able to reach arm up to 140 degrees without any left shoulder pain   Rationale to maximize safety and independence with performance of ADLs and functional tasks;to maximize safety and independence within the home;to maximize safety and independence within the community;to maximize safety and independence with transportation;to maximize safety and independence with self cares   Goal Progress MET   Target Date 05/10/24   Date Met 04/15/24   PT Goal 2   Goal Identifier LTG2   Goal Description Patient will be able to have a normal gait pattern and tolerate walking any distance without any hip pain   Rationale to maximize safety and independence with performance of ADLs and functional tasks;to maximize safety and independence within the home;to maximize safety and independence within the community;to maximize safety and independence with transportation;to maximize safety and independence with self cares   Goal Progress MET   Target Date 05/10/24   Date Met 04/15/24   Subjective Report   Subjective Report Sara states that overall things are going very well, she has noted 90% improvement since starting PT. Is much stronger and not having much pain. The last 10% is  her shoulder, feels comfortable with today being her last day of PT.   Objective Measures   Objective Measures Objective Measure 1   Objective Measure 1   Objective Measure L shoulder AROM   Details flexion = 170, ABD = 140   Treatment Interventions (PT)   Interventions Therapeutic Procedure/Exercise;Neuromuscular Re-education   Therapeutic Procedure/Exercise   Therapeutic Procedures: strength, endurance, ROM, flexibility minutes (43199) 40   Therapeutic Procedures Ther Proc 10;Ther Proc 9   Ther Proc 1 Scaption   Ther Proc 1 - Details VC to keep shoulders down away from ears x 10 reps with 2#   PTRx Ther Proc 1 Upper Trap Stretch   PTRx Ther Proc 1 - Details x30 seconds on R - added to HEP   PTRx Ther Proc 2 Thoracic Extension   PTRx Ther Proc 2 - Details x10 reps over chairback with towel roll - added to HEP   PTRx Ther Proc 3 Shoulder TB Pulldowns GTB   PTRx Ther Proc 3 - Details 2 x 10 reps   PTRx Ther Proc 4 Shoulder Theraband Rows   PTRx Ther Proc 4 - Details 2  x 10 . VC to relax upper traps   PTRx Ther Proc 5 Scap Retraction with RTB   PTRx Ther Proc 5 - Details x10 reps   Skilled Intervention Improve global strength   Patient Response/Progress Patient tolerated well   Ther Proc 9 Patient Education   Ther Proc 9 - Details Benefits of mobility, long term HEP consistency, postural recommendations   Ther Proc 10 Seated Scapular Retraction   Ther Proc 10 - Details x10 reps RTB   Plan   Home program see PTRx (online/printout)   Plan for next session DC   Total Session Time   Timed Code Treatment Minutes 40   Total Treatment Time (sum of timed and untimed services) 40         DISCHARGE  Reason for Discharge: Patient has met all goals.    Equipment Issued:     Discharge Plan: Patient to continue home program.    Referring Provider:  Nicol Wray*

## 2024-04-27 DIAGNOSIS — G47.09 OTHER INSOMNIA: ICD-10-CM

## 2024-04-27 RX ORDER — ZOLPIDEM TARTRATE 5 MG/1
5 TABLET ORAL
Qty: 90 TABLET | Refills: 0 | Status: SHIPPED | OUTPATIENT
Start: 2024-04-27 | End: 2024-08-30

## 2024-05-13 ENCOUNTER — MYC MEDICAL ADVICE (OUTPATIENT)
Dept: NEPHROLOGY | Facility: CLINIC | Age: 79
End: 2024-05-13
Payer: COMMERCIAL

## 2024-05-21 ENCOUNTER — DOCUMENTATION ONLY (OUTPATIENT)
Dept: MULTI SPECIALTY CLINIC | Facility: CLINIC | Age: 79
End: 2024-05-21
Payer: COMMERCIAL

## 2024-05-21 DIAGNOSIS — N18.32 STAGE 3B CHRONIC KIDNEY DISEASE (H): Primary | ICD-10-CM

## 2024-05-21 NOTE — PROGRESS NOTES
Hello,  In order to offer our patients the best possible experience, the lab schedule is reviewed to ensure patients have lab orders in Epic prior to arriving at the lab.    Please have one of your team members place a lab order in Epic for this patient prior to the lab appointment date.    Thank you for your attention,  [facility] [phone]

## 2024-05-28 ENCOUNTER — PRE VISIT (OUTPATIENT)
Dept: NEPHROLOGY | Facility: CLINIC | Age: 79
End: 2024-05-28

## 2024-05-28 ENCOUNTER — LAB (OUTPATIENT)
Dept: LAB | Facility: CLINIC | Age: 79
End: 2024-05-28
Payer: COMMERCIAL

## 2024-05-28 ENCOUNTER — OFFICE VISIT (OUTPATIENT)
Dept: NEPHROLOGY | Facility: CLINIC | Age: 79
End: 2024-05-28
Attending: INTERNAL MEDICINE
Payer: COMMERCIAL

## 2024-05-28 VITALS
BODY MASS INDEX: 24.49 KG/M2 | TEMPERATURE: 97.9 F | SYSTOLIC BLOOD PRESSURE: 144 MMHG | HEART RATE: 58 BPM | DIASTOLIC BLOOD PRESSURE: 76 MMHG | WEIGHT: 144.9 LBS | OXYGEN SATURATION: 96 %

## 2024-05-28 DIAGNOSIS — R42 DIZZINESS: ICD-10-CM

## 2024-05-28 DIAGNOSIS — E83.52 HYPERCALCEMIA: ICD-10-CM

## 2024-05-28 DIAGNOSIS — I10 ESSENTIAL HYPERTENSION: Primary | ICD-10-CM

## 2024-05-28 DIAGNOSIS — G45.9 TIA (TRANSIENT ISCHEMIC ATTACK): ICD-10-CM

## 2024-05-28 DIAGNOSIS — R82.90 ABNORMAL FINDING ON URINALYSIS: ICD-10-CM

## 2024-05-28 DIAGNOSIS — R42 VERTIGO: ICD-10-CM

## 2024-05-28 DIAGNOSIS — N18.32 TYPE 2 DIABETES MELLITUS WITH STAGE 3B CHRONIC KIDNEY DISEASE, UNSPECIFIED WHETHER LONG TERM INSULIN USE (H): ICD-10-CM

## 2024-05-28 DIAGNOSIS — M31.6 GCA (GIANT CELL ARTERITIS) (H): ICD-10-CM

## 2024-05-28 DIAGNOSIS — G25.81 RESTLESS LEG SYNDROME: ICD-10-CM

## 2024-05-28 DIAGNOSIS — N18.32 STAGE 3B CHRONIC KIDNEY DISEASE (H): ICD-10-CM

## 2024-05-28 DIAGNOSIS — I35.1 AORTIC VALVE INSUFFICIENCY, ETIOLOGY OF CARDIAC VALVE DISEASE UNSPECIFIED: ICD-10-CM

## 2024-05-28 DIAGNOSIS — E11.22 TYPE 2 DIABETES MELLITUS WITH STAGE 3B CHRONIC KIDNEY DISEASE, UNSPECIFIED WHETHER LONG TERM INSULIN USE (H): ICD-10-CM

## 2024-05-28 DIAGNOSIS — G40.909 SEIZURE DISORDER (H): ICD-10-CM

## 2024-05-28 LAB
ALBUMIN MFR UR ELPH: 39.6 MG/DL
ALBUMIN SERPL BCG-MCNC: 3.7 G/DL (ref 3.5–5.2)
ALBUMIN UR-MCNC: 30 MG/DL
ANION GAP SERPL CALCULATED.3IONS-SCNC: 10 MMOL/L (ref 7–15)
APPEARANCE UR: ABNORMAL
BACTERIA #/AREA URNS HPF: ABNORMAL /HPF
BILIRUB UR QL STRIP: NEGATIVE
BUN SERPL-MCNC: 18.2 MG/DL (ref 8–23)
CALCIUM SERPL-MCNC: 10.4 MG/DL (ref 8.8–10.2)
CHLORIDE SERPL-SCNC: 105 MMOL/L (ref 98–107)
COLOR UR AUTO: YELLOW
CREAT SERPL-MCNC: 1.15 MG/DL (ref 0.51–0.95)
CREAT UR-MCNC: 270 MG/DL
DEPRECATED HCO3 PLAS-SCNC: 27 MMOL/L (ref 22–29)
EGFRCR SERPLBLD CKD-EPI 2021: 48 ML/MIN/1.73M2
ERYTHROCYTE [DISTWIDTH] IN BLOOD BY AUTOMATED COUNT: 14.8 % (ref 10–15)
GLUCOSE SERPL-MCNC: 97 MG/DL (ref 70–99)
GLUCOSE UR STRIP-MCNC: NEGATIVE MG/DL
HCT VFR BLD AUTO: 41.6 % (ref 35–47)
HGB BLD-MCNC: 13.1 G/DL (ref 11.7–15.7)
HGB UR QL STRIP: ABNORMAL
HYALINE CASTS: 2 /LPF
KETONES UR STRIP-MCNC: NEGATIVE MG/DL
LEUKOCYTE ESTERASE UR QL STRIP: ABNORMAL
MCH RBC QN AUTO: 29.4 PG (ref 26.5–33)
MCHC RBC AUTO-ENTMCNC: 31.5 G/DL (ref 31.5–36.5)
MCV RBC AUTO: 93 FL (ref 78–100)
MUCOUS THREADS #/AREA URNS LPF: PRESENT /LPF
NITRATE UR QL: POSITIVE
PH UR STRIP: 5.5 [PH] (ref 5–7)
PHOSPHATE SERPL-MCNC: 3.6 MG/DL (ref 2.5–4.5)
PLATELET # BLD AUTO: 300 10E3/UL (ref 150–450)
POTASSIUM SERPL-SCNC: 4.5 MMOL/L (ref 3.4–5.3)
PROT/CREAT 24H UR: 0.15 MG/MG CR (ref 0–0.2)
RBC # BLD AUTO: 4.46 10E6/UL (ref 3.8–5.2)
RBC URINE: 8 /HPF
SODIUM SERPL-SCNC: 142 MMOL/L (ref 135–145)
SP GR UR STRIP: 1.02 (ref 1–1.03)
SQUAMOUS EPITHELIAL: 1 /HPF
UROBILINOGEN UR STRIP-MCNC: NORMAL MG/DL
VIT D+METAB SERPL-MCNC: 46 NG/ML (ref 20–50)
WBC # BLD AUTO: 9.4 10E3/UL (ref 4–11)
WBC URINE: 155 /HPF

## 2024-05-28 PROCEDURE — 82306 VITAMIN D 25 HYDROXY: CPT | Performed by: INTERNAL MEDICINE

## 2024-05-28 PROCEDURE — 99000 SPECIMEN HANDLING OFFICE-LAB: CPT | Performed by: PATHOLOGY

## 2024-05-28 PROCEDURE — 99205 OFFICE O/P NEW HI 60 MIN: CPT | Performed by: INTERNAL MEDICINE

## 2024-05-28 PROCEDURE — 84156 ASSAY OF PROTEIN URINE: CPT | Performed by: PATHOLOGY

## 2024-05-28 PROCEDURE — 81001 URINALYSIS AUTO W/SCOPE: CPT | Performed by: PATHOLOGY

## 2024-05-28 PROCEDURE — 87086 URINE CULTURE/COLONY COUNT: CPT | Performed by: INTERNAL MEDICINE

## 2024-05-28 PROCEDURE — 80069 RENAL FUNCTION PANEL: CPT | Performed by: PATHOLOGY

## 2024-05-28 PROCEDURE — 82652 VIT D 1 25-DIHYDROXY: CPT | Performed by: INTERNAL MEDICINE

## 2024-05-28 PROCEDURE — 36415 COLL VENOUS BLD VENIPUNCTURE: CPT | Performed by: PATHOLOGY

## 2024-05-28 PROCEDURE — 87186 SC STD MICRODIL/AGAR DIL: CPT | Performed by: INTERNAL MEDICINE

## 2024-05-28 PROCEDURE — 85027 COMPLETE CBC AUTOMATED: CPT | Performed by: PATHOLOGY

## 2024-05-28 PROCEDURE — G0463 HOSPITAL OUTPT CLINIC VISIT: HCPCS | Performed by: INTERNAL MEDICINE

## 2024-05-28 ASSESSMENT — PAIN SCALES - GENERAL: PAINLEVEL: NO PAIN (0)

## 2024-05-28 NOTE — LETTER
5/28/2024       RE: Jenny Iglesias  [address]  [place] [address]     Dear Colleague,    Thank you for referring your patient, Jenny Iglesias, to the [facility] [place] at [facility]. Please see a copy of my visit note below.      Nephrology Clinic Note  May 28, 2024    I was asked to see this patient by Dr. Taylor    CC: CKD    HPI: Jenny Iglesias is a 78 year old female with PMH significant for HTN, DM, CKD, TIA among others who presents for evaluation and management of CKD.     Patient presented to nephrology clinic for CKD evaluation.  Endorsed that she is feeling fairly stable.  No new symptoms today.  Upon questioning mentioned that she is having dizziness with moving her head from side-to-side and also felt off balance for the last 6 months.  Endorsed that her symptoms are stable not getting worse.  She tried to move sideboards and struck forwards to improve her balance and decrease falls.  Otherwise no other significant symptoms endorsed today.  Denied any recurrent fevers, chills, nausea, vomiting, diarrhea abdominal pain, chest pain or shortness of breath.  Did not take any over-the-counter medications including NSAIDs.  She was off of all calcium and vitamin D supplementations.  She restarted to take Multivite with minerals but does not know that if that has any calcium in it.    - History of urinary symptoms: incontinent, but no dysuria or urgency.  - History of Hematuria: no  - Swelling: no  - Hx of UTIs: couple but long time ago  - Hx of stones: no  - Rashes/Joint pain: no  - Family hx of kidney disease: no  - NSAID use: no, last use was before 2014.       Allergies   Allergen Reactions     Codeine Sulfate Shortness Of Breath and Anaphylaxis     Carbidopa-Levodopa      dystonia     Codeine      Diclofenac      Diarrhea     Morphine        Current Outpatient Medications   Medication Sig Dispense  Refill     atorvastatin (LIPITOR) 40 MG tablet TAKE ONE TABLET BY MOUTH ONE TIME DAILY 90 tablet 2     furosemide (LASIX) 20 MG tablet Take 1 tablet (20 mg) by mouth daily 90 tablet 1     lamoTRIgine (LAMICTAL) 25 MG tablet Take 75 mg by mouth 2 times daily       losartan (COZAAR) 100 MG tablet Take 1 tablet (100 mg) by mouth daily 90 tablet 3     metoprolol succinate ER (TOPROL XL) 100 MG 24 hr tablet Take one tablet (100 mg) by mouth every evening. 90 tablet 3     Multiple Vitamins-Minerals (CENTRUM PO) Take 1 tablet by mouth daily       pramipexole (MIRAPEX) 0.25 MG tablet TAKE ONE TAB BY MOUTH TWICE DAILY 180 tablet 2     sertraline (ZOLOFT) 100 MG tablet Take 1 tablet (100 mg) by mouth daily 90 tablet 2     zolpidem (AMBIEN) 5 MG tablet TAKE ONE TABLET BY MOUTH AT BEDTIME AS NEEDED for sleep 90 tablet 0     No current facility-administered medications for this visit.       Past Medical History:   Diagnosis Date     Anxiety      Carotid artery stenosis 11/2013    50% left internal carotid artery      Chronic low back pain      Hypertension      Osteoarthritis of lumbar spine      Osteopenia     mild, resolved     Postmenopausal 11/2013    on hormone     Restless leg syndrome      Seizures (H)     Feb 18, 2021, Small lost vision and could not speak for 4-5 seconds     Surgical menopause     age 47     Thyroid nodule 11/2013    35mm, rightthyroid lobe biopsy-benign nodule     TIA (transient ischemic attack)      TIA (transient ischemic attack) 11/2013     Vertigo        Past Surgical History:   Procedure Laterality Date     APPENDECTOMY       CATARACT EXTRACTION       CHOLECYSTECTOMY       HYSTERECTOMY       OTHER SURGICAL HISTORY Right 1998    fifth toe amputation     MT TEMPORAL ARTERY LIGATN OR BX Bilateral 3/4/2021    Procedure: BIOPSY, ARTERY, TEMPORAL;  Surgeon: Ramesh Box MD;  Location: [facility];  Service: General     TUBAL LIGATION         Social History     Tobacco Use     Smoking status:  Former     Current packs/day: 0.00     Types: Cigarettes     Quit date: 10/22/2000     Years since quittin.6     Smokeless tobacco: Never   Substance Use Topics     Alcohol use: Yes     Drug use: Not Currently       Family History   Problem Relation Age of Onset     Breast Cancer Mother 57.00     Lung Cancer Father 62.00     Hypertension Sister      Glaucoma No family hx of      Macular Degeneration No family hx of        ROS: A 12 system review of systems was negative other than noted here or above.     Exam:  BP (!) 144/76 (BP Location: Right arm, Patient Position: Sitting, Cuff Size: Adult Regular)   Pulse 58   Temp 97.9  F (36.6  C) (Oral)   Wt 65.7 kg (144 lb 14.4 oz)   LMP  (LMP Unknown)   SpO2 96%   BMI 24.49 kg/m      GENERAL APPEARANCE: alert and no distress  EYES:  no scleral icterus  HENT: no gross abnormalities noted  RESP: Able to speak in full sentences, no audible wheezing, no accessory muscle usage  CV: S1, S2 present   Extremities: no significant edema  SKIN: no rash  NEURO: mentation intact and speech normal  PSYCH: affect normal/bright    Results:    Lab on 2024   Component Date Value Ref Range Status     Color Urine 2024 Yellow  Colorless, Straw, Light Yellow, Yellow Final     Appearance Urine 2024 Slightly Cloudy (A)  Clear Final     Glucose Urine 2024 Negative  Negative mg/dL Final     Bilirubin Urine 2024 Negative  Negative Final     Ketones Urine 2024 Negative  Negative mg/dL Final     Specific Gravity Urine 2024 1.024  1.003 - 1.035 Final     Blood Urine 2024 Trace (A)  Negative Final     pH Urine 2024 5.5  5.0 - 7.0 Final     Protein Albumin Urine 2024 30 (A)  Negative mg/dL Final     Urobilinogen Urine 2024 Normal  Normal, 2.0 mg/dL Final     Nitrite Urine 2024 Positive (A)  Negative Final     Leukocyte Esterase Urine 2024 Large (A)  Negative Final     Bacteria Urine 2024 Many (A)  None Seen /HPF  Final     Mucus Urine 05/28/2024 Present (A)  None Seen /LPF Final     RBC Urine 05/28/2024 8 (H)  <=2 /HPF Final     WBC Urine 05/28/2024 155 (H)  <=5 /HPF Final     Squamous Epithelials Urine 05/28/2024 1  <=1 /HPF Final     Hyaline Casts Urine 05/28/2024 2  <=2 /LPF Final     Total Protein Urine mg/dL 05/28/2024 39.6    mg/dL Final     Total Protein Urine mg/mg Creat 05/28/2024 0.15  0.00 - 0.20 mg/mg Cr Final     Creatinine Urine mg/dL 05/28/2024 270.0  mg/dL Final     Sodium 05/28/2024 142  135 - 145 mmol/L Final    Reference intervals for this test were updated on 09/26/2023 to more accurately reflect our healthy population. There may be differences in the flagging of prior results with similar values performed with this method. Interpretation of those prior results can be made in the context of the updated reference intervals.      Potassium 05/28/2024 4.5  3.4 - 5.3 mmol/L Final     Chloride 05/28/2024 105  98 - 107 mmol/L Final     Carbon Dioxide (CO2) 05/28/2024 27  22 - 29 mmol/L Final     Anion Gap 05/28/2024 10  7 - 15 mmol/L Final     Glucose 05/28/2024 97  70 - 99 mg/dL Final     Urea Nitrogen 05/28/2024 18.2  8.0 - 23.0 mg/dL Final     Creatinine 05/28/2024 1.15 (H)  0.51 - 0.95 mg/dL Final     GFR Estimate 05/28/2024 48 (L)  >60 mL/min/1.73m2 Final     Calcium 05/28/2024 10.4 (H)  8.8 - 10.2 mg/dL Final     Albumin 05/28/2024 3.7  3.5 - 5.2 g/dL Final     Phosphorus 05/28/2024 3.6  2.5 - 4.5 mg/dL Final     WBC Count 05/28/2024 9.4  4.0 - 11.0 10e3/uL Final     RBC Count 05/28/2024 4.46  3.80 - 5.20 10e6/uL Final     Hemoglobin 05/28/2024 13.1  11.7 - 15.7 g/dL Final     Hematocrit 05/28/2024 41.6  35.0 - 47.0 % Final     MCV 05/28/2024 93  78 - 100 fL Final     MCH 05/28/2024 29.4  26.5 - 33.0 pg Final     MCHC 05/28/2024 31.5  31.5 - 36.5 g/dL Final     RDW 05/28/2024 14.8  10.0 - 15.0 % Final     Platelet Count 05/28/2024 300  150 - 450 10e3/uL Final       Assessment/Plan:     CKD stage  IIIa/b  Possible B/l renal artery stenosis (normal velocities were 200 or less, she had 249)  Pt with baseline creatinine of  0.9-1.1 until early 2021, but since it trended up and varible between 1.1 to 1.4 with eGFR in 30's and 40's which put him in CKD stage IIIa/b.  UA showed WBC and RBC, urine culture showed citrobacter which was similar to before in October 2023, likely colonization. With stable renal function and being asymptomatic choose not to treat it at this time. Renal imaging showed possible b/l renal artery stenosis, likely related to atherosclerosis, will continue to monitor for now with stable renal function.  - continue strict BP control   - encouraged to be hydrated well  - follow low salt diet   - encouraged to increase fresh fruits and vegetables daily   - avoid NSAID's as you are doing.    Hypertension :  Clinic BP are slightly higher than goal, continue on current therapy for now   Encouraged to continue home BP measurements.    Electrolytes :  Stable    BMD :  Chronic Hypercalcemia, mild to moderate   Noted brenda being high since 2018. Continued to be high at 10.4 today. Vitamin D is WNL, PTH was high in feb, no repeat done for this visit. Initially thought 2/2 hydrochlorothiazide which is since discontinued, which improved in levels but noted still on higher side. Also noted to have high vitamin D levels, now WNL.  - reordered vitamin D, 1.25 hydroxyvitamin D, PTH and PTHrP  - MM work was negative in feb 2024.     Metabolic acidosis :  Bicarb is WNL    Anemia :  Hb is WNL    Other problems  Giant cell arteritis with optic neuritis, now resolved and off of prednisone  Type II DM, last A1c is 5.8 in march 2024, at goal of < 7  Seizure disorder : on Lamictal  H/o of vertigo, ongoing symptoms with some balance issues. Asked to see PT and her neurology team.     Total time spent on the day of clinic visit was 67 including face to face time of 40 with pt, labs and image review including her renal US  recently done, chart review including rheumatology note and PCP notes and documentation as above.     Gardenia Fatima  Dept of Nephrology and Hypertension  [facility]

## 2024-05-28 NOTE — PROGRESS NOTES
Nephrology Clinic Note  May 28, 2024    I was asked to see this patient by Dr. Taylro    CC: CKD    HPI: Jenny Iglesias is a 78 year old female with PMH significant for HTN, DM, CKD, TIA among others who presents for evaluation and management of CKD.     Patient presented to nephrology clinic for CKD evaluation.  Endorsed that she is feeling fairly stable.  No new symptoms today.  Upon questioning mentioned that she is having dizziness with moving her head from side-to-side and also felt off balance for the last 6 months.  Endorsed that her symptoms are stable not getting worse.  She tried to move sideboards and struck forwards to improve her balance and decrease falls.  Otherwise no other significant symptoms endorsed today.  Denied any recurrent fevers, chills, nausea, vomiting, diarrhea abdominal pain, chest pain or shortness of breath.  Did not take any over-the-counter medications including NSAIDs.  She was off of all calcium and vitamin D supplementations.  She restarted to take Multivite with minerals but does not know that if that has any calcium in it.    - History of urinary symptoms: incontinent, but no dysuria or urgency.  - History of Hematuria: no  - Swelling: no  - Hx of UTIs: couple but long time ago  - Hx of stones: no  - Rashes/Joint pain: no  - Family hx of kidney disease: no  - NSAID use: no, last use was before 2014.       Allergies   Allergen Reactions    Codeine Sulfate Shortness Of Breath and Anaphylaxis    Carbidopa-Levodopa      dystonia    Codeine     Diclofenac      Diarrhea    Morphine        Current Outpatient Medications   Medication Sig Dispense Refill    atorvastatin (LIPITOR) 40 MG tablet TAKE ONE TABLET BY MOUTH ONE TIME DAILY 90 tablet 2    furosemide (LASIX) 20 MG tablet Take 1 tablet (20 mg) by mouth daily 90 tablet 1    lamoTRIgine (LAMICTAL) 25 MG tablet Take 75 mg by mouth 2 times daily      losartan (COZAAR) 100 MG tablet Take 1 tablet (100 mg) by mouth daily 90 tablet  3    metoprolol succinate ER (TOPROL XL) 100 MG 24 hr tablet Take one tablet (100 mg) by mouth every evening. 90 tablet 3    Multiple Vitamins-Minerals (CENTRUM PO) Take 1 tablet by mouth daily      pramipexole (MIRAPEX) 0.25 MG tablet TAKE ONE TAB BY MOUTH TWICE DAILY 180 tablet 2    sertraline (ZOLOFT) 100 MG tablet Take 1 tablet (100 mg) by mouth daily 90 tablet 2    zolpidem (AMBIEN) 5 MG tablet TAKE ONE TABLET BY MOUTH AT BEDTIME AS NEEDED for sleep 90 tablet 0     No current facility-administered medications for this visit.       Past Medical History:   Diagnosis Date    Anxiety     Carotid artery stenosis 2013    50% left internal carotid artery     Chronic low back pain     Hypertension     Osteoarthritis of lumbar spine     Osteopenia     mild, resolved    Postmenopausal 2013    on hormone    Restless leg syndrome     Seizures (H)     2021, Small lost vision and could not speak for 4-5 seconds    Surgical menopause     age 47    Thyroid nodule 2013    35mm, rightthyroid lobe biopsy-benign nodule    TIA (transient ischemic attack)     TIA (transient ischemic attack) 2013    Vertigo        Past Surgical History:   Procedure Laterality Date    APPENDECTOMY      CATARACT EXTRACTION      CHOLECYSTECTOMY      HYSTERECTOMY      OTHER SURGICAL HISTORY Right     fifth toe amputation    PA TEMPORAL ARTERY LIGATN OR BX Bilateral 3/4/2021    Procedure: BIOPSY, ARTERY, TEMPORAL;  Surgeon: Ramesh Box MD;  Location: [facility];  Service: General    TUBAL LIGATION         Social History     Tobacco Use    Smoking status: Former     Current packs/day: 0.00     Types: Cigarettes     Quit date: 10/22/2000     Years since quittin.6    Smokeless tobacco: Never   Substance Use Topics    Alcohol use: Yes    Drug use: Not Currently       Family History   Problem Relation Age of Onset    Breast Cancer Mother 57.00    Lung Cancer Father 62.00    Hypertension Sister     Glaucoma No family  hx of     Macular Degeneration No family hx of        ROS: A 12 system review of systems was negative other than noted here or above.     Exam:  BP (!) 144/76 (BP Location: Right arm, Patient Position: Sitting, Cuff Size: Adult Regular)   Pulse 58   Temp 97.9  F (36.6  C) (Oral)   Wt 65.7 kg (144 lb 14.4 oz)   LMP  (LMP Unknown)   SpO2 96%   BMI 24.49 kg/m      GENERAL APPEARANCE: alert and no distress  EYES:  no scleral icterus  HENT: no gross abnormalities noted  RESP: Able to speak in full sentences, no audible wheezing, no accessory muscle usage  CV: S1, S2 present   Extremities: no significant edema  SKIN: no rash  NEURO: mentation intact and speech normal  PSYCH: affect normal/bright    Results:    Lab on 05/28/2024   Component Date Value Ref Range Status    Color Urine 05/28/2024 Yellow  Colorless, Straw, Light Yellow, Yellow Final    Appearance Urine 05/28/2024 Slightly Cloudy (A)  Clear Final    Glucose Urine 05/28/2024 Negative  Negative mg/dL Final    Bilirubin Urine 05/28/2024 Negative  Negative Final    Ketones Urine 05/28/2024 Negative  Negative mg/dL Final    Specific Gravity Urine 05/28/2024 1.024  1.003 - 1.035 Final    Blood Urine 05/28/2024 Trace (A)  Negative Final    pH Urine 05/28/2024 5.5  5.0 - 7.0 Final    Protein Albumin Urine 05/28/2024 30 (A)  Negative mg/dL Final    Urobilinogen Urine 05/28/2024 Normal  Normal, 2.0 mg/dL Final    Nitrite Urine 05/28/2024 Positive (A)  Negative Final    Leukocyte Esterase Urine 05/28/2024 Large (A)  Negative Final    Bacteria Urine 05/28/2024 Many (A)  None Seen /HPF Final    Mucus Urine 05/28/2024 Present (A)  None Seen /LPF Final    RBC Urine 05/28/2024 8 (H)  <=2 /HPF Final    WBC Urine 05/28/2024 155 (H)  <=5 /HPF Final    Squamous Epithelials Urine 05/28/2024 1  <=1 /HPF Final    Hyaline Casts Urine 05/28/2024 2  <=2 /LPF Final    Total Protein Urine mg/dL 05/28/2024 39.6    mg/dL Final    Total Protein Urine mg/mg Creat 05/28/2024 0.15  0.00 -  0.20 mg/mg Cr Final    Creatinine Urine mg/dL 05/28/2024 270.0  mg/dL Final    Sodium 05/28/2024 142  135 - 145 mmol/L Final    Reference intervals for this test were updated on 09/26/2023 to more accurately reflect our healthy population. There may be differences in the flagging of prior results with similar values performed with this method. Interpretation of those prior results can be made in the context of the updated reference intervals.     Potassium 05/28/2024 4.5  3.4 - 5.3 mmol/L Final    Chloride 05/28/2024 105  98 - 107 mmol/L Final    Carbon Dioxide (CO2) 05/28/2024 27  22 - 29 mmol/L Final    Anion Gap 05/28/2024 10  7 - 15 mmol/L Final    Glucose 05/28/2024 97  70 - 99 mg/dL Final    Urea Nitrogen 05/28/2024 18.2  8.0 - 23.0 mg/dL Final    Creatinine 05/28/2024 1.15 (H)  0.51 - 0.95 mg/dL Final    GFR Estimate 05/28/2024 48 (L)  >60 mL/min/1.73m2 Final    Calcium 05/28/2024 10.4 (H)  8.8 - 10.2 mg/dL Final    Albumin 05/28/2024 3.7  3.5 - 5.2 g/dL Final    Phosphorus 05/28/2024 3.6  2.5 - 4.5 mg/dL Final    WBC Count 05/28/2024 9.4  4.0 - 11.0 10e3/uL Final    RBC Count 05/28/2024 4.46  3.80 - 5.20 10e6/uL Final    Hemoglobin 05/28/2024 13.1  11.7 - 15.7 g/dL Final    Hematocrit 05/28/2024 41.6  35.0 - 47.0 % Final    MCV 05/28/2024 93  78 - 100 fL Final    MCH 05/28/2024 29.4  26.5 - 33.0 pg Final    MCHC 05/28/2024 31.5  31.5 - 36.5 g/dL Final    RDW 05/28/2024 14.8  10.0 - 15.0 % Final    Platelet Count 05/28/2024 300  150 - 450 10e3/uL Final       Assessment/Plan:     CKD stage IIIa/b  Possible B/l renal artery stenosis (normal velocities were 200 or less, she had 249)  Pt with baseline creatinine of  0.9-1.1 until early 2021, but since it trended up and varible between 1.1 to 1.4 with eGFR in 30's and 40's which put him in CKD stage IIIa/b.  UA showed WBC and RBC, urine culture showed citrobacter which was similar to before in October 2023, likely colonization. With stable renal function and being  asymptomatic choose not to treat it at this time. Renal imaging showed possible b/l renal artery stenosis, likely related to atherosclerosis, will continue to monitor for now with stable renal function.  - continue strict BP control   - encouraged to be hydrated well  - follow low salt diet   - encouraged to increase fresh fruits and vegetables daily   - avoid NSAID's as you are doing.    Hypertension :  Clinic BP are slightly higher than goal, continue on current therapy for now   Encouraged to continue home BP measurements.    Electrolytes :  Stable    BMD :  Chronic Hypercalcemia, mild to moderate   Noted brenda being high since 2018. Continued to be high at 10.4 today. Vitamin D is WNL, PTH was high in feb, no repeat done for this visit. Initially thought 2/2 hydrochlorothiazide which is since discontinued, which improved in levels but noted still on higher side. Also noted to have high vitamin D levels, now WNL.  - reordered vitamin D, 1.25 hydroxyvitamin D, PTH and PTHrP  - MM work was negative in feb 2024.     Metabolic acidosis :  Bicarb is WNL    Anemia :  Hb is WNL    Other problems  Giant cell arteritis with optic neuritis, now resolved and off of prednisone  Type II DM, last A1c is 5.8 in march 2024, at goal of < 7  Seizure disorder : on Lamictal  H/o of vertigo, ongoing symptoms with some balance issues. Asked to see PT and her neurology team.     Total time spent on the day of clinic visit was 67 including face to face time of 40 with pt, labs and image review including her renal US recently done, chart review including rheumatology note and PCP notes and documentation as above.     Gardenia Fatima  Dept of Nephrology and Hypertension  [facility]

## 2024-05-28 NOTE — PATIENT INSTRUCTIONS
Increase water intake to 40 oz of water + 20 oz of other fluids  Hold your multivitamins with minerals, unless it does not have any calcium or other minerals  Continue to check BP at home     First make appointment with physical therapy for your dizziness and call neurology to make follow up appointment with them

## 2024-05-28 NOTE — NURSING NOTE
Chief Complaint   Patient presents with    Consult     NEW NEPHROLOGY - Complete RM. Per Pt, Stage 3b chronic kidney disease (H) [N18.32], referred by Karen Taylor MD. records in [facility],location verified.         Vitals:    05/28/24 1416 05/28/24 1422   BP: (!) 159/71 (!) 144/76   BP Location: Right arm Right arm   Patient Position: Sitting Sitting   Cuff Size: Adult Regular Adult Regular   Pulse: 58    Temp: 97.9  F (36.6  C)    TempSrc: Oral    SpO2: 96%    Weight: 65.7 kg (144 lb 14.4 oz)        BP Readings from Last 3 Encounters:   05/28/24 (!) 144/76   03/11/24 130/70   02/22/24 105/51       BP (!) 144/76 (BP Location: Right arm, Patient Position: Sitting, Cuff Size: Adult Regular)   Pulse 58   Temp 97.9  F (36.6  C) (Oral)   Wt 65.7 kg (144 lb 14.4 oz)   LMP  (LMP Unknown)   SpO2 96%   BMI 24.49 kg/m       Ben Lockhart, [facility]

## 2024-05-29 LAB
1,25(OH)2D SERPL-MCNC: 37.3 PG/ML (ref 19.9–79.3)
BACTERIA UR CULT: ABNORMAL
BACTERIA UR CULT: ABNORMAL

## 2024-06-06 ENCOUNTER — NURSE TRIAGE (OUTPATIENT)
Dept: NURSING | Facility: CLINIC | Age: 79
End: 2024-06-06
Payer: COMMERCIAL

## 2024-06-06 NOTE — TELEPHONE ENCOUNTER
calling, patient was up and doing okay this morning as they were getting ready to leave, but all of a sudden she was very dizzy.  got her in a chair and she instantly fell asleep.  woke her up and got her back to bed but patient has been out, or asleep, since then.     Patient fell a few days ago and has a scab on the scrape of her leg with redness extending down the leg away from this area.    Protocol recommends 911 for EMS   agrees to hang up with me and dial 911.    Leena Perales RN   06/06/24 8:42 AM  [facility] Nurse Advisor    Reason for Disposition   Difficult to awaken or acting confused (e.g., disoriented, slurred speech)    Additional Information   Negative: SEVERE difficulty breathing (e.g., struggling for each breath, speaks in single words)   Negative: Shock suspected (e.g., cold/pale/clammy skin, too weak to stand, low BP, rapid pulse)    Protocols used: Dizziness-A-OH

## 2024-06-26 ENCOUNTER — TELEPHONE (OUTPATIENT)
Dept: INTERNAL MEDICINE | Facility: CLINIC | Age: 79
End: 2024-06-26
Payer: COMMERCIAL

## 2024-06-26 NOTE — TELEPHONE ENCOUNTER
[facility] [place] requesting PT referral to be faxed to them at [phone] - faxed as requested

## 2024-06-26 NOTE — TELEPHONE ENCOUNTER
[facility] requesting back PT referral to be faxed to them at [phone] - faxed as requested

## 2024-07-07 ENCOUNTER — HEALTH MAINTENANCE LETTER (OUTPATIENT)
Age: 79
End: 2024-07-07

## 2024-07-08 ENCOUNTER — OFFICE VISIT (OUTPATIENT)
Dept: RHEUMATOLOGY | Facility: CLINIC | Age: 79
End: 2024-07-08
Payer: COMMERCIAL

## 2024-07-08 VITALS
BODY MASS INDEX: 23.29 KG/M2 | WEIGHT: 137.8 LBS | SYSTOLIC BLOOD PRESSURE: 130 MMHG | HEART RATE: 47 BPM | OXYGEN SATURATION: 92 % | DIASTOLIC BLOOD PRESSURE: 68 MMHG

## 2024-07-08 DIAGNOSIS — M15.0 PRIMARY OSTEOARTHRITIS INVOLVING MULTIPLE JOINTS: Primary | ICD-10-CM

## 2024-07-08 DIAGNOSIS — M25.562 CHRONIC PAIN OF BOTH KNEES: ICD-10-CM

## 2024-07-08 DIAGNOSIS — M25.561 CHRONIC PAIN OF BOTH KNEES: ICD-10-CM

## 2024-07-08 DIAGNOSIS — R76.8 ANA POSITIVE: ICD-10-CM

## 2024-07-08 DIAGNOSIS — G89.29 CHRONIC PAIN OF BOTH KNEES: ICD-10-CM

## 2024-07-08 PROCEDURE — 20610 DRAIN/INJ JOINT/BURSA W/O US: CPT | Mod: 50 | Performed by: INTERNAL MEDICINE

## 2024-07-08 PROCEDURE — 99214 OFFICE O/P EST MOD 30 MIN: CPT | Mod: 25 | Performed by: INTERNAL MEDICINE

## 2024-07-08 RX ORDER — METOPROLOL SUCCINATE 25 MG/1
25 TABLET, EXTENDED RELEASE ORAL EVERY EVENING
COMMUNITY
Start: 2024-06-21

## 2024-07-08 RX ORDER — TRIAMCINOLONE ACETONIDE 40 MG/ML
40 INJECTION, SUSPENSION INTRA-ARTICULAR; INTRAMUSCULAR ONCE
Status: COMPLETED | OUTPATIENT
Start: 2024-07-08 | End: 2024-07-08

## 2024-07-08 RX ADMIN — TRIAMCINOLONE ACETONIDE 40 MG: 40 INJECTION, SUSPENSION INTRA-ARTICULAR; INTRAMUSCULAR at 11:31

## 2024-07-08 RX ADMIN — TRIAMCINOLONE ACETONIDE 40 MG: 40 INJECTION, SUSPENSION INTRA-ARTICULAR; INTRAMUSCULAR at 11:30

## 2024-07-08 NOTE — PROGRESS NOTES
Rheumatology follow-up visit note     Virginia is a 78 year old female presents today for follow-up.    Sara was seen today for recheck.    Diagnoses and all orders for this visit:    Primary osteoarthritis involving multiple joints  -     triamcinolone (KENALOG-40) injection 40 mg  -     triamcinolone (KENALOG-40) injection 40 mg  -     ASPIRATION/INJECTION MAJOR JOINT    CHRISTIAN positive    Chronic pain of both knees  -     triamcinolone (KENALOG-40) injection 40 mg  -     triamcinolone (KENALOG-40) injection 40 mg  -     ASPIRATION/INJECTION MAJOR JOINT            After pros and cons were discussed including risk of infection, bleeding, skin changes including thinning, pigmentary alteration and scarring to name a few, left knee, right knee injected as noted in the orders section. This was done with nontouch technique.  The patient tolerated the procedure well and had a brisk Marcaine effect.  The postinjection care was discussed.      Follow up by phone in 4 months.    HPI    Jenny Igelsias is a 78 year old female is here for worsening pain in her knees in the context of osteoarthritis. She has history of temporal arteritis, no longer on oral glucocorticoids. She has bilateral knee pain associated with osteoarthritis and has found corticosteroid injections helpful. However she would like to keep the frequency as low as possible. We discussed various options. She noted pain level in the knees moderately severe. She wonders if she can continue to walk that she loves to. She has not observed swelling in the knee area. She has not had headache jaw claudication double vision.      DETAILED EXAMINATION  07/08/24  :    Vitals:    07/08/24 1106   BP: 130/68   BP Location: Right arm   Patient Position: Sitting   Cuff Size: Adult Regular   Pulse: (!) 47   SpO2: 92%   Weight: 62.5 kg (137 lb 12.8 oz)     Alert oriented. Head including the face is examined for malar rash, heliotropes, scarring, lupus pernio. Eyes  examined for redness such as in episcleritis/scleritis, periorbital lesions.   Neck/ Face examined for parotid gland swelling, range of motion of neck.  Left upper and lower and right upper and lower extremities examined for tenderness, swelling, warmth of the appendicular joints, range of motion, edema, rash.  Some of the important findings included: she does not have evidence of synovitis in the palpable joints of the upper extremities.  No significant deformities of the digits.  Both knees are warm, joint line tenderness medially.  No detectable effusion.  Patient Active Problem List    Diagnosis Date Noted    Aortic valve insufficiency, etiology of cardiac valve disease unspecified 10/22/2023     Priority: Medium    C. 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     Priority: Medium    Fuchs' corneal dystrophy of both eyes 12/13/2021     Priority: Medium    Corneal edema of both eyes 12/13/2021     Priority: Medium    Localization-related focal epilepsy with simple partial seizures (H) 10/10/2021     Priority: Medium    Seizure disorder (H) 06/09/2021     Priority: Medium    GCA (giant cell arteritis) (H) 03/10/2021     Priority: Medium    TIA (transient ischemic attack)      Priority: Medium    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     Priority: Medium     Added automatically from request for surgery [id number]        Stage 3b chronic kidney disease (H) 02/15/2021     Priority: Medium    Other chronic pain 10/07/2020     Priority: Medium    Coccyx pain 09/13/2019     Priority: Medium    Chronic insomnia 02/25/2016     Priority: Medium    Vertigo 10/27/2015     Priority: Medium    Essential hypertension 10/27/2015     Priority: Medium    Restless leg syndrome 10/27/2015     Priority: Medium    Perimenopausal  vasomotor symptoms 10/27/2015     Priority: Medium    Carotid stenosis, asymptomatic 10/27/2015     Priority: Medium     Past Surgical History:   Procedure Laterality Date    APPENDECTOMY      CATARACT EXTRACTION      CHOLECYSTECTOMY      HYSTERECTOMY      OTHER SURGICAL HISTORY Right 1998    fifth toe amputation    [place] TEMPORAL ARTERY LIGATN OR BX Bilateral 3/4/2021    Procedure: BIOPSY, ARTERY, TEMPORAL;  Surgeon: Ramesh Box MD;  Location: [facility];  Service: General    TUBAL LIGATION        Past Medical History:   Diagnosis Date    Anxiety     Carotid artery stenosis 11/2013    50% left internal carotid artery     Chronic low back pain     Hypertension     Osteoarthritis of lumbar spine     Osteopenia     mild, resolved    Postmenopausal 11/2013    on hormone    Restless leg syndrome     Seizures (H)     Feb 18, 2021, Small lost vision and could not speak for 4-5 seconds    Surgical menopause     age 47    Thyroid nodule 11/2013    35mm, rightthyroid lobe biopsy-benign nodule    TIA (transient ischemic attack)     TIA (transient ischemic attack) 11/2013    Vertigo      Allergies   Allergen Reactions    Codeine Sulfate Shortness Of Breath and Anaphylaxis    Carbidopa-Levodopa      dystonia    Codeine     Diclofenac      Diarrhea    Morphine      Current Outpatient Medications   Medication Sig Dispense Refill    atorvastatin (LIPITOR) 40 MG tablet TAKE ONE TABLET BY MOUTH ONE TIME DAILY 90 tablet 2    furosemide (LASIX) 20 MG tablet Take 1 tablet (20 mg) by mouth daily 90 tablet 1    lamoTRIgine (LAMICTAL) 25 MG tablet Take 75 mg by mouth 2 times daily      losartan (COZAAR) 100 MG tablet Take 1 tablet (100 mg) by mouth daily 90 tablet 3    metoprolol succinate ER (TOPROL XL) 100 MG 24 hr tablet Take one tablet (100 mg) by mouth every evening. 90 tablet 3    Multiple Vitamins-Minerals (CENTRUM PO) Take 1 tablet by mouth daily      pramipexole (MIRAPEX) 0.25 MG tablet TAKE ONE TAB BY MOUTH TWICE  DAILY 180 tablet 2    sertraline (ZOLOFT) 100 MG tablet Take 1 tablet (100 mg) by mouth daily 90 tablet 2    zolpidem (AMBIEN) 5 MG tablet TAKE ONE TABLET BY MOUTH AT BEDTIME AS NEEDED for sleep 90 tablet 0     family history includes Breast Cancer (age of onset: 57.00) in her mother; Hypertension in her sister; Lung Cancer (age of onset: 62.00) in her father.  Social Connections: Not on file          WBC Count   Date Value Ref Range Status   05/28/2024 9.4 4.0 - 11.0 10e3/uL Final     RBC Count   Date Value Ref Range Status   05/28/2024 4.46 3.80 - 5.20 10e6/uL Final     Hemoglobin   Date Value Ref Range Status   05/28/2024 13.1 11.7 - 15.7 g/dL Final     Hematocrit   Date Value Ref Range Status   05/28/2024 41.6 35.0 - 47.0 % Final     MCV   Date Value Ref Range Status   05/28/2024 93 78 - 100 fL Final     MCH   Date Value Ref Range Status   05/28/2024 29.4 26.5 - 33.0 pg Final     Platelet Count   Date Value Ref Range Status   05/28/2024 300 150 - 450 10e3/uL Final     % Lymphocytes   Date Value Ref Range Status   02/06/2024 38 % Final     AST   Date Value Ref Range Status   10/16/2023 41 0 - 45 U/L Final     Comment:     Reference intervals for this test were updated on 6/12/2023 to more accurately reflect our healthy population. There may be differences in the flagging of prior results with similar values performed with this method. Interpretation of those prior results can be made in the context of the updated reference intervals.     ALT   Date Value Ref Range Status   10/16/2023 41 0 - 50 U/L Final     Comment:     Reference intervals for this test were updated on 6/12/2023 to more accurately reflect our healthy population. There may be differences in the flagging of prior results with similar values performed with this method. Interpretation of those prior results can be made in the context of the updated reference intervals.       Albumin   Date Value Ref Range Status   05/28/2024 3.7 3.5 - 5.2 g/dL Final    01/13/2022 3.8 3.5 - 5.0 g/dL Final     Alkaline Phosphatase   Date Value Ref Range Status   10/16/2023 143 (H) 35 - 104 U/L Final     Creatinine   Date Value Ref Range Status   05/28/2024 1.15 (H) 0.51 - 0.95 mg/dL Final     GFR Estimate   Date Value Ref Range Status   05/28/2024 48 (L) >60 mL/min/1.73m2 Final   04/06/2021 39 (L) >60 mL/min/1.73m2 Final     GFR Estimate If Black   Date Value Ref Range Status   04/06/2021 48 (L) >60 mL/min/1.73m2 Final     Creatinine Urine mg/dL   Date Value Ref Range Status   05/28/2024 270.0 mg/dL Final     Erythrocyte Sedimentation Rate   Date Value Ref Range Status   10/16/2023 9 0 - 30 mm/hr Final     CRP   Date Value Ref Range Status   03/03/2022 0.1 0.0-<0.8 mg/dL Final

## 2024-08-07 DIAGNOSIS — I10 ESSENTIAL HYPERTENSION: ICD-10-CM

## 2024-08-07 RX ORDER — SERTRALINE HYDROCHLORIDE 100 MG/1
100 TABLET, FILM COATED ORAL DAILY
Qty: 90 TABLET | Refills: 2 | Status: SHIPPED | OUTPATIENT
Start: 2024-08-07

## 2024-08-26 DIAGNOSIS — Z13.220 SCREENING FOR HYPERLIPIDEMIA: ICD-10-CM

## 2024-08-26 DIAGNOSIS — I10 ESSENTIAL HYPERTENSION: ICD-10-CM

## 2024-08-26 RX ORDER — LOSARTAN POTASSIUM 100 MG/1
100 TABLET ORAL DAILY
Qty: 90 TABLET | Refills: 3 | Status: SHIPPED | OUTPATIENT
Start: 2024-08-26

## 2024-08-26 RX ORDER — PRAMIPEXOLE DIHYDROCHLORIDE 0.25 MG/1
TABLET ORAL
Qty: 180 TABLET | Refills: 2 | Status: SHIPPED | OUTPATIENT
Start: 2024-08-26

## 2024-08-30 DIAGNOSIS — G47.09 OTHER INSOMNIA: ICD-10-CM

## 2024-08-30 RX ORDER — ZOLPIDEM TARTRATE 5 MG/1
5 TABLET ORAL
Qty: 90 TABLET | Refills: 5 | Status: SHIPPED | OUTPATIENT
Start: 2024-08-30

## 2024-10-08 DIAGNOSIS — N18.32 STAGE 3B CHRONIC KIDNEY DISEASE (H): Primary | ICD-10-CM

## 2024-10-16 ENCOUNTER — TRANSFERRED RECORDS (OUTPATIENT)
Dept: HEALTH INFORMATION MANAGEMENT | Facility: CLINIC | Age: 79
End: 2024-10-16
Payer: COMMERCIAL

## 2024-10-17 ENCOUNTER — OFFICE VISIT (OUTPATIENT)
Dept: NEPHROLOGY | Facility: CLINIC | Age: 79
End: 2024-10-17
Attending: INTERNAL MEDICINE
Payer: COMMERCIAL

## 2024-10-17 ENCOUNTER — LAB (OUTPATIENT)
Dept: LAB | Facility: CLINIC | Age: 79
End: 2024-10-17
Payer: COMMERCIAL

## 2024-10-17 VITALS
HEART RATE: 81 BPM | BODY MASS INDEX: 23.12 KG/M2 | DIASTOLIC BLOOD PRESSURE: 81 MMHG | OXYGEN SATURATION: 96 % | TEMPERATURE: 98.7 F | WEIGHT: 136.8 LBS | SYSTOLIC BLOOD PRESSURE: 180 MMHG

## 2024-10-17 DIAGNOSIS — N18.32 TYPE 2 DIABETES MELLITUS WITH STAGE 3B CHRONIC KIDNEY DISEASE, UNSPECIFIED WHETHER LONG TERM INSULIN USE (H): ICD-10-CM

## 2024-10-17 DIAGNOSIS — I12.9 RENAL HYPERTENSION: Primary | ICD-10-CM

## 2024-10-17 DIAGNOSIS — E11.22 TYPE 2 DIABETES MELLITUS WITH STAGE 3B CHRONIC KIDNEY DISEASE, UNSPECIFIED WHETHER LONG TERM INSULIN USE (H): ICD-10-CM

## 2024-10-17 DIAGNOSIS — N18.32 STAGE 3B CHRONIC KIDNEY DISEASE (H): ICD-10-CM

## 2024-10-17 LAB
ALBUMIN MFR UR ELPH: 40.6 MG/DL
ALBUMIN SERPL BCG-MCNC: 3.8 G/DL (ref 3.5–5.2)
ANION GAP SERPL CALCULATED.3IONS-SCNC: 10 MMOL/L (ref 7–15)
BUN SERPL-MCNC: 16.9 MG/DL (ref 8–23)
CALCIUM SERPL-MCNC: 10.3 MG/DL (ref 8.8–10.4)
CHLORIDE SERPL-SCNC: 104 MMOL/L (ref 98–107)
CREAT SERPL-MCNC: 1.33 MG/DL (ref 0.51–0.95)
CREAT UR-MCNC: 155 MG/DL
EGFRCR SERPLBLD CKD-EPI 2021: 40 ML/MIN/1.73M2
GLUCOSE SERPL-MCNC: 92 MG/DL (ref 70–99)
HCO3 SERPL-SCNC: 28 MMOL/L (ref 22–29)
HGB BLD-MCNC: 13.3 G/DL (ref 11.7–15.7)
PHOSPHATE SERPL-MCNC: 3.5 MG/DL (ref 2.5–4.5)
POTASSIUM SERPL-SCNC: 3.9 MMOL/L (ref 3.4–5.3)
PROT/CREAT 24H UR: 0.26 MG/MG CR (ref 0–0.2)
PTH-INTACT SERPL-MCNC: 77 PG/ML (ref 15–65)
SODIUM SERPL-SCNC: 142 MMOL/L (ref 135–145)

## 2024-10-17 PROCEDURE — 83970 ASSAY OF PARATHORMONE: CPT | Performed by: PATHOLOGY

## 2024-10-17 PROCEDURE — 99000 SPECIMEN HANDLING OFFICE-LAB: CPT | Performed by: PATHOLOGY

## 2024-10-17 PROCEDURE — 85018 HEMOGLOBIN: CPT | Performed by: PATHOLOGY

## 2024-10-17 PROCEDURE — 83036 HEMOGLOBIN GLYCOSYLATED A1C: CPT | Performed by: INTERNAL MEDICINE

## 2024-10-17 PROCEDURE — 36415 COLL VENOUS BLD VENIPUNCTURE: CPT | Performed by: PATHOLOGY

## 2024-10-17 PROCEDURE — G0463 HOSPITAL OUTPT CLINIC VISIT: HCPCS | Performed by: INTERNAL MEDICINE

## 2024-10-17 PROCEDURE — 84156 ASSAY OF PROTEIN URINE: CPT | Performed by: PATHOLOGY

## 2024-10-17 PROCEDURE — 80053 COMPREHEN METABOLIC PANEL: CPT | Performed by: PATHOLOGY

## 2024-10-17 PROCEDURE — 99214 OFFICE O/P EST MOD 30 MIN: CPT | Performed by: INTERNAL MEDICINE

## 2024-10-17 PROCEDURE — 80061 LIPID PANEL: CPT | Performed by: PATHOLOGY

## 2024-10-17 PROCEDURE — 84100 ASSAY OF PHOSPHORUS: CPT | Performed by: PATHOLOGY

## 2024-10-17 PROCEDURE — 82248 BILIRUBIN DIRECT: CPT | Performed by: PATHOLOGY

## 2024-10-17 ASSESSMENT — PAIN SCALES - GENERAL: PAINLEVEL: NO PAIN (0)

## 2024-10-17 NOTE — NURSING NOTE
Chief Complaint   Patient presents with    RECHECK     5 follow up.      Vitals:    10/17/24 1554 10/17/24 1557   BP: (!) 180/69 (!) 180/81   BP Location: Left arm Left arm   Patient Position: Sitting Sitting   Cuff Size: Adult Regular Adult Regular   Pulse: 81    Temp: 98.7  F (37.1  C)    TempSrc: Oral    SpO2: 96%    Weight: 62.1 kg (136 lb 12.8 oz)        BP Readings from Last 3 Encounters:   10/17/24 (!) 180/81   07/08/24 130/68   05/28/24 (!) 144/76       BP (!) 180/81 (BP Location: Left arm, Patient Position: Sitting, Cuff Size: Adult Regular)   Pulse 81   Temp 98.7  F (37.1  C) (Oral)   Wt 62.1 kg (136 lb 12.8 oz)   LMP  (LMP Unknown)   SpO2 96%   BMI 23.12 kg/m       Sunshine Heympatricia

## 2024-10-17 NOTE — LETTER
"10/17/2024       RE: Jenny Iglesias  [address]  [place] [address]     Dear Colleague,    Thank you for referring your patient, Jenny Iglesias, to the [facility] [place] at [facility]. Please see a copy of my visit note below.      Nephrology Clinic Note      Patient previously seen by Dr Fatima (last on 5/28/24)  CC: CKD    HPI: Jenny Iglesias is a 78 year old female with PMH significant for HTN, DM, CKD, TIA among others who presents for evaluation and management of CKD.     Patient presented to nephrology clinic for CKD evaluation.  Endorsed that she is feeling fairly stable.  No new symptoms today.  Upon questioning mentioned that she is having dizziness with moving her head from side-to-side and also felt off balance for the last 6 months.  Endorsed that her symptoms are stable not getting worse.  She tried to move sideboards and struck forwards to improve her balance and decrease falls.  Otherwise no other significant symptoms endorsed today.  Denied any recurrent fevers, chills, nausea, vomiting, diarrhea abdominal pain, chest pain or shortness of breath.  Did not take any over-the-counter medications including NSAIDs.  She was off of all calcium and vitamin D supplementations.  She restarted to take Multivite with minerals but does not know that if that has any calcium in it.    - History of urinary symptoms: incontinent, but no dysuria or urgency.  - History of Hematuria: no  - Swelling: no  - Hx of UTIs: couple but long time ago  - Hx of stones: no  - Rashes/Joint pain: no  - Family hx of kidney disease: no  - NSAID use: no, last use was before 2014.    October 17, 2024  Ms Iglesias is seen for routine follow up  She reports chronic Back pain, Knee pain (gets injections)  No CP, SOB  Appetite good  Energy: takes a nap every day.  Falls asleep easily.   Does not like taking naps as it \"is a waste of " "time\"  Nocturia 4 x a night.  Only 3 x during the day.  BP has been high at home as well , 179/71 this am.  Goes to vertigo clinic.  Reports vertigo with head mvt, even while in bed  Old CT scan shows Extensive aortic calcification  US kidney with increased resistive indices.        Allergies   Allergen Reactions     Codeine Sulfate Shortness Of Breath and Anaphylaxis     Carbidopa-Levodopa      dystonia     Codeine      Diclofenac      Diarrhea     Morphine        Current Outpatient Medications reviewed with patient on Oct 17, 2024   Medication Sig Dispense Refill     amLODIPine (NORVASC) 2.5 MG tablet Take 2.5 mg by mouth daily.  She is NOT CURRENTLY TAKING        atorvastatin (LIPITOR) 40 MG tablet TAKE 1 TABLET BY MOUTH EVERY DAY 90 tablet 0     furosemide (LASIX) 20 MG tablet Take 1 tablet (20 mg) by mouth daily (Patient not taking: Reported on 7/8/2024) 90 tablet 1     lamoTRIgine (LAMICTAL) 25 MG tablet Take 75 mg by mouth 2 times daily       losartan (COZAAR) 100 MG tablet Take 1 tablet (100 mg) by mouth daily. 90 tablet 3     metoprolol succinate ER (TOPROL XL) 100 MG 24 hr tablet Take one tablet (100 mg) by mouth every evening. (Patient taking differently: Take 50 mg by mouth 2 times daily.) 90 tablet 3     metoprolol succinate ER (TOPROL XL) 25 MG 24 hr tablet Take 25 mg by mouth every evening (Patient not taking: Reported on 9/19/2024)       metoprolol tartrate (LOPRESSOR) 50 MG tablet Take 1 tablet (50 mg) by mouth 2 times daily. 180 tablet 3     Multiple Vitamins-Minerals (CENTRUM PO) Take 1 tablet by mouth daily       pramipexole (MIRAPEX) 0.25 MG tablet TAKE ONE TAB BY MOUTH TWICE DAILY 180 tablet 2     sertraline (ZOLOFT) 100 MG tablet TAKE 1 TABLET BY MOUTH EVERY DAY 90 tablet 2     No current facility-administered medications for this visit.       Past Medical History:   Diagnosis Date     Anxiety      Carotid artery stenosis 11/2013    50% left internal carotid artery      Chronic low back pain  "     Hypertension      Osteoarthritis of lumbar spine      Osteopenia     mild, resolved     Postmenopausal 2013    on hormone     Restless leg syndrome      Seizures (H)     2021, Small lost vision and could not speak for 4-5 seconds     Surgical menopause     age 47     Thyroid nodule 2013    35mm, rightthyroid lobe biopsy-benign nodule     TIA (transient ischemic attack)      TIA (transient ischemic attack) 2013     Vertigo        Past Surgical History:   Procedure Laterality Date     APPENDECTOMY       CATARACT EXTRACTION       CHOLECYSTECTOMY       HYSTERECTOMY       OTHER SURGICAL HISTORY Right 1998    fifth toe amputation     [place] TEMPORAL ARTERY LIGATN OR BX Bilateral 3/4/2021    Procedure: BIOPSY, ARTERY, TEMPORAL;  Surgeon: Ramesh Box MD;  Location: [facility];  Service: General     TUBAL LIGATION         Social History     Tobacco Use     Smoking status: Former     Current packs/day: 0.00     Types: Cigarettes     Quit date: 10/22/2000     Years since quittin.0     Smokeless tobacco: Never   Substance Use Topics     Alcohol use: Yes     Alcohol/week: 2.0 standard drinks of alcohol     Types: 2 Standard drinks or equivalent per week     Drug use: Not Currently       Family History   Problem Relation Age of Onset     Breast Cancer Mother 57.00     Lung Cancer Father 62.00     Hypertension Sister      Glaucoma No family hx of      Macular Degeneration No family hx of        ROS: A 12 system review of systems was negative other than noted here or above.     Exam:  BP (!) 180/81 (BP Location: Left arm, Patient Position: Sitting, Cuff Size: Adult Regular)   Pulse 81   Temp 98.7  F (37.1  C) (Oral)   Wt 62.1 kg (136 lb 12.8 oz)   LMP  (LMP Unknown)   SpO2 96%   BMI 23.12 kg/m    GENERAL APPEARANCE: alert and no distress  EYES:  no scleral icterus  HENT: no gross abnormalities noted  RESP: Able to speak in full sentences, no audible wheezing, no accessory muscle usage  CV:  S1, S2 present RRR.  No rub.    Extremities: no edema  NEURO: mentation intact and speech normal  PSYCH: affect normal/bright  Labs      Lab on 10/17/2024   Component Date Value Ref Range Status     Total Protein Urine mg/dL 10/17/2024 40.6    mg/dL Final     Total Protein Urine mg/mg Creat 10/17/2024 0.26 (H)  0.00 - 0.20 mg/mg Cr Final     Creatinine Urine mg/dL 10/17/2024 155.0  mg/dL Final     Sodium 10/17/2024 142  135 - 145 mmol/L Final     Potassium 10/17/2024 3.9  3.4 - 5.3 mmol/L Final     Chloride 10/17/2024 104  98 - 107 mmol/L Final     Carbon Dioxide (CO2) 10/17/2024 28  22 - 29 mmol/L Final     Anion Gap 10/17/2024 10  7 - 15 mmol/L Final     Glucose 10/17/2024 92  70 - 99 mg/dL Final     Urea Nitrogen 10/17/2024 16.9  8.0 - 23.0 mg/dL Final     Creatinine 10/17/2024 1.33 (H)  0.51 - 0.95 mg/dL Final     GFR Estimate 10/17/2024 40 (L)  >60 mL/min/1.73m2 Final    eGFR calculated using 2021 CKD-EPI equation.     Calcium 10/17/2024 10.3  8.8 - 10.4 mg/dL Final    Reference intervals for this test were updated on 7/16/2024 to reflect our healthy population more accurately. There may be differences in the flagging of prior results with similar values performed with this method. Those prior results can be interpreted in the context of the updated reference intervals.     Albumin 10/17/2024 3.8  3.5 - 5.2 g/dL Final     Phosphorus 10/17/2024 3.5  2.5 - 4.5 mg/dL Final     Hemoglobin 10/17/2024 13.3  11.7 - 15.7 g/dL Final     Creatinine   Date Value Ref Range Status   10/17/2024 1.33 (H) 0.51 - 0.95 mg/dL Final   05/28/2024 1.15 (H) 0.51 - 0.95 mg/dL Final   02/06/2024 1.45 (H) 0.51 - 0.95 mg/dL Final   10/16/2023 1.49 (H) 0.51 - 0.95 mg/dL Final   04/03/2023 1.07 (H) 0.51 - 0.95 mg/dL Final       Narrative & Impression   EXAM:  1. RENAL ULTRASOUND   2. RENAL DUPLEX  LOCATION: [facility]  DATE: 2/26/2024     INDICATION:  Stage 3b chronic kidney disease (H), Essential  hypertension  COMPARISON: None.  TECHNIQUE: Duplex imaging is performed utilizing gray-scale, two-dimensional images, and color-flow imaging. Doppler waveform analysis and spectral Doppler imaging is also performed.     FINDINGS:      RIGHT KIDNEY: 9.0 x 3.6 x 4.0 cm. Normal without hydronephrosis or masses.     LEFT KIDNEY 8.7 x 4.2 x 3.5 cm. No hydronephrosis. 0.7 x 0.7 x 0.7 renal cyst.      BLADDER: Normal.     RENAL DUPLEX:  Aortic PSV: 125 cm/s, multiphasic  Right Renal Artery PSV: 292 cm/s (Normal considered less than 200 cm/s.)  Right Intrarenal Resistive Index: 0.79-0.82  Left Renal Artery  cm/s (Normal considered less than 200 cm/s.)  Left Intrarenal Resistive Index: 0.77-0.83                                                                      IMPRESSION:   1.  Elevated bilateral renal artery peak systolic velocities concerning for renal artery stenosis.       Assessment/Plan:     CKD stage IIIa/b  Possible B/l renal artery stenosis (normal velocities were 200 or less, she had 249)  Pt with baseline creatinine of  0.9-1.1 until early 2021, but since it trended up and variable between 1.1 to 1.4 with eGFR in 30's and 40's which put him in CKD stage IIIa/b.    Ms Iglesias continues to have marked isolated systolic hypertension with wide pulse pressure.  This is consistent with the findings of extensive calcification of the aorta.  She had previously renal dopplers suggested of renal artery stenosis.  However in the setting of a history of long standing HTN and diffuse atherosclerosis, I do not think that pursuing further imaging for BELGICA is likely to alter management.  She is also very likely to have intra-renal chronic arterionephrosclerosis.  I instructed her to   1- continue with salt restriction (she is quite atune to that)  2- restart amlodipine 2.5 mg daily.  I encouraged her to continue monitoring her BP at home, and discussed being attentive to wide changes in BP - stop amlodipine if Syst BP  is < 120.     BMD :  Chronic Hypercalcemia, mild -> resolved.    Other problems  Giant cell arteritis with optic neuritis, now resolved and off of prednisone  Type II DM, last A1c is 5.8 in march 2024, at goal of < 7  Seizure disorder : on Lamictal    Follow up in 5 months    Taj Walls MD  Division of Nephrology and Hypertension  October 17, 2024        Again, thank you for allowing me to participate in the care of your patient.      Sincerely,    Taj Walls MD

## 2024-10-17 NOTE — PROGRESS NOTES
"  Nephrology Clinic Note      Patient previously seen by Dr Fatima (last on 5/28/24)  CC: CKD    HPI: Jenny Iglesias is a 78 year old female with PMH significant for HTN, DM, CKD, TIA among others who presents for evaluation and management of CKD.     Patient presented to nephrology clinic for CKD evaluation.  Endorsed that she is feeling fairly stable.  No new symptoms today.  Upon questioning mentioned that she is having dizziness with moving her head from side-to-side and also felt off balance for the last 6 months.  Endorsed that her symptoms are stable not getting worse.  She tried to move sideboards and struck forwards to improve her balance and decrease falls.  Otherwise no other significant symptoms endorsed today.  Denied any recurrent fevers, chills, nausea, vomiting, diarrhea abdominal pain, chest pain or shortness of breath.  Did not take any over-the-counter medications including NSAIDs.  She was off of all calcium and vitamin D supplementations.  She restarted to take Multivite with minerals but does not know that if that has any calcium in it.    - History of urinary symptoms: incontinent, but no dysuria or urgency.  - History of Hematuria: no  - Swelling: no  - Hx of UTIs: couple but long time ago  - Hx of stones: no  - Rashes/Joint pain: no  - Family hx of kidney disease: no  - NSAID use: no, last use was before 2014.    October 17, 2024  Ms Iglesias is seen for routine follow up  She reports chronic Back pain, Knee pain (gets injections)  No CP, SOB  Appetite good  Energy: takes a nap every day.  Falls asleep easily.   Does not like taking naps as it \"is a waste of time\"  Nocturia 4 x a night.  Only 3 x during the day.  BP has been high at home as well , 179/71 this am.  Goes to vertigo clinic.  Reports vertigo with head mvt, even while in bed  Old CT scan shows Extensive aortic calcification  US kidney with increased resistive indices.        Allergies   Allergen Reactions    Codeine " Sulfate Shortness Of Breath and Anaphylaxis    Carbidopa-Levodopa      dystonia    Codeine     Diclofenac      Diarrhea    Morphine        Current Outpatient Medications reviewed with patient on Oct 17, 2024   Medication Sig Dispense Refill    amLODIPine (NORVASC) 2.5 MG tablet Take 2.5 mg by mouth daily.  She is NOT CURRENTLY TAKING       atorvastatin (LIPITOR) 40 MG tablet TAKE 1 TABLET BY MOUTH EVERY DAY 90 tablet 0    furosemide (LASIX) 20 MG tablet Take 1 tablet (20 mg) by mouth daily (Patient not taking: Reported on 7/8/2024) 90 tablet 1    lamoTRIgine (LAMICTAL) 25 MG tablet Take 75 mg by mouth 2 times daily      losartan (COZAAR) 100 MG tablet Take 1 tablet (100 mg) by mouth daily. 90 tablet 3    metoprolol succinate ER (TOPROL XL) 100 MG 24 hr tablet Take one tablet (100 mg) by mouth every evening. (Patient taking differently: Take 50 mg by mouth 2 times daily.) 90 tablet 3    metoprolol succinate ER (TOPROL XL) 25 MG 24 hr tablet Take 25 mg by mouth every evening (Patient not taking: Reported on 9/19/2024)      metoprolol tartrate (LOPRESSOR) 50 MG tablet Take 1 tablet (50 mg) by mouth 2 times daily. 180 tablet 3    Multiple Vitamins-Minerals (CENTRUM PO) Take 1 tablet by mouth daily      pramipexole (MIRAPEX) 0.25 MG tablet TAKE ONE TAB BY MOUTH TWICE DAILY 180 tablet 2    sertraline (ZOLOFT) 100 MG tablet TAKE 1 TABLET BY MOUTH EVERY DAY 90 tablet 2     No current facility-administered medications for this visit.       Past Medical History:   Diagnosis Date    Anxiety     Carotid artery stenosis 11/2013    50% left internal carotid artery     Chronic low back pain     Hypertension     Osteoarthritis of lumbar spine     Osteopenia     mild, resolved    Postmenopausal 11/2013    on hormone    Restless leg syndrome     Seizures (H)     Feb 18, 2021, [place] lost vision and could not speak for 4-5 seconds    Surgical menopause     age 47    Thyroid nodule 11/2013    35mm, rightthyroid lobe biopsy-benign nodule     TIA (transient ischemic attack)     TIA (transient ischemic attack) 2013    Vertigo        Past Surgical History:   Procedure Laterality Date    APPENDECTOMY      CATARACT EXTRACTION      CHOLECYSTECTOMY      HYSTERECTOMY      OTHER SURGICAL HISTORY Right 1998    fifth toe amputation    AL TEMPORAL ARTERY LIGATN OR BX Bilateral 3/4/2021    Procedure: BIOPSY, ARTERY, TEMPORAL;  Surgeon: Ramesh Box MD;  Location: [facility];  Service: General    TUBAL LIGATION         Social History     Tobacco Use    Smoking status: Former     Current packs/day: 0.00     Types: Cigarettes     Quit date: 10/22/2000     Years since quittin.0    Smokeless tobacco: Never   Substance Use Topics    Alcohol use: Yes     Alcohol/week: 2.0 standard drinks of alcohol     Types: 2 Standard drinks or equivalent per week    Drug use: Not Currently       Family History   Problem Relation Age of Onset    Breast Cancer Mother 57.00    Lung Cancer Father 62.00    Hypertension Sister     Glaucoma No family hx of     Macular Degeneration No family hx of        ROS: A 12 system review of systems was negative other than noted here or above.     Exam:  BP (!) 180/81 (BP Location: Left arm, Patient Position: Sitting, Cuff Size: Adult Regular)   Pulse 81   Temp 98.7  F (37.1  C) (Oral)   Wt 62.1 kg (136 lb 12.8 oz)   LMP  (LMP Unknown)   SpO2 96%   BMI 23.12 kg/m    GENERAL APPEARANCE: alert and no distress  EYES:  no scleral icterus  HENT: no gross abnormalities noted  RESP: Able to speak in full sentences, no audible wheezing, no accessory muscle usage  CV: S1, S2 present RRR.  No rub.    Extremities: no edema  NEURO: mentation intact and speech normal  PSYCH: affect normal/bright  Labs      Lab on 10/17/2024   Component Date Value Ref Range Status    Total Protein Urine mg/dL 10/17/2024 40.6    mg/dL Final    Total Protein Urine mg/mg Creat 10/17/2024 0.26 (H)  0.00 - 0.20 mg/mg Cr Final    Creatinine Urine mg/dL  10/17/2024 155.0  mg/dL Final    Sodium 10/17/2024 142  135 - 145 mmol/L Final    Potassium 10/17/2024 3.9  3.4 - 5.3 mmol/L Final    Chloride 10/17/2024 104  98 - 107 mmol/L Final    Carbon Dioxide (CO2) 10/17/2024 28  22 - 29 mmol/L Final    Anion Gap 10/17/2024 10  7 - 15 mmol/L Final    Glucose 10/17/2024 92  70 - 99 mg/dL Final    Urea Nitrogen 10/17/2024 16.9  8.0 - 23.0 mg/dL Final    Creatinine 10/17/2024 1.33 (H)  0.51 - 0.95 mg/dL Final    GFR Estimate 10/17/2024 40 (L)  >60 mL/min/1.73m2 Final    eGFR calculated using 2021 CKD-EPI equation.    Calcium 10/17/2024 10.3  8.8 - 10.4 mg/dL Final    Reference intervals for this test were updated on 7/16/2024 to reflect our healthy population more accurately. There may be differences in the flagging of prior results with similar values performed with this method. Those prior results can be interpreted in the context of the updated reference intervals.    Albumin 10/17/2024 3.8  3.5 - 5.2 g/dL Final    Phosphorus 10/17/2024 3.5  2.5 - 4.5 mg/dL Final    Hemoglobin 10/17/2024 13.3  11.7 - 15.7 g/dL Final     Creatinine   Date Value Ref Range Status   10/17/2024 1.33 (H) 0.51 - 0.95 mg/dL Final   05/28/2024 1.15 (H) 0.51 - 0.95 mg/dL Final   02/06/2024 1.45 (H) 0.51 - 0.95 mg/dL Final   10/16/2023 1.49 (H) 0.51 - 0.95 mg/dL Final   04/03/2023 1.07 (H) 0.51 - 0.95 mg/dL Final       Narrative & Impression   EXAM:  1. RENAL ULTRASOUND   2. RENAL DUPLEX  LOCATION: [facility]  DATE: 2/26/2024     INDICATION:  Stage 3b chronic kidney disease (H), Essential hypertension  COMPARISON: None.  TECHNIQUE: Duplex imaging is performed utilizing gray-scale, two-dimensional images, and color-flow imaging. Doppler waveform analysis and spectral Doppler imaging is also performed.     FINDINGS:      RIGHT KIDNEY: 9.0 x 3.6 x 4.0 cm. Normal without hydronephrosis or masses.     LEFT KIDNEY 8.7 x 4.2 x 3.5 cm. No hydronephrosis. 0.7 x 0.7 x 0.7 renal cyst.       BLADDER: Normal.     RENAL DUPLEX:  Aortic PSV: 125 cm/s, multiphasic  Right Renal Artery PSV: 292 cm/s (Normal considered less than 200 cm/s.)  Right Intrarenal Resistive Index: 0.79-0.82  Left Renal Artery  cm/s (Normal considered less than 200 cm/s.)  Left Intrarenal Resistive Index: 0.77-0.83                                                                      IMPRESSION:   1.  Elevated bilateral renal artery peak systolic velocities concerning for renal artery stenosis.       Assessment/Plan:     CKD stage IIIa/b  Possible B/l renal artery stenosis (normal velocities were 200 or less, she had 249)  Pt with baseline creatinine of  0.9-1.1 until early 2021, but since it trended up and variable between 1.1 to 1.4 with eGFR in 30's and 40's which put him in CKD stage IIIa/b.    Ms Iglesias continues to have marked isolated systolic hypertension with wide pulse pressure.  This is consistent with the findings of extensive calcification of the aorta.  She had previously renal dopplers suggested of renal artery stenosis.  However in the setting of a history of long standing HTN and diffuse atherosclerosis, I do not think that pursuing further imaging for [place] is likely to alter management.  She is also very likely to have intra-renal chronic arterionephrosclerosis.  I instructed her to   1- continue with salt restriction (she is quite atune to that)  2- restart amlodipine 2.5 mg daily.  I encouraged her to continue monitoring her BP at home, and discussed being attentive to wide changes in BP - stop amlodipine if Syst BP is < 120.     BMD :  Chronic Hypercalcemia, mild -> resolved.    Other problems  Giant cell arteritis with optic neuritis, now resolved and off of prednisone  Type II DM, last A1c is 5.8 in march 2024, at goal of < 7  Seizure disorder : on Lamictal    Follow up in 5 months    Taj Walls MD  Division of Nephrology and Hypertension  October 17, 2024

## 2024-10-17 NOTE — PATIENT INSTRUCTIONS
Please restart taking Amlodipine 2.5 mg 1 tablet every evening at bedtime  Please continue your other medications.

## 2024-10-18 ENCOUNTER — TELEPHONE (OUTPATIENT)
Dept: NEPHROLOGY | Facility: CLINIC | Age: 79
End: 2024-10-18
Payer: COMMERCIAL

## 2024-10-18 NOTE — TELEPHONE ENCOUNTER
CRUZ SEWELL 10/18 to sched a 5 month (around 3/17/25) follow up with Dr. Walls via in-person with labs prior per checkout notes from 10/17.

## 2024-10-19 RX ORDER — AMLODIPINE BESYLATE 2.5 MG/1
2.5 TABLET ORAL DAILY
Qty: 90 TABLET | Refills: 3 | Status: SHIPPED | OUTPATIENT
Start: 2024-10-19

## 2024-10-22 ENCOUNTER — OFFICE VISIT (OUTPATIENT)
Dept: INTERNAL MEDICINE | Facility: CLINIC | Age: 79
End: 2024-10-22
Attending: INTERNAL MEDICINE
Payer: COMMERCIAL

## 2024-10-22 VITALS
HEIGHT: 64 IN | TEMPERATURE: 97.8 F | HEART RATE: 102 BPM | RESPIRATION RATE: 16 BRPM | SYSTOLIC BLOOD PRESSURE: 112 MMHG | WEIGHT: 136.7 LBS | DIASTOLIC BLOOD PRESSURE: 69 MMHG | OXYGEN SATURATION: 95 % | BODY MASS INDEX: 23.34 KG/M2

## 2024-10-22 DIAGNOSIS — G40.909 SEIZURE DISORDER (H): ICD-10-CM

## 2024-10-22 DIAGNOSIS — N18.32 STAGE 3B CHRONIC KIDNEY DISEASE (H): ICD-10-CM

## 2024-10-22 DIAGNOSIS — Z13.220 SCREENING FOR HYPERLIPIDEMIA: ICD-10-CM

## 2024-10-22 DIAGNOSIS — R42 DIZZINESS: ICD-10-CM

## 2024-10-22 DIAGNOSIS — G45.9 TIA (TRANSIENT ISCHEMIC ATTACK): ICD-10-CM

## 2024-10-22 DIAGNOSIS — M31.6 GCA (GIANT CELL ARTERITIS) (H): ICD-10-CM

## 2024-10-22 DIAGNOSIS — E11.22 TYPE 2 DIABETES MELLITUS WITH STAGE 3B CHRONIC KIDNEY DISEASE, UNSPECIFIED WHETHER LONG TERM INSULIN USE (H): Primary | ICD-10-CM

## 2024-10-22 DIAGNOSIS — Z23 NEED FOR SHINGLES VACCINE: ICD-10-CM

## 2024-10-22 DIAGNOSIS — I65.29 ASYMPTOMATIC CAROTID ARTERY STENOSIS, UNSPECIFIED LATERALITY: ICD-10-CM

## 2024-10-22 DIAGNOSIS — R32 URINARY INCONTINENCE, UNSPECIFIED TYPE: ICD-10-CM

## 2024-10-22 DIAGNOSIS — I10 ESSENTIAL HYPERTENSION: ICD-10-CM

## 2024-10-22 DIAGNOSIS — H47.019 ISCHEMIC OPTIC NEUROPATHY, UNSPECIFIED LATERALITY: ICD-10-CM

## 2024-10-22 DIAGNOSIS — G25.81 RESTLESS LEG SYNDROME: ICD-10-CM

## 2024-10-22 DIAGNOSIS — N18.32 TYPE 2 DIABETES MELLITUS WITH STAGE 3B CHRONIC KIDNEY DISEASE, UNSPECIFIED WHETHER LONG TERM INSULIN USE (H): Primary | ICD-10-CM

## 2024-10-22 DIAGNOSIS — A04.72 C. DIFFICILE COLITIS: ICD-10-CM

## 2024-10-22 DIAGNOSIS — R25.1 TREMOR: ICD-10-CM

## 2024-10-22 DIAGNOSIS — Z12.31 ENCOUNTER FOR SCREENING MAMMOGRAM FOR BREAST CANCER: ICD-10-CM

## 2024-10-22 DIAGNOSIS — Z23 NEED FOR TDAP VACCINATION: ICD-10-CM

## 2024-10-22 DIAGNOSIS — G89.29 OTHER CHRONIC PAIN: ICD-10-CM

## 2024-10-22 LAB
ALBUMIN SERPL BCG-MCNC: 3.8 G/DL (ref 3.5–5.2)
ALP SERPL-CCNC: 196 U/L (ref 40–150)
ALT SERPL W P-5'-P-CCNC: 18 U/L (ref 0–50)
AST SERPL W P-5'-P-CCNC: 31 U/L (ref 0–45)
BILIRUB DIRECT SERPL-MCNC: <0.2 MG/DL (ref 0–0.3)
BILIRUB SERPL-MCNC: 0.3 MG/DL
CHOLEST SERPL-MCNC: 164 MG/DL
EST. AVERAGE GLUCOSE BLD GHB EST-MCNC: 134 MG/DL
HBA1C MFR BLD: 6.3 %
HDLC SERPL-MCNC: 88 MG/DL
LDLC SERPL CALC-MCNC: 65 MG/DL
NONHDLC SERPL-MCNC: 76 MG/DL
PROT SERPL-MCNC: 6.7 G/DL (ref 6.4–8.3)
TRIGL SERPL-MCNC: 56 MG/DL

## 2024-10-22 PROCEDURE — G0008 ADMIN INFLUENZA VIRUS VAC: HCPCS | Performed by: INTERNAL MEDICINE

## 2024-10-22 PROCEDURE — 90480 ADMN SARSCOV2 VAC 1/ONLY CMP: CPT | Performed by: INTERNAL MEDICINE

## 2024-10-22 PROCEDURE — 91320 SARSCV2 VAC 30MCG TRS-SUC IM: CPT | Performed by: INTERNAL MEDICINE

## 2024-10-22 PROCEDURE — 99214 OFFICE O/P EST MOD 30 MIN: CPT | Mod: 25 | Performed by: INTERNAL MEDICINE

## 2024-10-22 PROCEDURE — 90662 IIV NO PRSV INCREASED AG IM: CPT | Performed by: INTERNAL MEDICINE

## 2024-10-22 PROCEDURE — G0439 PPPS, SUBSEQ VISIT: HCPCS | Performed by: INTERNAL MEDICINE

## 2024-10-22 RX ORDER — ASPIRIN 81 MG/1
81 TABLET, CHEWABLE ORAL DAILY
Qty: 90 TABLET | Refills: 3 | Status: SHIPPED | OUTPATIENT
Start: 2024-10-22

## 2024-10-22 RX ORDER — ATORVASTATIN CALCIUM 40 MG/1
40 TABLET, FILM COATED ORAL DAILY
Qty: 90 TABLET | Refills: 0 | Status: SHIPPED | OUTPATIENT
Start: 2024-10-22

## 2024-10-22 SDOH — HEALTH STABILITY: PHYSICAL HEALTH: ON AVERAGE, HOW MANY DAYS PER WEEK DO YOU ENGAGE IN MODERATE TO STRENUOUS EXERCISE (LIKE A BRISK WALK)?: 2 DAYS

## 2024-10-22 SDOH — HEALTH STABILITY: PHYSICAL HEALTH: ON AVERAGE, HOW MANY MINUTES DO YOU ENGAGE IN EXERCISE AT THIS LEVEL?: 20 MIN

## 2024-10-22 ASSESSMENT — SOCIAL DETERMINANTS OF HEALTH (SDOH): HOW OFTEN DO YOU GET TOGETHER WITH FRIENDS OR RELATIVES?: PATIENT DECLINED

## 2024-10-22 ASSESSMENT — PAIN SCALES - GENERAL: PAINLEVEL: NO PAIN (0)

## 2024-10-22 NOTE — PATIENT INSTRUCTIONS
1.You have to be on a cholesterol pill / atorvastatin because of your h/o plaque in your arteries and to prevent stroke   2.Resume baby aspirin to prevent stroke   3. Target blood pressure is 130/80 or less   Lowest 100/50 not lower amlodipine was started to help it keep in this range   Bring in your BP kit and have the nurse calibrate it   4 we can repeat bone density 2025 ( after your next visit )

## 2024-10-22 NOTE — PROGRESS NOTES
Preventive Care Visit  [facility]  Karen Taylor MD, Internal Medicine  Oct 22, 2024      Assessment & Plan     Need for shingles vaccine    - zoster vaccine recombinant adjuvanted (SHINGRIX) injection; Inject 0.5 mLs into the muscle once for 1 dose. Pharmacist administered    Need for Tdap vaccination    - Tdap, tetanus-diptheria-acell pertussis, (BOOSTRIX) 5-2.5-18.5 LF-MCG/0.5 PASCUAL injection; Inject 0.5 mLs into the muscle once for 1 dose.    Type 2 diabetes mellitus with stage 3b chronic kidney disease, unspecified whether long term insulin use (H)  Lab Results   Component Value Date    A1C 6.3 10/17/2024    A1C 5.8 03/28/2024    A1C 5.8 10/16/2023    A1C 5.9 04/03/2023    A1C 5.9 10/06/2022     No medication recommended . Continue diet   - HEMOGLOBIN A1C; Future  - Lipid panel reflex to direct LDL Non-fasting; Future  - aspirin (ASA) 81 MG chewable tablet; Take 1 tablet (81 mg) by mouth daily.  - Hepatic panel (Albumin, ALT, AST, Bili, Alk Phos, TP); Future    Dizziness  On going positional vertigo    [facility] but not really improving with epley maouvures   - MR Brain w/o & w Contrast; Future  - MRA Brain ([place]) wo & w Contrast; Future  - MRA Neck (Carotids) wo Contrast; Future    Screening for hyperlipidemia    - atorvastatin (LIPITOR) 40 MG tablet; Take 1 tablet (40 mg) by mouth daily.    Tremor  Mild , no current significant signs for parkinsons disease   Anxiety may be playing a role   Essential hypertension  Conttolled     Asymptomatic carotid artery stenosis, unspecified laterality  Needs surveillance   For some reason she misunderstood and she stopped her atorvastatin.  When she saw the pharmacist last.  I did tell her she has to start it again.  Aspirin had been causing some bruising but I recommend she start it again.  Blood pressure needs to be lower and not optimal.  Stage 3b chronic kidney disease (H)  GFR Estimate   Date Value  Ref Range Status   10/17/2024 40 (L) >60 mL/min/1.73m2 Final     Comment:     eGFR calculated using 2021 CKD-EPI equation.   05/28/2024 48 (L) >60 mL/min/1.73m2 Final   02/06/2024 36 (L) >60 mL/min/1.73m2 Final   04/06/2021 39 (L) >60 mL/min/1.73m2 Final   03/15/2021 49 (L) >60 mL/min/1.73m2 Final   10/08/2020 54 (L) >60 mL/min/1.73m2 Final     Stable     Restless leg syndrome  Fair control     Seizure disorder (H)  She is on Lamictal and has had no recurrence she does see a seizure physician.    GCA (giant cell arteritis) (H)  In remission she is off steroids sed rate and CRP have been normal    TIA (transient ischemic attack)  Her episodes of not being able to talk clearly will resume to be seizures and she was started on lamotrigine.  There was a question of a subacute infarct.  And chronic small vessel disease.  - MR Brain w/o & w Contrast; Future  - MRA Brain ([place]) wo & w Contrast; Future  - MRA Neck (Carotids) wo Contrast; Future    C. difficile colitis  In remission now.  Has had stable bowel motions.  Other chronic pain  He is off narcotics.  Would not give due to her seizure threshold    Ischemic optic neuropathy, unspecified laterality    - MR Brain w/o & w Contrast; Future  - MRA Brain ([place]) wo & w Contrast; Future  - MRA Neck (Carotids) wo Contrast; Future    Encounter for screening mammogram for breast cancer  Commend continuing mammograms  - MA Screen Bilateral w/Terry; Future    Urinary incontinence, unspecified type  Wait anticholinergic medications for her.            Counseling  Appropriate preventive services were addressed with this patient via screening, questionnaire, or discussion as appropriate for fall prevention, nutrition, physical activity, Tobacco-use cessation, social engagement, weight loss and cognition.  Checklist reviewing preventive services available has been given to the patient.  Reviewed patient's diet, addressing concerns and/or questions.   She is at  risk for lack of exercise and has been provided with information to increase physical activity for the benefit of her well-being.   The patient was provided with written information regarding signs of hearing loss.   Information on urinary incontinence and treatment options given to patient.           Veronica Ruiz is a 79 year old, presenting for the following:  Very pleasant patient she is has a history of a word finding problems found to have seizures, then had giant cell arteritis with ischemic optic neuropathy and then had C. difficile colitis so had a very eventful [id number] but has been relatively stable since then.  IS GOING TO THE DIZZY CLINIC     BALANCE IS OFF   Has a recurrence of dizziness , it bothers her the most when she changes position from english[pine to sitting    Sometimes when she is reading she can also have an episode . It lasts 4 secon   , 3 weeks      Zolpidem is stooped     Current Outpatient Medications   Medication Sig Dispense Refill    amLODIPine (NORVASC) 2.5 MG tablet Take 1 tablet (2.5 mg) by mouth daily. 90 tablet 3    lamoTRIgine (LAMICTAL) 25 MG tablet Take 75 mg by mouth 2 times daily      losartan (COZAAR) 100 MG tablet Take 1 tablet (100 mg) by mouth daily. 90 tablet 3    metoprolol succinate ER (TOPROL XL) 100 MG 24 hr tablet Take one tablet (100 mg) by mouth every evening. (Patient taking differently: Take 50 mg by mouth 2 times daily.) 90 tablet 3    Multiple Vitamins-Minerals (CENTRUM PO) Take 1 tablet by mouth daily      pramipexole (MIRAPEX) 0.25 MG tablet TAKE ONE TAB BY MOUTH TWICE DAILY 180 tablet 2    sertraline (ZOLOFT) 100 MG tablet TAKE 1 TABLET BY MOUTH EVERY DAY 90 tablet 2     No current facility-administered medications for this visit.       Wellness Visit and Recheck Medication (PT REPORTS THAT SHE IS HERE TO COMPLETE HER ANNUAL WELLNESS EXAM.)        10/22/2024    10:32 AM   Additional Questions   Roomed by DANY   Accompanied by ALONE         10/22/2024     10:32 AM   Patient Reported Additional Medications   Patient reports taking the following new medications NONE         Health Care Directive  Patient does not have a Health Care Directive or Living Will: Discussed advance care planning with patient; information given to patient to review.  Weight managebale   HPI              10/22/2024   General Health   How would you rate your overall physical health? Good   Feel stress (tense, anxious, or unable to sleep) Not at all            10/22/2024   Nutrition   Diet: Regular (no restrictions)            10/22/2024   Exercise   Days per week of moderate/strenous exercise 2 days   Average minutes spent exercising at this level 20 min      (!) EXERCISE CONCERN      10/22/2024   Social Factors   Frequency of gathering with friends or relatives Patient declined   Worry food won't last until get money to buy more No   Food not last or not have enough money for food? No   Do you have housing? (Housing is defined as stable permanent housing and does not include staying ouside in a car, in a tent, in an abandoned building, in an overnight shelter, or couch-surfing.) No   Are you worried about losing your housing? No   Lack of transportation? No   Unable to get utilities (heat,electricity)? No   Want help with housing or utility concern? No      (!) HOUSING CONCERN PRESENT      10/22/2024   Fall Risk   Fallen 2 or more times in the past year? No    No   Trouble with walking or balance? Yes    No   Reason Gait Speed Test Not Completed Unable to complete test, patient reported symptoms       Multiple values from one day are sorted in reverse-chronological order          10/22/2024   Activities of Daily Living- Home Safety   Needs help with the following daily activites None of the above   Safety concerns in the home None of the above            10/22/2024   Dental   Dentist two times every year? Yes            10/22/2024   Hearing Screening   Hearing concerns? (!) I NEED TO ASK  PEOPLE TO SPEAK UP OR REPEAT THEMSELVES.            10/22/2024   Driving Risk Screening   Patient/family members have concerns about driving No            10/22/2024   General Alertness/Fatigue Screening   Have you been more tired than usual lately? No            10/22/2024   Urinary Incontinence Screening   Bothered by leaking urine in past 6 months Yes            10/22/2024   TB Screening   Were you born outside of the [place]? No            Today's PHQ-2 Score:       10/22/2024     8:48 AM   PHQ-2 (  [employer])   Q1: Little interest or pleasure in doing things 0   Q2: Feeling down, depressed or hopeless 0   PHQ-2 Score 0   Q1: Little interest or pleasure in doing things Not at all   Q2: Feeling down, depressed or hopeless Not at all   PHQ-2 Score 0           10/22/2024   Substance Use   Alcohol more than 3/day or more than 7/wk No   Do you have a current opioid prescription? No   How severe/bad is pain from 1 to 10? 3/10   Do you use any other substances recreationally? No        Social History     Tobacco Use    Smoking status: Former     Current packs/day: 0.00     Types: Cigarettes     Quit date: 10/22/2000     Years since quittin.0    Smokeless tobacco: Never   Substance Use Topics    Alcohol use: Yes     Alcohol/week: 2.0 standard drinks of alcohol     Types: 2 Standard drinks or equivalent per week    Drug use: Not Currently                        Reviewed and updated as needed this visit by Provider                      Current providers sharing in care for this patient include:  Patient Care Team:  Karen Taylor MD as PCP - General  Karen Taylor MD as Assigned PCP  Mark Han MBBS as Assigned Rheumatology Provider  Gardenia Fatima MD as MD (Nephrology)  Ifeanyi Daley RPH as Pharmacist  Ifeanyi Daley RPH as Assigned MTM Pharmacist  Gardenia Fatima MD as Assigned Nephrology Provider  Taj Walls MD as MD (Nephrology)    The following health maintenance items are  "reviewed in Epic and correct as of today:  Health Maintenance   Topic Date Due    DIABETIC FOOT EXAM  Never done    URINE DRUG SCREEN  Never done    DTAP/TDAP/TD IMMUNIZATION (1 - Tdap) Never done    ZOSTER IMMUNIZATION (2 of 3) 12/09/2008    A1C  06/28/2024    INFLUENZA VACCINE (1) 09/01/2024    COVID-19 Vaccine (7 - 2024-25 season) 09/01/2024    MICROALBUMIN  09/28/2024    LIPID  10/16/2024    ANNUAL REVIEW OF HM ORDERS  10/16/2024    MEDICARE ANNUAL WELLNESS VISIT  10/16/2024    EYE EXAM  10/27/2024    BMP  10/17/2025    HEMOGLOBIN  10/17/2025    FALL RISK ASSESSMENT  10/22/2025    ADVANCE CARE PLANNING  10/22/2028    DEXA  10/26/2037    PARATHYROID  Completed    PHOSPHORUS  Completed    PHQ-2 (once per calendar year)  Completed    Pneumococcal Vaccine: 65+ Years  Completed    URINALYSIS  Completed    ALK PHOS  Completed    RSV VACCINE  Completed    HPV IMMUNIZATION  Aged Out    MENINGITIS IMMUNIZATION  Aged Out    RSV MONOCLONAL ANTIBODY  Aged Out    COLORECTAL CANCER SCREENING  Discontinued            Objective    Exam  /69 (BP Location: Left arm, Patient Position: Sitting, Cuff Size: Adult Regular)   Pulse 102   Temp 97.8  F (36.6  C) (Axillary)   Resp 16   Ht 1.626 m (5' 4\")   Wt 62 kg (136 lb 11.2 oz)   LMP  (LMP Unknown)   SpO2 95%   Breastfeeding No   BMI 23.46 kg/m     Estimated body mass index is 23.46 kg/m  as calculated from the following:    Height as of this encounter: 1.626 m (5' 4\").    Weight as of this encounter: 62 kg (136 lb 11.2 oz).    Physical Exam  GENERAL: alert and no distress  NECK: no adenopathy, no asymmetry, masses, or scars  RESP: lungs clear to auscultation - no rales, rhonchi or wheezes  CV: regular rate and rhythm, normal S1 S2, no S3 or S4, no murmur, click or rub, no peripheral edema  ABDOMEN: soft, nontender, no hepatosplenomegaly, no masses and bowel sounds normal  MS: no gross musculoskeletal defects noted, no edema        10/22/2024   Mini Cog   Clock Draw " Score 2 Normal   3 Item Recall 3 objects recalled   Mini Cog Total Score 5                 Signed Electronically by: Karen Taylor MD

## 2024-10-24 ENCOUNTER — ALLIED HEALTH/NURSE VISIT (OUTPATIENT)
Dept: FAMILY MEDICINE | Facility: CLINIC | Age: 79
End: 2024-10-24
Payer: COMMERCIAL

## 2024-10-24 ENCOUNTER — ANCILLARY PROCEDURE (OUTPATIENT)
Dept: MAMMOGRAPHY | Facility: CLINIC | Age: 79
End: 2024-10-24
Attending: INTERNAL MEDICINE
Payer: COMMERCIAL

## 2024-10-24 VITALS — DIASTOLIC BLOOD PRESSURE: 80 MMHG | SYSTOLIC BLOOD PRESSURE: 138 MMHG

## 2024-10-24 DIAGNOSIS — F41.9 ANXIETY DUE TO INVASIVE PROCEDURE: Primary | ICD-10-CM

## 2024-10-24 DIAGNOSIS — I10 ESSENTIAL HYPERTENSION: Primary | ICD-10-CM

## 2024-10-24 DIAGNOSIS — Z12.31 ENCOUNTER FOR SCREENING MAMMOGRAM FOR BREAST CANCER: ICD-10-CM

## 2024-10-24 PROCEDURE — 77067 SCR MAMMO BI INCL CAD: CPT | Mod: TC | Performed by: STUDENT IN AN ORGANIZED HEALTH CARE EDUCATION/TRAINING PROGRAM

## 2024-10-24 PROCEDURE — 77063 BREAST TOMOSYNTHESIS BI: CPT | Mod: TC | Performed by: STUDENT IN AN ORGANIZED HEALTH CARE EDUCATION/TRAINING PROGRAM

## 2024-10-24 PROCEDURE — 99207 PR NO CHARGE NURSE ONLY: CPT

## 2024-10-24 RX ORDER — LORAZEPAM 1 MG/1
1 TABLET ORAL EVERY 6 HOURS PRN
Qty: 5 TABLET | Refills: 0 | Status: SHIPPED | OUTPATIENT
Start: 2024-10-24

## 2024-10-24 NOTE — PROGRESS NOTES
I met with Jenny Iglesias at the request of Dr Taylor to recheck her blood pressure.  Blood pressure medications on the med list were reviewed with patient.    Patient has taken all medications as per usual regimen: Yes  Patient reports tolerating them without any issues or concerns: Yes    Vitals:    10/24/24 1410 10/24/24 1415   BP: (!) 150/74 138/80   BP Location: Left arm Left arm   Patient Position: Sitting Sitting   Cuff Size: Adult Regular Adult Regular       After 5 minutes, the patient's blood pressure was <140/90, the previous encounter was reviewed, recorded blood pressure below 140/90. Patient was discharged and the note will be sent to the provider for final review.

## 2024-10-28 ENCOUNTER — TELEPHONE (OUTPATIENT)
Dept: INTERNAL MEDICINE | Facility: CLINIC | Age: 79
End: 2024-10-28

## 2024-10-28 ENCOUNTER — LAB (OUTPATIENT)
Dept: LAB | Facility: CLINIC | Age: 79
End: 2024-10-28
Payer: COMMERCIAL

## 2024-10-28 DIAGNOSIS — N39.0 RECURRENT UTI: Primary | ICD-10-CM

## 2024-10-28 DIAGNOSIS — N39.0 RECURRENT UTI: ICD-10-CM

## 2024-10-28 NOTE — TELEPHONE ENCOUNTER
"Patient experiencing urinary burning and frequency starting this morning. Having urge to urinate \"every 10 minutes\" with little output. Denies blood in urine, abdominal pain, or lower back pain. Patient would like prescription for symptoms.     Informed patient that Dr. Taylor would be notified. Discussed with patient that a lab may be recommended for urine sample. Patient states she will do what is needed, but she would prefer a prescription. Patient will await a return call with next steps.   "

## 2024-10-28 NOTE — TELEPHONE ENCOUNTER
Notified patient that a urine test is needed. Informed patient that once Dr. Taylor reviews the results, she will send a prescription if appropriate. Patient verbalized understanding. She states she has the supplies and will drop off a urine sample this afternoon.

## 2024-10-28 NOTE — TELEPHONE ENCOUNTER
Symptoms    Describe your symptoms: Burning with urination    Any pain: Yes:     How long have you been having symptoms: 3  hours    Appointment offered?: Yes: Patient would like a prescription but will come in for lab if needed    Triage offered?: Yes: 5 minute hold on RN line. Pt asked to send message to RNs instead of continuing to hold.     Preferred Pharmacy:   [id number] - [place], [place] - [address]  [address] [place] [address]  Phone: [phone] Fax: [phone]    Could we send this information to you in [facility] [facility] or would you prefer to receive a phone call?:   Patient would prefer a phone call     Okay to leave a detailed message?: No at Home number on file [phone] (home)

## 2024-10-28 NOTE — TELEPHONE ENCOUNTER
Did sign urine test. Does need to drop one in and I will see it and send in prescription after seeing it and not wait for full culture reprort  . Resistant bacteria are very commnone so we stillneed a culture sent out

## 2024-11-04 ENCOUNTER — TRANSFERRED RECORDS (OUTPATIENT)
Dept: HEALTH INFORMATION MANAGEMENT | Facility: CLINIC | Age: 79
End: 2024-11-04
Payer: COMMERCIAL

## 2024-11-11 ENCOUNTER — OFFICE VISIT (OUTPATIENT)
Dept: RHEUMATOLOGY | Facility: CLINIC | Age: 79
End: 2024-11-11
Payer: COMMERCIAL

## 2024-11-11 VITALS
WEIGHT: 135.3 LBS | DIASTOLIC BLOOD PRESSURE: 46 MMHG | HEART RATE: 58 BPM | SYSTOLIC BLOOD PRESSURE: 104 MMHG | BODY MASS INDEX: 23.22 KG/M2 | OXYGEN SATURATION: 97 %

## 2024-11-11 DIAGNOSIS — R76.8 ANA POSITIVE: ICD-10-CM

## 2024-11-11 DIAGNOSIS — M15.0 PRIMARY OSTEOARTHRITIS INVOLVING MULTIPLE JOINTS: Primary | ICD-10-CM

## 2024-11-11 DIAGNOSIS — G89.29 CHRONIC PAIN OF BOTH KNEES: ICD-10-CM

## 2024-11-11 DIAGNOSIS — M25.562 CHRONIC PAIN OF BOTH KNEES: ICD-10-CM

## 2024-11-11 DIAGNOSIS — Z87.39 HISTORY OF TEMPORAL ARTERITIS: ICD-10-CM

## 2024-11-11 DIAGNOSIS — M25.561 CHRONIC PAIN OF BOTH KNEES: ICD-10-CM

## 2024-11-11 PROCEDURE — 20610 DRAIN/INJ JOINT/BURSA W/O US: CPT | Mod: 50 | Performed by: INTERNAL MEDICINE

## 2024-11-11 PROCEDURE — 99214 OFFICE O/P EST MOD 30 MIN: CPT | Mod: 25 | Performed by: INTERNAL MEDICINE

## 2024-11-11 RX ORDER — TRIAMCINOLONE ACETONIDE 40 MG/ML
40 INJECTION, SUSPENSION INTRA-ARTICULAR; INTRAMUSCULAR ONCE
Status: COMPLETED | OUTPATIENT
Start: 2024-11-11 | End: 2024-11-11

## 2024-11-11 RX ADMIN — TRIAMCINOLONE ACETONIDE 40 MG: 40 INJECTION, SUSPENSION INTRA-ARTICULAR; INTRAMUSCULAR at 13:28

## 2024-11-11 NOTE — PROGRESS NOTES
Rheumatology follow-up visit jolanta Alvarado is a 79 year old female presents today for follow-up.    Sara was seen today for recheck.    Diagnoses and all orders for this visit:    Primary osteoarthritis involving multiple joints  -     triamcinolone (KENALOG-40) injection 40 mg  -     triamcinolone (KENALOG-40) injection 40 mg  -     ASPIRATION/INJECTION MAJOR JOINT    CHRISTIAN positive    Chronic pain of both knees  -     triamcinolone (KENALOG-40) injection 40 mg  -     triamcinolone (KENALOG-40) injection 40 mg  -     ASPIRATION/INJECTION MAJOR JOINT    History of temporal arteritis            After pros and cons were discussed including risk of infection, bleeding, skin changes including thinning, pigmentary alteration and scarring to name a few, right and left 9 knee injected as noted in the orders section. This was done with nontouch technique.  The patient tolerated the procedure well and had a brisk Marcaine effect.  The postinjection care was discussed.      Follow up in 4 months.    HPI    Jenny Iglesias is a 79 year old female is here for follow-up of worsening pain in her knees this is gone on for the past couple of weeks.  She has had corticosteroid injections in the past.  These tend to last 3-1/2 to 4 months.  She is known to have  Osteoarthritis. She has history of temporal arteritis, no longer on oral glucocorticoids. She has bilateral knee pain associated with osteoarthritis and has found corticosteroid injections helpful. However she would like to keep the frequency as low as possible. We discussed various options. She noted pain level in the knees moderately severe. She wonders if she can continue to walk that she loves to. She has not observed swelling in the knee area. She has not had headache jaw claudication double vision.     DETAILED EXAMINATION  11/11/24  :    Vitals:    11/11/24 1233   BP: 104/46   BP Location: Right arm   Patient Position: Sitting   Cuff Size: Adult Regular    Pulse: 58   SpO2: 97%   Weight: 61.4 kg (135 lb 4.8 oz)     Alert oriented. Head including the face is examined for malar rash, heliotropes, scarring, lupus pernio. Eyes examined for redness such as in episcleritis/scleritis, periorbital lesions.   Neck/ Face examined for parotid gland swelling, range of motion of neck.  Left upper and lower and right upper and lower extremities examined for tenderness, swelling, warmth of the appendicular joints, range of motion, edema, rash.  Some of the important findings included: she does not have evidence of synovitis in the palpable joints of the upper extremities.  No significant deformities of the digits.  + Heberden nodes.    joint line tenderness bilaterally worse on the medial side of the knees.  No effusion or warmth.     Patient Active Problem List    Diagnosis Date Noted    Aortic valve insufficiency, etiology of cardiac valve disease unspecified 10/22/2023     Priority: Medium    C. 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     Priority: Medium    Fuchs' corneal dystrophy of both eyes 12/13/2021     Priority: Medium    Corneal edema of both eyes 12/13/2021     Priority: Medium    Localization-related focal epilepsy with simple partial seizures (H) 10/10/2021     Priority: Medium    Seizure disorder (H) 06/09/2021     Priority: Medium    GCA (giant cell arteritis) (H) 03/10/2021     Priority: Medium    TIA (transient ischemic attack)      Priority: Medium    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     Priority: Medium     Added automatically from request for surgery [id number]        Stage 3b chronic kidney disease (H) 02/15/2021     Priority: Medium    Other chronic pain 10/07/2020     Priority: Medium    Coccyx pain 09/13/2019     Priority: Medium    Chronic  insomnia 02/25/2016     Priority: Medium    Vertigo 10/27/2015     Priority: Medium    Essential hypertension 10/27/2015     Priority: Medium    Restless leg syndrome 10/27/2015     Priority: Medium    Perimenopausal vasomotor symptoms 10/27/2015     Priority: Medium    Carotid stenosis, asymptomatic 10/27/2015     Priority: Medium     Past Surgical History:   Procedure Laterality Date    APPENDECTOMY      CATARACT EXTRACTION      CHOLECYSTECTOMY      HYSTERECTOMY      OTHER SURGICAL HISTORY Right 1998    fifth toe amputation    KY TEMPORAL ARTERY LIGATN OR BX Bilateral 3/4/2021    Procedure: BIOPSY, ARTERY, TEMPORAL;  Surgeon: Ramesh Box MD;  Location: [facility];  Service: General    TUBAL LIGATION        Past Medical History:   Diagnosis Date    Anxiety     Carotid artery stenosis 11/2013    50% left internal carotid artery     Chronic low back pain     Hypertension     Osteoarthritis of lumbar spine     Osteopenia     mild, resolved    Postmenopausal 11/2013    on hormone    Restless leg syndrome     Seizures (H)     Feb 18, 2021, [place] lost vision and could not speak for 4-5 seconds    Surgical menopause     age 47    Thyroid nodule 11/2013    35mm, rightthyroid lobe biopsy-benign nodule    TIA (transient ischemic attack)     TIA (transient ischemic attack) 11/2013    Vertigo      Allergies   Allergen Reactions    Codeine Sulfate Shortness Of Breath and Anaphylaxis    Carbidopa-Levodopa      dystonia    Codeine     Diclofenac      Diarrhea    Morphine      Current Outpatient Medications   Medication Sig Dispense Refill    amLODIPine (NORVASC) 2.5 MG tablet Take 1 tablet (2.5 mg) by mouth daily. 90 tablet 3    aspirin (ASA) 81 MG chewable tablet Take 1 tablet (81 mg) by mouth daily. 90 tablet 3    atorvastatin (LIPITOR) 40 MG tablet Take 1 tablet (40 mg) by mouth daily. 90 tablet 0    lamoTRIgine (LAMICTAL) 25 MG tablet Take 75 mg by mouth 2 times daily      LORazepam (ATIVAN) 1 MG tablet Take 1  tablet (1 mg) by mouth every 6 hours as needed for anxiety. 5 tablet 0    losartan (COZAAR) 100 MG tablet Take 1 tablet (100 mg) by mouth daily. 90 tablet 3    metoprolol succinate ER (TOPROL XL) 100 MG 24 hr tablet Take one tablet (100 mg) by mouth every evening. (Patient taking differently: Take 50 mg by mouth 2 times daily.) 90 tablet 3    Multiple Vitamins-Minerals (CENTRUM PO) Take 1 tablet by mouth daily      pramipexole (MIRAPEX) 0.25 MG tablet TAKE ONE TAB BY MOUTH TWICE DAILY 180 tablet 2    sertraline (ZOLOFT) 100 MG tablet TAKE 1 TABLET BY MOUTH EVERY DAY 90 tablet 2     family history includes Breast Cancer (age of onset: 57) in her mother; Hypertension in her sister; Lung Cancer (age of onset: 62) in her father.  Social Connections: Unknown (10/22/2024)    Social Connection and Isolation Panel [NHANES]     Frequency of Communication with Friends and Family: Not on file     Frequency of Social Gatherings with Friends and Family: Patient declined     Attends Yazidism Services: Not on file     Active Member of Clubs or Organizations: Not on file     Attends Club or Organization Meetings: Not on file     Marital Status: Not on file          WBC Count   Date Value Ref Range Status   05/28/2024 9.4 4.0 - 11.0 10e3/uL Final     RBC Count   Date Value Ref Range Status   05/28/2024 4.46 3.80 - 5.20 10e6/uL Final     Hemoglobin   Date Value Ref Range Status   10/17/2024 13.3 11.7 - 15.7 g/dL Final     Hematocrit   Date Value Ref Range Status   05/28/2024 41.6 35.0 - 47.0 % Final     MCV   Date Value Ref Range Status   05/28/2024 93 78 - 100 fL Final     MCH   Date Value Ref Range Status   05/28/2024 29.4 26.5 - 33.0 pg Final     Platelet Count   Date Value Ref Range Status   05/28/2024 300 150 - 450 10e3/uL Final     % Lymphocytes   Date Value Ref Range Status   02/06/2024 38 % Final     AST   Date Value Ref Range Status   10/17/2024 31 0 - 45 U/L Final     ALT   Date Value Ref Range Status   10/17/2024 18 0 -  50 U/L Final     Albumin   Date Value Ref Range Status   10/17/2024 3.8 3.5 - 5.2 g/dL Final   10/17/2024 3.8 3.5 - 5.2 g/dL Final   01/13/2022 3.8 3.5 - 5.0 g/dL Final     Alkaline Phosphatase   Date Value Ref Range Status   10/17/2024 196 (H) 40 - 150 U/L Final     Creatinine   Date Value Ref Range Status   10/17/2024 1.33 (H) 0.51 - 0.95 mg/dL Final     GFR Estimate   Date Value Ref Range Status   10/17/2024 40 (L) >60 mL/min/1.73m2 Final     Comment:     eGFR calculated using 2021 CKD-EPI equation.   04/06/2021 39 (L) >60 mL/min/1.73m2 Final     GFR Estimate If Black   Date Value Ref Range Status   04/06/2021 48 (L) >60 mL/min/1.73m2 Final     Creatinine Urine mg/dL   Date Value Ref Range Status   10/17/2024 155.0 mg/dL Final     Erythrocyte Sedimentation Rate   Date Value Ref Range Status   10/16/2023 9 0 - 30 mm/hr Final     CRP   Date Value Ref Range Status   03/03/2022 0.1 0.0-<0.8 mg/dL Final

## 2024-11-18 ENCOUNTER — HOSPITAL ENCOUNTER (OUTPATIENT)
Dept: MRI IMAGING | Facility: CLINIC | Age: 79
Discharge: HOME | End: 2024-11-18
Attending: INTERNAL MEDICINE | Admitting: INTERNAL MEDICINE
Payer: COMMERCIAL

## 2024-11-18 DIAGNOSIS — I65.29 ASYMPTOMATIC CAROTID ARTERY STENOSIS, UNSPECIFIED LATERALITY: ICD-10-CM

## 2024-11-18 DIAGNOSIS — M31.6 GCA (GIANT CELL ARTERITIS) (H): ICD-10-CM

## 2024-11-18 DIAGNOSIS — R42 DIZZINESS: ICD-10-CM

## 2024-11-18 DIAGNOSIS — G45.9 TIA (TRANSIENT ISCHEMIC ATTACK): ICD-10-CM

## 2024-11-18 PROCEDURE — 70544 MR ANGIOGRAPHY HEAD W/O DYE: CPT

## 2024-11-18 PROCEDURE — 70551 MRI BRAIN STEM W/O DYE: CPT

## 2024-11-18 PROCEDURE — 70547 MR ANGIOGRAPHY NECK W/O DYE: CPT

## 2024-12-03 DIAGNOSIS — F41.9 ANXIETY DUE TO INVASIVE PROCEDURE: ICD-10-CM

## 2024-12-03 DIAGNOSIS — I10 ESSENTIAL HYPERTENSION: ICD-10-CM

## 2024-12-04 RX ORDER — LOSARTAN POTASSIUM 100 MG/1
100 TABLET ORAL DAILY
Qty: 90 TABLET | Refills: 3 | Status: SHIPPED | OUTPATIENT
Start: 2024-12-04

## 2024-12-04 RX ORDER — LORAZEPAM 1 MG/1
TABLET ORAL
Qty: 5 TABLET | Refills: 0 | Status: SHIPPED | OUTPATIENT
Start: 2024-12-04

## 2024-12-04 NOTE — TELEPHONE ENCOUNTER
Spoke with patient who states she is not sure if she takes this or not. States she is in the car right now but will call back after she is able to double check her list at home.

## 2024-12-05 RX ORDER — HYDROCHLOROTHIAZIDE 25 MG/1
25 TABLET ORAL DAILY
Qty: 90 TABLET | Refills: 0 | OUTPATIENT
Start: 2024-12-05

## 2024-12-10 DIAGNOSIS — I10 ESSENTIAL HYPERTENSION: ICD-10-CM

## 2024-12-11 RX ORDER — METOPROLOL SUCCINATE 100 MG/1
TABLET, EXTENDED RELEASE ORAL
Qty: 90 TABLET | Refills: 3 | Status: SHIPPED | OUTPATIENT
Start: 2024-12-11

## 2024-12-17 DIAGNOSIS — I10 ESSENTIAL HYPERTENSION: ICD-10-CM

## 2024-12-18 RX ORDER — HYDROCHLOROTHIAZIDE 25 MG/1
25 TABLET ORAL DAILY
Qty: 90 TABLET | Refills: 0 | OUTPATIENT
Start: 2024-12-18

## 2024-12-18 NOTE — TELEPHONE ENCOUNTER
12/5/24 encounter:    Patient returns call to notify clinic that she is not on hydrochlorothiazide. RX refill denied.

## 2025-01-11 DIAGNOSIS — Z13.220 SCREENING FOR HYPERLIPIDEMIA: ICD-10-CM

## 2025-01-11 DIAGNOSIS — I10 ESSENTIAL HYPERTENSION: ICD-10-CM

## 2025-01-13 RX ORDER — HYDROCHLOROTHIAZIDE 25 MG/1
25 TABLET ORAL DAILY
Qty: 90 TABLET | Refills: 0 | OUTPATIENT
Start: 2025-01-13

## 2025-01-13 RX ORDER — ATORVASTATIN CALCIUM 40 MG/1
40 TABLET, FILM COATED ORAL DAILY
Qty: 90 TABLET | Refills: 2 | Status: SHIPPED | OUTPATIENT
Start: 2025-01-13

## 2025-01-14 ENCOUNTER — TELEPHONE (OUTPATIENT)
Dept: INTERNAL MEDICINE | Facility: CLINIC | Age: 80
End: 2025-01-14
Payer: COMMERCIAL

## 2025-01-14 NOTE — TELEPHONE ENCOUNTER
Writer called patient to clarify the request for imaging.  Patient stated she had MRI orders from her last appointment with Dr. Taylor that weren't done and she couldn't schedule due to the orders expiring.  Writer reviewed chart and noted the following imaging was ordered on 10/22/2024:    - MR Brain w/o & w Contrast; Future  - MRA Brain ([place] [facility]) wo & w Contrast; Future  - MRA Neck (Carotids) wo Contrast; Future    These imaging studies were completed on 11/18/2024 with notes from Dr. Taylor that patient was contacted with results on 11/22/2024 with a referral to vascular surgery.    Patient was provided with the information above and asked if there was imaging from another provider that she was trying to schedule.  Patient stated that the imaging was ordered by Dr. Taylor and that she received a notification from [facility] to schedule.      Writer reviewed telephone encounters from 1/10/2025 with similar requests and note from Dr. Taylor on 1/13/2025 that no other MRI's are needed at this point and patient can schedule a routine follow-up in 1-3 months if needed. Patient does not have an appointment scheduled with PCP at this time.    Writer provided the information above and offered to assist with scheduling an appointment.  Patient declined to schedule at this time.  Patient denied having other concerns or new symptoms that need to be reviewed.      Routing to PCP for awareness only given recurring requests this past week.    Mary Finch M.A., LPN  Clinic Manager  [facility]

## 2025-01-14 NOTE — TELEPHONE ENCOUNTER
Reason for Call:  Other call back    Detailed comments: PT CALLING TO SCHEDULE MRI ON HER HEAD, NO ORDER, PT CALLING SAYING  ORDER WAS TO BE PLACED SHE THOUGHT PLEASE CALL AND ADVISE PT     Phone Number Patient can be reached at: Work number on file:  [phone] (work)    Best Time: ANYTIME    Can we leave a detailed message on this number? YES    Call taken on 1/14/2025 at 9:53 AM by Jorge Bradley

## 2025-01-14 NOTE — Clinical Note
Dr. Taylor,  I'm routing to you for awareness as patient has called multiple times with same request over the past week.    Mary

## 2025-02-03 DIAGNOSIS — I10 ESSENTIAL HYPERTENSION: ICD-10-CM

## 2025-02-03 RX ORDER — HYDROCHLOROTHIAZIDE 25 MG/1
25 TABLET ORAL DAILY
Qty: 90 TABLET | Refills: 0 | Status: SHIPPED | OUTPATIENT
Start: 2025-02-03

## 2025-02-03 RX ORDER — SERTRALINE HYDROCHLORIDE 100 MG/1
100 TABLET, FILM COATED ORAL DAILY
Qty: 90 TABLET | Refills: 3 | Status: SHIPPED | OUTPATIENT
Start: 2025-02-03

## 2025-02-04 ENCOUNTER — OFFICE VISIT (OUTPATIENT)
Dept: INTERNAL MEDICINE | Facility: CLINIC | Age: 80
End: 2025-02-04
Payer: COMMERCIAL

## 2025-02-04 ENCOUNTER — TELEPHONE (OUTPATIENT)
Dept: INTERNAL MEDICINE | Facility: CLINIC | Age: 80
End: 2025-02-04

## 2025-02-04 ENCOUNTER — NURSE TRIAGE (OUTPATIENT)
Dept: INTERNAL MEDICINE | Facility: CLINIC | Age: 80
End: 2025-02-04

## 2025-02-04 VITALS
SYSTOLIC BLOOD PRESSURE: 192 MMHG | WEIGHT: 137.7 LBS | HEART RATE: 55 BPM | OXYGEN SATURATION: 100 % | BODY MASS INDEX: 23.51 KG/M2 | TEMPERATURE: 98 F | DIASTOLIC BLOOD PRESSURE: 67 MMHG | HEIGHT: 64 IN

## 2025-02-04 DIAGNOSIS — M31.6 GCA (GIANT CELL ARTERITIS) (H): ICD-10-CM

## 2025-02-04 DIAGNOSIS — R56.9 CONVULSIONS, UNSPECIFIED CONVULSION TYPE (H): ICD-10-CM

## 2025-02-04 DIAGNOSIS — Z23 NEED FOR TDAP VACCINATION: ICD-10-CM

## 2025-02-04 DIAGNOSIS — Z23 NEED FOR SHINGLES VACCINE: ICD-10-CM

## 2025-02-04 DIAGNOSIS — K13.79 ORAL PAIN OF UNKNOWN ETIOLOGY: ICD-10-CM

## 2025-02-04 DIAGNOSIS — N18.32 TYPE 2 DIABETES MELLITUS WITH STAGE 3B CHRONIC KIDNEY DISEASE, UNSPECIFIED WHETHER LONG TERM INSULIN USE (H): Primary | ICD-10-CM

## 2025-02-04 DIAGNOSIS — E11.9 TYPE 2 DIABETES MELLITUS WITHOUT COMPLICATION, WITHOUT LONG-TERM CURRENT USE OF INSULIN (H): ICD-10-CM

## 2025-02-04 DIAGNOSIS — E11.22 TYPE 2 DIABETES MELLITUS WITH STAGE 3B CHRONIC KIDNEY DISEASE, UNSPECIFIED WHETHER LONG TERM INSULIN USE (H): Primary | ICD-10-CM

## 2025-02-04 DIAGNOSIS — I10 ACCELERATED ESSENTIAL HYPERTENSION: ICD-10-CM

## 2025-02-04 LAB
KOH PREPARATION: NORMAL
KOH PREPARATION: NORMAL

## 2025-02-04 PROCEDURE — G2211 COMPLEX E/M VISIT ADD ON: HCPCS | Performed by: INTERNAL MEDICINE

## 2025-02-04 PROCEDURE — 87210 SMEAR WET MOUNT SALINE/INK: CPT | Performed by: INTERNAL MEDICINE

## 2025-02-04 PROCEDURE — 99214 OFFICE O/P EST MOD 30 MIN: CPT | Performed by: INTERNAL MEDICINE

## 2025-02-04 RX ORDER — NYSTATIN 100000 [USP'U]/ML
500000 SUSPENSION ORAL 4 TIMES DAILY
Qty: 473 ML | Refills: 1 | Status: SHIPPED | OUTPATIENT
Start: 2025-02-04

## 2025-02-04 RX ORDER — AMLODIPINE BESYLATE 5 MG/1
5 TABLET ORAL DAILY
Qty: 90 TABLET | Refills: 3 | Status: SHIPPED | OUTPATIENT
Start: 2025-02-04

## 2025-02-04 ASSESSMENT — PAIN SCALES - GENERAL: PAINLEVEL_OUTOF10: NO PAIN (0)

## 2025-02-04 NOTE — TELEPHONE ENCOUNTER
Incoming call from patient.  She states that she missed a call from Dr. Taylor today.  There is no documentation in the chart that any outreaches were attempted and patient does not have any questions nor does she know what the call may have been about.  Patient requested that message be sent to Dr. Taylor in case any sort of message needed to be relayed to patient

## 2025-02-04 NOTE — TELEPHONE ENCOUNTER
Nurse Triage SBAR    Is this a 2nd Level Triage? NO    Situation: possible thrush    Background:   Past Medical History:   Diagnosis Date    Anxiety     Carotid artery stenosis 11/2013    50% left internal carotid artery     Chronic low back pain     Hypertension     Osteoarthritis of lumbar spine     Osteopenia     mild, resolved    Postmenopausal 11/2013    on hormone    Restless leg syndrome     Seizures (H)     Feb 18, 2021, [place] lost vision and could not speak for 4-5 seconds    Surgical menopause     age 47    Thyroid nodule 11/2013    35mm, rightthyroid lobe biopsy-benign nodule    TIA (transient ischemic attack)     TIA (transient ischemic attack) 11/2013    Vertigo          Assessment: White patches on tongue that started a few days ago    Protocol Recommended Disposition:   See in Office Within 3 Days    Recommendation: Patient self scheduled for office visit today, but was wondering if she could do a virtual visit rather than in-person. Advised patient to keep in person visit in order for tongue to be properly assessed.          Does the patient meet one of the following criteria for ADS visit consideration? 16+ years old, with an [facility] PCP     TIP  Providers, please consider if this condition is appropriate for management at one of our Acute and Diagnostic Services sites.     If patient is a good candidate, please use dotphrase <dot>triageresponse and select Refer to ADS to document.     Reason for Disposition   White patches that stick to tongue or inner cheek, which can be wiped off    Protocols used: Mouth Symptoms-A-OH

## 2025-02-04 NOTE — PROGRESS NOTES
Assessment & Plan   1. Need for shingles vaccine  Deferred    2. Need for Tdap vaccination  Deferred    3. Type 2 diabetes mellitus with stage 3b chronic kidney disease, unspecified whether long term insulin use (H) (Primary)  For now increase amlodipine to 5 mg  - amLODIPine (NORVASC) 5 MG tablet; Take 1 tablet (5 mg) by mouth daily.  Dispense: 90 tablet; Refill: 3  - KOH Preparation - Clinic Collect    4. Accelerated essential hypertension  This has happened in the past.  We know from her prior duplex ultrasound of the kidneys she does have evidence of renal artery stenosis but her subsequent blood pressure is normalized.  Would recommend vascular surgery referral if blood pressures do not improve.  She has also been seen by nephrology and could discuss with them.  I also worry about medical noncompliance of medication and mixup that could be causing fluctuating blood pressure control.  He has been hypotensive clinic visits as well.  Recommend nursing visit with care coordination to go through medications and setting up pillbox.  His follow-up and repeat clinic visit in 2 weeks    Of note she did see nephrology about this and they felt that further evaluation of the renal artery vascular pressures would not be helpful most likely she has chronic arterionephrosclerosis.  - Primary Care - Care Coordination Referral; Future  - nystatin (MYCOSTATIN) 559734 UNIT/ML suspension; Take 5 mLs (500,000 Units) by mouth 4 times daily.  Dispense: 473 mL; Refill: 1    5. Oral pain of unknown etiology  Do not see any significant pathology in the tongue or mucosa she may have gingivitis recommend mouthwash and dental referral    6. GCA (giant cell arteritis) (H)  Now in remission had history of ischemic optic neuropathy as well  She has been weaned off prednisone.  Last sed rate was 9 she also sees rheumatology for osteoarthritis  7. 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  Will do full cognitive assessment at the next visit in 2 weeks consider neuropsych testing.  I am not sure if this is a relapse of her epilepsy or early onset dementia.  I do not think she has seen her epilepsy doctor for over a year    8. Type 2 diabetes mellitus without complication, without long-term current use of insulin (H)  Lab Results   Component Value Date    A1C 6.3 10/17/2024    A1C 5.8 03/28/2024    A1C 5.8 10/16/2023    A1C 5.9 04/03/2023    A1C 5.9 10/06/2022     Well-controlled on diet alone        The longitudinal plan of care for the diagnosis(es)/condition(s) as documented were addressed during this visit. Due to the added complexity in care, I will continue to support Sara in the subsequent management and with ongoing continuity of care.          Veronica Ruiz is a 79 year old, presenting for the following health issues:  OFFICE VISIT (Patient reports they are here with possible thrush started a few days ago. Tongue has got sores on it. Got up 4 times last night to rinse her mouth out. )     No dysphagia , burns in the mouth   No recent use of antibiotic   No change in health   No change in teeth brushing   Noo mouth wash     GFR Estimate   Date Value Ref Range Status   10/17/2024 40 (L) >60 mL/min/1.73m2 Final     Comment:     eGFR calculated using 2021 CKD-EPI equation.   05/28/2024 48 (L) >60 mL/min/1.73m2 Final   02/06/2024 36 (L) >60 mL/min/1.73m2 Final   04/06/2021 39 (L) >60 mL/min/1.73m2 Final   03/15/2021 49 (L) >60 mL/min/1.73m2 Final   10/08/2020 54 (L) >60 mL/min/1.73m2 Final           2/4/2025    12:15 PM   Additional Questions   Roomed by Gilma   Accompanied by alone         2/4/2025    12:15 PM   Patient Reported Additional Medications   Patient  "reports taking the following new medications none     History of Present Illness       Reason for visit:  Thrush  Symptom onset:  1-3 days ago   She is taking medications regularly.                     Objective    BP (!) 192/67 (BP Location: Left arm, Patient Position: Sitting, Cuff Size: Adult Regular)   Pulse 55   Temp 98  F (36.7  C) (Temporal)   Ht 1.626 m (5' 4\")   Wt 62.5 kg (137 lb 11.2 oz)   LMP  (LMP Unknown)   SpO2 100%   BMI 23.64 kg/m    Body mass index is 23.64 kg/m .  Physical Exam   GENERAL: alert and no distress  HENT: ear canals and TM's normal, nose and mouth without ulcers or lesions  NECK: no adenopathy, no asymmetry, masses, or scars  RESP: lungs clear to auscultation - no rales, rhonchi or wheezes  CV: regular rate and rhythm, normal S1 S2, no S3 or S4, no murmur, click or rub, no peripheral edema  MS: no gross musculoskeletal defects noted, no edema            Signed Electronically by: Karen Taylor MD    "

## 2025-02-06 ENCOUNTER — PATIENT OUTREACH (OUTPATIENT)
Dept: CARE COORDINATION | Facility: CLINIC | Age: 80
End: 2025-02-06
Payer: COMMERCIAL

## 2025-02-06 NOTE — PROGRESS NOTES
Clinic Care Coordination Contact  [facility]/Voicemail    Clinical Data: Care Coordinator Outreach    Outreach Documentation Number of Outreach Attempt   2/6/2025   3:05 PM 1       Left message on patient's voicemail with call back information and requested return call.      Plan: Care Coordinator CHW to discuss recent CC referral with patient and offer assistance/resources  Care Coordinator will try to reach patient again in 1-2 business days.    Order Questions  Recommend nursing visit with care coordination to go through medications and setting up pillbox.     Question Answer   Reason for Referral: Complex Medical Concerns/Education   Complex Medical Concerns: Chronic Diagnosis - Use Comments    Compliance with medical treatment plan   Clinical Staff have discussed the Care Coordination Referral with the patient and/or caregiver: Yes     Arpil ZARATE  Community Health Worker  [facility] Care Coordination  Leonel M[place]en [email or web address]  Office: [phone]

## 2025-02-09 ENCOUNTER — HEALTH MAINTENANCE LETTER (OUTPATIENT)
Age: 80
End: 2025-02-09

## 2025-02-18 ENCOUNTER — PATIENT OUTREACH (OUTPATIENT)
Dept: NURSING | Facility: CLINIC | Age: 80
End: 2025-02-18
Payer: COMMERCIAL

## 2025-02-18 NOTE — PROGRESS NOTES
Clinic Care Coordination Contact    Situation: Patient chart reviewed by care coordinator.    Background: Patient referred to Care Coordination by her PCP.     Assessment: [facility] RN spoke with patient today to discuss enrollment in Care Coordination. Patient stated she was needing assistance with her medications. Patient was scheduled for a medication education visit on 2/21/25 before her appointment with PCP. Patient agreed to bring in all of her medications.     Plan/Recommendations: Writer will complete enrollment in Care Coordination at visit on 2/21/25    David Myhre, RN  [facility] RN  [phone]

## 2025-02-21 ENCOUNTER — ALLIED HEALTH/NURSE VISIT (OUTPATIENT)
Dept: NURSING | Facility: CLINIC | Age: 80
End: 2025-02-21
Payer: COMMERCIAL

## 2025-02-21 DIAGNOSIS — Z71.89 COUNSELING AND COORDINATION OF CARE: Primary | ICD-10-CM

## 2025-02-21 PROBLEM — N18.31 TYPE 2 DIABETES MELLITUS WITH STAGE 3A CHRONIC KIDNEY DISEASE, WITHOUT LONG-TERM CURRENT USE OF INSULIN (H): Status: ACTIVE | Noted: 2022-02-11

## 2025-02-21 PROCEDURE — 99207 PR NO CHARGE NURSE ONLY: CPT

## 2025-02-21 ASSESSMENT — ACTIVITIES OF DAILY LIVING (ADL): DEPENDENT_IADLS:: INDEPENDENT

## 2025-03-06 ENCOUNTER — ALLIED HEALTH/NURSE VISIT (OUTPATIENT)
Dept: NURSING | Facility: CLINIC | Age: 80
End: 2025-03-06
Payer: COMMERCIAL

## 2025-03-06 DIAGNOSIS — Z71.89 COUNSELING AND COORDINATION OF CARE: Primary | ICD-10-CM

## 2025-03-06 PROCEDURE — 99207 PR NO CHARGE NURSE ONLY: CPT

## 2025-03-08 NOTE — PROGRESS NOTES
Clinic Care Coordination Contact  Clinic Care Coordination Contact  OUTREACH    Referral Information:  Referral Source: PCP    Primary Diagnosis: Psychosocial    Chief Complaint   Patient presents with    Clinic Care Coordination - Initial        Universal Utilization:   Clinic Utilization  Difficulty keeping appointments:: No  Compliance Concerns: No  No-Show Concerns: No  No PCP office visit in Past Year: No  Utilization      No Show Count (past year)  0             ED Visits  0             Hospital Admissions  0                    Current as of: 3/6/2025  7:39 PM                Clinical Concerns:  Current Medical Concerns:  Patient is a 79 year old woman with a history of vertigo, HTN, carotid stenosis, chronic insomnia, stage 3 chronic kidney disease, TIA, localization-related focal epilepsy, polymorphic amyloid degeneration of cornea, type 2 diabetes mellitus, aortic valve insufficiency.     Patient stated her primary concern is managing her medications. She was accepting of having writer assist her with medication set-up assistance on a monthly basis. Goal around this concern was established at today's assessment.   Current Behavioral Concerns: Patient reported a history of depression. Currently under good control. She has been prescribed sertraline by her PCP. Patient said he feels the sertraline helps keep her depressive symptoms under good control. Patient reported she has good support from family and friends.     Education Provided to patient: Discussed the importance of taking her medications as directed. Encouraged her to attend all follow up appointments as scheduled.   Pain  Pain (GOAL):: No  Health Maintenance Reviewed: Due/Overdue   Health Maintenance Due   Topic Date Due    DIABETIC FOOT EXAM  Never done    URINE DRUG SCREEN  Never done    DTAP/TDAP/TD IMMUNIZATION (1 - Tdap) Never done    ZOSTER IMMUNIZATION (2 of 3) 12/09/2008        Medication Management:  Medication review status: Medications  reviewed and no changes reported per patient.        Writer will continue to assist patient with medication set-up.      Functional Status:  Dependent ADLs:: Independent  Dependent IADLs:: Independent  Bed or wheelchair confined:: No  Fallen 2 or more times in the past year?: No  Any fall with injury in the past year?: No    Living Situation:  Current living arrangement:: I live in a private home with spouse  Type of residence:: Apartment    Lifestyle & Psychosocial Needs:    Social Drivers of Health     Food Insecurity: Low Risk  (10/22/2024)    Food Insecurity     Within the past 12 months, did you worry that your food would run out before you got money to buy more?: No     Within the past 12 months, did the food you bought just not last and you didn t have money to get more?: No   Depression: Not at risk (2/4/2025)    PHQ-2     PHQ-2 Score: 0   Housing Stability: High Risk (10/22/2024)    Housing Stability     Do you have housing? : No     Are you worried about losing your housing?: No   Tobacco Use: Medium Risk (2/21/2025)    Patient History     Smoking Tobacco Use: Former     Smokeless Tobacco Use: Never     Passive Exposure: Not on file   Financial Resource Strain: Low Risk  (10/22/2024)    Financial Resource Strain     Within the past 12 months, have you or your family members you live with been unable to get utilities (heat, electricity) when it was really needed?: No   Alcohol Use: Not At Risk (7/15/2023)    [facility]    AUDIT-C     Frequency of Alcohol Consumption: 2-4 times a month     Average Number of Drinks: 1 or 2     Frequency of Binge Drinking: Never   Transportation Needs: Low Risk  (10/22/2024)    Transportation Needs     Within the past 12 months, has lack of transportation kept you from medical appointments, getting your medicines, non-medical meetings or appointments, work, or from getting things that you need?: No   Physical  Activity: Insufficiently Active (10/22/2024)    Exercise Vital Sign     Days of Exercise per Week: 2 days     Minutes of Exercise per Session: 20 min   Interpersonal Safety: Low Risk  (10/22/2024)    Interpersonal Safety     Do you feel physically and emotionally safe where you currently live?: Yes     Within the past 12 months, have you been hit, slapped, kicked or otherwise physically hurt by someone?: No     Within the past 12 months, have you been humiliated or emotionally abused in other ways by your partner or ex-partner?: No   Stress: No Stress Concern Present (10/22/2024)    [employer] - Occupational Stress Questionnaire     Feeling of Stress : Not at all   Social Connections: Unknown (10/22/2024)    Social Connection and Isolation Panel [NHANES]     Frequency of Communication with Friends and Family: Not on file     Frequency of Social Gatherings with Friends and Family: Patient declined     Attends Mandaen Services: Not on file     Active Member of Clubs or Organizations: Not on file     Attends Club or Organization Meetings: Not on file     Marital Status: Not on file   Health Literacy: Not on file     Diet:: Regular  Inadequate nutrition (GOAL):: No  Tube Feeding: No  Inadequate activity/exercise (GOAL):: No  Significant changes in sleep pattern (GOAL): No  Transportation means:: Family, Regular car     Mandaen or spiritual beliefs that impact treatment:: No  Mental health DX:: Yes  Mental health DX how managed:: Medication  Mental health management concern (GOAL):: No  Informal Support system:: Family, Spouse, Friends        Financial Concerns: Patient denied any financial concerns.      Resources and Interventions:  Current Resources:      Community Resources: None  Supplies Currently Used at Home: None  Equipment Currently Used at Home: none  Employment Status: retired         Advance Care Plan/Directive  Advanced Care Plans/Directives on file:: No  Discussed with  patient/caregiver:: Declined Further Information    Referrals Placed: None         Care Plan:  Care Plan: Medication Regimen       Problem: Medication Adherence       Long-Range Goal: I would like to have assistance setting up my medicaitons on a regular basis to assist me with compliance.       Start Date: 2/21/2025 Expected End Date: 2/20/2026    This Visit's Progress: 10%    Priority: High    Note:     Barriers: difficulty managing medications   Strengths: strong advocate for herself.   Patient expressed understanding of goal: yes  Action steps to achieve this goal:  1. I will attend all scheduled appointments with the [facility] RN Rony.   2. I will take my medications daily as directed.                                 Patient/Caregiver understanding: Verbalized understanding of goal and other information discussed at today's assessment.     Outreach Frequency: monthly, more frequently as needed  Future Appointments                In 3 days Mark Han MBBS [facility] MPLW    In 4 days S[facility] LAB [facility] SPMW    In 1 week Taj Walls MD [facility] Nephrology [facility]    In 3 weeks SPMW CCC RN [facility] SPMW    In 3 months Karen Taylor MD [facility] SPMW            Plan: [facility] RN will continue to monitor, support patient with current goal and will be available to assist as nursing needs arise. [facility] CHW will provide reminders of medication set up visits.     Clinic Care Coordination Medication Education Initial Visit    Patient presents for:  Medication education and Pill box teaching    Language: English  : No    Communication: Literate in English    Accompanied by: Patient    Patient Living Situation: Current living arrangement:: I live in a private home with spouse  Type of residence:: Apartment    Primary Care Provider:   Karen Taylor    Barriers:  Inadequate support at home and Memory deficits    Medication List (see cited below): Patient presents with all ordered medications    Current Outpatient Medications   Medication Sig Dispense Refill    amLODIPine (NORVASC) 5 MG tablet Take 1 tablet (5 mg) by mouth daily. 90 tablet 3    aspirin (ASA) 81 MG chewable tablet Take 1 tablet (81 mg) by mouth daily. 90 tablet 3    atorvastatin (LIPITOR) 40 MG tablet Take 1 tablet (40 mg) by mouth daily. 90 tablet 2    hydrochlorothiazide (HYDRODIURIL) 25 MG tablet TAKE ONE TABLET BY MOUTH ONE TIME DAILY 90 tablet 0    lamoTRIgine (LAMICTAL) 25 MG tablet Take 75 mg by mouth 2 times daily      losartan (COZAAR) 100 MG tablet TAKE ONE TABLET BY MOUTH ONE TIME DAILY 90 tablet 3    metoprolol succinate ER (TOPROL XL) 100 MG 24 hr tablet Take one tablet (100 mg) by mouth every evening. 90 tablet 3    Multiple Vitamins-Minerals (CENTRUM PO) Take 1 tablet by mouth daily      pramipexole (MIRAPEX) 0.25 MG tablet TAKE ONE TAB BY MOUTH TWICE DAILY 180 tablet 2    sertraline (ZOLOFT) 100 MG tablet TAKE ONE TABLET BY MOUTH ONE TIME DAILY 90 tablet 3    nystatin (MYCOSTATIN) 738661 UNIT/ML suspension Take 5 mLs (500,000 Units) by mouth 4 times daily. 473 mL 1     No current facility-administered medications for this visit.       Equipment: Basic pill box- twice daily dosing    Pill Box was set up by RN at visit  on 2/21/25 for a two week supply.     Medication Set Up: Dependent  Medication Administration:  Independent    Compliance: Initial visit. Unable to determine compliance.     Patient's medications were set up as follows:     AM: amlodipine 5 mg, sertraline 100 mg, hydrochlorothiazide 25 mg, lorsartan 100 mg, aspirin 81 mg, pramipexole 0.25 mg, Multi-vitamin    PM: atorvastatin 40 mg, metoprolol 100 mg, Lamictal 75 mg  RN Will:  Scheduled next MEV on 3/6/25     Care Guide Will:  Care Guide Delegation  Due Date: 3/5/25  Call the patient to remind them of  their next MEV  Remind the patient to bring their pill box, all medications in bottles and any refills they need

## 2025-03-09 NOTE — PROGRESS NOTES
Follow Up Progress Note      Assessment: Clinic Care Coordination Medication Education Follow - Up Visit    Patient presents for:  Medication education, Pill box teaching, and Compliance monitoring    Language: English  : No    Communication: Literate in other languages    Accompanied by: Patient    Patient Living Situation:      Primary Care Provider:  Karen Taylor    Barriers:  Inadequate support at home and Memory deficits    Medication List (see cited below): Patient presents with all ordered medications    Current Outpatient Medications   Medication Sig Dispense Refill    amLODIPine (NORVASC) 5 MG tablet Take 1 tablet (5 mg) by mouth daily. 90 tablet 3    aspirin (ASA) 81 MG chewable tablet Take 1 tablet (81 mg) by mouth daily. 90 tablet 3    atorvastatin (LIPITOR) 40 MG tablet Take 1 tablet (40 mg) by mouth daily. 90 tablet 2    hydrochlorothiazide (HYDRODIURIL) 25 MG tablet TAKE ONE TABLET BY MOUTH ONE TIME DAILY 90 tablet 0    lamoTRIgine (LAMICTAL) 25 MG tablet Take 75 mg by mouth 2 times daily      losartan (COZAAR) 100 MG tablet TAKE ONE TABLET BY MOUTH ONE TIME DAILY 90 tablet 3    metoprolol succinate ER (TOPROL XL) 100 MG 24 hr tablet Take one tablet (100 mg) by mouth every evening. 90 tablet 3    Multiple Vitamins-Minerals (CENTRUM PO) Take 1 tablet by mouth daily      nystatin (MYCOSTATIN) 649512 UNIT/ML suspension Take 5 mLs (500,000 Units) by mouth 4 times daily. 473 mL 1    pramipexole (MIRAPEX) 0.25 MG tablet TAKE ONE TAB BY MOUTH TWICE DAILY 180 tablet 2    sertraline (ZOLOFT) 100 MG tablet TAKE ONE TABLET BY MOUTH ONE TIME DAILY 90 tablet 3     No current facility-administered medications for this visit.       Equipment: Basic pill box- twice daily dosing    Pill Box was set up by RN at visit  on 3/6/25 for a one month supply    Medication Set Up: Dependent  Medication Administration:  Independent    Compliance: 100% compliance    Medications were set up as follows:     AM:  amlodipine 5 mg, sertraline 100 mg, hydrochlorothiazide 25 mg, lorsartan 100 mg, aspirin 81 mg, pramipexole 0.25 mg, Multi-vitamin     PM: atorvastatin 40 mg, metoprolol 100 mg, Lamictal 75 mg    Future Appointments   Date Time Provider Department Center   3/11/2025 12:30 PM Mark Han MBBS [facility]   3/12/2025 11:00 AM SPM[facility] LAB [facility]LABR [facility]   3/20/2025  4:00 PM Taj Walls MD [facility]   [facility] DO[facility]   6[facility]10/2025 12:40 [facility]       Action Plan  RN Will:  Scheduled next MEV for 4/4/25     Care Guide Will:  Care Guide Delegation  Due Date: 4/3/25  Call the patient to remind them of their next MEV  Remind the patient to bring their pill box, all medications in bottles and any refills they need    Nursing Notes:   Patient stated she said having medications set up in medication boxes was help for her compliance. She stated she would like to continue to have assistance setting up her medications on a regular basis.       Care Gaps:    Health Maintenance Due   Topic Date Due    DIABETIC FOOT EXAM  Never done    URINE DRUG SCREEN  Never done    DTAP/TDAP/TD IMMUNIZATION (1 - Tdap) Never done    ZOSTER IMMUNIZATION (2 of 3) 12/09/2008       Scheduled with PCP on 6/10/25      Care Plans  Care Plan: Medication Regimen       Problem: Medication Adherence       Long-Range Goal: I would like to have assistance setting up my medicaitons on a regular basis to assist me with compliance.       Start Date: 2/21/2025 Expected End Date: 2/20/2026    Recent Progress: 10%    Priority: High    Note:     Barriers: difficulty managing medications   Strengths: strong advocate for herself.   Patient expressed understanding of goal: yes  Action steps to achieve this goal:  1. I will attend all scheduled appointments with the [facility] RN Rony.   2. I will take my medications daily as directed.     Discussed on 3/6/25                                 Intervention/Education provided during outreach: Discussed the importance of setting up her medications as directed. Encouraged her to attend all scheduled appointments as directed.      Outreach Frequency: monthly, more frequently as needed    Plan: CCC RN will continue to monitor, support patient with current goal and will be available to assist as additional nursing needs arise. CCC CHW will provide reminders of upcoming medication set-up appointments.     Care Coordinator will follow up in one month.

## 2025-03-11 ENCOUNTER — OFFICE VISIT (OUTPATIENT)
Dept: RHEUMATOLOGY | Facility: CLINIC | Age: 80
End: 2025-03-11
Payer: COMMERCIAL

## 2025-03-11 VITALS
OXYGEN SATURATION: 96 % | HEART RATE: 50 BPM | SYSTOLIC BLOOD PRESSURE: 138 MMHG | BODY MASS INDEX: 22.88 KG/M2 | WEIGHT: 133.3 LBS | DIASTOLIC BLOOD PRESSURE: 64 MMHG

## 2025-03-11 DIAGNOSIS — G89.29 CHRONIC PAIN OF BOTH KNEES: ICD-10-CM

## 2025-03-11 DIAGNOSIS — R76.8 ANA POSITIVE: ICD-10-CM

## 2025-03-11 DIAGNOSIS — M15.0 PRIMARY OSTEOARTHRITIS INVOLVING MULTIPLE JOINTS: Primary | ICD-10-CM

## 2025-03-11 DIAGNOSIS — M25.561 CHRONIC PAIN OF BOTH KNEES: ICD-10-CM

## 2025-03-11 DIAGNOSIS — Z87.39 HISTORY OF TEMPORAL ARTERITIS: ICD-10-CM

## 2025-03-11 DIAGNOSIS — M25.562 CHRONIC PAIN OF BOTH KNEES: ICD-10-CM

## 2025-03-11 PROCEDURE — 3075F SYST BP GE 130 - 139MM HG: CPT | Performed by: INTERNAL MEDICINE

## 2025-03-11 PROCEDURE — 3078F DIAST BP <80 MM HG: CPT | Performed by: INTERNAL MEDICINE

## 2025-03-11 PROCEDURE — 99214 OFFICE O/P EST MOD 30 MIN: CPT | Mod: 25 | Performed by: INTERNAL MEDICINE

## 2025-03-11 PROCEDURE — 20610 DRAIN/INJ JOINT/BURSA W/O US: CPT | Mod: RT | Performed by: INTERNAL MEDICINE

## 2025-03-11 RX ORDER — TRIAMCINOLONE ACETONIDE 40 MG/ML
40 INJECTION, SUSPENSION INTRA-ARTICULAR; INTRAMUSCULAR ONCE
Status: COMPLETED | OUTPATIENT
Start: 2025-03-11 | End: 2025-03-11

## 2025-03-11 RX ADMIN — TRIAMCINOLONE ACETONIDE 40 MG: 40 INJECTION, SUSPENSION INTRA-ARTICULAR; INTRAMUSCULAR at 13:13

## 2025-03-11 NOTE — PROGRESS NOTES
Rheumatology follow-up visit note     Virginia is a 79 year old female presents today for follow-up.    Sara was seen today for recheck.    Diagnoses and all orders for this visit:    Primary osteoarthritis involving multiple joints  -     ASPIRATION/INJECTION MAJOR JOINT  -     triamcinolone (KENALOG-40) injection 40 mg  -     triamcinolone (KENALOG-40) injection 40 mg    CHRISTIAN positive    Chronic pain of both knees  -     ASPIRATION/INJECTION MAJOR JOINT  -     triamcinolone (KENALOG-40) injection 40 mg  -     triamcinolone (KENALOG-40) injection 40 mg    History of temporal arteritis            After pros and cons were discussed including risk of infection, bleeding, skin changes including thinning, pigmentary alteration and scarring to name a few, left knee, right knee injected as noted in the orders section. This was done with nontouch technique.  The patient tolerated the procedure well and had a brisk Marcaine effect.  The postinjection care was discussed.      Follow up in 4 months.    HPI    Jenny Iglesias is a 79 year old female is here for worsening pain in her knees.  She is well-known to have osteoarthritis.  She noted the pain level to be moderately severe.  This is worse with activity.  Does not wake her up from sleep.  She has had good response to corticosteroid injections in the past.  She takes acetaminophen with limited benefit.  She has a noted no swelling no recent trauma the knees.  No other joint areas similarly painful.  In the past she has history of temporal arteritis without recurrence of original symptoms.         DETAILED EXAMINATION  03/11/25  :    Vitals:    03/11/25 1240   BP: 138/64   BP Location: Right arm   Pulse: 50   SpO2: 96%   Weight: 60.5 kg (133 lb 4.8 oz)     Alert oriented. Head including the face is examined for malar rash, heliotropes, scarring, lupus pernio. Eyes examined for redness such as in episcleritis/scleritis, periorbital lesions.   Neck/ Face examined  for parotid gland swelling, range of motion of neck.  Left upper and lower and right upper and lower extremities examined for tenderness, swelling, warmth of the appendicular joints, range of motion, edema, rash.  Some of the important findings included: she does not have evidence of synovitis in the palpable joints of the upper extremities.  Oa  significant deformities of the digits.  ++ Heberden nodes.  Range of motion of the shoulders  show full abduction.  No JLT effusion or warmth of the knees.  she does not have dactylitis of the digits.     Patient Active Problem List    Diagnosis Date Noted    Aortic valve insufficiency, etiology of cardiac valve disease unspecified 10/22/2023     Priority: Medium    C. difficile colitis 10/06/2022     Priority: Medium    Type 2 diabetes mellitus with stage 3a chronic kidney disease, without long-term current use of insulin (H) 02/11/2022     Priority: Medium    Polymorphic amyloid degeneration of cornea 12/13/2021     Priority: Medium    Fuchs' corneal dystrophy of both eyes 12/13/2021     Priority: Medium    Corneal edema of both eyes 12/13/2021     Priority: Medium    Localization-related focal epilepsy with simple partial seizures (H) 10/10/2021     Priority: Medium    Seizure disorder (H) 06/09/2021     Priority: Medium    GCA (giant cell arteritis) (H) 03/10/2021     Priority: Medium    TIA (transient ischemic attack)      Priority: Medium    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     Priority: Medium     Added automatically from request for surgery [id number]        Stage 3b chronic kidney disease (H) 02/15/2021     Priority: Medium    Other chronic pain 10/07/2020     Priority: Medium    Coccyx pain 09/13/2019     Priority: Medium    Chronic insomnia 02/25/2016     Priority: Medium    Vertigo 10/27/2015     Priority: Medium    Essential hypertension 10/27/2015     Priority: Medium    Restless leg syndrome 10/27/2015     Priority: Medium    Perimenopausal vasomotor symptoms  10/27/2015     Priority: Medium    Carotid stenosis, asymptomatic 10/27/2015     Priority: Medium     Past Surgical History:   Procedure Laterality Date    APPENDECTOMY      CATARACT EXTRACTION      CHOLECYSTECTOMY      HYSTERECTOMY      OTHER SURGICAL HISTORY Right 1998    fifth toe amputation    NV TEMPORAL ARTERY LIGATN OR BX Bilateral 3/4/2021    Procedure: BIOPSY, ARTERY, TEMPORAL;  Surgeon: Ramesh Box MD;  Location: [facility];  Service: General    TUBAL LIGATION        Past Medical History:   Diagnosis Date    Anxiety     Carotid artery stenosis 11/2013    50% left internal carotid artery     Chronic low back pain     Hypertension     Osteoarthritis of lumbar spine     Osteopenia     mild, resolved    Postmenopausal 11/2013    on hormone    Restless leg syndrome     Seizures (H)     Feb 18, 2021, Small lost vision and could not speak for 4-5 seconds    Surgical menopause     age 47    Thyroid nodule 11/2013    35mm, rightthyroid lobe biopsy-benign nodule    TIA (transient ischemic attack)     TIA (transient ischemic attack) 11/2013    Vertigo      Allergies   Allergen Reactions    Codeine Sulfate Shortness Of Breath and Anaphylaxis    Carbidopa-Levodopa      dystonia    Codeine     Diclofenac      Diarrhea    Morphine      Current Outpatient Medications   Medication Sig Dispense Refill    amLODIPine (NORVASC) 5 MG tablet Take 1 tablet (5 mg) by mouth daily. 90 tablet 3    aspirin (ASA) 81 MG chewable tablet Take 1 tablet (81 mg) by mouth daily. 90 tablet 3    atorvastatin (LIPITOR) 40 MG tablet Take 1 tablet (40 mg) by mouth daily. 90 tablet 2    hydrochlorothiazide (HYDRODIURIL) 25 MG tablet TAKE ONE TABLET BY MOUTH ONE TIME DAILY 90 tablet 0    lamoTRIgine (LAMICTAL) 25 MG tablet Take 75 mg by mouth 2 times daily      losartan (COZAAR) 100 MG tablet TAKE ONE TABLET BY MOUTH ONE TIME DAILY 90 tablet 3    metoprolol succinate ER (TOPROL XL) 100 MG 24 hr tablet Take one tablet (100 mg) by mouth  every evening. 90 tablet 3    Multiple Vitamins-Minerals (CENTRUM PO) Take 1 tablet by mouth daily      nystatin (MYCOSTATIN) 214582 UNIT/ML suspension Take 5 mLs (500,000 Units) by mouth 4 times daily. 473 mL 1    pramipexole (MIRAPEX) 0.25 MG tablet TAKE ONE TAB BY MOUTH TWICE DAILY 180 tablet 2    sertraline (ZOLOFT) 100 MG tablet TAKE ONE TABLET BY MOUTH ONE TIME DAILY 90 tablet 3     family history includes Breast Cancer (age of onset: 57) in her mother; Hypertension in her sister; Lung Cancer (age of onset: 62) in her father.  Social Connections: Unknown (10/22/2024)    Social Connection and Isolation Panel [NHANES]     Frequency of Communication with Friends and Family: Not on file     Frequency of Social Gatherings with Friends and Family: Patient declined     Attends Church Services: Not on file     Active Member of Clubs or Organizations: Not on file     Attends Club or Organization Meetings: Not on file     Marital Status: Not on file          WBC Count   Date Value Ref Range Status   05/28/2024 9.4 4.0 - 11.0 10e3/uL Final     RBC Count   Date Value Ref Range Status   05/28/2024 4.46 3.80 - 5.20 10e6/uL Final     Hemoglobin   Date Value Ref Range Status   10/17/2024 13.3 11.7 - 15.7 g/dL Final     Hematocrit   Date Value Ref Range Status   05/28/2024 41.6 35.0 - 47.0 % Final     MCV   Date Value Ref Range Status   05/28/2024 93 78 - 100 fL Final     MCH   Date Value Ref Range Status   05/28/2024 29.4 26.5 - 33.0 pg Final     Platelet Count   Date Value Ref Range Status   05/28/2024 300 150 - 450 10e3/uL Final     % Lymphocytes   Date Value Ref Range Status   02/06/2024 38 % Final     AST   Date Value Ref Range Status   02/21/2025 31 0 - 45 U/L Final     ALT   Date Value Ref Range Status   02/21/2025 16 0 - 50 U/L Final     Albumin   Date Value Ref Range Status   02/21/2025 4.1 3.5 - 5.2 g/dL Final   01/13/2022 3.8 3.5 - 5.0 g/dL Final     Alkaline Phosphatase   Date Value Ref Range Status   02/21/2025  186 (H) 40 - 150 U/L Final     Creatinine   Date Value Ref Range Status   02/21/2025 1.63 (H) 0.51 - 0.95 mg/dL Final     GFR Estimate   Date Value Ref Range Status   02/21/2025 31 (L) >60 mL/min/1.73m2 Final     Comment:     eGFR calculated using 2021 CKD-EPI equation.   04/06/2021 39 (L) >60 mL/min/1.73m2 Final     GFR Estimate If Black   Date Value Ref Range Status   04/06/2021 48 (L) >60 mL/min/1.73m2 Final     Creatinine Urine mg/dL   Date Value Ref Range Status   02/21/2025 238.0 mg/dL Final     Comment:     The reference ranges have not been established in urine creatinine. The results should be integrated into the clinical context for interpretation.     Erythrocyte Sedimentation Rate   Date Value Ref Range Status   10/16/2023 9 0 - 30 mm/hr Final     CRP   Date Value Ref Range Status   03/03/2022 0.1 0.0-<0.8 mg/dL Final

## 2025-03-12 ENCOUNTER — LAB (OUTPATIENT)
Dept: LAB | Facility: CLINIC | Age: 80
End: 2025-03-12
Payer: COMMERCIAL

## 2025-03-12 DIAGNOSIS — N18.32 STAGE 3B CHRONIC KIDNEY DISEASE (H): Primary | ICD-10-CM

## 2025-03-12 DIAGNOSIS — E11.9 TYPE 2 DIABETES MELLITUS WITHOUT COMPLICATION, WITHOUT LONG-TERM CURRENT USE OF INSULIN (H): ICD-10-CM

## 2025-03-12 DIAGNOSIS — I12.9 RENAL HYPERTENSION: ICD-10-CM

## 2025-03-12 LAB
BASOPHILS # BLD AUTO: 0 10E3/UL (ref 0–0.2)
BASOPHILS NFR BLD AUTO: 0 %
EOSINOPHIL # BLD AUTO: 0 10E3/UL (ref 0–0.7)
EOSINOPHIL NFR BLD AUTO: 0 %
ERYTHROCYTE [DISTWIDTH] IN BLOOD BY AUTOMATED COUNT: 13.9 % (ref 10–15)
HCT VFR BLD AUTO: 41.9 % (ref 35–47)
HGB BLD-MCNC: 13.6 G/DL (ref 11.7–15.7)
IMM GRANULOCYTES # BLD: 0 10E3/UL
IMM GRANULOCYTES NFR BLD: 0 %
LYMPHOCYTES # BLD AUTO: 2.1 10E3/UL (ref 0.8–5.3)
LYMPHOCYTES NFR BLD AUTO: 22 %
MCH RBC QN AUTO: 29.7 PG (ref 26.5–33)
MCHC RBC AUTO-ENTMCNC: 32.5 G/DL (ref 31.5–36.5)
MCV RBC AUTO: 92 FL (ref 78–100)
MONOCYTES # BLD AUTO: 0.5 10E3/UL (ref 0–1.3)
MONOCYTES NFR BLD AUTO: 5 %
NEUTROPHILS # BLD AUTO: 7.2 10E3/UL (ref 1.6–8.3)
NEUTROPHILS NFR BLD AUTO: 74 %
PLATELET # BLD AUTO: 236 10E3/UL (ref 150–450)
RBC # BLD AUTO: 4.58 10E6/UL (ref 3.8–5.2)
WBC # BLD AUTO: 9.8 10E3/UL (ref 4–11)

## 2025-03-12 PROCEDURE — 36415 COLL VENOUS BLD VENIPUNCTURE: CPT

## 2025-03-12 PROCEDURE — 85025 COMPLETE CBC W/AUTO DIFF WBC: CPT

## 2025-03-12 PROCEDURE — 80069 RENAL FUNCTION PANEL: CPT

## 2025-03-12 PROCEDURE — 84156 ASSAY OF PROTEIN URINE: CPT

## 2025-03-13 LAB
ALBUMIN MFR UR ELPH: 46.5 MG/DL
ALBUMIN SERPL BCG-MCNC: 4.1 G/DL (ref 3.5–5.2)
ANION GAP SERPL CALCULATED.3IONS-SCNC: 11 MMOL/L (ref 7–15)
BUN SERPL-MCNC: 27.6 MG/DL (ref 8–23)
CALCIUM SERPL-MCNC: 10.2 MG/DL (ref 8.8–10.4)
CHLORIDE SERPL-SCNC: 104 MMOL/L (ref 98–107)
CREAT SERPL-MCNC: 1.2 MG/DL (ref 0.51–0.95)
CREAT UR-MCNC: 187 MG/DL
EGFRCR SERPLBLD CKD-EPI 2021: 45 ML/MIN/1.73M2
GLUCOSE SERPL-MCNC: 125 MG/DL (ref 70–99)
HCO3 SERPL-SCNC: 29 MMOL/L (ref 22–29)
PHOSPHATE SERPL-MCNC: 3.3 MG/DL (ref 2.5–4.5)
POTASSIUM SERPL-SCNC: 3.9 MMOL/L (ref 3.4–5.3)
PROT/CREAT 24H UR: 0.25 MG/MG CR (ref 0–0.2)
SODIUM SERPL-SCNC: 144 MMOL/L (ref 135–145)

## 2025-04-03 ENCOUNTER — PATIENT OUTREACH (OUTPATIENT)
Dept: CARE COORDINATION | Facility: CLINIC | Age: 80
End: 2025-04-03
Payer: COMMERCIAL

## 2025-04-03 NOTE — PROGRESS NOTES
Clinic Care Coordination Contact  [facility]/Voicemail    Clinical Data: Care Coordinator Outreach    Outreach Documentation Number of Outreach Attempt   4/3/2025   9:17 AM 1       Left message on patient's voicemail with call back information and requested return call.      Plan: Care Coordinator will try to reach patient again in 1 month.      Jacquelin CURIEL  [facility] Care Coordination   [facility], [facility]   Office: [phone]

## 2025-04-04 ENCOUNTER — ALLIED HEALTH/NURSE VISIT (OUTPATIENT)
Dept: NURSING | Facility: CLINIC | Age: 80
End: 2025-04-04
Payer: COMMERCIAL

## 2025-04-04 DIAGNOSIS — Z71.89 COUNSELING AND COORDINATION OF CARE: Primary | ICD-10-CM

## 2025-04-04 PROCEDURE — 99207 PR NO CHARGE NURSE ONLY: CPT

## 2025-04-08 NOTE — PROGRESS NOTES
Clinic Care Coordination Contact  Follow Up Progress Note      Assessment: Clinic Care Coordination Medication Education Follow - Up Visit    Patient presents for:  Medication education, Pill box teaching, and Compliance monitoring    Language: English  : No    Communication: Literate in English    Accompanied by:      Patient Living Situation:      Primary Care Provider:  Karen Taylor    Barriers:  Inadequate support at home and Memory deficits    Medication List (see cited below): Patient presents with all ordered medications    Current Outpatient Medications   Medication Sig Dispense Refill    amLODIPine (NORVASC) 5 MG tablet Take 1 tablet (5 mg) by mouth daily. 90 tablet 3    aspirin (ASA) 81 MG chewable tablet Take 1 tablet (81 mg) by mouth daily. 90 tablet 3    atorvastatin (LIPITOR) 40 MG tablet Take 1 tablet (40 mg) by mouth daily. 90 tablet 2    hydrochlorothiazide (HYDRODIURIL) 25 MG tablet TAKE ONE TABLET BY MOUTH ONE TIME DAILY 90 tablet 0    lamoTRIgine (LAMICTAL) 25 MG tablet Take 75 mg by mouth 2 times daily      losartan (COZAAR) 100 MG tablet TAKE ONE TABLET BY MOUTH ONE TIME DAILY 90 tablet 3    metoprolol succinate ER (TOPROL XL) 100 MG 24 hr tablet Take one tablet (100 mg) by mouth every evening. 90 tablet 3    Multiple Vitamins-Minerals (CENTRUM PO) Take 1 tablet by mouth daily      nystatin (MYCOSTATIN) 938396 UNIT/ML suspension Take 5 mLs (500,000 Units) by mouth 4 times daily. 473 mL 1    pramipexole (MIRAPEX) 0.25 MG tablet TAKE ONE TAB BY MOUTH TWICE DAILY 180 tablet 2    sertraline (ZOLOFT) 100 MG tablet TAKE ONE TABLET BY MOUTH ONE TIME DAILY 90 tablet 3     No current facility-administered medications for this visit.       Equipment: Basic pill box- twice daily dosing    Pill Box was set up by RN at visit  4/4/25    Medication Set Up: Dependent  Medication Administration:  Independent    Compliance: 100% compliance    Medications were set up as follows:   AM: amlodipine 5  mg, sertraline 100 mg, hydrochlorothiazide 25 mg, lorsartan 100 mg, aspirin 81 mg, pramipexole 0.25 mg, Multi-vitamin     PM: atorvastatin 40 mg, metoprolol 100 mg, Lamictal 75 mg      Future Appointments   Date Time Provider Department Center   4/29/2025 11:00 AM [facility] CCC RN [facility]   6/10/2025 12:40 PM Karen Taylor MD [facility]   7/16/2025  8:00 AM Mark Han [facility]       Action Plan  RN Will:  Next MEV on 4/29/25     Care Guide Will:  Care Guide Delegation  Due Date: 4/28/26  Call the patient to remind them of their next MEV  Remind the patient to bring their pill box, all medications in bottles and any refills they need        Nursing Notes:  Nothing further to add at this time, please see documentation within visit notes      Care Gaps:    Health Maintenance Due   Topic Date Due    DIABETIC FOOT EXAM  Never done    URINE DRUG SCREEN  Never done    DTAP/TDAP/TD IMMUNIZATION (1 - Tdap) Never done    ZOSTER IMMUNIZATION (2 of 3) 12/09/2008       Scheduled with PCP on 6/10/25      Care Plans  Care Plan: Medication Regimen       Problem: Medication Adherence       Long-Range Goal: I would like to have assistance setting up my medicaitons on a regular basis to assist me with compliance.       Start Date: 2/21/2025 Expected End Date: 2/20/2026    This Visit's Progress: 30% Recent Progress: 10%    Priority: High    Note:     Barriers: difficulty managing medications   Strengths: strong advocate for herself.   Patient expressed understanding of goal: yes  Action steps to achieve this goal:  1. I will attend all scheduled appointments with the CCC RN Rony.   2. I will take my medications daily as directed.     Discussed on 3/6/25                                Intervention/Education provided during outreach: Discussed the importance of taking her medications as directed. Encouraged her to contact Care Coordination for any additional needs or concerns.       Plan: CCC RN will continue  to monitor, support patient with current goal and will be available to assist as nursing needs arise.     Care Coordinator will follow up in on 4/29/26     NULL

## 2025-04-20 ENCOUNTER — HEALTH MAINTENANCE LETTER (OUTPATIENT)
Age: 80
End: 2025-04-20

## 2025-04-28 DIAGNOSIS — I10 ESSENTIAL HYPERTENSION: ICD-10-CM

## 2025-04-28 DIAGNOSIS — Z13.220 SCREENING FOR HYPERLIPIDEMIA: ICD-10-CM

## 2025-04-28 RX ORDER — PRAMIPEXOLE DIHYDROCHLORIDE 0.25 MG/1
TABLET ORAL
Qty: 180 TABLET | Refills: 2 | Status: SHIPPED | OUTPATIENT
Start: 2025-04-28

## 2025-04-28 RX ORDER — HYDROCHLOROTHIAZIDE 25 MG/1
25 TABLET ORAL DAILY
Qty: 90 TABLET | Refills: 4 | Status: SHIPPED | OUTPATIENT
Start: 2025-04-28

## 2025-04-28 RX ORDER — HYDROCHLOROTHIAZIDE 25 MG/1
25 TABLET ORAL DAILY
Qty: 90 TABLET | Refills: 0 | OUTPATIENT
Start: 2025-04-28

## 2025-04-29 ENCOUNTER — ALLIED HEALTH/NURSE VISIT (OUTPATIENT)
Dept: NURSING | Facility: CLINIC | Age: 80
End: 2025-04-29
Payer: COMMERCIAL

## 2025-04-29 DIAGNOSIS — Z71.89 COUNSELING AND COORDINATION OF CARE: Primary | ICD-10-CM

## 2025-04-29 PROCEDURE — 99207 PR NO CHARGE NURSE ONLY: CPT

## 2025-04-29 NOTE — PROGRESS NOTES
Clinic Care Coordination Medication Education Follow - Up Visit    Patient presents for:  Medication education, Pill box teaching, and Compliance monitoring    Language: English  : No    Communication: Literate in English    Accompanied by: Patient    Patient Living Situation:      Primary Care Provider:  Karen Taylor    Barriers:  Non-compliance of medications and Memory deficits    Medication List (see cited below): Patient presents with all ordered medications    Current Outpatient Medications   Medication Sig Dispense Refill    amLODIPine (NORVASC) 5 MG tablet Take 1 tablet (5 mg) by mouth daily. 90 tablet 3    aspirin (ASA) 81 MG chewable tablet Take 1 tablet (81 mg) by mouth daily. 90 tablet 3    atorvastatin (LIPITOR) 40 MG tablet Take 1 tablet (40 mg) by mouth daily. 90 tablet 2    hydrochlorothiazide (HYDRODIURIL) 25 MG tablet Take 1 tablet (25 mg) by mouth daily. 90 tablet 4    lamoTRIgine (LAMICTAL) 25 MG tablet Take 75 mg by mouth 2 times daily      losartan (COZAAR) 100 MG tablet TAKE ONE TABLET BY MOUTH ONE TIME DAILY 90 tablet 3    metoprolol succinate ER (TOPROL XL) 100 MG 24 hr tablet Take one tablet (100 mg) by mouth every evening. 90 tablet 3    Multiple Vitamins-Minerals (CENTRUM PO) Take 1 tablet by mouth daily      nystatin (MYCOSTATIN) 825803 UNIT/ML suspension Take 5 mLs (500,000 Units) by mouth 4 times daily. 473 mL 1    pramipexole (MIRAPEX) 0.25 MG tablet TAKE ONE TAB BY MOUTH TWICE DAILY 180 tablet 2    sertraline (ZOLOFT) 100 MG tablet TAKE ONE TABLET BY MOUTH ONE TIME DAILY 90 tablet 3     No current facility-administered medications for this visit.       Equipment: Basic pill box- twice daily dosing    Pill Box was set up by RN at visit  on 4/29/25 for a 5 week supply.     Medication Set Up: Dependent  Medication Administration:  Independent    Compliance: 100% compliance    Patient's medications were set up as follows:     AM: amlodipine 5 mg, sertraline 100 mg,  hydrochlorothiazide 25 mg, lorsartan 100 mg, aspirin 81 mg, pramipexole 0.25 mg, Multi-vitamin, Lamictal 75 mg     PM: atorvastatin 40 mg, metoprolol 100 mg, Lamictal 75 mg, pramipexole 0.25 mg    Future Appointments   Date Time Provider Department Center   5/29/2025  2:00 PM \[facility]\""[facility] RN [facility]   6/10/2025 12:40 PM Karen Taylor MD [facility]   7/16/2025  8:00 AM Mark Han MBBS [facility]       Action Plan  RN Will:  Scheduled next medication set up visit on 5/29/25.          Nursing Notes:  Reminded patient to reschedule appointment with Nephrology per PCP recommendation. Patient aware of follow up with PCP on 6/10/24.        Care Gaps:    Health Maintenance Due   Topic Date Due    DIABETIC FOOT EXAM  Never done    URINE DRUG SCREEN  Never done    DTAP/TDAP/TD IMMUNIZATION (1 - Tdap) Never done    ZOSTER IMMUNIZATION (2 of 3) 12/09/2008    COVID-19 Vaccine (8 - 2024-25 season) 04/22/2025       Scheduled with PCP on 6/10/24.       Care Plans  Care Plan: Medication Regimen       Problem: Medication Adherence       Long-Range Goal: I would like to have assistance setting up my medicaitons on a regular basis to assist me with compliance.       Start Date: 2/21/2025 Expected End Date: 2/20/2026    Recent Progress: 30%    Priority: High    Note:     Barriers: difficulty managing medications   Strengths: strong advocate for herself.   Patient expressed understanding of goal: yes  Action steps to achieve this goal:  1. I will attend all scheduled appointments with the [facility] RN Rony.   2. I will take my medications daily as directed.     Discussed on 4/29/25                                Intervention/Education provided during outreach: Discussed the importance of taking her medications as directed. Encouraged her to attend all upcoming appointments. Encouraged her to contact Care Coordination for any additional needs or concerns.      Outreach Frequency: monthly, more frequently as  needed      Plan: CCC RN will continue to monitor, support patient with current goal and will be available to assist as nursing needs arise.     Care Coordinator will follow up in one month.

## 2025-05-28 ENCOUNTER — PATIENT OUTREACH (OUTPATIENT)
Dept: CARE COORDINATION | Facility: CLINIC | Age: 80
End: 2025-05-28
Payer: COMMERCIAL

## 2025-05-29 DIAGNOSIS — Z13.220 SCREENING FOR HYPERLIPIDEMIA: ICD-10-CM

## 2025-05-29 DIAGNOSIS — I10 ESSENTIAL HYPERTENSION: ICD-10-CM

## 2025-05-29 RX ORDER — ATORVASTATIN CALCIUM 40 MG/1
40 TABLET, FILM COATED ORAL DAILY
Qty: 90 TABLET | Refills: 2 | Status: SHIPPED | OUTPATIENT
Start: 2025-05-29

## 2025-05-29 RX ORDER — METOPROLOL SUCCINATE 100 MG/1
TABLET, EXTENDED RELEASE ORAL
Qty: 90 TABLET | Refills: 3 | OUTPATIENT
Start: 2025-05-29

## 2025-05-29 RX ORDER — METOPROLOL SUCCINATE 100 MG/1
TABLET, EXTENDED RELEASE ORAL
Qty: 90 TABLET | Refills: 1 | Status: SHIPPED | OUTPATIENT
Start: 2025-05-29 | End: 2025-05-29

## 2025-05-29 RX ORDER — METOPROLOL SUCCINATE 100 MG/1
TABLET, EXTENDED RELEASE ORAL
Qty: 90 TABLET | Refills: 1 | Status: SHIPPED | OUTPATIENT
Start: 2025-05-29

## 2025-05-31 DIAGNOSIS — I10 ESSENTIAL HYPERTENSION: ICD-10-CM

## 2025-05-31 DIAGNOSIS — Z13.220 SCREENING FOR HYPERLIPIDEMIA: ICD-10-CM

## 2025-05-31 RX ORDER — ATORVASTATIN CALCIUM 40 MG/1
40 TABLET, FILM COATED ORAL DAILY
Qty: 90 TABLET | Refills: 2 | Status: SHIPPED | OUTPATIENT
Start: 2025-05-31

## 2025-05-31 RX ORDER — METOPROLOL SUCCINATE 100 MG/1
TABLET, EXTENDED RELEASE ORAL
Qty: 90 TABLET | Refills: 1 | Status: SHIPPED | OUTPATIENT
Start: 2025-05-31

## 2025-06-01 ENCOUNTER — HEALTH MAINTENANCE LETTER (OUTPATIENT)
Age: 80
End: 2025-06-01

## 2025-06-05 ENCOUNTER — ALLIED HEALTH/NURSE VISIT (OUTPATIENT)
Dept: NURSING | Facility: CLINIC | Age: 80
End: 2025-06-05
Payer: COMMERCIAL

## 2025-06-05 ENCOUNTER — OFFICE VISIT (OUTPATIENT)
Dept: INTERNAL MEDICINE | Facility: CLINIC | Age: 80
End: 2025-06-05
Payer: COMMERCIAL

## 2025-06-05 VITALS
DIASTOLIC BLOOD PRESSURE: 60 MMHG | WEIGHT: 135.9 LBS | HEIGHT: 64 IN | OXYGEN SATURATION: 97 % | TEMPERATURE: 98.2 F | HEART RATE: 50 BPM | SYSTOLIC BLOOD PRESSURE: 134 MMHG | BODY MASS INDEX: 23.2 KG/M2

## 2025-06-05 DIAGNOSIS — M81.0 OSTEOPOROSIS, UNSPECIFIED OSTEOPOROSIS TYPE, UNSPECIFIED PATHOLOGICAL FRACTURE PRESENCE: ICD-10-CM

## 2025-06-05 DIAGNOSIS — G45.9 TIA (TRANSIENT ISCHEMIC ATTACK): ICD-10-CM

## 2025-06-05 DIAGNOSIS — G40.909 SEIZURE DISORDER (H): ICD-10-CM

## 2025-06-05 DIAGNOSIS — M31.6 GCA (GIANT CELL ARTERITIS) (H): ICD-10-CM

## 2025-06-05 DIAGNOSIS — A04.72 C. DIFFICILE COLITIS: ICD-10-CM

## 2025-06-05 DIAGNOSIS — E11.22 TYPE 2 DIABETES MELLITUS WITH STAGE 3A CHRONIC KIDNEY DISEASE, WITHOUT LONG-TERM CURRENT USE OF INSULIN (H): Primary | ICD-10-CM

## 2025-06-05 DIAGNOSIS — M17.10 ARTHRITIS OF KNEE: ICD-10-CM

## 2025-06-05 DIAGNOSIS — N18.32 STAGE 3B CHRONIC KIDNEY DISEASE (H): ICD-10-CM

## 2025-06-05 DIAGNOSIS — N18.31 TYPE 2 DIABETES MELLITUS WITH STAGE 3A CHRONIC KIDNEY DISEASE, WITHOUT LONG-TERM CURRENT USE OF INSULIN (H): Primary | ICD-10-CM

## 2025-06-05 DIAGNOSIS — G31.84 MCI (MILD COGNITIVE IMPAIRMENT): ICD-10-CM

## 2025-06-05 DIAGNOSIS — Z71.89 COUNSELING AND COORDINATION OF CARE: Primary | ICD-10-CM

## 2025-06-05 DIAGNOSIS — I10 ESSENTIAL HYPERTENSION: ICD-10-CM

## 2025-06-05 DIAGNOSIS — Z23 NEED FOR VACCINATION: ICD-10-CM

## 2025-06-05 PROBLEM — G89.29 OTHER CHRONIC PAIN: Status: RESOLVED | Noted: 2020-10-07 | Resolved: 2025-06-05

## 2025-06-05 PROCEDURE — 99207 PR NO CHARGE NURSE ONLY: CPT

## 2025-06-05 ASSESSMENT — PAIN SCALES - GENERAL: PAINLEVEL_OUTOF10: NO PAIN (0)

## 2025-06-05 NOTE — PROGRESS NOTES
Assessment & Plan     Need for vaccination  Discussed   - zoster vaccine recombinant adjuvanted (SHINGRIX) injection; Inject 0.5 mLs into the muscle once for 1 dose. Pharmacist administered  - Tdap, tetanus-diptheria-acell pertussis, (BOOSTRIX) 5-2.5-18.5 LF-MCG/0.5 PASCUAL injection; Inject 0.5 mLs into the muscle once for 1 dose.    Type 2 diabetes mellitus with stage 3a chronic kidney disease, without long-term current use of insulin (H)  Lab Results   Component Value Date    A1C 5.8 02/21/2025    A1C 6.3 10/17/2024    A1C 5.8 03/28/2024    A1C 5.8 10/16/2023    A1C 5.9 04/03/2023     Will resolve the diagnosis     Stage 3b chronic kidney disease (H)  GFR Estimate   Date Value Ref Range Status   03/12/2025 45 (L) >60 mL/min/1.73m2 Final     Comment:     eGFR calculated using 2021 CKD-EPI equation.   02/21/2025 31 (L) >60 mL/min/1.73m2 Final     Comment:     eGFR calculated using 2021 CKD-EPI equation.   10/17/2024 40 (L) >60 mL/min/1.73m2 Final     Comment:     eGFR calculated using 2021 CKD-EPI equation.   04/06/2021 39 (L) >60 mL/min/1.73m2 Final   03/15/2021 49 (L) >60 mL/min/1.73m2 Final   10/08/2020 54 (L) >60 mL/min/1.73m2 Final     Has stage 3 B kidney disease with mild microalbuminuria   Has seen nephrology    Attributed to NSAID use . Glomerulosclerosis due to HTN not renal artery stenosis     Osteoporosis, unspecified osteoporosis type, unspecified pathological fracture presence  Is due for surveillance dexa    Past h/o fosamax use   - DX Bone Density; Future    Arthritis of knee  Bilateral knee arthritis   Last xray showed mild tricompartmental arthritis   She does get better with steroid shots via rheumatoloy   She would need another xray as if she has advanced arthritis she would qualify for knee repplacement but  she doesn't want that either   She doesn't want any pain meds either     Essential hypertension  Controlled     Seizure disorder (H)  On lamictal     TIA (transient ischemic attack)  Severe  "dizziness   MRI brain and MRA neck and brain showed no prior infarct , or suspicion lesion or vascular problem     GCA (giant cell arteritis) (H)  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 difficile colitis  One time episode     MCI (mild cognitive impairment)  Intermittent episodes of confusion , missed appointments medication issues   She now has care coordination involved   Defer neuropsych testing for now   Passes screening tests    Retrun for wellness     The longitudinal plan of care for the diagnosis(es)/condition(s) as documented were addressed during this visit. Due to the added complexity in care, I will continue to support Sara in the subsequent management and with ongoing continuity of care.              Due for bone density   Subjective   Sara is a 79 year old, presenting for the following health issues:  office visit (Pt is here for 4 month follow up on high BP. )        6/5/2025    10:15 AM   Additional Questions   Roomed by Gilma   Accompanied by alone         6/5/2025    10:15 AM   Patient Reported Additional Medications   Patient reports taking the following new medications none     History of Present Illness       Hypertension: She presents for follow up of hypertension.  She does check blood pressure  regularly outside of the clinic. Outside blood pressures have been over 140/90. She follows a low salt diet.     She eats 2-3 servings of fruits and vegetables daily.She consumes 1 sweetened beverage(s) daily.She exercises with enough effort to increase her heart rate 10 to 19 minutes per day.    She is taking medications regularly.                      Objective    /60 (BP Location: Left arm, Patient Position: Sitting, Cuff Size: Adult Regular)   Pulse 50   Temp 98.2  F (36.8  C) (Temporal)   Ht 1.626 m (5' 4\")   Wt 61.6 kg (135 lb 14.4 oz)   LMP  (LMP Unknown)   SpO2 97%   BMI 23.33 kg/m  "   Body mass index is 23.33 kg/m .  Physical Exam   GENERAL: alert and no distress  NECK: no adenopathy, no asymmetry, masses, or scars  RESP: lungs clear to auscultation - no rales, rhonchi or wheezes  CV: regular rate and rhythm, normal S1 S2, no S3 or S4, no murmur, click or rub, no peripheral edema    Signed Electronically by: Karen Taylor MD

## 2025-06-09 DIAGNOSIS — I10 ESSENTIAL HYPERTENSION: ICD-10-CM

## 2025-06-09 DIAGNOSIS — E11.22 TYPE 2 DIABETES MELLITUS WITH STAGE 3B CHRONIC KIDNEY DISEASE, UNSPECIFIED WHETHER LONG TERM INSULIN USE (H): ICD-10-CM

## 2025-06-09 DIAGNOSIS — Z13.220 SCREENING FOR HYPERLIPIDEMIA: ICD-10-CM

## 2025-06-09 DIAGNOSIS — N18.32 TYPE 2 DIABETES MELLITUS WITH STAGE 3B CHRONIC KIDNEY DISEASE, UNSPECIFIED WHETHER LONG TERM INSULIN USE (H): ICD-10-CM

## 2025-06-09 RX ORDER — PRAMIPEXOLE DIHYDROCHLORIDE 0.25 MG/1
TABLET ORAL
Qty: 180 TABLET | Refills: 2 | Status: SHIPPED | OUTPATIENT
Start: 2025-06-09

## 2025-06-09 RX ORDER — ATORVASTATIN CALCIUM 40 MG/1
40 TABLET, FILM COATED ORAL DAILY
Qty: 90 TABLET | Refills: 2 | Status: SHIPPED | OUTPATIENT
Start: 2025-06-09

## 2025-06-09 RX ORDER — HYDROCHLOROTHIAZIDE 25 MG/1
25 TABLET ORAL DAILY
Qty: 90 TABLET | Refills: 4 | Status: SHIPPED | OUTPATIENT
Start: 2025-06-09

## 2025-06-09 RX ORDER — LOSARTAN POTASSIUM 100 MG/1
100 TABLET ORAL DAILY
Qty: 90 TABLET | Refills: 3 | Status: SHIPPED | OUTPATIENT
Start: 2025-06-09

## 2025-06-09 RX ORDER — SERTRALINE HYDROCHLORIDE 100 MG/1
100 TABLET, FILM COATED ORAL DAILY
Qty: 90 TABLET | Refills: 3 | Status: SHIPPED | OUTPATIENT
Start: 2025-06-09

## 2025-06-09 RX ORDER — AMLODIPINE BESYLATE 5 MG/1
5 TABLET ORAL DAILY
Qty: 90 TABLET | Refills: 3 | Status: SHIPPED | OUTPATIENT
Start: 2025-06-09

## 2025-06-09 RX ORDER — METOPROLOL SUCCINATE 100 MG/1
TABLET, EXTENDED RELEASE ORAL
Qty: 90 TABLET | Refills: 1 | Status: SHIPPED | OUTPATIENT
Start: 2025-06-09

## 2025-06-10 NOTE — PROGRESS NOTES
Clinic Care Coordination Contact  Patient arrived for appointment today, but forget her medications and medications boxes. Patient was rescheduled for 6/13/25 at 1:00 pm. Patient said she has enough medications in her medications boxes at home to bridge her to this appointment.

## 2025-07-01 ENCOUNTER — PATIENT OUTREACH (OUTPATIENT)
Dept: CARE COORDINATION | Facility: CLINIC | Age: 80
End: 2025-07-01
Payer: COMMERCIAL

## 2025-07-01 NOTE — PROGRESS NOTES
Clinic Care Coordination Contact  [facility]/Voicemail    Clinical Data: Care Coordinator Outreach    Outreach Documentation Number of Outreach Attempt   7/1/2025   2:53 PM 1       Left message on patient's voicemail with call back information and requested return call.      Plan: Care Coordinator will try to reach patient again in 10 business days.    David Myhre, RN  [facility] RN  [phone]

## 2025-07-08 ENCOUNTER — PATIENT OUTREACH (OUTPATIENT)
Dept: CARE COORDINATION | Facility: CLINIC | Age: 80
End: 2025-07-08
Payer: COMMERCIAL

## 2025-07-08 NOTE — PROGRESS NOTES
Clinic Care Coordination Contact  [facility]/Voicemail    Clinical Data: Care Coordinator Outreach    Outreach Documentation Number of Outreach Attempt   7/1/2025   2:53 PM 1   7/8/2025   3:08 PM 2       Left message on patient's voicemail with call back information and requested return call.      Plan: Care Coordinator will try to reach patient again in 10 business days.    David Myhre, RN   [facility] RN  [phone]

## 2025-08-05 ENCOUNTER — PATIENT OUTREACH (OUTPATIENT)
Dept: CARE COORDINATION | Facility: CLINIC | Age: 80
End: 2025-08-05
Payer: COMMERCIAL

## 2025-08-07 ENCOUNTER — NURSE TRIAGE (OUTPATIENT)
Dept: INTERNAL MEDICINE | Facility: CLINIC | Age: 80
End: 2025-08-07
Payer: COMMERCIAL

## 2025-08-26 ENCOUNTER — PATIENT OUTREACH (OUTPATIENT)
Dept: CARE COORDINATION | Facility: CLINIC | Age: 80
End: 2025-08-26
Payer: COMMERCIAL

## 2025-08-27 ASSESSMENT — ACTIVITIES OF DAILY LIVING (ADL): DEPENDENT_IADLS:: MEDICATION MANAGEMENT
